# Patient Record
Sex: FEMALE | Race: WHITE | NOT HISPANIC OR LATINO | Employment: OTHER | URBAN - METROPOLITAN AREA
[De-identification: names, ages, dates, MRNs, and addresses within clinical notes are randomized per-mention and may not be internally consistent; named-entity substitution may affect disease eponyms.]

---

## 2017-06-22 ENCOUNTER — APPOINTMENT (EMERGENCY)
Dept: RADIOLOGY | Facility: HOSPITAL | Age: 67
End: 2017-06-22
Payer: COMMERCIAL

## 2017-06-22 ENCOUNTER — HOSPITAL ENCOUNTER (EMERGENCY)
Facility: HOSPITAL | Age: 67
Discharge: HOME | End: 2017-06-22
Attending: EMERGENCY MEDICINE | Admitting: EMERGENCY MEDICINE
Payer: COMMERCIAL

## 2017-06-22 VITALS
TEMPERATURE: 97.4 F | HEART RATE: 73 BPM | DIASTOLIC BLOOD PRESSURE: 74 MMHG | BODY MASS INDEX: 24.75 KG/M2 | HEIGHT: 64 IN | RESPIRATION RATE: 20 BRPM | OXYGEN SATURATION: 97 % | SYSTOLIC BLOOD PRESSURE: 149 MMHG | WEIGHT: 145 LBS

## 2017-06-22 DIAGNOSIS — K21.9 GASTROESOPHAGEAL REFLUX DISEASE WITHOUT ESOPHAGITIS: Primary | ICD-10-CM

## 2017-06-22 LAB
ALBUMIN SERPL BCP-MCNC: 4 G/DL (ref 3.5–5)
ALP SERPL-CCNC: 69 U/L (ref 46–116)
ALT SERPL W P-5'-P-CCNC: 24 U/L (ref 12–78)
ANION GAP SERPL CALCULATED.3IONS-SCNC: 9 MMOL/L (ref 4–13)
APTT PPP: 30 SECONDS (ref 23–35)
AST SERPL W P-5'-P-CCNC: 14 U/L (ref 5–45)
BASOPHILS # BLD AUTO: 0 THOUSANDS/ΜL (ref 0–0.1)
BASOPHILS NFR BLD AUTO: 0 % (ref 0–1)
BILIRUB SERPL-MCNC: 0.2 MG/DL (ref 0.2–1)
BUN SERPL-MCNC: 20 MG/DL (ref 5–25)
CALCIUM SERPL-MCNC: 9.1 MG/DL (ref 8.3–10.1)
CHLORIDE SERPL-SCNC: 103 MMOL/L (ref 100–108)
CO2 SERPL-SCNC: 24 MMOL/L (ref 21–32)
CREAT SERPL-MCNC: 0.78 MG/DL (ref 0.6–1.3)
EOSINOPHIL # BLD AUTO: 0.2 THOUSAND/ΜL (ref 0–0.61)
EOSINOPHIL NFR BLD AUTO: 2 % (ref 0–6)
ERYTHROCYTE [DISTWIDTH] IN BLOOD BY AUTOMATED COUNT: 13.9 % (ref 11.6–15.1)
GFR SERPL CREATININE-BSD FRML MDRD: >60 ML/MIN/1.73SQ M
GLUCOSE SERPL-MCNC: 107 MG/DL (ref 65–140)
HCT VFR BLD AUTO: 39.1 % (ref 37–47)
HGB BLD-MCNC: 13.4 G/DL (ref 12–16)
INR PPP: 0.97 (ref 0.86–1.16)
LYMPHOCYTES # BLD AUTO: 3.3 THOUSANDS/ΜL (ref 0.6–4.47)
LYMPHOCYTES NFR BLD AUTO: 41 % (ref 14–44)
MCH RBC QN AUTO: 33.3 PG (ref 27–31)
MCHC RBC AUTO-ENTMCNC: 34.2 G/DL (ref 31.4–37.4)
MCV RBC AUTO: 97 FL (ref 82–98)
MONOCYTES # BLD AUTO: 0.5 THOUSAND/ΜL (ref 0.17–1.22)
MONOCYTES NFR BLD AUTO: 7 % (ref 4–12)
NEUTROPHILS # BLD AUTO: 4.1 THOUSANDS/ΜL (ref 1.85–7.62)
NEUTS SEG NFR BLD AUTO: 50 % (ref 43–75)
NRBC BLD AUTO-RTO: 0 /100 WBCS
PLATELET # BLD AUTO: 317 THOUSANDS/UL (ref 130–400)
PMV BLD AUTO: 6.8 FL (ref 8.9–12.7)
POTASSIUM SERPL-SCNC: 3.6 MMOL/L (ref 3.5–5.3)
PROT SERPL-MCNC: 7.1 G/DL (ref 6.4–8.2)
PROTHROMBIN TIME: 10.2 SECONDS (ref 9.4–11.7)
RBC # BLD AUTO: 4.02 MILLION/UL (ref 4.2–5.4)
SODIUM SERPL-SCNC: 136 MMOL/L (ref 136–145)
TROPONIN I SERPL-MCNC: <0.02 NG/ML
WBC # BLD AUTO: 8.2 THOUSAND/UL (ref 4.8–10.8)

## 2017-06-22 PROCEDURE — 93005 ELECTROCARDIOGRAM TRACING: CPT

## 2017-06-22 PROCEDURE — 80053 COMPREHEN METABOLIC PANEL: CPT

## 2017-06-22 PROCEDURE — 85025 COMPLETE CBC W/AUTO DIFF WBC: CPT

## 2017-06-22 PROCEDURE — 71010 HB CHEST X-RAY 1 VIEW FRONTAL (PORTABLE): CPT

## 2017-06-22 PROCEDURE — 84484 ASSAY OF TROPONIN QUANT: CPT

## 2017-06-22 PROCEDURE — 85730 THROMBOPLASTIN TIME PARTIAL: CPT

## 2017-06-22 PROCEDURE — 99285 EMERGENCY DEPT VISIT HI MDM: CPT

## 2017-06-22 PROCEDURE — 85610 PROTHROMBIN TIME: CPT

## 2017-06-22 PROCEDURE — 36415 COLL VENOUS BLD VENIPUNCTURE: CPT

## 2017-06-22 RX ORDER — SERTRALINE HYDROCHLORIDE 100 MG/1
TABLET, FILM COATED ORAL
COMMUNITY

## 2017-06-22 RX ORDER — OMEPRAZOLE 20 MG/1
10 CAPSULE, DELAYED RELEASE ORAL 2 TIMES DAILY WITH MEALS
COMMUNITY

## 2017-06-22 RX ORDER — ALPRAZOLAM 0.25 MG/1
0.5 TABLET ORAL 4 TIMES DAILY PRN
COMMUNITY

## 2017-06-22 RX ORDER — FAMOTIDINE 20 MG/1
20 TABLET, FILM COATED ORAL DAILY
COMMUNITY

## 2017-06-22 RX ORDER — LISINOPRIL 10 MG/1
10 TABLET ORAL DAILY
COMMUNITY

## 2017-06-30 LAB
ATRIAL RATE: 78 BPM
P AXIS: 78 DEGREES
PR INTERVAL: 140 MS
QRS AXIS: 36 DEGREES
QRSD INTERVAL: 82 MS
QT INTERVAL: 360 MS
QTC INTERVAL: 410 MS
T WAVE AXIS: 59 DEGREES
VENTRICULAR RATE: 78 BPM

## 2017-08-09 ENCOUNTER — HOSPITAL ENCOUNTER (EMERGENCY)
Facility: HOSPITAL | Age: 67
Discharge: HOME | End: 2017-08-09
Attending: EMERGENCY MEDICINE | Admitting: EMERGENCY MEDICINE
Payer: COMMERCIAL

## 2017-08-09 ENCOUNTER — APPOINTMENT (EMERGENCY)
Dept: RADIOLOGY | Facility: HOSPITAL | Age: 67
End: 2017-08-09
Payer: COMMERCIAL

## 2017-08-09 VITALS
WEIGHT: 145 LBS | BODY MASS INDEX: 24.89 KG/M2 | DIASTOLIC BLOOD PRESSURE: 101 MMHG | OXYGEN SATURATION: 95 % | HEART RATE: 79 BPM | SYSTOLIC BLOOD PRESSURE: 143 MMHG | TEMPERATURE: 96.5 F | RESPIRATION RATE: 24 BRPM

## 2017-08-09 DIAGNOSIS — K21.9 GERD (GASTROESOPHAGEAL REFLUX DISEASE): ICD-10-CM

## 2017-08-09 DIAGNOSIS — F41.9 ANXIETY: ICD-10-CM

## 2017-08-09 DIAGNOSIS — R07.9 CHEST PAIN: Primary | ICD-10-CM

## 2017-08-09 PROCEDURE — 99283 EMERGENCY DEPT VISIT LOW MDM: CPT

## 2017-08-09 PROCEDURE — 93005 ELECTROCARDIOGRAM TRACING: CPT | Performed by: EMERGENCY MEDICINE

## 2017-08-09 PROCEDURE — 71010 HB CHEST X-RAY 1 VIEW FRONTAL (PORTABLE): CPT

## 2017-08-10 LAB
ATRIAL RATE: 72 BPM
P AXIS: 75 DEGREES
PR INTERVAL: 130 MS
QRS AXIS: 28 DEGREES
QRSD INTERVAL: 84 MS
QT INTERVAL: 366 MS
QTC INTERVAL: 400 MS
T WAVE AXIS: 60 DEGREES
VENTRICULAR RATE: 72 BPM

## 2017-10-30 ENCOUNTER — TRANSCRIBE ORDERS (OUTPATIENT)
Dept: ADMINISTRATIVE | Facility: HOSPITAL | Age: 67
End: 2017-10-30

## 2017-10-30 DIAGNOSIS — E04.1 NONTOXIC UNINODULAR GOITER: Primary | ICD-10-CM

## 2017-10-30 DIAGNOSIS — R14.0 ABDOMINAL DISTENTION: ICD-10-CM

## 2017-11-01 ENCOUNTER — HOSPITAL ENCOUNTER (OUTPATIENT)
Dept: RADIOLOGY | Facility: HOSPITAL | Age: 67
Discharge: HOME | End: 2017-11-01
Payer: COMMERCIAL

## 2017-11-01 DIAGNOSIS — E04.1 NONTOXIC UNINODULAR GOITER: ICD-10-CM

## 2017-11-01 PROCEDURE — 76536 US EXAM OF HEAD AND NECK: CPT

## 2017-11-10 ENCOUNTER — TRANSCRIBE ORDERS (OUTPATIENT)
Dept: ADMINISTRATIVE | Facility: HOSPITAL | Age: 67
End: 2017-11-10

## 2017-11-10 DIAGNOSIS — R11.0 NAUSEA: ICD-10-CM

## 2017-11-10 DIAGNOSIS — R14.0 ABDOMINAL DISTENTION: Primary | ICD-10-CM

## 2018-08-19 ENCOUNTER — HOSPITAL ENCOUNTER (EMERGENCY)
Facility: HOSPITAL | Age: 68
Discharge: HOME | End: 2018-08-19
Attending: EMERGENCY MEDICINE
Payer: COMMERCIAL

## 2018-08-19 VITALS
DIASTOLIC BLOOD PRESSURE: 76 MMHG | WEIGHT: 143 LBS | HEIGHT: 64 IN | SYSTOLIC BLOOD PRESSURE: 160 MMHG | HEART RATE: 76 BPM | OXYGEN SATURATION: 96 % | BODY MASS INDEX: 24.41 KG/M2 | TEMPERATURE: 97.5 F | RESPIRATION RATE: 18 BRPM

## 2018-08-19 DIAGNOSIS — K21.9 ACID REFLUX: Primary | ICD-10-CM

## 2018-08-19 PROCEDURE — 99283 EMERGENCY DEPT VISIT LOW MDM: CPT

## 2018-08-19 RX ORDER — FAMOTIDINE 10 MG
10 TABLET ORAL
COMMUNITY
End: 2021-10-08

## 2018-08-19 RX ORDER — FAMOTIDINE 10 MG
10 TABLET ORAL
COMMUNITY

## 2018-08-19 NOTE — DISCHARGE INSTRUCTIONS
Gastroesophageal Reflux Disease   WHAT YOU NEED TO KNOW:   Gastroesophageal reflux occurs when acid and food in the stomach back up into the esophagus  Gastroesophageal reflux disease (GERD) is reflux that occurs more than twice a week for a few weeks  It usually causes heartburn and other symptoms  GERD can cause other health problems over time if it is not treated  DISCHARGE INSTRUCTIONS:   Seek care immediately if:   · You feel full and cannot burp or vomit  · You have severe chest pain and sudden trouble breathing  · Your bowel movements are black, bloody, or tarry-looking  · Your vomit looks like coffee grounds or has blood in it  Contact your healthcare provider if:   · You vomit large amounts, or you vomit often  · You have trouble breathing after you vomit  · You have trouble swallowing, or pain with swallowing  · You are losing weight without trying  · Your symptoms get worse or do not improve with treatment  · You have questions or concerns about your condition or care  Medicines:   · Medicines  are used to decrease stomach acid  Medicine may also be used to help your lower esophageal sphincter and stomach contract (tighten) more  · Take your medicine as directed  Contact your healthcare provider if you think your medicine is not helping or if you have side effects  Tell him or her if you are allergic to any medicine  Keep a list of the medicines, vitamins, and herbs you take  Include the amounts, and when and why you take them  Bring the list or the pill bottles to follow-up visits  Carry your medicine list with you in case of an emergency  Manage GERD:   · Do not have foods or drinks that may increase heartburn  These include chocolate, peppermint, fried or fatty foods, drinks that contain caffeine, or carbonated drinks (soda)  Other foods include spicy foods, onions, tomatoes, and tomato-based foods   Do not have foods or drinks that can irritate your esophagus, such as citrus fruits, juices, and alcohol  · Do not eat large meals  When you eat a lot of food at one time, your stomach needs more acid to digest it  Eat 6 small meals each day instead of 3 large ones, and eat slowly  Do not eat meals 2 to 3 hours before bedtime  · Elevate the head of your bed  Place 6-inch blocks under the head of your bed frame  You may also use more than one pillow under your head and shoulders while you sleep  · Maintain a healthy weight  If you are overweight, weight loss may help relieve symptoms of GERD  · Do not smoke  Smoking weakens the lower esophageal sphincter and increases the risk of GERD  Ask your healthcare provider for information if you currently smoke and need help to quit  E-cigarettes or smokeless tobacco still contain nicotine  Talk to your healthcare provider before you use these products  · Do not wear clothing that is tight around your waist   Tight clothing can put pressure on your stomach and cause or worsen GERD symptoms  Follow up with your healthcare provider as directed:  Write down your questions so you remember to ask them during your visits  © 2017 [facility] Information is for End User's use only and may not be sold, redistributed or otherwise used for commercial purposes  All illustrations and images included in CareNotes® are the copyrighted property of A D A M [employer]  or [employer]  The above information is an  only  It is not intended as medical advice for individual conditions or treatments  Talk to your doctor, nurse or pharmacist before following any medical regimen to see if it is safe and effective for you

## 2018-08-19 NOTE — ED PROVIDER NOTES
History  Chief Complaint   Patient presents with    Sore Throat - Complicated     patient states her throat started burning about 90 minutes before presentation  States when she gets this symptom is usually last 2 5-3 5 hours  States this happened a few years ago when she took amoxil and also this past Thursday  Denies SOB or chest pain  Denies problems breathing  States she is having more secretions than usual      HPI    This is a 60-year-old female with history of acid reflux, anxiety  Patient states she has history of acid reflux she has been advised not to drink excessive amounts of caffeine, drinking or smoking but she continues to  States she states she had an extra strong coffee this morning and started having this burning sensation in her epigastric area that is going up to her throat  She states her throat was burning  She states this happened last Thursday along with a year ago  She went to see her PCP last Thursday who stated acid reflex  Currently she takes Pepcid, omeprazole, Maalox  States because of that she got very anxious and came to the ED  Currently her symptoms are getting better  No trouble swallowing  No swelling  Denies sensation of throat closing  No history of anaphylaxis  States she just wanted somebody to talk to and now she feels better  78 year female presenting had acid reflux I discussed with patient this is most likely acid reflux and anxiety acting  I discussed about discontinuing alcohol along with smoking  Patient understands and states she will try  Patient will follow up with her gastroenterologist as needed  Prior to Admission Medications   Prescriptions Last Dose Informant Patient Reported? Taking?    ALPRAZolam (XANAX) 0 25 mg tablet 8/19/2018 at 1400 Self Yes Yes   Sig: Take 0 5 mg by mouth 4 (four) times a day as needed for anxiety     aluminum-magnesium hydroxide 200-200 MG/5ML suspension 8/19/2018 at Unknown time  Yes Yes   Sig: Take 15 mL by mouth every 12 (twelve) hours     famotidine (PEPCID) 10 mg tablet 8/19/2018 at Unknown time  Yes Yes   Sig: Take 10 mg by mouth daily with lunch   famotidine (PEPCID) 10 mg tablet 8/18/2018 at Unknown time  Yes Yes   Sig: Take 10 mg by mouth daily with dinner   famotidine (PEPCID) 20 mg tablet 8/19/2018 at Unknown time  Yes Yes   Sig: Take 20 mg by mouth daily     lisinopril (ZESTRIL) 10 mg tablet 8/19/2018 at Unknown time Self Yes Yes   Sig: Take 10 mg by mouth daily   omeprazole (PriLOSEC) 20 mg delayed release capsule 8/19/2018 at Unknown time Self Yes Yes   Sig: Take 10 mg by mouth 2 (two) times a day with meals     sertraline (ZOLOFT) 100 mg tablet 8/19/2018 at Unknown time Self Yes Yes   Sig: Take by mouth daily        Facility-Administered Medications: None       Past Medical History:   Diagnosis Date    Anxiety     Depression     GERD (gastroesophageal reflux disease)     Hypertension        Past Surgical History:   Procedure Laterality Date    APPENDECTOMY         History reviewed  No pertinent family history  I have reviewed and agree with the history as documented  Social History   Substance Use Topics    Smoking status: Heavy Tobacco Smoker     Packs/day: 2 00    Smokeless tobacco: Never Used    Alcohol use Yes      Comment: every night has "a few" beers        Review of Systems   Constitutional: Negative  Negative for diaphoresis and fever  HENT: Negative for sore throat, trouble swallowing and voice change  Burning back of her throat    Respiratory: Negative  Negative for cough, shortness of breath and wheezing  Cardiovascular: Negative  Negative for chest pain, palpitations and leg swelling  Gastrointestinal: Negative for abdominal distention, abdominal pain, nausea and vomiting  Genitourinary: Negative  Musculoskeletal: Negative  Skin: Negative  Neurological: Negative  Psychiatric/Behavioral: Negative      All other systems reviewed and are negative  Physical Exam  Physical Exam   Constitutional: She is oriented to person, place, and time  She appears well-developed and well-nourished  No distress  HENT:   Head: Normocephalic and atraumatic  Eyes: EOM are normal  Pupils are equal, round, and reactive to light  Neck: Normal range of motion  Neck supple  Cardiovascular: Normal rate, regular rhythm and normal heart sounds  No murmur heard  Pulmonary/Chest: Effort normal and breath sounds normal  No respiratory distress  Abdominal: Soft  Bowel sounds are normal  She exhibits no distension  There is no tenderness  There is no rebound and no guarding  Musculoskeletal: Normal range of motion  She exhibits no edema or deformity  Neurological: She is alert and oriented to person, place, and time  Skin: Skin is warm and dry  She is not diaphoretic  Psychiatric: She has a normal mood and affect  Vitals reviewed        Vital Signs  ED Triage Vitals [08/19/18 1439]   Temperature Pulse Respirations Blood Pressure SpO2   97 5 °F (36 4 °C) 78 18 160/76 98 %      Temp Source Heart Rate Source Patient Position - Orthostatic VS BP Location FiO2 (%)   Oral Monitor Sitting Left arm --      Pain Score       No Pain           Vitals:    08/19/18 1439 08/19/18 1445   BP: 160/76 160/76   Pulse: 78 76   Patient Position - Orthostatic VS: Sitting        Visual Acuity      ED Medications  Medications - No data to display    Diagnostic Studies  Results Reviewed     None                 No orders to display              Procedures  Procedures       Phone Contacts  ED Phone Contact    ED Course                               MDM  CritCare Time    Disposition  Final diagnoses:   Acid reflux     Time reflects when diagnosis was documented in both MDM as applicable and the Disposition within this note     Time User Action Codes Description Comment    8/19/2018  3:09 PM Gerber Hall  8  [K21 9] Acid reflux       ED Disposition     ED Disposition Condition Comment    Discharge  Millicent Ayers discharge to home/self care  Condition at discharge: Good        Follow-up Information     Follow up With Specialties Details Why [address] [facility] [address] [facility] Schedule an appointment as soon as possible for a visit  [address] Juliann Valadez [facility] [address] [place] [address] Nakul Santos - [facility]  [phone]          Patient's Medications   Discharge Prescriptions    No medications on file     No discharge procedures on file      ED Provider  Electronically Signed by           Genoveva Bello MD  08/19/18 7417

## 2019-01-15 ENCOUNTER — HOSPITAL ENCOUNTER (EMERGENCY)
Facility: HOSPITAL | Age: 69
Discharge: HOME | End: 2019-01-15
Attending: EMERGENCY MEDICINE | Admitting: EMERGENCY MEDICINE
Payer: COMMERCIAL

## 2019-01-15 ENCOUNTER — APPOINTMENT (EMERGENCY)
Dept: RADIOLOGY | Facility: HOSPITAL | Age: 69
End: 2019-01-15
Payer: COMMERCIAL

## 2019-01-15 VITALS
BODY MASS INDEX: 24.43 KG/M2 | HEART RATE: 78 BPM | HEIGHT: 64 IN | RESPIRATION RATE: 18 BRPM | SYSTOLIC BLOOD PRESSURE: 155 MMHG | OXYGEN SATURATION: 98 % | TEMPERATURE: 97.1 F | DIASTOLIC BLOOD PRESSURE: 73 MMHG | WEIGHT: 143.08 LBS

## 2019-01-15 DIAGNOSIS — R10.13 EPIGASTRIC PAIN: Primary | ICD-10-CM

## 2019-01-15 DIAGNOSIS — K21.9 GERD (GASTROESOPHAGEAL REFLUX DISEASE): ICD-10-CM

## 2019-01-15 LAB
ALBUMIN SERPL BCP-MCNC: 3.9 G/DL (ref 3.5–5)
ALP SERPL-CCNC: 71 U/L (ref 46–116)
ALT SERPL W P-5'-P-CCNC: 21 U/L (ref 12–78)
ANION GAP SERPL CALCULATED.3IONS-SCNC: 7 MMOL/L (ref 4–13)
APTT PPP: 29 SECONDS (ref 24–33)
AST SERPL W P-5'-P-CCNC: 12 U/L (ref 5–45)
BASOPHILS # BLD AUTO: 0.04 THOUSANDS/ΜL (ref 0–0.1)
BASOPHILS NFR BLD AUTO: 0 % (ref 0–1)
BILIRUB SERPL-MCNC: 0.3 MG/DL (ref 0.2–1)
BILIRUB UR QL STRIP: NEGATIVE
BUN SERPL-MCNC: 19 MG/DL (ref 5–25)
CALCIUM SERPL-MCNC: 9.1 MG/DL (ref 8.3–10.1)
CHLORIDE SERPL-SCNC: 100 MMOL/L (ref 100–108)
CLARITY UR: CLEAR
CO2 SERPL-SCNC: 29 MMOL/L (ref 21–32)
COLOR UR: NORMAL
CREAT SERPL-MCNC: 0.8 MG/DL (ref 0.6–1.3)
EOSINOPHIL # BLD AUTO: 0.13 THOUSAND/ΜL (ref 0–0.61)
EOSINOPHIL NFR BLD AUTO: 2 % (ref 0–6)
ERYTHROCYTE [DISTWIDTH] IN BLOOD BY AUTOMATED COUNT: 13.7 % (ref 11.6–15.1)
GFR SERPL CREATININE-BSD FRML MDRD: 77 ML/MIN/1.73SQ M
GLUCOSE SERPL-MCNC: 129 MG/DL (ref 65–140)
GLUCOSE UR STRIP-MCNC: NEGATIVE MG/DL
HCT VFR BLD AUTO: 41.7 % (ref 34.8–46.1)
HGB BLD-MCNC: 13.4 G/DL (ref 11.5–15.4)
HGB UR QL STRIP.AUTO: NEGATIVE
IMM GRANULOCYTES # BLD AUTO: 0.02 THOUSAND/UL (ref 0–0.2)
IMM GRANULOCYTES NFR BLD AUTO: 0 % (ref 0–2)
INR PPP: 0.93 (ref 0.86–1.16)
KETONES UR STRIP-MCNC: NEGATIVE MG/DL
LEUKOCYTE ESTERASE UR QL STRIP: NEGATIVE
LIPASE SERPL-CCNC: 120 U/L (ref 73–393)
LYMPHOCYTES # BLD AUTO: 2.86 THOUSANDS/ΜL (ref 0.6–4.47)
LYMPHOCYTES NFR BLD AUTO: 32 % (ref 14–44)
MAGNESIUM SERPL-MCNC: 2.1 MG/DL (ref 1.6–2.6)
MCH RBC QN AUTO: 32 PG (ref 26.8–34.3)
MCHC RBC AUTO-ENTMCNC: 32.1 G/DL (ref 31.4–37.4)
MCV RBC AUTO: 100 FL (ref 82–98)
MONOCYTES # BLD AUTO: 0.64 THOUSAND/ΜL (ref 0.17–1.22)
MONOCYTES NFR BLD AUTO: 7 % (ref 4–12)
NEUTROPHILS # BLD AUTO: 5.21 THOUSANDS/ΜL (ref 1.85–7.62)
NEUTS SEG NFR BLD AUTO: 59 % (ref 43–75)
NITRITE UR QL STRIP: NEGATIVE
NRBC BLD AUTO-RTO: 0 /100 WBCS
PH UR STRIP.AUTO: 6.5 [PH] (ref 5–9)
PHOSPHATE SERPL-MCNC: 2.7 MG/DL (ref 2.3–4.1)
PLATELET # BLD AUTO: 321 THOUSANDS/UL (ref 149–390)
PMV BLD AUTO: 9.2 FL (ref 8.9–12.7)
POTASSIUM SERPL-SCNC: 3.9 MMOL/L (ref 3.5–5.3)
PROT SERPL-MCNC: 7.1 G/DL (ref 6.4–8.2)
PROT UR STRIP-MCNC: NEGATIVE MG/DL
PROTHROMBIN TIME: 9.8 SECONDS (ref 9.4–11.7)
RBC # BLD AUTO: 4.19 MILLION/UL (ref 3.81–5.12)
SODIUM SERPL-SCNC: 136 MMOL/L (ref 136–145)
SP GR UR STRIP.AUTO: 1.01 (ref 1–1.03)
TROPONIN I SERPL-MCNC: <0.02 NG/ML
TROPONIN I SERPL-MCNC: <0.02 NG/ML
UROBILINOGEN UR QL STRIP.AUTO: 0.2 E.U./DL
WBC # BLD AUTO: 8.9 THOUSAND/UL (ref 4.31–10.16)

## 2019-01-15 PROCEDURE — 85610 PROTHROMBIN TIME: CPT | Performed by: EMERGENCY MEDICINE

## 2019-01-15 PROCEDURE — 84484 ASSAY OF TROPONIN QUANT: CPT | Performed by: EMERGENCY MEDICINE

## 2019-01-15 PROCEDURE — 83690 ASSAY OF LIPASE: CPT | Performed by: EMERGENCY MEDICINE

## 2019-01-15 PROCEDURE — 99284 EMERGENCY DEPT VISIT MOD MDM: CPT

## 2019-01-15 PROCEDURE — 93005 ELECTROCARDIOGRAM TRACING: CPT

## 2019-01-15 PROCEDURE — 85730 THROMBOPLASTIN TIME PARTIAL: CPT | Performed by: EMERGENCY MEDICINE

## 2019-01-15 PROCEDURE — 83735 ASSAY OF MAGNESIUM: CPT | Performed by: EMERGENCY MEDICINE

## 2019-01-15 PROCEDURE — 96360 HYDRATION IV INFUSION INIT: CPT

## 2019-01-15 PROCEDURE — 81003 URINALYSIS AUTO W/O SCOPE: CPT | Performed by: EMERGENCY MEDICINE

## 2019-01-15 PROCEDURE — 85025 COMPLETE CBC W/AUTO DIFF WBC: CPT | Performed by: EMERGENCY MEDICINE

## 2019-01-15 PROCEDURE — 74022 RADEX COMPL AQT ABD SERIES: CPT

## 2019-01-15 PROCEDURE — 96361 HYDRATE IV INFUSION ADD-ON: CPT

## 2019-01-15 PROCEDURE — 36415 COLL VENOUS BLD VENIPUNCTURE: CPT | Performed by: EMERGENCY MEDICINE

## 2019-01-15 PROCEDURE — 84100 ASSAY OF PHOSPHORUS: CPT | Performed by: EMERGENCY MEDICINE

## 2019-01-15 PROCEDURE — 80053 COMPREHEN METABOLIC PANEL: CPT | Performed by: EMERGENCY MEDICINE

## 2019-01-15 RX ORDER — SUCRALFATE ORAL 1 G/10ML
1000 SUSPENSION ORAL ONCE
Status: DISCONTINUED | OUTPATIENT
Start: 2019-01-15 | End: 2019-01-15 | Stop reason: HOSPADM

## 2019-01-15 RX ORDER — PANTOPRAZOLE SODIUM 40 MG/1
40 INJECTION, POWDER, FOR SOLUTION INTRAVENOUS ONCE
Status: DISCONTINUED | OUTPATIENT
Start: 2019-01-15 | End: 2019-01-15 | Stop reason: HOSPADM

## 2019-01-15 RX ADMIN — SODIUM CHLORIDE 500 ML: 0.9 INJECTION, SOLUTION INTRAVENOUS at 15:20

## 2019-01-15 NOTE — ED NOTES
Pt reportedly left building in street clothes with NSL in place  Security informed, then patient returned ambulatory back to ER  Went to car to smoke a cigarette ,  Advised patient facility is non smoking & leaving building with NSL in place is not acceptable  Pt aware & requested patient  to change into hospital gown, pt refused  Suggested patient stay in room & not in lagunas/roaming department or leaving building & pulled long sleeved shirt up her arm to make NSL visible to hospital staff        Mila Marks, RN  01/15/19 9620

## 2019-01-15 NOTE — Clinical Note
Date: 1/15/2019  Patient: Rocio Reed  Admitted: 1/15/2019  3:01 PM  Attending Provider: Rosina Lee DO    Rocio Reed or her authorized caregiver has made the decision for the patient to leave the emergency department against the advice  of the emergency department staff  She or her authorized caregiver has been informed and understands the inherent risks, including death, worsening pain  She or her authorized caregiver has decided to accept the responsibility for this decision  Dillon Raheem dirk Gutierrezjennifer and all necessary parties have been advised that she may return for further evaluation or treatment  Her condition at time of discharge was stable    Rocio Reed had current vital signs as follows:  /73 (BP Location: Right arm)    Pulse 78   Temp (!) 97 1 °F (36 2 °C) (Oral)   Resp 18   Ht 5' 4" (1 626 m)   Wt 64 9 kg (143 lb 1 3 oz)

## 2019-01-15 NOTE — ED PROVIDER NOTES
History  Chief Complaint   Patient presents with    Heartburn     pt presents to ED with hx of acid reflux  pt states "its way worse and much sharper today" pt states "i want to just get checked out because I'm nervous that the pain is in my chest" pt states she has a hx of anxiety and it flares up with her acid reflux     72-year-old female with past medical history of GERD, hypertension, depression, presents to the ER with complaint of reflux and epigastric pain since this morning  Patient states that she has been getting intubated soft for dinner last night  Patient woke up and had some strong coffee without eating breakfast   Patient then started to feel burning sensation epigastric pain radiating to her chest   Patient became worried once the pain started to radiate to her chest as this does not usually happen with her reflux  Patient is compliant with her Maalox, Pepcid, omeprazole at home for her GERD  Patient denies any previous history of cardiovascular disease  Patient denies any family history of cardiovascular disease  Patient appears to be extremely anxious during interview  Patient states she did take her Xanax earlier today for her anxiety  Patient came to the ER to make sure that her symptoms are not related to any cardiac origin  Patient is followed closely by her PCP  Patient states that she had a stress test about 2 years ago that was unremarkable  Patient is not seen by any cardiologist         History provided by:  Patient  Heartburn   Associated symptoms: chest pain    Associated symptoms: no abdominal pain, no congestion, no cough, no diarrhea, no ear pain, no fever, no headaches, no nausea, no rash, no shortness of breath and no wheezing        Prior to Admission Medications   Prescriptions Last Dose Informant Patient Reported? Taking?    ALPRAZolam (XANAX) 0 25 mg tablet  Self Yes No   Sig: Take 0 5 mg by mouth 4 (four) times a day as needed for anxiety     aluminum-magnesium hydroxide 200-200 MG/5ML suspension   Yes No   Sig: Take 15 mL by mouth every 12 (twelve) hours     famotidine (PEPCID) 10 mg tablet   Yes No   Sig: Take 10 mg by mouth daily with lunch   famotidine (PEPCID) 10 mg tablet   Yes No   Sig: Take 10 mg by mouth daily with dinner   famotidine (PEPCID) 20 mg tablet   Yes No   Sig: Take 20 mg by mouth daily     lisinopril (ZESTRIL) 10 mg tablet  Self Yes No   Sig: Take 10 mg by mouth daily   omeprazole (PriLOSEC) 20 mg delayed release capsule  Self Yes No   Sig: Take 10 mg by mouth 2 (two) times a day with meals     sertraline (ZOLOFT) 100 mg tablet  Self Yes No   Sig: Take by mouth daily        Facility-Administered Medications: None       Past Medical History:   Diagnosis Date    Anxiety     Depression     GERD (gastroesophageal reflux disease)     Hypertension        Past Surgical History:   Procedure Laterality Date    APPENDECTOMY         History reviewed  No pertinent family history  I have reviewed and agree with the history as documented  Social History   Substance Use Topics    Smoking status: Heavy Tobacco Smoker     Packs/day: 2 00    Smokeless tobacco: Never Used    Alcohol use Yes      Comment: every night has "a few" beers        Review of Systems   Constitutional: Negative for activity change, appetite change, chills and fever  HENT: Negative for congestion and ear pain  Eyes: Negative for pain and discharge  Respiratory: Negative for cough, chest tightness, shortness of breath, wheezing and stridor  Cardiovascular: Positive for chest pain  Negative for palpitations  Gastrointestinal: Negative for abdominal distention, abdominal pain, constipation, diarrhea and nausea  Epigastric pain   Endocrine: Negative for cold intolerance  Genitourinary: Negative for dysuria, frequency and urgency  Musculoskeletal: Negative for arthralgias and back pain  Skin: Negative for color change and rash     Allergic/Immunologic: Negative for environmental allergies and food allergies  Neurological: Negative for dizziness, weakness, numbness and headaches  Hematological: Negative for adenopathy  Psychiatric/Behavioral: Negative for agitation, behavioral problems and confusion  The patient is not nervous/anxious  All other systems reviewed and are negative  Physical Exam  Physical Exam   Constitutional: She is oriented to person, place, and time  She appears well-developed and well-nourished  HENT:   Head: Normocephalic and atraumatic  Mouth/Throat: Oropharynx is clear and moist    Eyes: Conjunctivae and EOM are normal    Neck: Normal range of motion  Neck supple  Cardiovascular: Normal rate, regular rhythm, normal heart sounds and intact distal pulses  Pulmonary/Chest: Effort normal and breath sounds normal    Lungs are clear to auscultation  No chest wall tenderness noted to palpation  Abdominal: Soft  Bowel sounds are normal  She exhibits no distension  There is no tenderness  Abdomen is soft  Bowel sounds present all 4 quadrant  Mild tenderness noted to palpation along the epigastric region  Musculoskeletal: Normal range of motion  Neurological: She is alert and oriented to person, place, and time  Skin: Skin is warm and dry  Psychiatric: She has a normal mood and affect  Her behavior is normal  Judgment and thought content normal    Nursing note and vitals reviewed        Vital Signs  ED Triage Vitals [01/15/19 1508]   Temperature Pulse Respirations Blood Pressure SpO2   (!) 97 1 °F (36 2 °C) 78 18 155/73 98 %      Temp Source Heart Rate Source Patient Position - Orthostatic VS BP Location FiO2 (%)   Oral Monitor Sitting Right arm --      Pain Score       --           Vitals:    01/15/19 1508   BP: 155/73   Pulse: 78   Patient Position - Orthostatic VS: Sitting       Visual Acuity      ED Medications  Medications   sodium chloride 0 9 % bolus 500 mL (0 mL Intravenous Stopped 1/15/19 1348)       Diagnostic Studies  Results Reviewed     Procedure Component Value Units Date/Time    Troponin I [[id number]]  (Normal) Collected:  01/15/19 1808    Lab Status:  Final result Specimen:  Blood from Arm, Left Updated:  01/15/19 1833     Troponin I <0 02 ng/mL     UA w Reflex to Microscopic w Reflex to Culture [[id number]] Collected:  01/15/19 1710    Lab Status:  Final result Specimen:  Urine from Urine, Clean Catch Updated:  01/15/19 1719     Color, UA Light Yellow     Clarity, UA Clear     Specific Gravity, UA 1 010     pH, UA 6 5     Leukocytes, UA Negative     Nitrite, UA Negative     Protein, UA Negative mg/dl      Glucose, UA Negative mg/dl      Ketones, UA Negative mg/dl      Urobilinogen, UA 0 2 E U /dl      Bilirubin, UA Negative     Blood, UA Negative    Protime-INR [[id number]]  (Normal) Collected:  01/15/19 1520    Lab Status:  Final result Specimen:  Blood from Arm, Left Updated:  01/15/19 1552     Protime 9 8 seconds      INR 0 93    APTT [[id number]]  (Normal) Collected:  01/15/19 1520    Lab Status:  Final result Specimen:  Blood from Arm, Left Updated:  01/15/19 1552     PTT 29 seconds     Troponin I [[id number]]  (Normal) Collected:  01/15/19 1520    Lab Status:  Final result Specimen:  Blood from Arm, Left Updated:  01/15/19 1547     Troponin I <0 02 ng/mL     Comprehensive metabolic panel [[id number]] Collected:  01/15/19 1520    Lab Status:  Final result Specimen:  Blood from Arm, Left Updated:  01/15/19 1546     Sodium 136 mmol/L      Potassium 3 9 mmol/L      Chloride 100 mmol/L      CO2 29 mmol/L      ANION GAP 7 mmol/L      BUN 19 mg/dL      Creatinine 0 80 mg/dL      Glucose 129 mg/dL      Calcium 9 1 mg/dL      AST 12 U/L      ALT 21 U/L      Alkaline Phosphatase 71 U/L      Total Protein 7 1 g/dL      Albumin 3 9 g/dL      Total Bilirubin 0 30 mg/dL      eGFR 77 ml/min/1 73sq m     Narrative:         National Kidney Disease Education Program recommendations are as follows:  GFR calculation is accurate only with a steady state creatinine  Chronic Kidney disease less than 60 ml/min/1 73 sq  meters  Kidney failure less than 15 ml/min/1 73 sq  meters  Phosphorus [[id number]]  (Normal) Collected:  01/15/19 1520    Lab Status:  Final result Specimen:  Blood from Arm, Left Updated:  01/15/19 1546     Phosphorus 2 7 mg/dL     Magnesium [[id number]]  (Normal) Collected:  01/15/19 1520    Lab Status:  Final result Specimen:  Blood from Arm, Left Updated:  01/15/19 1546     Magnesium 2 1 mg/dL     Lipase [[id number]]  (Normal) Collected:  01/15/19 1520    Lab Status:  Final result Specimen:  Blood from Arm, Left Updated:  01/15/19 1546     Lipase 120 u/L     CBC and differential [[id number]]  (Abnormal) Collected:  01/15/19 1520    Lab Status:  Final result Specimen:  Blood from Arm, Left Updated:  01/15/19 1528     WBC 8 90 Thousand/uL      RBC 4 19 Million/uL      Hemoglobin 13 4 g/dL      Hematocrit 41 7 %       (H) fL      MCH 32 0 pg      MCHC 32 1 g/dL      RDW 13 7 %      MPV 9 2 fL      Platelets 331 Thousands/uL      nRBC 0 /100 WBCs      Neutrophils Relative 59 %      Immat GRANS % 0 %      Lymphocytes Relative 32 %      Monocytes Relative 7 %      Eosinophils Relative 2 %      Basophils Relative 0 %      Neutrophils Absolute 5 21 Thousands/µL      Immature Grans Absolute 0 02 Thousand/uL      Lymphocytes Absolute 2 86 Thousands/µL      Monocytes Absolute 0 64 Thousand/µL      Eosinophils Absolute 0 13 Thousand/µL      Basophils Absolute 0 04 Thousands/µL                  XR abdomen obstruction series   Final Result by Edmond Cassidy MD (01/15 1700)      Nonobstructive bowel gas pattern           Workstation performed: [id number]                    Procedures  ECG 12 Lead Documentation  Date/Time: 1/15/2019 3:19 PM  Performed by: Toña Marie  Authorized by: Toña Marie     Indications / Diagnosis:  Epigastric pain  ECG reviewed by me, the ED Provider: yes    Patient location:  ED  Previous ECG: Previous ECG:  Compared to current    Similarity:  No change    Comparison to cardiac monitor: Yes    Interpretation:     Interpretation: normal    Comments:      Sinus rhythm, rate 73, normal axis, normal intervals, no acute ST/T-wave abnormalities noted, otherwise unremarkable EKG, unchanged from previous study  Phone Contacts  ED Phone Contact    ED Course  ED Course as of Andrea 15 2225   Tue Andrea 15, 2019   1618 Patient very anxious and has refused the Carafate and Protonix saying that she is feeling better  1642 Patient requesting to sign out against medical advice  She is extremely and chest and refusing anything for anxiety  Patient does not want to wait for her 2nd troponin    1700 When nurse went to give patient AMA form, pt changed her mind and decided to stay for second troponin  Pt will be discharged after second troponin is resulted  1845 Second troponin is negative  At this point pt is discharged home with follow up to PCP, GI and cardiology  HEART Risk Score      Most Recent Value   History  0 Filed at: 01/15/2019 1648   ECG  0 Filed at: 01/15/2019 1648   Age  2 Filed at: 01/15/2019 1648   Risk Factors  1 Filed at: 01/15/2019 1648   Troponin  0 Filed at: 01/15/2019 1648   Heart Score Risk Calculator   History  0 Filed at: 01/15/2019 1648   ECG  0 Filed at: 01/15/2019 1648   Age  2 Filed at: 01/15/2019 1648   Risk Factors  1 Filed at: 01/15/2019 1648   Troponin  0 Filed at: 01/15/2019 1648   HEART Score  3 Filed at: 01/15/2019 1648   HEART Score  3 Filed at: 01/15/2019 1648                            MDM  Number of Diagnoses or Management Options  Epigastric pain: new and requires workup  GERD (gastroesophageal reflux disease): new and requires workup  Diagnosis management comments: Obtain blood work, troponin, EKG, obstruction series,   Give IV fluids, Protonix, Carafate and re-evaluate symptoms         Amount and/or Complexity of Data Reviewed  Clinical lab tests: ordered and reviewed  Tests in the radiology section of CPT®: ordered and reviewed  Tests in the medicine section of CPT®: ordered and reviewed  Review and summarize past medical records: yes  Independent visualization of images, tracings, or specimens: yes    Risk of Complications, Morbidity, and/or Mortality  General comments: Patient refused any medications in the ER as she is very anxious was taking new medications  Patient states that her symptoms started to improve in the ER  Patient's EKG and 1st troponin was negative  Patient's heart score was low in the ER  I offered to do a 2nd troponin however patient initially was not agreeable and wanted to sign out AMA  When nurse went to give the [place] form, patient changed her mind  Patient's 2nd troponin was negative as well  At this point patient's symptoms are most likely secondary to GERD and unlikely due to any cardiac origin as her heart score is also low  At this point patient is discharged home with follow-up to PCP, GI, and Cardiology for a repeat stress test  Close return instructions given to return to the ER for any worsening symptoms  Patient agrees with discharge plan  Patient well appearing at time of discharge        Patient Progress  Patient progress: improved    CritCare Time    Disposition  Final diagnoses:   Epigastric pain   GERD (gastroesophageal reflux disease)     Time reflects when diagnosis was documented in both MDM as applicable and the Disposition within this note     Time User Action Codes Description Comment    1/15/2019  4:43 PM Negrito Sanchez Add [R10 13] Epigastric pain     1/15/2019  4:43 PM Vinnie Alonso Add [K21 9] GERD (gastroesophageal reflux disease)       ED Disposition     ED Disposition Condition Comment    Discharge          Follow-up Information     Follow up With Specialties Details Why Codi Gillespie MD Cardiology  To further discuss your symptoms of epigastric pain and possibly obtain an outpatient stress test  Qi U  76       Brianna Madison MD Family Medicine In 3 days  [address] MD Gastroenterology  Please follow up with her own GI doctor or call the number below to further discuss your symptoms of GERD and possibly obtain a follow-up EGD  Shonda Vargas  [phone]            Discharge Medication List as of 1/15/2019  6:44 PM      CONTINUE these medications which have NOT CHANGED    Details   ALPRAZolam (XANAX) 0 25 mg tablet Take 0 5 mg by mouth 4 (four) times a day as needed for anxiety  , Historical Med      aluminum-magnesium hydroxide 200-200 MG/5ML suspension Take 15 mL by mouth every 12 (twelve) hours  , Historical Med      !! famotidine (PEPCID) 10 mg tablet Take 10 mg by mouth daily with lunch, Historical Med      !! famotidine (PEPCID) 10 mg tablet Take 10 mg by mouth daily with dinner, Historical Med      !! famotidine (PEPCID) 20 mg tablet Take 20 mg by mouth daily  , Historical Med      lisinopril (ZESTRIL) 10 mg tablet Take 10 mg by mouth daily, Historical Med      omeprazole (PriLOSEC) 20 mg delayed release capsule Take 10 mg by mouth 2 (two) times a day with meals  , Historical Med      sertraline (ZOLOFT) 100 mg tablet Take by mouth daily  , Historical Med       !! - Potential duplicate medications found  Please discuss with provider  No discharge procedures on file      ED Provider  Electronically Signed by           Stan Hearn DO  01/15/19 9148

## 2019-01-15 NOTE — DISCHARGE INSTRUCTIONS
Please take a list of all of your medications and discharge paperwork with you to all of your follow-up medical visits  Please take all of your medications as directed  Please call your family doctor or return to the ER if you have increased shortness of breath, chest pain, fevers, chills, nausea, vomiting, diarrhea, or any other worsening symptoms  Epigastric Pain, Ambulatory Care   GENERAL INFORMATION:   Epigastric pain  is felt in the middle of the upper abdomen, between the ribs and the bellybutton  The pain may be mild or severe  Pain may spread from or to another part of your body  Epigastric pain may be a sign of a serious health problem that needs to be treated  Common symptoms include the following:   · Inflammation of your stomach, liver, pancreas, or intestines    · Heart problems, such as a heart attack    · Digestion problems, such as indigestion, GERD, or lactose intolerance    · Medical conditions, such as an ulcer, a hernia, irritable bowel syndrome (IBS), or cancer    · A blockage in your bowels or gallbladder    · A bladder infection    · An injury or previous surgery in your abdomen  Seek immediate care for the following symptoms:   · Heart attack symptoms:      ¨ Squeezing, pressure, or pain in your chest that lasts longer than 5 minutes or returns    ¨ Discomfort or pain in your back, neck, jaw, stomach, or arm     ¨ Trouble breathing    ¨ Nausea or vomiting    ¨ Lightheadedness or a sudden cold sweat, especially with chest pain or trouble breathing    · Severe pain that radiates to your jaw or back    · Severe pain that starts suddenly and quickly gets worse    · No ability to have a bowel movement, with vomiting    · Vomiting or coughing up blood    · Blood in your urine or bowel movement    · Feeling drowsy and slower than usual breathing  Treatment for epigastric pain  will depend on what is causing your pain  You may be given medicine to treat pain or to stop vomiting   You may also need medicines to reduce or control stomach acid, or to treat an infection  Manage your symptoms:   · Keep a record of your symptoms  Include when the pain starts, how long it lasts, and if it is sharp or dull  Also include any foods you ate or activities you did before the pain started  Keep track of anything that helped the pain  · Eat a variety of healthy foods  Healthy foods include fruits, vegetables, whole-grain breads, low-fat dairy products, beans, lean meats, and fish  Ask if you need to be on a special diet  Certain foods may cause your pain, such as alcohol or foods that are high in fat  You may need to eat smaller meals and to eat more often than usual     · Drink liquids as directed  Ask how much liquid to drink each day and which liquids are best for you  Do not have drinks that contain alcohol or caffeine  Follow up with your healthcare provider as directed:  Write down your questions so you remember to ask them during your visits  CARE AGREEMENT:   You have the right to help plan your care  Learn about your health condition and how it may be treated  Discuss treatment options with your caregivers to decide what care you want to receive  You always have the right to refuse treatment  The above information is an  only  It is not intended as medical advice for individual conditions or treatments  Talk to your doctor, nurse or pharmacist before following any medical regimen to see if it is safe and effective for you  © 2014 [address] is for End User's use only and may not be sold, redistributed or otherwise used for commercial purposes  All illustrations and images included in CareNotes® are the copyrighted property of [employer]  or [employer]  Gastroesophageal Reflux Disease   WHAT YOU NEED TO KNOW:   What is gastroesophageal reflux disease?   Gastroesophageal reflux occurs when acid and food in the stomach back up into the esophagus  Gastroesophageal reflux disease (GERD) is reflux that occurs more than twice a week for a few weeks  It usually causes heartburn and other symptoms  GERD can cause other health problems over time if it is not treated  What causes GERD? GERD often occurs when the lower muscle (sphincter) of the esophagus does not close properly  The sphincter normally opens to let food into the stomach  It then closes to keep food and stomach acid in the stomach  If the sphincter does not close properly, stomach acid and food back up (reflux) into the esophagus  The following may increase your risk for GERD:  · Certain foods such as spicy foods, chocolate, foods that contain caffeine, peppermint, and fried foods    · Hiatal hernia    · Certain medicines such as calcium channel blockers (used to treat high blood pressure), allergy medicines, sedatives, or antidepressants    · Pregnancy or obesity    · Lying down after a meal    · Drinking alcohol or smoking  What are the signs and symptoms of GERD? Heartburn is the most common symptom of GERD  You may feel burning pain in your chest or below the breast bone  This usually occurs after meals and spreads to your neck, jaw, or shoulder  The pain gets better when you change positions  You may also have any of the following:  · Bitter or acid taste in your mouth    · Dry cough    · Trouble swallowing or pain with swallowing    · Hoarseness or sore throat    · Frequent burping or hiccups    · Feeling of fullness soon after you start eating  How is GERD diagnosed? Your healthcare provider will ask about your symptoms and when they started  Tell him or her about other medical conditions you have, your eating habits, and your activities  You may also need any of the following:  · Esophageal pH monitoring  is used to place a small probe inside your esophagus and stomach to check the amount of acid       · An endoscopy  is a procedure used to look at the inside of your esophagus and stomach  An endoscope is a bendable tube with a light and camera on the end  Your healthcare provider may remove a small sample of tissue and send it to a lab for tests  · Upper GI x-rays  are done to take pictures of your stomach and intestines (bowel)  You may be given a chalky liquid to drink before the pictures are taken  This liquid helps your stomach and intestines show up better on the x-rays  · Esophageal manometry  is a test that shows how your esophagus pushes food and fluid to your stomach  It also shows the pressures in your esophagus and stomach  It may show a hiatal hernia  How is GERD treated? · Medicines  are used to decrease stomach acid  Medicine may also be used to help your lower esophageal sphincter and stomach contract (tighten) more  · Surgery  is done to wrap the upper part of the stomach around the esophageal sphincter  This will strengthen the sphincter and prevent reflux  How can I help manage GERD? · Do not have foods or drinks that may increase heartburn  These include chocolate, peppermint, fried or fatty foods, drinks that contain caffeine, or carbonated drinks (soda)  Other foods include spicy foods, onions, tomatoes, and tomato-based foods  Do not have foods or drinks that can irritate your esophagus, such as citrus fruits, juices, and alcohol  · Do not eat large meals  When you eat a lot of food at one time, your stomach needs more acid to digest it  Eat 6 small meals each day instead of 3 large ones, and eat slowly  Do not eat meals 2 to 3 hours before bedtime  · Elevate the head of your bed  Place 6-inch blocks under the head of your bed frame  You may also use more than one pillow under your head and shoulders while you sleep  · Maintain a healthy weight  If you are overweight, weight loss may help relieve symptoms of GERD  · Do not smoke  Smoking weakens the lower esophageal sphincter and increases the risk of GERD   Ask your healthcare provider for information if you currently smoke and need help to quit  E-cigarettes or smokeless tobacco still contain nicotine  Talk to your healthcare provider before you use these products  · Do not wear clothing that is tight around your waist   Tight clothing can put pressure on your stomach and cause or worsen GERD symptoms  When should I seek immediate care? · You feel full and cannot burp or vomit  · You have severe chest pain and sudden trouble breathing  · Your bowel movements are black, bloody, or tarry-looking  · Your vomit looks like coffee grounds or has blood in it  When should I contact my healthcare provider? · You vomit large amounts, or you vomit often  · You have trouble breathing after you vomit  · You have trouble swallowing, or pain with swallowing  · You are losing weight without trying  · Your symptoms get worse or do not improve with treatment  · You have questions or concerns about your condition or care  CARE AGREEMENT:   You have the right to help plan your care  Learn about your health condition and how it may be treated  Discuss treatment options with your caregivers to decide what care you want to receive  You always have the right to refuse treatment  The above information is an  only  It is not intended as medical advice for individual conditions or treatments  Talk to your doctor, nurse or pharmacist before following any medical regimen to see if it is safe and effective for you  © 2017 [address] is for End User's use only and may not be sold, redistributed or otherwise used for commercial purposes  All illustrations and images included in CareNotes® are the copyrighted property of [employer]  or [employer]

## 2019-01-15 NOTE — ED NOTES
Patient is panicking states "Haim why the dr wants to wait three hours for my next enzyme level  No other Dr  Has done this  I want to leave  Get me paperwork I am going to sign out " Dr Aragon Sayres notified and in room with patient        Brigido Tidwell RN  01/15/19 3236

## 2019-01-15 NOTE — ED NOTES
Risks of signing out AMA discussed with patient  Patient screamed "DEATH! MI! WHAT?!" Patient reassured that these are Possible risks of signing out  Patient screamed "I'm not signing out now! Ill stay but You aren't hooking me up to that monitor and I'm wearing my own clothes though " Dr Alexandre Camacho notified       Frank Cox, PASCUAL  01/15/19 1381

## 2019-01-17 LAB
ATRIAL RATE: 73 BPM
P AXIS: 58 DEGREES
PR INTERVAL: 128 MS
QRS AXIS: 20 DEGREES
QRSD INTERVAL: 82 MS
QT INTERVAL: 364 MS
QTC INTERVAL: 401 MS
T WAVE AXIS: 55 DEGREES
VENTRICULAR RATE: 73 BPM

## 2019-01-17 PROCEDURE — 93010 ELECTROCARDIOGRAM REPORT: CPT | Performed by: INTERNAL MEDICINE

## 2019-05-04 ENCOUNTER — HOSPITAL ENCOUNTER (EMERGENCY)
Facility: HOSPITAL | Age: 69
Discharge: HOME | End: 2019-05-04
Attending: EMERGENCY MEDICINE | Admitting: EMERGENCY MEDICINE
Payer: COMMERCIAL

## 2019-05-04 ENCOUNTER — APPOINTMENT (EMERGENCY)
Dept: RADIOLOGY | Facility: HOSPITAL | Age: 69
End: 2019-05-04
Payer: COMMERCIAL

## 2019-05-04 VITALS
SYSTOLIC BLOOD PRESSURE: 135 MMHG | BODY MASS INDEX: 25.23 KG/M2 | RESPIRATION RATE: 18 BRPM | TEMPERATURE: 99 F | OXYGEN SATURATION: 98 % | HEART RATE: 92 BPM | DIASTOLIC BLOOD PRESSURE: 69 MMHG | WEIGHT: 147 LBS

## 2019-05-04 DIAGNOSIS — F41.9 ANXIETY: Primary | ICD-10-CM

## 2019-05-04 LAB
ALBUMIN SERPL BCP-MCNC: 3.7 G/DL (ref 3.5–5)
ALP SERPL-CCNC: 72 U/L (ref 46–116)
ALT SERPL W P-5'-P-CCNC: 21 U/L (ref 12–78)
ANION GAP SERPL CALCULATED.3IONS-SCNC: 7 MMOL/L (ref 4–13)
AST SERPL W P-5'-P-CCNC: 13 U/L (ref 5–45)
BASOPHILS # BLD AUTO: 0.04 THOUSANDS/ΜL (ref 0–0.1)
BASOPHILS NFR BLD AUTO: 1 % (ref 0–1)
BILIRUB SERPL-MCNC: 0.3 MG/DL (ref 0.2–1)
BILIRUB UR QL STRIP: NEGATIVE
BUN SERPL-MCNC: 20 MG/DL (ref 5–25)
CALCIUM SERPL-MCNC: 9.1 MG/DL (ref 8.3–10.1)
CHLORIDE SERPL-SCNC: 103 MMOL/L (ref 100–108)
CLARITY UR: CLEAR
CO2 SERPL-SCNC: 27 MMOL/L (ref 21–32)
COLOR UR: YELLOW
CREAT SERPL-MCNC: 1.09 MG/DL (ref 0.6–1.3)
DEPRECATED D DIMER PPP: 300 NG/ML (FEU) (ref 190–520)
EOSINOPHIL # BLD AUTO: 0.14 THOUSAND/ΜL (ref 0–0.61)
EOSINOPHIL NFR BLD AUTO: 2 % (ref 0–6)
ERYTHROCYTE [DISTWIDTH] IN BLOOD BY AUTOMATED COUNT: 13.8 % (ref 11.6–15.1)
GFR SERPL CREATININE-BSD FRML MDRD: 53 ML/MIN/1.73SQ M
GLUCOSE SERPL-MCNC: 112 MG/DL (ref 65–140)
GLUCOSE UR STRIP-MCNC: NEGATIVE MG/DL
HCT VFR BLD AUTO: 38.5 % (ref 34.8–46.1)
HGB BLD-MCNC: 12.5 G/DL (ref 11.5–15.4)
HGB UR QL STRIP.AUTO: NEGATIVE
IMM GRANULOCYTES # BLD AUTO: 0.02 THOUSAND/UL (ref 0–0.2)
IMM GRANULOCYTES NFR BLD AUTO: 0 % (ref 0–2)
KETONES UR STRIP-MCNC: ABNORMAL MG/DL
LEUKOCYTE ESTERASE UR QL STRIP: NEGATIVE
LYMPHOCYTES # BLD AUTO: 2.93 THOUSANDS/ΜL (ref 0.6–4.47)
LYMPHOCYTES NFR BLD AUTO: 35 % (ref 14–44)
MAGNESIUM SERPL-MCNC: 2.2 MG/DL (ref 1.6–2.6)
MCH RBC QN AUTO: 32.7 PG (ref 26.8–34.3)
MCHC RBC AUTO-ENTMCNC: 32.5 G/DL (ref 31.4–37.4)
MCV RBC AUTO: 101 FL (ref 82–98)
MONOCYTES # BLD AUTO: 0.63 THOUSAND/ΜL (ref 0.17–1.22)
MONOCYTES NFR BLD AUTO: 7 % (ref 4–12)
NEUTROPHILS # BLD AUTO: 4.71 THOUSANDS/ΜL (ref 1.85–7.62)
NEUTS SEG NFR BLD AUTO: 55 % (ref 43–75)
NITRITE UR QL STRIP: NEGATIVE
NRBC BLD AUTO-RTO: 0 /100 WBCS
PH UR STRIP.AUTO: 6.5 [PH]
PLATELET # BLD AUTO: 281 THOUSANDS/UL (ref 149–390)
PMV BLD AUTO: 9 FL (ref 8.9–12.7)
POTASSIUM SERPL-SCNC: 3.9 MMOL/L (ref 3.5–5.3)
PROT SERPL-MCNC: 6.7 G/DL (ref 6.4–8.2)
PROT UR STRIP-MCNC: NEGATIVE MG/DL
RBC # BLD AUTO: 3.82 MILLION/UL (ref 3.81–5.12)
SODIUM SERPL-SCNC: 137 MMOL/L (ref 136–145)
SP GR UR STRIP.AUTO: 1.02 (ref 1–1.03)
TROPONIN I SERPL-MCNC: <0.02 NG/ML
TSH SERPL DL<=0.05 MIU/L-ACNC: 4.93 UIU/ML (ref 0.36–3.74)
UROBILINOGEN UR QL STRIP.AUTO: 0.2 E.U./DL
WBC # BLD AUTO: 8.47 THOUSAND/UL (ref 4.31–10.16)

## 2019-05-04 PROCEDURE — 71045 X-RAY EXAM CHEST 1 VIEW: CPT

## 2019-05-04 PROCEDURE — 80053 COMPREHEN METABOLIC PANEL: CPT | Performed by: EMERGENCY MEDICINE

## 2019-05-04 PROCEDURE — 85379 FIBRIN DEGRADATION QUANT: CPT | Performed by: EMERGENCY MEDICINE

## 2019-05-04 PROCEDURE — 99284 EMERGENCY DEPT VISIT MOD MDM: CPT

## 2019-05-04 PROCEDURE — 36415 COLL VENOUS BLD VENIPUNCTURE: CPT | Performed by: EMERGENCY MEDICINE

## 2019-05-04 PROCEDURE — 93005 ELECTROCARDIOGRAM TRACING: CPT

## 2019-05-04 PROCEDURE — 84443 ASSAY THYROID STIM HORMONE: CPT | Performed by: EMERGENCY MEDICINE

## 2019-05-04 PROCEDURE — 81003 URINALYSIS AUTO W/O SCOPE: CPT | Performed by: EMERGENCY MEDICINE

## 2019-05-04 PROCEDURE — 85025 COMPLETE CBC W/AUTO DIFF WBC: CPT | Performed by: EMERGENCY MEDICINE

## 2019-05-04 PROCEDURE — 83735 ASSAY OF MAGNESIUM: CPT | Performed by: EMERGENCY MEDICINE

## 2019-05-04 PROCEDURE — 84484 ASSAY OF TROPONIN QUANT: CPT | Performed by: EMERGENCY MEDICINE

## 2019-05-06 LAB
ATRIAL RATE: 72 BPM
P AXIS: 76 DEGREES
PR INTERVAL: 132 MS
QRS AXIS: 37 DEGREES
QRSD INTERVAL: 82 MS
QT INTERVAL: 354 MS
QTC INTERVAL: 387 MS
T WAVE AXIS: 59 DEGREES
VENTRICULAR RATE: 72 BPM

## 2019-05-06 PROCEDURE — 93010 ELECTROCARDIOGRAM REPORT: CPT | Performed by: INTERNAL MEDICINE

## 2019-07-04 ENCOUNTER — OFFICE VISIT (OUTPATIENT)
Dept: URGENT CARE | Facility: CLINIC | Age: 69
End: 2019-07-04
Payer: COMMERCIAL

## 2019-07-04 ENCOUNTER — HOSPITAL ENCOUNTER (EMERGENCY)
Facility: HOSPITAL | Age: 69
Discharge: HOME | End: 2019-07-04
Attending: EMERGENCY MEDICINE | Admitting: EMERGENCY MEDICINE
Payer: COMMERCIAL

## 2019-07-04 ENCOUNTER — APPOINTMENT (EMERGENCY)
Dept: RADIOLOGY | Facility: HOSPITAL | Age: 69
End: 2019-07-04
Payer: COMMERCIAL

## 2019-07-04 VITALS
WEIGHT: 149.5 LBS | BODY MASS INDEX: 25.52 KG/M2 | DIASTOLIC BLOOD PRESSURE: 96 MMHG | HEIGHT: 64 IN | HEART RATE: 86 BPM | OXYGEN SATURATION: 98 % | SYSTOLIC BLOOD PRESSURE: 162 MMHG | TEMPERATURE: 97.8 F | RESPIRATION RATE: 20 BRPM

## 2019-07-04 VITALS
OXYGEN SATURATION: 99 % | SYSTOLIC BLOOD PRESSURE: 146 MMHG | HEART RATE: 88 BPM | WEIGHT: 149.8 LBS | DIASTOLIC BLOOD PRESSURE: 70 MMHG | RESPIRATION RATE: 18 BRPM | BODY MASS INDEX: 25.57 KG/M2 | TEMPERATURE: 97.6 F | HEIGHT: 64 IN

## 2019-07-04 DIAGNOSIS — J40 BRONCHITIS: Primary | ICD-10-CM

## 2019-07-04 DIAGNOSIS — J20.9 ACUTE BRONCHITIS, UNSPECIFIED ORGANISM: Primary | ICD-10-CM

## 2019-07-04 PROCEDURE — 99283 EMERGENCY DEPT VISIT LOW MDM: CPT

## 2019-07-04 PROCEDURE — 71046 X-RAY EXAM CHEST 2 VIEWS: CPT

## 2019-07-04 PROCEDURE — 99213 OFFICE O/P EST LOW 20 MIN: CPT | Performed by: PHYSICIAN ASSISTANT

## 2019-07-04 NOTE — PROGRESS NOTES
[facility]        NAME: Gilbert Mitchell is a 80 y o  female  : 1950    MRN: [id number]  DATE: 2019  TIME: 9:41 AM    Assessment and Plan   Acute bronchitis, unspecified organism [J20 [id number]]  1  Acute bronchitis, unspecified organism           Patient Instructions   Patient is an every day two pack per day smoker-symptoms consistent with bronchitis/COPD exacerbation and not an infectious source  I recommended prednisone and albuterol inhaler for a diagnosis of bronchitis and the patient refuses treatment  The patient has requested antibiotics several times, however, antibiotics are not appropriate in this situation  She states that she does not want to take the prescribed medication because she "does not like taking medicine"  The patient elicits no signs or symptoms of pneumonia or other bacterial infection  Saline nasal spray several times daily, Flonase in a m , cool mist humidifier, may take over-the-counter cough medication for symptoms  Follow up with PCP in 3-5 days  Proceed to  ER if symptoms worsen  Chief Complaint     Chief Complaint   Patient presents with    Cough     Had episode of GERD today - resolved after taking medicine  Then started with tight, congested cough with some chestt ightness  No ear pain or sore throat  No fever  Took Tylenol at 2 pm          History of Present Illness   The patient is a 72-year-old female who presents with a 1 day history of a cough  She is a 2 pack per day smoker but denies a history of COPD  She states that this morning she had an episode of what felt like GERD  She described a burning sensation in her throat that made her cough  Her symptoms quickly resolved after taking her previously prescribed occurred medication  She does admit to a history of GERD in the symptoms felt similar  She denied chest pain, shortness of breath, radiating arm pain  Negative focal deficits  Negative weakness    She has a 1 day history of a cough without shortness of breath or chest tightness  She admits to wheezing but states that she always wheezes  Negative hemoptysis  Negative fever or shaking chills  Positive nasal congestion without sinus point tenderness  Negative headache  Negative dizziness or lightheadedness  Negative abdominal pain, nausea, vomiting or diarrhea  Negative lower extremity edema  HPI    Review of Systems   Review of Systems   Constitutional: Negative for activity change, chills, fatigue and fever  HENT: Positive for congestion and rhinorrhea  Negative for ear discharge, ear pain, facial swelling, mouth sores, postnasal drip, sinus pressure, sinus pain, sneezing, sore throat and trouble swallowing  Eyes: Negative for pain, discharge, redness and itching  Respiratory: Positive for cough and wheezing  Negative for apnea, chest tightness, shortness of breath and stridor  Cardiovascular: Negative for chest pain  Gastrointestinal: Negative for abdominal distention, abdominal pain, diarrhea, nausea and vomiting  Genitourinary: Negative for difficulty urinating  Musculoskeletal: Negative for arthralgias and myalgias  Skin: Negative for pallor and rash  Allergic/Immunologic: Negative  Neurological: Negative for dizziness, light-headedness and headaches  Hematological: Negative  Psychiatric/Behavioral: Negative  All other systems reviewed and are negative          Current Medications       Current Outpatient Medications:     ALPRAZolam (XANAX) 0 25 mg tablet, Take 0 5 mg by mouth 4 (four) times a day as needed for anxiety  , Disp: , Rfl:     aluminum-magnesium hydroxide 200-200 MG/5ML suspension, Take 15 mL by mouth every 12 (twelve) hours  , Disp: , Rfl:     famotidine (PEPCID) 10 mg tablet, Take 10 mg by mouth daily with lunch, Disp: , Rfl:     famotidine (PEPCID) 10 mg tablet, Take 10 mg by mouth daily with dinner, Disp: , Rfl:     famotidine (PEPCID) 20 mg tablet, Take 20 mg by mouth daily  , Disp: , Rfl:     lisinopril (ZESTRIL) 10 mg tablet, Take 10 mg by mouth daily, Disp: , Rfl:     omeprazole (PriLOSEC) 20 mg delayed release capsule, Take 10 mg by mouth 2 (two) times a day with meals  , Disp: , Rfl:     sertraline (ZOLOFT) 100 mg tablet, Take by mouth daily  , Disp: , Rfl:     Current Allergies     Allergies as of 07/04/2019 - Reviewed 07/04/2019   Allergen Reaction Noted    Amoxil [amoxicillin] Other (See Comments) 08/19/2018            The following portions of the patient's history were reviewed and updated as appropriate: allergies, current medications, past family history, past medical history, past social history, past surgical history and problem list      Past Medical History:   Diagnosis Date    Anxiety     Depression     GERD (gastroesophageal reflux disease)     Hypertension        Past Surgical History:   Procedure Laterality Date    APPENDECTOMY      TONSILLECTOMY         No family history on file  Medications have been verified  Objective   /70 (BP Location: Left arm, Patient Position: Sitting, Cuff Size: Standard)   Pulse 88   Temp 97 6 °F (36 4 °C) (Tympanic)   Resp 18   Ht 5' 4" (1 626 m)   Wt 67 9 kg (149 lb 12 8 oz)   SpO2 99%   BMI 25 71 kg/m²        Physical Exam     Physical Exam   Constitutional: She is oriented to person, place, and time  Vital signs are normal  She appears well-developed and well-nourished  Non-toxic appearance  She does not have a sickly appearance  She does not appear ill  No distress  HENT:   Head: Normocephalic and atraumatic  Right Ear: Hearing, tympanic membrane, external ear and ear canal normal  No drainage or tenderness  Tympanic membrane is not perforated, not erythematous, not retracted and not bulging  No middle ear effusion  No decreased hearing is noted  Left Ear: Hearing, tympanic membrane, external ear and ear canal normal  No drainage or tenderness   Tympanic membrane is not perforated, not erythematous, not retracted and not bulging  No middle ear effusion  No decreased hearing is noted  Nose: Nose normal  No mucosal edema, rhinorrhea or septal deviation  Right sinus exhibits no maxillary sinus tenderness and no frontal sinus tenderness  Left sinus exhibits no maxillary sinus tenderness and no frontal sinus tenderness  Mouth/Throat: Uvula is midline, oropharynx is clear and moist and mucous membranes are normal  No oral lesions  No dental abscesses or uvula swelling  No oropharyngeal exudate, posterior oropharyngeal edema, posterior oropharyngeal erythema or tonsillar abscesses  Eyes: Pupils are equal, round, and reactive to light  Conjunctivae and EOM are normal    Neck: Trachea normal, normal range of motion and full passive range of motion without pain  Neck supple  No JVD present  No edema and no erythema present  No thyromegaly present  Cardiovascular: Normal rate, regular rhythm, S1 normal, S2 normal, normal heart sounds, intact distal pulses and normal pulses  No murmur heard  Pulmonary/Chest: Effort normal and breath sounds normal  No accessory muscle usage or stridor  No tachypnea  No respiratory distress  She has no decreased breath sounds  She has no wheezes  She has no rhonchi  She has no rales  Abdominal: Soft  Normal appearance and bowel sounds are normal  She exhibits no distension  There is no hepatosplenomegaly  There is no tenderness  Musculoskeletal: Normal range of motion  She exhibits no edema  Neurological: She is alert and oriented to person, place, and time  Skin: Skin is warm, dry and intact  No abrasion, no lesion and no rash noted  She is not diaphoretic  No cyanosis or erythema  Nails show no clubbing  Psychiatric: Her speech is normal  Her mood appears anxious  Her affect is angry  She is aggressive  Nursing note and vitals reviewed

## 2019-07-04 NOTE — PATIENT INSTRUCTIONS
Saline nasal spray several times daily, Flonase in a m , cool mist humidifier, may take over-the-counter cough medication for symptoms  Follow up with PCP in 3-5 days  Proceed to  ER if symptoms worsen  COPD Exacerbation, Ambulatory Care   GENERAL INFORMATION:   A COPD (chronic obstructive pulmonary disease ) exacerbation  is a flare up or worsening of COPD  Common symptoms include the following:   · Shortness of breath     · A dry cough     · Coughing fits that bring up mucus from your lungs     · Wheezing and chest tightness  Seek immediate care for the following symptoms:   · Confusion, dizziness, or lightheadedness    · Red, swollen, warm arm or leg    · Shortness of breath or chest pain    · Coughing up blood  Treatment for a COPD exacerbation  may include medicines to help decrease swelling and inflammation in your lungs  Medicines may also help open your airways or treat and infection  You may need pulmonary rehabilitation to help you manage your symptoms and improve your quality of life  You may need extra oxygen to help you breathe easier  Prevent another exacerbation:   · Do not smoke, and avoid others who smoke  If you smoke, it is never too late to quit  You may have fewer exacerbations  Ask for information about medicines and support programs that can help you quit  · Avoid triggers that make your symptoms worse  Cold weather and sudden temperature changes can trigger an exacerbation  Fumes from cars and chemicals, air pollution, and perfume can also increase your symptoms  · Use pursed-lip breathing when you feel short of breath  Take a deep breath in through your nose  Slowly breathe out through your mouth with your lips pursed for twice as long as you inhaled  You can also practice this breathing pattern while you bend, lift, climb stairs, or exercise  Pursed-lip breathing slows down your breathing and helps move more air in and out of your lungs             · Exercise for at least 20 minutes each day  Exercise can help increase your energy and decrease shortness of breath  Ask about the best exercise plan for you  · Prevent infections that can be dangerous when you have COPD  Get a flu vaccine every year as soon as it becomes available  Ask if you should also get other vaccines, such as those given to prevent pneumonia and tetanus  Avoid people who are sick, and wash your hands often  Follow up with your healthcare provider as directed:  Write down your questions so you remember to ask them during your visits  CARE AGREEMENT:   You have the right to help plan your care  Learn about your health condition and how it may be treated  Discuss treatment options with your caregivers to decide what care you want to receive  You always have the right to refuse treatment  The above information is an  only  It is not intended as medical advice for individual conditions or treatments  Talk to your doctor, nurse or pharmacist before following any medical regimen to see if it is safe and effective for you  © 2014 [address] is for End User's use only and may not be sold, redistributed or otherwise used for commercial purposes  All illustrations and images included in CareNotes® are the copyrighted property of [employer]  or [employer]rell

## 2020-01-12 ENCOUNTER — APPOINTMENT (EMERGENCY)
Dept: RADIOLOGY | Facility: HOSPITAL | Age: 70
End: 2020-01-12
Payer: COMMERCIAL

## 2020-01-12 ENCOUNTER — HOSPITAL ENCOUNTER (EMERGENCY)
Facility: HOSPITAL | Age: 70
Discharge: HOME | End: 2020-01-12
Attending: EMERGENCY MEDICINE | Admitting: EMERGENCY MEDICINE
Payer: COMMERCIAL

## 2020-01-12 VITALS
OXYGEN SATURATION: 99 % | TEMPERATURE: 98.3 F | RESPIRATION RATE: 17 BRPM | HEIGHT: 64 IN | WEIGHT: 150 LBS | HEART RATE: 86 BPM | BODY MASS INDEX: 25.61 KG/M2 | SYSTOLIC BLOOD PRESSURE: 148 MMHG | DIASTOLIC BLOOD PRESSURE: 70 MMHG

## 2020-01-12 DIAGNOSIS — F41.9 ANXIETY: Primary | ICD-10-CM

## 2020-01-12 PROCEDURE — 71046 X-RAY EXAM CHEST 2 VIEWS: CPT

## 2020-01-12 PROCEDURE — 99283 EMERGENCY DEPT VISIT LOW MDM: CPT

## 2020-01-12 PROCEDURE — 99284 EMERGENCY DEPT VISIT MOD MDM: CPT | Performed by: EMERGENCY MEDICINE

## 2020-01-12 NOTE — ED PROVIDER NOTES
History  Chief Complaint   Patient presents with    Anxiety     patient was diagnosed with anxiety it 1988  Has had "flare ups" for weeks on end periodically throughout the year  This epsiode started around Julius, patient has been taking more xanax than usual  Patient increased her zoloft dosage on her own  Saw PCP and made him aware  No additional change to medications  Patient had an episode yesterday and today of not feeling able to catch her breath "that just won't quit"  77 female with c/o being anxious and having constant thoughts that make her anxious  States she gets sob with that  Pt states that she increased her zoloft dose at home but it hasn't helped  She states that this just won't quit  Does take xanax routinely  No alleviating factors  No other assoc symptoms      History provided by:  Patient   used: No        Prior to Admission Medications   Prescriptions Last Dose Informant Patient Reported? Taking?    ALPRAZolam (XANAX) 0 25 mg tablet 1/12/2020 at 1300 Self Yes Yes   Sig: Take 0 5 mg by mouth 4 (four) times a day as needed for anxiety     aluminum-magnesium hydroxide 200-200 MG/5ML suspension   Yes No   Sig: Take 15 mL by mouth every 12 (twelve) hours     famotidine (PEPCID) 10 mg tablet   Yes Yes   Sig: Take 10 mg by mouth daily with lunch   famotidine (PEPCID) 10 mg tablet   Yes Yes   Sig: Take 10 mg by mouth daily with dinner   famotidine (PEPCID) 20 mg tablet   Yes Yes   Sig: Take 20 mg by mouth daily     lisinopril (ZESTRIL) 10 mg tablet  Self Yes Yes   Sig: Take 10 mg by mouth daily   omeprazole (PriLOSEC) 20 mg delayed release capsule  Self Yes Yes   Sig: Take 10 mg by mouth 2 (two) times a day with meals     sertraline (ZOLOFT) 100 mg tablet  Self Yes No   Sig: Take by mouth daily        Facility-Administered Medications: None       Past Medical History:   Diagnosis Date    Anxiety     Depression     GERD (gastroesophageal reflux disease)     Hypertension        Past Surgical History:   Procedure Laterality Date    APPENDECTOMY      TONSILLECTOMY         History reviewed  No pertinent family history  I have reviewed and agree with the history as documented  Social History     Tobacco Use    Smoking status: Heavy Tobacco Smoker     Packs/day: 1 50    Smokeless tobacco: Never Used   Substance Use Topics    Alcohol use: Yes     Alcohol/week: 28 0 standard drinks     Types: 28 Cans of beer per week     Comment: every night has "a few" beers    Drug use: No        Review of Systems   Constitutional: Negative for activity change, chills, diaphoresis and fever  HENT: Negative for congestion, ear pain, nosebleeds, sore throat, trouble swallowing and voice change  Eyes: Negative for pain, discharge and redness  Respiratory: Positive for shortness of breath  Negative for apnea, cough, choking, wheezing and stridor  Cardiovascular: Negative for chest pain and palpitations  Gastrointestinal: Negative for abdominal distention, abdominal pain, constipation, diarrhea, nausea and vomiting  Endocrine: Negative for polydipsia  Genitourinary: Negative for difficulty urinating, dysuria, flank pain, frequency, hematuria and urgency  Musculoskeletal: Negative for back pain, gait problem, joint swelling, myalgias, neck pain and neck stiffness  Skin: Negative for pallor and rash  Neurological: Negative for dizziness, tremors, syncope, speech difficulty, weakness, numbness and headaches  Hematological: Negative for adenopathy  Psychiatric/Behavioral: Positive for sleep disturbance  Negative for confusion, hallucinations, self-injury and suicidal ideas  The patient is nervous/anxious  Physical Exam  Physical Exam   Constitutional: She is oriented to person, place, and time  Vital signs are normal  She appears well-developed and well-nourished  No distress  HENT:   Head: Normocephalic and atraumatic     Right Ear: External ear normal  Left Ear: External ear normal    Nose: Nose normal    Mouth/Throat: Oropharynx is clear and moist    Eyes: Pupils are equal, round, and reactive to light  Conjunctivae are normal    Neck: Normal range of motion  Neck supple  Cardiovascular: Normal rate, regular rhythm, normal heart sounds and intact distal pulses  Pulmonary/Chest: Effort normal and breath sounds normal    Abdominal: Soft  Bowel sounds are normal    Musculoskeletal: Normal range of motion  Neurological: She is alert and oriented to person, place, and time  She has normal reflexes  Skin: Skin is warm and dry  She is not diaphoretic  Psychiatric: She has a normal mood and affect  Nursing note and vitals reviewed  Vital Signs  ED Triage Vitals [01/12/20 1356]   Temperature Pulse Respirations Blood Pressure SpO2   98 3 °F (36 8 °C) 86 17 148/70 99 %      Temp Source Heart Rate Source Patient Position - Orthostatic VS BP Location FiO2 (%)   Tympanic Monitor Sitting Right arm --      Pain Score       --           Vitals:    01/12/20 1356   BP: 148/70   Pulse: 86   Patient Position - Orthostatic VS: Sitting         Visual Acuity      ED Medications  Medications - No data to display    Diagnostic Studies  Results Reviewed     None                 XR chest 2 views    (Results Pending)              Procedures  Procedures         ED Course                               MDM      Disposition  Final diagnoses:   Anxiety     Time reflects when diagnosis was documented in both MDM as applicable and the Disposition within this note     Time User Action Codes Description Comment    1/12/2020  3:45 PM Priya Mancia Add [F41 9] Anxiety       ED Disposition     ED Disposition Condition Date/Time Comment    Discharge Stable Sun Jan 12, 2020  3:45 PM Promise Mujica discharge to home/self care              Follow-up Information     Follow up With Specialties Details Why [address],  Family Medicine Schedule an appointment as soon as possible for a visit  As needed [address] [facility]  [phone]            Patient's Medications   Discharge Prescriptions    No medications on file     No discharge procedures on file      ED Provider  Electronically Signed by           Katty Pedraza DO  01/12/20 8624

## 2020-03-04 NOTE — ED PROVIDER NOTES
History  Chief Complaint   Patient presents with    Cough     Pt sts she had acid reflux this am   Took meds but now has continuous cough  Went to care now and they wanted to put her on steroids but she refused  Thinks she has bronchitis  80year old female hx anxiety presents with cough x1 day  It started today after an episode of acid reflux  She was evaluated at urgent care earlier today, was diagnosed with bronchitis however patient refused treatment  Patient states she is in a rush and just wants a chest xray done  She is asymptomatic at this time  No shortness of breath, difficulty breathing, chest tightness, fever, chills, abdominal pain, nausea, vomiting  No recent travel  She is a current smoker  No calf pain or leg swelling  Prior to Admission Medications   Prescriptions Last Dose Informant Patient Reported? Taking? ALPRAZolam (XANAX) 0 25 mg tablet  Self Yes No   Sig: Take 0 5 mg by mouth 4 (four) times a day as needed for anxiety     aluminum-magnesium hydroxide 200-200 MG/5ML suspension   Yes No   Sig: Take 15 mL by mouth every 12 (twelve) hours     famotidine (PEPCID) 10 mg tablet   Yes No   Sig: Take 10 mg by mouth daily with lunch   famotidine (PEPCID) 10 mg tablet   Yes No   Sig: Take 10 mg by mouth daily with dinner   famotidine (PEPCID) 20 mg tablet   Yes No   Sig: Take 20 mg by mouth daily     lisinopril (ZESTRIL) 10 mg tablet  Self Yes No   Sig: Take 10 mg by mouth daily   omeprazole (PriLOSEC) 20 mg delayed release capsule  Self Yes No   Sig: Take 10 mg by mouth 2 (two) times a day with meals     sertraline (ZOLOFT) 100 mg tablet  Self Yes No   Sig: Take by mouth daily        Facility-Administered Medications: None       Past Medical History:   Diagnosis Date    Anxiety     Depression     GERD (gastroesophageal reflux disease)     Hypertension        Past Surgical History:   Procedure Laterality Date    APPENDECTOMY      TONSILLECTOMY         History reviewed   No pertinent family history  I have reviewed and agree with the history as documented  Social History     Tobacco Use    Smoking status: Heavy Tobacco Smoker     Packs/day: 2 00    Smokeless tobacco: Never Used   Substance Use Topics    Alcohol use: Yes     Alcohol/week: 28 0 standard drinks     Types: 28 Cans of beer per week     Comment: every night has "a few" beers    Drug use: No        Review of Systems   Constitutional: Negative for chills and fever  HENT: Negative for sneezing and sore throat  Respiratory: Positive for cough  Negative for shortness of breath  Cardiovascular: Negative for chest pain, palpitations and leg swelling  Gastrointestinal: Negative for abdominal pain, constipation, diarrhea, nausea and vomiting  Musculoskeletal: Negative for back pain, gait problem and joint swelling  Skin: Negative for color change, pallor, rash and wound  Neurological: Negative for dizziness, syncope, weakness, light-headedness, numbness and headaches  All other systems reviewed and are negative  Physical Exam  Physical Exam   Constitutional: She appears well-developed and well-nourished  No distress  Patient anxious   HENT:   Head: Normocephalic and atraumatic  Nose: Nose normal    Eyes: EOM are normal    Neck: Normal range of motion  Cardiovascular: Normal rate, regular rhythm, normal heart sounds and intact distal pulses  Exam reveals no gallop and no friction rub  No murmur heard  Pulmonary/Chest: Effort normal and breath sounds normal  No stridor  No respiratory distress  She has no wheezes  She has no rales  Sp02 is 98% indicating adequate oxygenation on room air   Skin: Skin is warm and dry  Capillary refill takes less than 2 seconds  No rash noted  She is not diaphoretic  No erythema  No pallor  Nursing note and vitals reviewed        Vital Signs  ED Triage Vitals [07/04/19 1630]   Temperature Pulse Respirations Blood Pressure SpO2   97 8 °F (36 6 °C) 86 20 162/96 98 %      Temp src Heart Rate Source Patient Position - Orthostatic VS BP Location FiO2 (%)   -- -- -- -- --      Pain Score       No Pain           Vitals:    07/04/19 1630   BP: 162/96   Pulse: 86         Visual Acuity      ED Medications  Medications - No data to display    Diagnostic Studies  Results Reviewed     None                 XR chest 2 views    (Results Pending)              Procedures  Procedures       ED Course                               MDM  Number of Diagnoses or Management Options  Bronchitis:   Diagnosis management comments: Cough likely secondary to developing bronchitis vs GERD episode  CXR NAD  Encouraged supportive treatment at this time, can swallow honey, use humidifier, increase hydration, etc  Patient was offered steroids and rescue inhaler at urgent care which she refused, as she also refused any other medications/interventions at this visit  Gave patient proper education regarding diagnosis  Answered all questions  Return to ED for any worsening of symptoms otherwise follow up with primary care physician for re-evaluation  Discussed plan with patient who verbalized understanding and agreed to plan  Amount and/or Complexity of Data Reviewed  Tests in the radiology section of CPT®: ordered and reviewed  Discussion of test results with the performing providers: yes  Review and summarize past medical records: yes  Discuss the patient with other providers: yes  Independent visualization of images, tracings, or specimens: yes        Disposition  Final diagnoses:   Bronchitis     Time reflects when diagnosis was documented in both MDM as applicable and the Disposition within this note     Time User Action Codes Description Comment    7/4/2019  4:56 PM Nakul, [facility] Bronchitis       ED Disposition     ED Disposition Condition Date/Time Comment    Discharge Good Thu Jul 4, 2019  4:56 PM Bran Cedillo discharge to home/self care              Follow-up Information     Follow up With Specialties Details Why Contact Info Additional [address], DO Family Medicine Schedule an appointment as soon as possible for a visit in 1 week If symptoms worsen [address] [facility] Emergency Department Emergency Medicine Go to  As needed [address] [facility], [place], [place], [address]          Discharge Medication List as of 7/4/2019  4:56 PM      CONTINUE these medications which have NOT CHANGED    Details   ALPRAZolam (XANAX) 0 25 mg tablet Take 0 5 mg by mouth 4 (four) times a day as needed for anxiety  , Historical Med      aluminum-magnesium hydroxide 200-200 MG/5ML suspension Take 15 mL by mouth every 12 (twelve) hours  , Historical Med      !! famotidine (PEPCID) 10 mg tablet Take 10 mg by mouth daily with lunch, Historical Med      !! famotidine (PEPCID) 10 mg tablet Take 10 mg by mouth daily with dinner, Historical Med      !! famotidine (PEPCID) 20 mg tablet Take 20 mg by mouth daily  , Historical Med      lisinopril (ZESTRIL) 10 mg tablet Take 10 mg by mouth daily, Historical Med      omeprazole (PriLOSEC) 20 mg delayed release capsule Take 10 mg by mouth 2 (two) times a day with meals  , Historical Med      sertraline (ZOLOFT) 100 mg tablet Take by mouth daily  , Historical Med       !! - Potential duplicate medications found  Please discuss with provider  No discharge procedures on file      ED Provider  Electronically Signed by           Armond Burks PA-C  07/04/19 4616 Home

## 2021-01-04 ENCOUNTER — OFFICE VISIT (OUTPATIENT)
Dept: URGENT CARE | Facility: CLINIC | Age: 71
End: 2021-01-04
Payer: COMMERCIAL

## 2021-01-04 VITALS
RESPIRATION RATE: 20 BRPM | SYSTOLIC BLOOD PRESSURE: 142 MMHG | HEART RATE: 94 BPM | HEIGHT: 63 IN | WEIGHT: 152 LBS | DIASTOLIC BLOOD PRESSURE: 73 MMHG | BODY MASS INDEX: 26.93 KG/M2 | OXYGEN SATURATION: 97 %

## 2021-01-04 DIAGNOSIS — M54.2 NECK PAIN: Primary | ICD-10-CM

## 2021-01-04 PROCEDURE — 99213 OFFICE O/P EST LOW 20 MIN: CPT | Performed by: PHYSICIAN ASSISTANT

## 2021-01-04 NOTE — PATIENT INSTRUCTIONS
Exam shows no signs of neurological dysfunction  No shortness or breath, chest pain or headache  No ECG needed at this time  Continue to take tylenol every 6 hours up to 650mg each time  Can use icy/hot or heating pads  Patient declined muscle relaxer and prednisone  Stretch your neck with range of motion exercises once or twice an hour when you can  If pain continues follow up with primary care doctor for physical therapy referral  Reassured patient strongly believed this was a muscular issue and with continued symptomatic treatment will resolve

## 2021-01-04 NOTE — PROGRESS NOTES
[facility]        NAME: Pascual Miranda is a 72 y o  female  : 1950    MRN: [id number]  DATE: 2021  TIME: 5:25 PM    Assessment and Plan   Neck pain [M54 2]  1  Neck pain           Patient Instructions     Patient Instructions   Exam shows no signs of neurological dysfunction  No shortness or breath, chest pain or headache  No ECG needed at this time  Continue to take tylenol every 6 hours up to 650mg each time  Can use icy/hot or heating pads  Patient declined muscle relaxer and prednisone  Stretch your neck with range of motion exercises once or twice an hour when you can  If pain continues follow up with primary care doctor for physical therapy referral  Reassured patient strongly believed this was a muscular issue and with continued symptomatic treatment will resolve  Follow up with PCP in 3-5 days  Proceed to  ER if symptoms worsen  Chief Complaint   No chief complaint on file  History of Present Illness       45-year-old female with severe anxiety presents today with stiff neck and pain x4 days  She notes the pain started as stiffness and was mild but 3 days ago she carried in heavy groceries and made the pain worse  She feels like she cannot move her head in all directions fully  Patient has been taking Tylenol twice a day and using icy Hot x1 day with mild symptomatic relief  She notes she has a history of back problems and sees a chiropractor  She called the chiropractor but he retired because of [place]  She has never taken any medication other than over-the-counter because of her psychiatric meds  She feels as though her pain is getting better with time however her anxiety was high today she wanted to be examined  The patient denies chest pain shortness of breath fever headache dizziness nausea vomiting diarrhea  Review of Systems   Review of Systems   Constitutional: Negative for chills, diaphoresis and fever     Respiratory: Negative for cough, chest tightness and shortness of breath  Cardiovascular: Negative for chest pain and palpitations  Gastrointestinal: Negative for diarrhea, nausea and vomiting  Musculoskeletal: Positive for back pain, neck pain and neck stiffness  Neurological: Negative for light-headedness and headaches  Psychiatric/Behavioral: The patient is nervous/anxious (High anxiety)  Current Medications       Current Outpatient Medications:     ALPRAZolam (XANAX) 0 25 mg tablet, Take 0 5 mg by mouth 4 (four) times a day as needed for anxiety  , Disp: , Rfl:     aluminum-magnesium hydroxide 200-200 MG/5ML suspension, Take 15 mL by mouth every 12 (twelve) hours  , Disp: , Rfl:     famotidine (PEPCID) 10 mg tablet, Take 10 mg by mouth daily with lunch, Disp: , Rfl:     famotidine (PEPCID) 10 mg tablet, Take 10 mg by mouth daily with dinner, Disp: , Rfl:     famotidine (PEPCID) 20 mg tablet, Take 20 mg by mouth daily  , Disp: , Rfl:     lisinopril (ZESTRIL) 10 mg tablet, Take 10 mg by mouth daily, Disp: , Rfl:     omeprazole (PriLOSEC) 20 mg delayed release capsule, Take 10 mg by mouth 2 (two) times a day with meals  , Disp: , Rfl:     sertraline (ZOLOFT) 100 mg tablet, Take by mouth daily  , Disp: , Rfl:     Current Allergies     Allergies as of 01/04/2021 - Reviewed 01/12/2020   Allergen Reaction Noted    Amoxil [amoxicillin] Other (See Comments) 08/19/2018            The following portions of the patient's history were reviewed and updated as appropriate: allergies, current medications, past family history, past medical history, past social history, past surgical history and problem list      Past Medical History:   Diagnosis Date    Anxiety     Depression     GERD (gastroesophageal reflux disease)     Hypertension        Past Surgical History:   Procedure Laterality Date    APPENDECTOMY      TONSILLECTOMY         No family history on file        Medications have been verified  Objective   /73   Pulse 94   Resp 20   Ht 5' 3" (1 6 m)   Wt 68 9 kg (152 lb)   SpO2 97%   BMI 26 93 kg/m²        Physical Exam     Physical Exam  Vitals signs reviewed  Constitutional:       Appearance: Normal appearance  Neck:      Musculoskeletal: Normal range of motion  Muscular tenderness present  No neck rigidity  Vascular: Carotid bruit: left sided scalene TTP  Comments: No cervical bony tenderness  Full ROM with some pain  N/V intact, no numbness/tingling  Cardiovascular:      Rate and Rhythm: Normal rate and regular rhythm  Pulses: Normal pulses  Heart sounds: Normal heart sounds  Pulmonary:      Effort: Pulmonary effort is normal  No respiratory distress  Breath sounds: Normal breath sounds  Musculoskeletal: Normal range of motion  General: No tenderness  Neurological:      Mental Status: She is alert and oriented to person, place, and time  Mental status is at baseline     Psychiatric:      Comments: Visually anxious with rapid speech and knee shaking

## 2021-10-08 ENCOUNTER — HOSPITAL ENCOUNTER (EMERGENCY)
Facility: HOSPITAL | Age: 71
Discharge: HOME | End: 2021-10-08
Attending: EMERGENCY MEDICINE | Admitting: EMERGENCY MEDICINE
Payer: COMMERCIAL

## 2021-10-08 ENCOUNTER — APPOINTMENT (EMERGENCY)
Dept: RADIOLOGY | Facility: HOSPITAL | Age: 71
End: 2021-10-08
Payer: COMMERCIAL

## 2021-10-08 VITALS
RESPIRATION RATE: 22 BRPM | SYSTOLIC BLOOD PRESSURE: 153 MMHG | HEART RATE: 80 BPM | BODY MASS INDEX: 27.63 KG/M2 | DIASTOLIC BLOOD PRESSURE: 70 MMHG | OXYGEN SATURATION: 95 % | WEIGHT: 156 LBS | TEMPERATURE: 97.8 F

## 2021-10-08 DIAGNOSIS — R07.9 CHEST PAIN, UNSPECIFIED: Primary | ICD-10-CM

## 2021-10-08 LAB
ANION GAP SERPL CALCULATED.3IONS-SCNC: 8 MMOL/L (ref 4–13)
BASOPHILS # BLD AUTO: 0.04 THOUSANDS/ΜL (ref 0–0.1)
BASOPHILS NFR BLD AUTO: 1 % (ref 0–1)
BUN SERPL-MCNC: 22 MG/DL (ref 5–25)
CALCIUM SERPL-MCNC: 9.3 MG/DL (ref 8.3–10.1)
CHLORIDE SERPL-SCNC: 103 MMOL/L (ref 100–108)
CO2 SERPL-SCNC: 29 MMOL/L (ref 21–32)
CREAT SERPL-MCNC: 0.73 MG/DL (ref 0.6–1.3)
EOSINOPHIL # BLD AUTO: 0.12 THOUSAND/ΜL (ref 0–0.61)
EOSINOPHIL NFR BLD AUTO: 1 % (ref 0–6)
ERYTHROCYTE [DISTWIDTH] IN BLOOD BY AUTOMATED COUNT: 13.7 % (ref 11.6–15.1)
GFR SERPL CREATININE-BSD FRML MDRD: 84 ML/MIN/1.73SQ M
GLUCOSE SERPL-MCNC: 107 MG/DL (ref 65–140)
HCT VFR BLD AUTO: 40.8 % (ref 34.8–46.1)
HGB BLD-MCNC: 13.1 G/DL (ref 11.5–15.4)
IMM GRANULOCYTES # BLD AUTO: 0.02 THOUSAND/UL (ref 0–0.2)
IMM GRANULOCYTES NFR BLD AUTO: 0 % (ref 0–2)
LYMPHOCYTES # BLD AUTO: 2.48 THOUSANDS/ΜL (ref 0.6–4.47)
LYMPHOCYTES NFR BLD AUTO: 29 % (ref 14–44)
MCH RBC QN AUTO: 32.3 PG (ref 26.8–34.3)
MCHC RBC AUTO-ENTMCNC: 32.1 G/DL (ref 31.4–37.4)
MCV RBC AUTO: 101 FL (ref 82–98)
MONOCYTES # BLD AUTO: 0.65 THOUSAND/ΜL (ref 0.17–1.22)
MONOCYTES NFR BLD AUTO: 8 % (ref 4–12)
NEUTROPHILS # BLD AUTO: 5.37 THOUSANDS/ΜL (ref 1.85–7.62)
NEUTS SEG NFR BLD AUTO: 61 % (ref 43–75)
NRBC BLD AUTO-RTO: 0 /100 WBCS
PLATELET # BLD AUTO: 315 THOUSANDS/UL (ref 149–390)
PMV BLD AUTO: 9.4 FL (ref 8.9–12.7)
POTASSIUM SERPL-SCNC: 4.2 MMOL/L (ref 3.5–5.3)
RBC # BLD AUTO: 4.05 MILLION/UL (ref 3.81–5.12)
SODIUM SERPL-SCNC: 140 MMOL/L (ref 136–145)
TROPONIN I SERPL-MCNC: <0.02 NG/ML
WBC # BLD AUTO: 8.68 THOUSAND/UL (ref 4.31–10.16)

## 2021-10-08 PROCEDURE — 80048 BASIC METABOLIC PNL TOTAL CA: CPT | Performed by: PHYSICIAN ASSISTANT

## 2021-10-08 PROCEDURE — 99285 EMERGENCY DEPT VISIT HI MDM: CPT

## 2021-10-08 PROCEDURE — 84484 ASSAY OF TROPONIN QUANT: CPT | Performed by: PHYSICIAN ASSISTANT

## 2021-10-08 PROCEDURE — 36415 COLL VENOUS BLD VENIPUNCTURE: CPT | Performed by: PHYSICIAN ASSISTANT

## 2021-10-08 PROCEDURE — NC001 PR NO CHARGE: Performed by: EMERGENCY MEDICINE

## 2021-10-08 PROCEDURE — 93005 ELECTROCARDIOGRAM TRACING: CPT

## 2021-10-08 PROCEDURE — 85025 COMPLETE CBC W/AUTO DIFF WBC: CPT | Performed by: PHYSICIAN ASSISTANT

## 2021-10-08 PROCEDURE — 71045 X-RAY EXAM CHEST 1 VIEW: CPT

## 2021-10-08 PROCEDURE — 99284 EMERGENCY DEPT VISIT MOD MDM: CPT | Performed by: PHYSICIAN ASSISTANT

## 2021-10-08 RX ORDER — SODIUM CHLORIDE 9 MG/ML
3 INJECTION INTRAVENOUS
Status: DISCONTINUED | OUTPATIENT
Start: 2021-10-08 | End: 2021-10-08

## 2021-10-09 LAB
ATRIAL RATE: 82 BPM
P AXIS: 67 DEGREES
PR INTERVAL: 138 MS
QRS AXIS: 16 DEGREES
QRSD INTERVAL: 72 MS
QT INTERVAL: 350 MS
QTC INTERVAL: 408 MS
T WAVE AXIS: 52 DEGREES
VENTRICULAR RATE: 82 BPM

## 2021-10-09 PROCEDURE — 93010 ELECTROCARDIOGRAM REPORT: CPT | Performed by: INTERNAL MEDICINE

## 2022-12-28 ENCOUNTER — OFFICE VISIT (OUTPATIENT)
Dept: URGENT CARE | Facility: CLINIC | Age: 72
End: 2022-12-28

## 2022-12-28 VITALS
DIASTOLIC BLOOD PRESSURE: 72 MMHG | RESPIRATION RATE: 20 BRPM | BODY MASS INDEX: 28.52 KG/M2 | SYSTOLIC BLOOD PRESSURE: 152 MMHG | TEMPERATURE: 97.7 F | OXYGEN SATURATION: 96 % | HEART RATE: 87 BPM | WEIGHT: 161 LBS

## 2022-12-28 DIAGNOSIS — R10.9 ABDOMINAL DISCOMFORT: Primary | ICD-10-CM

## 2022-12-30 NOTE — PROGRESS NOTES
[facility]        NAME: Anthony Blake is a 71 y o  female  : 1950    MRN: [id number]  DATE: 2022  TIME: 2:24 PM    Assessment and Plan   Abdominal discomfort [R10 9]  1  Abdominal discomfort          Left without being evaluated  Patient Instructions     Follow up with PCP in 3-5 days  Proceed to  ER if symptoms worsen  Chief Complaint     Chief Complaint   Patient presents with   • Abdominal Pain     EPIGASTRIC FULLNESS, "BLAH " FEELING  THIS AM, AFTER DRINKING COFFEE THIS AM   DENIES N/V/D  DENIES NON RADIATING PAIN   WITH USE OF XANAX , AND PEPCID WITH SOME RELIEF  @ 1615  DENIES SOB   @1757 pt LWBS   • Left Without Being Seen     Pt walked out         History of Present Illness       52-year-old female presenting today requesting an ECG  Denies chest pain but was feeling somewhat fatigued  Review of Systems   Review of Systems   Constitutional: Positive for fatigue  Cardiovascular: Negative for chest pain           Current Medications       Current Outpatient Medications:   •  ALPRAZolam (XANAX) 0 25 mg tablet, Take 0 5 mg by mouth 4 (four) times a day as needed for anxiety  , Disp: , Rfl:   •  aluminum-magnesium hydroxide 200-200 MG/5ML suspension, Take 15 mL by mouth every 12 (twelve) hours, Disp: , Rfl:   •  famotidine (PEPCID) 10 mg tablet, Take 10 mg by mouth daily with lunch, Disp: , Rfl:   •  famotidine (PEPCID) 20 mg tablet, Take 20 mg by mouth daily  , Disp: , Rfl:   •  lisinopril (ZESTRIL) 10 mg tablet, Take 10 mg by mouth daily, Disp: , Rfl:   •  omeprazole (PriLOSEC) 20 mg delayed release capsule, Take 10 mg by mouth 2 (two) times a day with meals  , Disp: , Rfl:   •  sertraline (ZOLOFT) 100 mg tablet, Take by mouth , Disp: , Rfl:     Current Allergies     Allergies as of 2022 - Reviewed 2022   Allergen Reaction Noted   • Amoxil [amoxicillin] Other (See Comments) 2018            The following portions of the patient's history were reviewed and updated as appropriate: allergies, current medications, past family history, past medical history, past social history, past surgical history and problem list      Past Medical History:   Diagnosis Date   • Anxiety    • Depression    • GERD (gastroesophageal reflux disease)    • Hypertension        Past Surgical History:   Procedure Laterality Date   • APPENDECTOMY     • TONSILLECTOMY         No family history on file  Medications have been verified  Objective   /72   Pulse 87   Temp 97 7 °F (36 5 °C)   Resp 20   Wt 73 kg (161 lb)   SpO2 96%   BMI 28 52 kg/m²   No LMP recorded  Patient is postmenopausal        Physical Exam     Physical Exam  Vitals and nursing note reviewed  Neurological:      Mental Status: She is alert

## 2023-04-29 ENCOUNTER — HOSPITAL ENCOUNTER (EMERGENCY)
Facility: HOSPITAL | Age: 73
Discharge: HOME | End: 2023-04-30
Attending: EMERGENCY MEDICINE

## 2023-04-29 ENCOUNTER — APPOINTMENT (EMERGENCY)
Dept: RADIOLOGY | Facility: HOSPITAL | Age: 73
End: 2023-04-29

## 2023-04-29 VITALS
DIASTOLIC BLOOD PRESSURE: 78 MMHG | HEART RATE: 78 BPM | OXYGEN SATURATION: 97 % | HEIGHT: 64 IN | RESPIRATION RATE: 18 BRPM | SYSTOLIC BLOOD PRESSURE: 171 MMHG | BODY MASS INDEX: 27.64 KG/M2

## 2023-04-29 DIAGNOSIS — N20.0 KIDNEY STONES: Primary | ICD-10-CM

## 2023-04-29 DIAGNOSIS — R31.9 HEMATURIA, UNSPECIFIED TYPE: ICD-10-CM

## 2023-04-29 LAB
BASOPHILS # BLD AUTO: 0.07 THOUSANDS/ΜL (ref 0–0.1)
BASOPHILS NFR BLD AUTO: 1 % (ref 0–1)
BILIRUB UR QL STRIP: NEGATIVE
CLARITY UR: CLEAR
COLOR UR: NORMAL
EOSINOPHIL # BLD AUTO: 0.2 THOUSAND/ΜL (ref 0–0.61)
EOSINOPHIL NFR BLD AUTO: 2 % (ref 0–6)
ERYTHROCYTE [DISTWIDTH] IN BLOOD BY AUTOMATED COUNT: 14.2 % (ref 11.6–15.1)
GLUCOSE UR STRIP-MCNC: NEGATIVE MG/DL
HCT VFR BLD AUTO: 37.4 % (ref 34.8–46.1)
HGB BLD-MCNC: 12.3 G/DL (ref 11.5–15.4)
HGB UR QL STRIP.AUTO: NEGATIVE
IMM GRANULOCYTES # BLD AUTO: 0.02 THOUSAND/UL (ref 0–0.2)
IMM GRANULOCYTES NFR BLD AUTO: 0 % (ref 0–2)
KETONES UR STRIP-MCNC: NEGATIVE MG/DL
LEUKOCYTE ESTERASE UR QL STRIP: NEGATIVE
LYMPHOCYTES # BLD AUTO: 2.88 THOUSANDS/ΜL (ref 0.6–4.47)
LYMPHOCYTES NFR BLD AUTO: 32 % (ref 14–44)
MCH RBC QN AUTO: 32.1 PG (ref 26.8–34.3)
MCHC RBC AUTO-ENTMCNC: 32.9 G/DL (ref 31.4–37.4)
MCV RBC AUTO: 98 FL (ref 82–98)
MONOCYTES # BLD AUTO: 0.66 THOUSAND/ΜL (ref 0.17–1.22)
MONOCYTES NFR BLD AUTO: 7 % (ref 4–12)
NEUTROPHILS # BLD AUTO: 5.24 THOUSANDS/ΜL (ref 1.85–7.62)
NEUTS SEG NFR BLD AUTO: 58 % (ref 43–75)
NITRITE UR QL STRIP: NEGATIVE
NRBC BLD AUTO-RTO: 0 /100 WBCS
PH UR STRIP.AUTO: 6 [PH]
PLATELET # BLD AUTO: 299 THOUSANDS/UL (ref 149–390)
PMV BLD AUTO: 8.7 FL (ref 8.9–12.7)
PROT UR STRIP-MCNC: NEGATIVE MG/DL
RBC # BLD AUTO: 3.83 MILLION/UL (ref 3.81–5.12)
SP GR UR STRIP.AUTO: <=1.005 (ref 1–1.03)
UROBILINOGEN UR QL STRIP.AUTO: 0.2 E.U./DL
WBC # BLD AUTO: 9.07 THOUSAND/UL (ref 4.31–10.16)

## 2023-04-30 LAB
ALBUMIN SERPL BCP-MCNC: 4.1 G/DL (ref 3.5–5)
ALP SERPL-CCNC: 67 U/L (ref 34–104)
ALT SERPL W P-5'-P-CCNC: 16 U/L (ref 7–52)
ANION GAP SERPL CALCULATED.3IONS-SCNC: 8 MMOL/L (ref 4–13)
AST SERPL W P-5'-P-CCNC: 17 U/L (ref 13–39)
BILIRUB SERPL-MCNC: 0.21 MG/DL (ref 0.2–1)
BUN SERPL-MCNC: 14 MG/DL (ref 5–25)
CALCIUM SERPL-MCNC: 8.9 MG/DL (ref 8.4–10.2)
CHLORIDE SERPL-SCNC: 101 MMOL/L (ref 96–108)
CO2 SERPL-SCNC: 25 MMOL/L (ref 21–32)
CREAT SERPL-MCNC: 0.79 MG/DL (ref 0.6–1.3)
GFR SERPL CREATININE-BSD FRML MDRD: 75 ML/MIN/1.73SQ M
GLUCOSE SERPL-MCNC: 110 MG/DL (ref 65–140)
POTASSIUM SERPL-SCNC: 3.7 MMOL/L (ref 3.5–5.3)
PROT SERPL-MCNC: 6.7 G/DL (ref 6.4–8.4)
SODIUM SERPL-SCNC: 134 MMOL/L (ref 135–147)

## 2023-04-30 NOTE — ED PROVIDER NOTES
History  Chief Complaint   Patient presents with    Blood in Urine     Pt  c/o blood in urine  Pt  States she was scheduled for pelvic ultrasound on weds  recently had urine culture and pap smear this week and said results were normal      Patient presents for evaluation of hematuria  Patient states this has been happening intermittently since about November or December 2022  She denies any pain or discomfort  States it will happen once and then resolved and this was the fourth time  Denies any pain or discomfort at this time  She saw her primary care physician who did a urine culture was negative also did a pelvic exam with a Pap smear and is sent for a pelvic ultrasound  Patient is very anxious over the hematuria  History provided by:  Patient   used: No        Prior to Admission Medications   Prescriptions Last Dose Informant Patient Reported? Taking? ALPRAZolam (XANAX) 0 25 mg tablet   Yes No   Sig: Take 0 5 mg by mouth 4 (four) times a day as needed for anxiety     aluminum-magnesium hydroxide 200-200 MG/5ML suspension   Yes No   Sig: Take 15 mL by mouth every 12 (twelve) hours   famotidine (PEPCID) 10 mg tablet   Yes No   Sig: Take 10 mg by mouth daily with lunch   famotidine (PEPCID) 20 mg tablet   Yes No   Sig: Take 20 mg by mouth daily     lisinopril (ZESTRIL) 10 mg tablet   Yes No   Sig: Take 10 mg by mouth daily   omeprazole (PriLOSEC) 20 mg delayed release capsule   Yes No   Sig: Take 10 mg by mouth 2 (two) times a day with meals     sertraline (ZOLOFT) 100 mg tablet   Yes No   Sig: Take by mouth       Facility-Administered Medications: None       Past Medical History:   Diagnosis Date    Anxiety     Depression     GERD (gastroesophageal reflux disease)     Hypertension        Past Surgical History:   Procedure Laterality Date    APPENDECTOMY      TONSILLECTOMY         History reviewed  No pertinent family history    I have reviewed and agree with the history as "documented  E-Cigarette/Vaping    E-Cigarette Use Never User      E-Cigarette/Vaping Substances     Social History     Tobacco Use    Smoking status: Heavy Smoker     Packs/day: 1 50     Types: Cigarettes    Smokeless tobacco: Never   Vaping Use    Vaping Use: Never used   Substance Use Topics    Alcohol use: Yes     Alcohol/week: 28 0 standard drinks     Types: 28 Cans of beer per week     Comment: every night has \"a few\" beers    Drug use: No       Review of Systems   Constitutional: Negative for chills and fever  HENT: Negative for ear pain and sore throat  Eyes: Negative for pain and visual disturbance  Respiratory: Negative for cough and shortness of breath  Cardiovascular: Negative for chest pain and palpitations  Gastrointestinal: Negative for abdominal pain and vomiting  Genitourinary: Positive for hematuria  Negative for dysuria  Musculoskeletal: Negative for arthralgias and back pain  Skin: Negative for color change and rash  Neurological: Negative for seizures and syncope  Psychiatric/Behavioral: The patient is nervous/anxious  All other systems reviewed and are negative  Physical Exam  Physical Exam  Vitals and nursing note reviewed  Constitutional:       General: She is not in acute distress  Cardiovascular:      Rate and Rhythm: Normal rate and regular rhythm  Pulmonary:      Effort: Pulmonary effort is normal  No respiratory distress  Breath sounds: Normal breath sounds  Abdominal:      General: Abdomen is flat  Bowel sounds are normal  There is no distension  Palpations: Abdomen is soft  Tenderness: There is no abdominal tenderness  There is no right CVA tenderness, left CVA tenderness, guarding or rebound  Neurological:      General: No focal deficit present  Mental Status: She is alert and oriented to person, place, and time           Vital Signs  ED Triage Vitals [04/29/23 2315]   Temp Pulse Respirations Blood Pressure SpO2   -- 78 " 18 (!) 171/78 97 %      Temp src Heart Rate Source Patient Position - Orthostatic VS BP Location FiO2 (%)   -- Monitor Sitting Left arm --      Pain Score       --           Vitals:    04/29/23 2315   BP: (!) 171/78   Pulse: 78   Patient Position - Orthostatic VS: Sitting         Visual Acuity      ED Medications  Medications - No data to display    Diagnostic Studies  Results Reviewed     Procedure Component Value Units Date/Time    Comprehensive metabolic panel [[id number]]  (Abnormal) Collected: 04/29/23 2334    Lab Status: Final result Specimen: Blood from Arm, Left Updated: 04/30/23 0009     Sodium 134 mmol/L      Potassium 3 7 mmol/L      Chloride 101 mmol/L      CO2 25 mmol/L      ANION GAP 8 mmol/L      BUN 14 mg/dL      Creatinine 0 79 mg/dL      Glucose 110 mg/dL      Calcium 8 9 mg/dL      AST 17 U/L      ALT 16 U/L      Alkaline Phosphatase 67 U/L      Total Protein 6 7 g/dL      Albumin 4 1 g/dL      Total Bilirubin 0 21 mg/dL      eGFR 75 ml/min/1 73sq m     Narrative:      [place] guidelines for Chronic Kidney Disease (CKD):     Stage 1 with normal or high GFR (GFR > 90 mL/min/1 73 square meters)    Stage 2 Mild CKD (GFR = 60-89 mL/min/1 73 square meters)    Stage 3A Moderate CKD (GFR = 45-59 mL/min/1 73 square meters)    Stage 3B Moderate CKD (GFR = 30-44 mL/min/1 73 square meters)    Stage 4 Severe CKD (GFR = 15-29 mL/min/1 73 square meters)    Stage 5 End Stage CKD (GFR <15 mL/min/1 73 square meters)  Note: GFR calculation is accurate only with a steady state creatinine    CBC and differential [[id number]]  (Abnormal) Collected: 04/29/23 2334    Lab Status: Final result Specimen: Blood from Arm, Left Updated: 04/29/23 2340     WBC 9 07 Thousand/uL      RBC 3 83 Million/uL      Hemoglobin 12 3 g/dL      Hematocrit 37 4 %      MCV 98 fL      MCH 32 1 pg      MCHC 32 9 g/dL      RDW 14 2 %      MPV 8 7 fL      Platelets 216 Thousands/uL      nRBC 0 /100 WBCs Neutrophils Relative 58 %      Immat GRANS % 0 %      Lymphocytes Relative 32 %      Monocytes Relative 7 %      Eosinophils Relative 2 %      Basophils Relative 1 %      Neutrophils Absolute 5 24 Thousands/µL      Immature Grans Absolute 0 02 Thousand/uL      Lymphocytes Absolute 2 88 Thousands/µL      Monocytes Absolute 0 66 Thousand/µL      Eosinophils Absolute 0 20 Thousand/µL      Basophils Absolute 0 07 Thousands/µL     UA w Reflex to Microscopic w Reflex to Culture [[id number]] Collected: 04/29/23 2317    Lab Status: Final result Specimen: Urine, Clean Catch Updated: 04/29/23 2321     Color, UA Light Yellow     Clarity, UA Clear     Specific Gravity, UA <=1 005     pH, UA 6 0     Leukocytes, UA Negative     Nitrite, UA Negative     Protein, UA Negative mg/dl      Glucose, UA Negative mg/dl      Ketones, UA Negative mg/dl      Urobilinogen, UA 0 2 E U /dl      Bilirubin, UA Negative     Occult Blood, UA Negative                 CT renal stone study abdomen pelvis without contrast    (Results Pending)              Procedures  Procedures         ED Course                               SBIRT 20yo+    Flowsheet Row Most Recent Value   Initial Alcohol Screen: US AUDIT-C     1  How often do you have a drink containing alcohol? 6 Filed at: 04/29/2023 2318   2  How many drinks containing alcohol do you have on a typical day you are drinking? 3 Filed at: 04/29/2023 2318   3b  FEMALE Any Age, or MALE 65+: How often do you have 4 or more drinks on one occassion? 6 Filed at: 04/29/2023 2318   Audit-C Score 15 Filed at: 04/29/2023 2318   TERRANCE: How many times in the past year have you    Used an illegal drug or used a prescription medication for non-medical reasons? Never Filed at: 04/29/2023 2318                    Medical Decision Making  Pulse ox 97% on room air indicating adequate oxygenation          Patient signed out to next provider Dr Reyna Flor    Amount and/or Complexity of Data Reviewed  Labs: ordered  Radiology: ordered  Disposition  Final diagnoses:   None     ED Disposition     None      Follow-up Information    None         Patient's Medications   Discharge Prescriptions    No medications on file       No discharge procedures on file      PDMP Review     None          ED Provider  Electronically Signed by           Estrellita Bland DO  04/30/23 4184

## 2023-04-30 NOTE — ED CARE HANDOFF
Emergency Department Sign Out Note        Sign out and transfer of care from Dr Norwood Distance  See Separate Emergency Department note  The patient, Sahil Mccrary, was evaluated by the previous provider for hematuria  Workup Completed:    Labs and imaging    ED Course / Workup Pending (followup): Case endorsed to me pending CT scan results  2:46 AM    CT scan reveals some kidney stones that are uncomplicated  She also has some findings in the GI tract that are relayed to the patient  I have given her outpatient follow-up with urology as well as GI  Patient hemodynamically stable with no new concerns at this time  She is provided with strict return precautions as well  Procedures  MDM        Disposition  Final diagnoses:   Kidney stones   Hematuria, unspecified type     Time reflects when diagnosis was documented in both MDM as applicable and the Disposition within this note     Time User Action Codes Description Comment    4/30/2023  2:42 AM Janette Balder Add [N20 0] Kidney stones     4/30/2023  2:43 AM Janette Balder Add [R31 9] Hematuria, unspecified type       ED Disposition     ED Disposition   Discharge    Condition   Stable    Date/Time   Sun Apr 30, 2023  2:42 AM    Comment   Sahil Mccrary discharge to home/self care  Follow-up Information     Follow up With Specialties Details Why Contact Info Additional [address] Urology Call in 1 day  [address] [facility] [address]  [address], [address], [place], [address], [phone]        Patient's Medications   Discharge Prescriptions    No medications on file     No discharge procedures on file         ED Provider  Electronically Signed by     Marisel Gupta MD  04/30/23 5756

## 2023-07-28 ENCOUNTER — APPOINTMENT (EMERGENCY)
Dept: RADIOLOGY | Facility: HOSPITAL | Age: 73
DRG: 066 | End: 2023-07-28
Payer: COMMERCIAL

## 2023-07-28 ENCOUNTER — HOSPITAL ENCOUNTER (INPATIENT)
Facility: HOSPITAL | Age: 73
LOS: 1 days | Discharge: HOME | DRG: 066 | End: 2023-07-29
Attending: STUDENT IN AN ORGANIZED HEALTH CARE EDUCATION/TRAINING PROGRAM | Admitting: FAMILY MEDICINE
Payer: COMMERCIAL

## 2023-07-28 DIAGNOSIS — I63.9 ACUTE CVA (CEREBROVASCULAR ACCIDENT) (HCC): Primary | ICD-10-CM

## 2023-07-28 DIAGNOSIS — I63.9 CVA (CEREBRAL VASCULAR ACCIDENT) (HCC): ICD-10-CM

## 2023-07-28 DIAGNOSIS — E11.9 TYPE 2 DIABETES MELLITUS (HCC): ICD-10-CM

## 2023-07-28 DIAGNOSIS — I66.02 OCCLUSION AND STENOSIS OF LEFT MIDDLE CEREBRAL ARTERY: ICD-10-CM

## 2023-07-28 DIAGNOSIS — E78.5 DYSLIPIDEMIA: ICD-10-CM

## 2023-07-28 DIAGNOSIS — F17.200 SMOKER: ICD-10-CM

## 2023-07-28 DIAGNOSIS — K21.9 GERD (GASTROESOPHAGEAL REFLUX DISEASE): ICD-10-CM

## 2023-07-28 DIAGNOSIS — I10 ESSENTIAL HYPERTENSION: ICD-10-CM

## 2023-07-28 LAB
2HR DELTA HS TROPONIN: 0 NG/L
ANION GAP SERPL CALCULATED.3IONS-SCNC: 5 MMOL/L
APTT PPP: 31 SECONDS (ref 23–37)
BUN SERPL-MCNC: 16 MG/DL (ref 5–25)
CALCIUM SERPL-MCNC: 9.3 MG/DL (ref 8.4–10.2)
CARDIAC TROPONIN I PNL SERPL HS: 4 NG/L
CARDIAC TROPONIN I PNL SERPL HS: 4 NG/L
CHLORIDE SERPL-SCNC: 105 MMOL/L (ref 96–108)
CHOLEST SERPL-MCNC: 225 MG/DL
CO2 SERPL-SCNC: 28 MMOL/L (ref 21–32)
CREAT SERPL-MCNC: 0.67 MG/DL (ref 0.6–1.3)
ERYTHROCYTE [DISTWIDTH] IN BLOOD BY AUTOMATED COUNT: 14.7 % (ref 11.6–15.1)
EST. AVERAGE GLUCOSE BLD GHB EST-MCNC: 140 MG/DL
FLUAV RNA RESP QL NAA+PROBE: NEGATIVE
FLUBV RNA RESP QL NAA+PROBE: NEGATIVE
GFR SERPL CREATININE-BSD FRML MDRD: 88 ML/MIN/1.73SQ M
GLUCOSE SERPL-MCNC: 122 MG/DL (ref 65–140)
GLUCOSE SERPL-MCNC: 134 MG/DL (ref 65–140)
HBA1C MFR BLD: 6.5 %
HCT VFR BLD AUTO: 39.6 % (ref 34.8–46.1)
HDLC SERPL-MCNC: 81 MG/DL
HGB BLD-MCNC: 12.7 G/DL (ref 11.5–15.4)
INR PPP: 0.86 (ref 0.84–1.19)
LDLC SERPL CALC-MCNC: 130 MG/DL (ref 0–100)
MCH RBC QN AUTO: 31.2 PG (ref 26.8–34.3)
MCHC RBC AUTO-ENTMCNC: 32.1 G/DL (ref 31.4–37.4)
MCV RBC AUTO: 97 FL (ref 82–98)
PLATELET # BLD AUTO: 306 THOUSANDS/UL (ref 149–390)
PMV BLD AUTO: 9.3 FL (ref 8.9–12.7)
POTASSIUM SERPL-SCNC: 4.4 MMOL/L (ref 3.5–5.3)
PROTHROMBIN TIME: 11.8 SECONDS (ref 11.6–14.5)
RBC # BLD AUTO: 4.07 MILLION/UL (ref 3.81–5.12)
RSV RNA RESP QL NAA+PROBE: NEGATIVE
SARS-COV-2 RNA RESP QL NAA+PROBE: NEGATIVE
SODIUM SERPL-SCNC: 138 MMOL/L (ref 135–147)
TRIGL SERPL-MCNC: 72 MG/DL
WBC # BLD AUTO: 7.81 THOUSAND/UL (ref 4.31–10.16)

## 2023-07-28 PROCEDURE — 84484 ASSAY OF TROPONIN QUANT: CPT | Performed by: STUDENT IN AN ORGANIZED HEALTH CARE EDUCATION/TRAINING PROGRAM

## 2023-07-28 PROCEDURE — 83036 HEMOGLOBIN GLYCOSYLATED A1C: CPT | Performed by: STUDENT IN AN ORGANIZED HEALTH CARE EDUCATION/TRAINING PROGRAM

## 2023-07-28 PROCEDURE — 0241U HB NFCT DS VIR RESP RNA 4 TRGT: CPT | Performed by: STUDENT IN AN ORGANIZED HEALTH CARE EDUCATION/TRAINING PROGRAM

## 2023-07-28 PROCEDURE — 85027 COMPLETE CBC AUTOMATED: CPT | Performed by: STUDENT IN AN ORGANIZED HEALTH CARE EDUCATION/TRAINING PROGRAM

## 2023-07-28 PROCEDURE — 99291 CRITICAL CARE FIRST HOUR: CPT | Performed by: STUDENT IN AN ORGANIZED HEALTH CARE EDUCATION/TRAINING PROGRAM

## 2023-07-28 PROCEDURE — G1004 CDSM NDSC: HCPCS

## 2023-07-28 PROCEDURE — 80061 LIPID PANEL: CPT | Performed by: STUDENT IN AN ORGANIZED HEALTH CARE EDUCATION/TRAINING PROGRAM

## 2023-07-28 PROCEDURE — 93005 ELECTROCARDIOGRAM TRACING: CPT

## 2023-07-28 PROCEDURE — 1124F ACP DISCUSS-NO DSCNMKR DOCD: CPT | Performed by: PSYCHIATRY & NEUROLOGY

## 2023-07-28 PROCEDURE — 85610 PROTHROMBIN TIME: CPT | Performed by: STUDENT IN AN ORGANIZED HEALTH CARE EDUCATION/TRAINING PROGRAM

## 2023-07-28 PROCEDURE — 70496 CT ANGIOGRAPHY HEAD: CPT

## 2023-07-28 PROCEDURE — 80048 BASIC METABOLIC PNL TOTAL CA: CPT | Performed by: STUDENT IN AN ORGANIZED HEALTH CARE EDUCATION/TRAINING PROGRAM

## 2023-07-28 PROCEDURE — 70498 CT ANGIOGRAPHY NECK: CPT

## 2023-07-28 PROCEDURE — 99291 CRITICAL CARE FIRST HOUR: CPT

## 2023-07-28 PROCEDURE — 85730 THROMBOPLASTIN TIME PARTIAL: CPT | Performed by: STUDENT IN AN ORGANIZED HEALTH CARE EDUCATION/TRAINING PROGRAM

## 2023-07-28 PROCEDURE — 99223 1ST HOSP IP/OBS HIGH 75: CPT | Performed by: PSYCHIATRY & NEUROLOGY

## 2023-07-28 PROCEDURE — 36415 COLL VENOUS BLD VENIPUNCTURE: CPT | Performed by: STUDENT IN AN ORGANIZED HEALTH CARE EDUCATION/TRAINING PROGRAM

## 2023-07-28 PROCEDURE — 82948 REAGENT STRIP/BLOOD GLUCOSE: CPT

## 2023-07-28 PROCEDURE — 99223 1ST HOSP IP/OBS HIGH 75: CPT | Performed by: FAMILY MEDICINE

## 2023-07-28 RX ORDER — ATORVASTATIN CALCIUM 80 MG/1
80 TABLET, FILM COATED ORAL
Status: DISCONTINUED | OUTPATIENT
Start: 2023-07-28 | End: 2023-07-29 | Stop reason: HOSPADM

## 2023-07-28 RX ORDER — FAMOTIDINE 20 MG/1
10 TABLET, FILM COATED ORAL 2 TIMES DAILY
Status: DISCONTINUED | OUTPATIENT
Start: 2023-07-28 | End: 2023-07-29 | Stop reason: HOSPADM

## 2023-07-28 RX ORDER — ASPIRIN 81 MG/1
81 TABLET, CHEWABLE ORAL DAILY
Status: DISCONTINUED | OUTPATIENT
Start: 2023-07-29 | End: 2023-07-29 | Stop reason: HOSPADM

## 2023-07-28 RX ORDER — NICOTINE 21 MG/24HR
1 PATCH, TRANSDERMAL 24 HOURS TRANSDERMAL DAILY
Status: DISCONTINUED | OUTPATIENT
Start: 2023-07-29 | End: 2023-07-29 | Stop reason: HOSPADM

## 2023-07-28 RX ORDER — ALPRAZOLAM 0.5 MG/1
0.5 TABLET ORAL 4 TIMES DAILY PRN
Status: DISCONTINUED | OUTPATIENT
Start: 2023-07-28 | End: 2023-07-29 | Stop reason: HOSPADM

## 2023-07-28 RX ORDER — CLOPIDOGREL BISULFATE 75 MG/1
75 TABLET ORAL DAILY
Status: DISCONTINUED | OUTPATIENT
Start: 2023-07-29 | End: 2023-07-29 | Stop reason: HOSPADM

## 2023-07-28 RX ORDER — ASPIRIN 325 MG
325 TABLET ORAL ONCE
Status: COMPLETED | OUTPATIENT
Start: 2023-07-28 | End: 2023-07-28

## 2023-07-28 RX ORDER — SERTRALINE HYDROCHLORIDE 100 MG/1
100 TABLET, FILM COATED ORAL DAILY
Status: DISCONTINUED | OUTPATIENT
Start: 2023-07-29 | End: 2023-07-29 | Stop reason: HOSPADM

## 2023-07-28 RX ORDER — HYDRALAZINE HYDROCHLORIDE 20 MG/ML
5 INJECTION INTRAMUSCULAR; INTRAVENOUS EVERY 6 HOURS PRN
Status: DISCONTINUED | OUTPATIENT
Start: 2023-07-28 | End: 2023-07-29 | Stop reason: HOSPADM

## 2023-07-28 RX ORDER — ENOXAPARIN SODIUM 100 MG/ML
40 INJECTION SUBCUTANEOUS DAILY
Status: DISCONTINUED | OUTPATIENT
Start: 2023-07-29 | End: 2023-07-29 | Stop reason: HOSPADM

## 2023-07-28 RX ORDER — CLOPIDOGREL BISULFATE 75 MG/1
600 TABLET ORAL ONCE
Status: COMPLETED | OUTPATIENT
Start: 2023-07-28 | End: 2023-07-28

## 2023-07-28 RX ADMIN — IOHEXOL 85 ML: 350 INJECTION, SOLUTION INTRAVENOUS at 13:40

## 2023-07-28 RX ADMIN — ATORVASTATIN CALCIUM 80 MG: 40 TABLET, FILM COATED ORAL at 16:08

## 2023-07-28 RX ADMIN — CLOPIDOGREL BISULFATE 600 MG: 75 TABLET ORAL at 15:00

## 2023-07-28 RX ADMIN — ASPIRIN 325 MG: 325 TABLET ORAL at 15:00

## 2023-07-28 RX ADMIN — FAMOTIDINE 10 MG: 20 TABLET, FILM COATED ORAL at 18:22

## 2023-07-28 NOTE — ASSESSMENT & PLAN NOTE
Patient presented to the ER with right hand weakness where she was dropping things. Her  noticed her to have slurred speech. Symptoms started at around 10:30 AM and lasted about 15 minutes.     Admit to telemetry  CTA head/neck revealed acute ischemia of left lentiform nucleus, left insular ribbon/subinsular region  Patient loaded with 600 mg of Plavix and full dose ASA  Placed patient on 75 mg of Plavix and 81 mg of aspirin starting tomorrow   high-dose statin  Lipid panel, HgA1C in AM  MRI in AM  Echo with bubble study  Plan of care coordinated with neurology

## 2023-07-28 NOTE — H&P
[address] [facility]  H&P  Name: Jannette Nguyen 71 y.o. female I MRN: [id number]  Unit/Bed#: ED CT2 I Date of Admission: 7/28/2023   Date of Service: 7/28/2023 I Hospital Day: 0      Assessment/Plan   * CVA (cerebral vascular accident) [facility])  Assessment & Plan  Patient presented to the ER with right hand weakness where she was dropping things. Her  noticed her to have slurred speech. Symptoms started at around 10:30 AM and lasted about 15 minutes. Admit to telemetry  CTA head/neck revealed acute ischemia of left lentiform nucleus, left insular ribbon/subinsular region  Patient loaded with 600 mg of Plavix and full dose ASA  Placed patient on 75 mg of Plavix and 81 mg of aspirin starting tomorrow   high-dose statin  Lipid panel, HgA1C in AM  MRI in AM  Echo with bubble study  Plan of care coordinated with neurology        Occlusion and stenosis of left middle cerebral artery  Assessment & Plan  Patient noted to have short segment critical high-grade stenosis of left middle cerebral artery M1 segment    Smoker  Assessment & Plan  Smoking cessation. Nicotine patch    GERD (gastroesophageal reflux disease)  Assessment & Plan  Continue Pepcid    Essential hypertension  Assessment & Plan  Hold antihypertensives to allow for permissive hypertension  Hydralazine as needed    Anxiety and depression  Assessment & Plan  Continue Zoloft, Xanax as needed        VTE Pharmacologic Prophylaxis:   lovenox  Code Status: Level 1 - Full Code     Anticipated Length of Stay: Patient will be admitted on an inpatient basis with an anticipated length of stay of greater than 2 midnights secondary to Stroke.     Total Time Spent on Date of Encounter in care of patient: 55 minutes This time was spent on one or more of the following: performing physical exam; counseling and coordination of care; obtaining or reviewing history; documenting in the medical record; reviewing/ordering tests, medications or procedures; communicating with other healthcare professionals and discussing with patient's family/caregivers. Chief Complaint: Right hand weakness    History of Present Illness:  Armond Iqbal is a 71 y.o. female who presents with Right hand weakness where she was dropping things that started at around 10:30 AM.  Per patient her  also noted her to have slurring of speech. Symptoms lasted about 15 minutes and then fully resolved. Patient denies any numbness or recurrence of symptoms since. Patient does have low back pain with sciatica. Denies any difficulty ambulating weakness in her legs    Review of Systems:  Review of Systems   Constitutional: Negative for appetite change, chills and fever. HENT: Negative for congestion. Eyes: Negative for photophobia and visual disturbance. Respiratory: Negative for chest tightness and shortness of breath. Cardiovascular: Negative for chest pain and leg swelling. Gastrointestinal: Negative for abdominal pain, blood in stool, diarrhea, nausea and vomiting. Genitourinary: Negative for dysuria, frequency and hematuria. Musculoskeletal: Positive for back pain. Skin: Negative for wound. Neurological: Positive for speech difficulty and weakness. Negative for dizziness, syncope and light-headedness. Hematological: Does not bruise/bleed easily. Psychiatric/Behavioral: Negative for agitation. Past Medical and Surgical History:   Past Medical History:   Diagnosis Date   • Anxiety    • Depression    • GERD (gastroesophageal reflux disease)    • Hypertension        Past Surgical History:   Procedure Laterality Date   • APPENDECTOMY     • TONSILLECTOMY         Meds/Allergies:  Prior to Admission medications    Medication Sig Start Date End Date Taking?  Authorizing Provider   ALPRAZolam Mary Elizondo 0.5 mg tablet Take 0.5 mg by mouth 4 (four) times a day as needed for anxiety Pt takes one 0.5 mg tablet as needed for anxiety up to four times daily   Yes Historical Provider, MD   aluminum-magnesium hydroxide 200-200 MG/5ML suspension Take 15 mL by mouth every 12 (twelve) hours   Yes Historical Provider, MD   famotidine (PEPCID) 20 mg tablet Take 20 mg by mouth daily Pt is taking half a tablet in the morning and half a tablet at night. Yes Historical Provider, MD   lisinopril (ZESTRIL) 10 mg tablet Take 10 mg by mouth daily   Yes Historical Provider, MD   omeprazole (PriLOSEC) 20 mg delayed release capsule Take 10 mg by mouth 2 (two) times a day with meals     Yes Historical Provider, MD   sertraline (ZOLOFT) 100 mg tablet Take by mouth    Yes Historical Provider, MD   famotidine (PEPCID) 10 mg tablet Take 10 mg by mouth daily with lunch  Patient not taking: Reported on 7/28/2023    Historical Provider, MD     I have reviewed home medications using recent Epic encounter. Allergies: Allergies   Allergen Reactions   • Amoxil [Amoxicillin] Other (See Comments)     "mouth felt on fire"       Social History:  Marital Status: /Civil Union   Occupation: Works at [employer]  Patient Pre-hospital Living Situation: With spouse  Patient Pre-hospital Level of Mobility: walks  Patient Pre-hospital Diet Restrictions: none  Substance Use History:   Social History     Substance and Sexual Activity   Alcohol Use Yes   • Alcohol/week: 28.0 standard drinks of alcohol   • Types: 28 Cans of beer per week    Comment: every night has "a few" beers     Social History     Tobacco Use   Smoking Status Heavy Smoker   • Packs/day: 1.50   • Types: Cigarettes   Smokeless Tobacco Never     Social History     Substance and Sexual Activity   Drug Use No       Family History:  History reviewed. No pertinent family history.     Physical Exam:     Vitals:   Blood Pressure: (!) 191/91 (07/28/23 1543)  Pulse: 78 (07/28/23 1543)  Temperature: 98.5 °F (36.9 °C) (07/28/23 1221)  Temp Source: Oral (07/28/23 1221)  Respirations: 18 (07/28/23 1543)  Height: 5' 4" (162.6 cm) (07/28/23 1217)  Weight - Scale: 79.2 kg (174 lb 9.7 oz) (07/28/23 1217)  SpO2: 98 % (07/28/23 1543)    Physical Exam  Constitutional:       General: She is not in acute distress. Appearance: She is well-developed. HENT:      Head: Normocephalic and atraumatic. Eyes:      General: No scleral icterus. Cardiovascular:      Rate and Rhythm: Normal rate and regular rhythm. Pulmonary:      Effort: Pulmonary effort is normal. No respiratory distress. Breath sounds: Normal breath sounds. No wheezing or rales. Abdominal:      General: Bowel sounds are normal. There is no distension. Palpations: Abdomen is soft. Tenderness: There is no abdominal tenderness. Musculoskeletal:      Right lower leg: No edema. Left lower leg: No edema. Neurological:      Mental Status: She is alert and oriented to person, place, and time. Sensory: No sensory deficit. Motor: No weakness. Comments: Speech is clear and fluent.   Mild right-sided facial droop noted but per patient this is at her baseline          Additional Data:     Lab Results:  Results from last 7 days   Lab Units 07/28/23  1228   WBC Thousand/uL 7.81   HEMOGLOBIN g/dL 12.7   HEMATOCRIT % 39.6   PLATELETS Thousands/uL 306     Results from last 7 days   Lab Units 07/28/23  1228   SODIUM mmol/L 138   POTASSIUM mmol/L 4.4   CHLORIDE mmol/L 105   CO2 mmol/L 28   BUN mg/dL 16   CREATININE mg/dL 0.67   ANION GAP mmol/L 5   CALCIUM mg/dL 9.3   GLUCOSE RANDOM mg/dL 122     Results from last 7 days   Lab Units 07/28/23  1228   INR  0.86     Results from last 7 days   Lab Units 07/28/23  1212   POC GLUCOSE mg/dl 134               Lines/Drains:  Invasive Devices     Peripheral Intravenous Line  Duration           Peripheral IV 07/28/23 Left Antecubital <1 day                    Imaging: Reviewed radiology reports from this admission including: cTA head/neck  CTA head and neck with and without contrast   Final Result by Kasandra Patterson MD (07/28 9227) Short segment critical high-grade stenosis/near occlusion of the M1 segment of the left middle cerebral artery. Low-attenuation of the left lentiform nucleus as well as left insular ribbon/subinsular white matter consistent with acute ischemia and should be correlated with MRI. No associated hemorrhage. I personally discussed this study with Koko Ramirez on 7/28/2023 2:11 PM.                        Workstation performed: [id number]             EKG and Other Studies Reviewed on Admission:   · EKG: Sinus rhythm. ** Please Note: This note has been constructed using a voice recognition system.  **

## 2023-07-28 NOTE — ASSESSMENT & PLAN NOTE
Patient noted to have short segment critical high-grade stenosis of left middle cerebral artery M1 segment

## 2023-07-28 NOTE — CONSULTS
[address] [facility]   Neurology Initial Consult    Cristy Cisneros is a 71 y.o. female  [address] [facility]          Information obtained from:   Chief Complaint   Patient presents with   • CVA/TIA-like Symptoms     Pt's spouse states pt couldn't hold anything in her right hand and pt had slurred speech and droopy lower lip. Began about two hours ago and has since resolved. Pt is A&O x4 upon arrival. No blood thinners. Pt was outside at the time and symptoms developed after walking inside. Assessment/Plan:    1. Suspected acute left BG/insular ischemic stroke  2. Left M1 stenosis/occlusion of unclear chronicity  3. HLD  4. HTN  5. Smoking addiction       Patient doesn't have any active symptoms from suspected stroke in left hemisphere. Her right facial asymmetry she says is old. She wasn't TNK candidate due to low NIH scale. She was given aspirin 325mg, plavix of 600mg and lipitor 80mg. Cont aspirin 81mg, plavix 75mg and lipitor 80mg tomm  MRI brain   TTE w/ shunt  Allow permissive HTN. Systolic atleast >585 for 24 hrs. Hold all antihypertensives, may use midodrine if needed. Neuro checks  Speech evaluation   PT/OT  Advised smoking cessation      Total time of encounter: 70 min   More than 50% of time was spent in counseling and coordination of care of patient. HPI:    Cristy Cisneros is a 70 yo F with PMH of HTN presents with transient right sided symptoms. She states that around 10:30am, she had developed slurred speech and dropped things out of her right hand. Denied any difficulty with gait or vision. Patient states that her symptoms lasted 15 min only. She didn't have any sxs upon arrival. LSN was 10:30am. Stroke alert wasn't called due to lack of symptoms. Patient's CTA head and neck reveals short segment critical high grade stenosis /occlusion of M1 of left MCA and likely changes related to potential left BG stroke. Patient smokes 1.5 ppd.  She's not on any AP at home.  Patient was asked to be given aspirin 325mg, plavix 600mg and lipitor 80mg after discussion with ED. Past Medical History:   Diagnosis Date   • Anxiety    • Depression    • GERD (gastroesophageal reflux disease)    • Hypertension        Past Surgical History:   Procedure Laterality Date   • APPENDECTOMY     • TONSILLECTOMY         Allergies   Allergen Reactions   • Amoxil [Amoxicillin] Other (See Comments)     "mouth felt on fire"         Current Facility-Administered Medications:   •  ALPRAZolam (XANAX) tablet 0.5 mg, 0.5 mg, Oral, 4x Daily PRN, Kyara Olivera DO  •  [START ON 7/29/2023] aspirin chewable tablet 81 mg, 81 mg, Oral, Daily, Kyara Olivera DO  •  atorvastatin (LIPITOR) tablet 80 mg, 80 mg, Oral, Daily With Dinner, Kyara Olivera DO, 80 mg at 07/28/23 1608  •  [START ON 7/29/2023] clopidogrel (PLAVIX) tablet 75 mg, 75 mg, Oral, Daily, Kyara Olivera DO  •  [START ON 7/29/2023] enoxaparin (LOVENOX) subcutaneous injection 40 mg, 40 mg, Subcutaneous, Daily, Kyara Olivera DO  •  famotidine (PEPCID) tablet 10 mg, 10 mg, Oral, BID, Kyara Olivera, DO  •  [START ON 7/29/2023] nicotine (NICODERM CQ) 21 mg/24 hr TD 24 hr patch 1 patch, 1 patch, Transdermal, Daily, Kyara Olivera, DO  •  [START ON 7/29/2023] sertraline (ZOLOFT) tablet 100 mg, 100 mg, Oral, Daily, Kyara Olivera,     Social History     Socioeconomic History   • Marital status: /Civil Union     Spouse name: Not on file   • Number of children: Not on file   • Years of education: Not on file   • Highest education level: Not on file   Occupational History   • Not on file   Tobacco Use   • Smoking status: Heavy Smoker     Packs/day: 1.50     Types: Cigarettes   • Smokeless tobacco: Never   Vaping Use   • Vaping Use: Never used   Substance and Sexual Activity   • Alcohol use:  Yes     Alcohol/week: 28.0 standard drinks of alcohol     Types: 28 Cans of beer per week     Comment: every night has "a few" beers   • Drug use: No   • Sexual activity: Not on file   Other Topics Concern   • Not on file   Social History Narrative   • Not on file     Social Determinants of Health     Financial Resource Strain: Not on file   Food Insecurity: Not on file   Transportation Needs: Not on file   Physical Activity: Not on file   Stress: Not on file   Social Connections: Not on file   Intimate Partner Violence: Not on file   Housing Stability: Not on file       History reviewed. No pertinent family history. Review of systems:  Please see HPI for positive symptoms. No fever, no chills, no weight change. Ocular: No drainage, no blurred vision. HEENT:  No sore throat, earache, or congestion. No neck pain. COR:  No chest pain. No palpitations. Lungs:  no sob, wheezing,  GI:  no  nausea, no vomiting, no diarrhea, no constipation, no anorexia. :  No dysuria, frequency, or urgency. No hematuria. Musculoskeletal:  No joint pain or swelling or edema. Skin:  No rash or itching. Psychiatric:  no anxiety, no depression. Endocrine:  No polyuria or polydipsia. Physical examination:  Vitals:    07/28/23 1748   BP: (!) 172/92   Pulse: 79   Resp: 18   Temp: 98 °F (36.7 °C)   SpO2: 95%       GENERAL APPEARANCE:  The patient is alert, oriented. He is in no acute distress. HEENT:  Head is normocephalic. The sinuses are otherwise nontender. Pupils are equal and reactive. NECK:  Supple without lymphadenopathy. HEART:  Regular rate and rhythm. LUNGS:  clear to auscultation. No crackles or wheezes are heard. ABDOMEN:  Soft, nontender, nondistended with good bowel sounds heard. EXTREMITIES:  Without cyanosis, clubbing or edema. Mental status: The patient is alert, attentive, and oriented. Speech is clear and fluent, good repetition, comprehension, and naming. he recalls 3/3 objects at 5 minutes. Cranial nerves:  CN II: Visual fields are full to confrontation. Fundoscopic exam is normal with sharp discs and no vascular changes. . Pupils are 3 mm and briskly reactive to light. CN III, IV, VI: At primary gaze, there is no eye deviation. CN V: Facial sensation is intact to pinprick in all 3 divisions bilaterally. Corneal responses are intact. CN VII: right facial asymmetry however patient states that she's had that for a long time. CN VIII: Hearing is normal to rubbing fingers  CN IX, X: Palate elevates symmetrically. Phonation is normal.  CN XI: Head turning and shoulder shrug are intact  CN XII: Tongue is midline with normal movements and no atrophy. Motor: There is no pronator drift of out-stretched arms. Muscle bulk and tone are normal.     Muscle exam  Arm Right Left Leg Right Left   Deltoid 5/5 5/5 Iliopsoas 5/5 5/5   Biceps 5/5 5/5 Quads 5/5 5/5   Triceps 5/5 5/5 Hamstrings 5/5 5/5   Wrist Extension 5/5 5/5 Ankle Dorsi Flexion 5/5 5/5   Wrist Flexion 5/5 5/5 Ankle Plantar Flexion 5/5 5/5   Interossei 4++/5 5/5 Ankle Eversion 5/5 5/5   APB 5/5 5/5 Ankle Inversion 5/5 5/5       Reflexes   RJ BJ TJ KJ AJ Plantars Trujillo's   Right 2+ 2+ 2+ 2+  Downgoing Not present   Left 2+ 2+ 2+ 2+  Downgoing Not present     Sensory:  Light touch, pinprick, position sense, and vibration sense are intact in fingers and toes. Coordination:  Rapid alternating movements and fine finger movements are intact. There is no dysmetria on finger-to-nose and heel-knee-shin. There are no abnormal or extraneous movements. Romberg negative. Gait/Stance:  Normal heel/toe walking and tandem gait.     Lab Results   Component Value Date    WBC 7.81 07/28/2023    HGB 12.7 07/28/2023    HCT 39.6 07/28/2023    MCV 97 07/28/2023     07/28/2023     No results found for: "HGBA1C"  Lab Results   Component Value Date    ALT 16 04/29/2023    AST 17 04/29/2023    ALKPHOS 67 04/29/2023     Lab Results   Component Value Date    CALCIUM 9.3 07/28/2023    K 4.4 07/28/2023    CO2 28 07/28/2023     07/28/2023    BUN 16 07/28/2023    CREATININE 0.67 07/28/2023         Radiology Review of reports and notes reveal:    Independent Interpretation of images or specimens:  CTA head and neck with and without contrast    Result Date: 7/28/2023  Short segment critical high-grade stenosis/near occlusion of the M1 segment of the left middle cerebral artery. Low-attenuation of the left lentiform nucleus as well as left insular ribbon/subinsular white matter consistent with acute ischemia and should be correlated with MRI. No associated hemorrhage. Thank you for this consult. Cortland Bence, M.D.   [facility]  [address]

## 2023-07-28 NOTE — ED PROVIDER NOTES
History  Chief Complaint   Patient presents with   • CVA/TIA-like Symptoms     Pt's spouse states pt couldn't hold anything in her right hand and pt had slurred speech and droopy lower lip. Began about two hours ago and has since resolved. Pt is A&O x4 upon arrival. No blood thinners. Pt was outside at the time and symptoms developed after walking inside. Patient is a 69-year-old female, past medical history of anxiety, depression, GERD, and hypertension, who presents to the emergency department for transient right facial droop and right hand weakness. Symptoms started at approximately 10 AM this morning. She noticed that she kept dropping things with her right hand. Her  noted the facial droop (right/mid lower lip). This lasted for around 2 hours. Eventually she was convinced by her  to seek care and she now presents for further evaluation. On arrival to the emergency department symptoms have resolved. Currently she states she feels fine. Denies any numbness, tingling, or weakness. Denies any chest pain, shortness of breath, nausea, vomiting. No prior history of symptoms like this in the past.  No other complaints or concerns. NIH on arrival: 0          Prior to Admission Medications   Prescriptions Last Dose Informant Patient Reported? Taking?    ALPRAZolam (XANAX) 0.5 mg tablet 7/28/2023 at 1130 Self Yes Yes   Sig: Take 0.5 mg by mouth 4 (four) times a day as needed for anxiety Pt takes one 0.5 mg tablet as needed for anxiety up to four times daily   aluminum-magnesium hydroxide 200-200 MG/5ML suspension 7/28/2023 at 1000  Yes Yes   Sig: Take 15 mL by mouth every 12 (twelve) hours   famotidine (PEPCID) 10 mg tablet Not Taking  Yes No   Sig: Take 10 mg by mouth daily with lunch   Patient not taking: Reported on 7/28/2023   famotidine (PEPCID) 20 mg tablet 7/28/2023 at 1000 Self Yes Yes   Sig: Take 20 mg by mouth daily Pt is taking half a tablet in the morning and half a tablet at night.   lisinopril (ZESTRIL) 10 mg tablet 7/28/2023 at 1000 Self Yes Yes   Sig: Take 10 mg by mouth daily   omeprazole (PriLOSEC) 20 mg delayed release capsule 7/28/2023 at 1000 Self Yes Yes   Sig: Take 10 mg by mouth 2 (two) times a day with meals     sertraline (ZOLOFT) 100 mg tablet 7/27/2023 at 1500 Self Yes Yes   Sig: Take by mouth       Facility-Administered Medications: None       Past Medical History:   Diagnosis Date   • Anxiety    • Depression    • GERD (gastroesophageal reflux disease)    • Hypertension        Past Surgical History:   Procedure Laterality Date   • APPENDECTOMY     • TONSILLECTOMY         History reviewed. No pertinent family history. I have reviewed and agree with the history as documented. E-Cigarette/Vaping   • E-Cigarette Use Never User      E-Cigarette/Vaping Substances     Social History     Tobacco Use   • Smoking status: Heavy Smoker     Packs/day: 1.50     Types: Cigarettes   • Smokeless tobacco: Never   Vaping Use   • Vaping Use: Never used   Substance Use Topics   • Alcohol use: Yes     Alcohol/week: 28.0 standard drinks of alcohol     Types: 28 Cans of beer per week     Comment: every night has "a few" beers   • Drug use: No       Review of Systems   Constitutional: Negative for chills and fever. Respiratory: Negative for shortness of breath. Cardiovascular: Negative for chest pain. Gastrointestinal: Negative for nausea and vomiting. Neurological: Positive for facial asymmetry (Resolved) and weakness (Resolved). Negative for numbness. All other systems reviewed and are negative. Physical Exam  Physical Exam  Vitals and nursing note reviewed. Constitutional:       General: She is not in acute distress. Appearance: She is well-developed. She is not diaphoretic. HENT:      Head: Normocephalic and atraumatic.       Right Ear: External ear normal.      Left Ear: External ear normal.      Nose: Nose normal.   Eyes:      General: Lids are normal. No scleral icterus. Cardiovascular:      Rate and Rhythm: Normal rate and regular rhythm. Heart sounds: Normal heart sounds. No murmur heard. No friction rub. No gallop. Pulmonary:      Effort: Pulmonary effort is normal. No respiratory distress. Breath sounds: Normal breath sounds. No wheezing or rales. Abdominal:      Palpations: Abdomen is soft. Tenderness: There is no abdominal tenderness. There is no guarding or rebound. Musculoskeletal:         General: No deformity. Normal range of motion. Cervical back: Normal range of motion and neck supple. Skin:     General: Skin is warm and dry. Neurological:      General: No focal deficit present. Mental Status: She is alert and oriented to person, place, and time. Mental status is at baseline. GCS: GCS eye subscore is 4. GCS verbal subscore is 5. GCS motor subscore is 6. Cranial Nerves: Cranial nerves 2-12 are intact. No cranial nerve deficit, dysarthria or facial asymmetry. Sensory: Sensation is intact. No sensory deficit. Motor: Motor function is intact. No weakness, tremor, seizure activity or pronator drift. Coordination: Coordination is intact. Finger-Nose-Finger Test and Heel to [place] Test normal. Rapid alternating movements normal.      Gait: Gait is intact.  Gait normal.   Psychiatric:         Mood and Affect: Mood normal.         Behavior: Behavior normal.         Vital Signs  ED Triage Vitals   Temperature Pulse Respirations Blood Pressure SpO2   07/28/23 1221 07/28/23 1217 07/28/23 1217 07/28/23 1217 07/28/23 1217   98.5 °F (36.9 °C) 77 18 (!) 179/82 98 %      Temp Source Heart Rate Source Patient Position - Orthostatic VS BP Location FiO2 (%)   07/28/23 1221 07/28/23 1217 07/28/23 1217 07/28/23 1217 --   Oral Monitor Lying Left arm       Pain Score       07/28/23 1217       No Pain           Vitals:    07/28/23 1315 07/28/23 1400 07/28/23 1445 07/28/23 1543   BP: 162/94 162/79 (!) 195/138 (!) 191/91 Pulse: 74 79 81 78   Patient Position - Orthostatic VS: Lying Lying Lying Lying         Visual Acuity  Visual Acuity    Flowsheet Row Most Recent Value   L Pupil Size (mm) 3   R Pupil Size (mm) 3          ED Medications  Medications   atorvastatin (LIPITOR) tablet 80 mg (has no administration in time range)   iohexol (OMNIPAQUE) 350 MG/ML injection (SINGLE-DOSE) 85 mL (85 mL Intravenous Given 7/28/23 1340)   aspirin tablet 325 mg (325 mg Oral Given 7/28/23 1500)   clopidogrel (PLAVIX) tablet 600 mg (600 mg Oral Given 7/28/23 1500)       Diagnostic Studies  Results Reviewed     Procedure Component Value Units Date/Time    HS Troponin I 2hr [[id number]]  (Normal) Collected: 07/28/23 1434    Lab Status: Final result Specimen: Blood from Arm, Left Updated: 07/28/23 1524     hs TnI 2hr 4 ng/L      Delta 2hr hsTnI 0 ng/L     Lipid Panel with Direct LDL reflex [[id number]]  (Abnormal) Collected: 07/28/23 1503    Lab Status: Final result Specimen: Blood from Arm, Left Updated: 07/28/23 1523     Cholesterol 225 mg/dL      Triglycerides 72 mg/dL      HDL, Direct 81 mg/dL      LDL Calculated 130 mg/dL     Hemoglobin A1C [[id number]] Collected: 07/28/23 1503    Lab Status: In process Specimen: Blood from Arm, Left Updated: 07/28/23 1506    HS Troponin I 4hr [[id number]]     Lab Status: No result Specimen: Blood     FLU/RSV/COVID - if FLU/RSV clinically relevant [[id number]]  (Normal) Collected: 07/28/23 1228    Lab Status: Final result Specimen: Nares from Nose Updated: 07/28/23 1329     SARS-CoV-2 Negative     INFLUENZA A PCR Negative     INFLUENZA B PCR Negative     RSV PCR Negative    Narrative:      FOR PEDIATRIC PATIENTS - copy/paste COVID Guidelines URL to browser: [email or web address]    SARS-CoV-2 assay is a Nucleic Acid Amplification assay intended for the  qualitative detection of nucleic acid from SARS-CoV-2 in nasopharyngeal  swabs.  Results are for the presumptive identification of SARS-CoV-2 RNA. Positive results are indicative of infection with SARS-CoV-2, the virus  causing COVID-19, but do not rule out bacterial infection or co-infection  with other viruses. Laboratories within the [facility] and its  territories are required to report all positive results to the appropriate  public health authorities. Negative results do not preclude SARS-CoV-2  infection and should not be used as the sole basis for treatment or other  patient management decisions. Negative results must be combined with  clinical observations, patient history, and epidemiological information. This test has not been FDA cleared or approved. This test has been authorized by FDA under an Emergency Use Authorization  (EUA). This test is only authorized for the duration of time the  declaration that circumstances exist justifying the authorization of the  emergency use of an in vitro diagnostic tests for detection of SARS-CoV-2  virus and/or diagnosis of COVID-19 infection under section 564(b)(1) of  the Act, 21 U. S.C. 545HOL-4(C)(8), unless the authorization is terminated  or revoked sooner. The test has been validated but independent review by FDA  and CLIA is pending. Test performed using [employer]: This RT-PCR assay targets N2,  a region unique to SARS-CoV-2. A conserved region in the E-gene was chosen  for pan-Sarbecovirus detection which includes SARS-CoV-2. According to CMS-2020-01-R, this platform meets the definition of high-throughput technology.     HS Troponin 0hr (reflex protocol) [[id number]]  (Normal) Collected: 07/28/23 1228    Lab Status: Final result Specimen: Blood from Arm, Left Updated: 07/28/23 1322     hs TnI 0hr 4 ng/L     Basic metabolic panel [[id number]] Collected: 07/28/23 1228    Lab Status: Final result Specimen: Blood from Arm, Left Updated: 07/28/23 1316     Sodium 138 mmol/L      Potassium 4.4 mmol/L      Chloride 105 mmol/L      CO2 28 mmol/L      ANION GAP 5 mmol/L      BUN 16 mg/dL      Creatinine 0.67 mg/dL      Glucose 122 mg/dL      Calcium 9.3 mg/dL      eGFR 88 ml/min/1.73sq m     Narrative:      [place] guidelines for Chronic Kidney Disease (CKD):   •  Stage 1 with normal or high GFR (GFR > 90 mL/min/1.73 square meters)  •  Stage 2 Mild CKD (GFR = 60-89 mL/min/1.73 square meters)  •  Stage 3A Moderate CKD (GFR = 45-59 mL/min/1.73 square meters)  •  Stage 3B Moderate CKD (GFR = 30-44 mL/min/1.73 square meters)  •  Stage 4 Severe CKD (GFR = 15-29 mL/min/1.73 square meters)  •  Stage 5 End Stage CKD (GFR <15 mL/min/1.73 square meters)  Note: GFR calculation is accurate only with a steady state creatinine    Protime-INR [[id number]]  (Normal) Collected: 07/28/23 1228    Lab Status: Final result Specimen: Blood from Arm, Left Updated: 07/28/23 1309     Protime 11.8 seconds      INR 0.86    APTT [[id number]]  (Normal) Collected: 07/28/23 1228    Lab Status: Final result Specimen: Blood from Arm, Left Updated: 07/28/23 1309     PTT 31 seconds     CBC and Platelet [[id number]]  (Normal) Collected: 07/28/23 1228    Lab Status: Final result Specimen: Blood from Arm, Left Updated: 07/28/23 1241     WBC 7.81 Thousand/uL      RBC 4.07 Million/uL      Hemoglobin 12.7 g/dL      Hematocrit 39.6 %      MCV 97 fL      MCH 31.2 pg      MCHC 32.1 g/dL      RDW 14.7 %      Platelets 072 Thousands/uL      MPV 9.3 fL     Fingerstick Glucose (POCT) [[id number]]  (Normal) Collected: 07/28/23 1212    Lab Status: Final result Updated: 07/28/23 1228     POC Glucose 134 mg/dl                  CTA head and neck with and without contrast   Final Result by Bridget Santos MD (07/28 1427)      Short segment critical high-grade stenosis/near occlusion of the M1 segment of the left middle cerebral artery. Low-attenuation of the left lentiform nucleus as well as left insular ribbon/subinsular white matter consistent with acute ischemia and should be correlated with MRI. No associated hemorrhage.       I personally discussed this study with Lambert Carreno on 7/28/2023 2:11 PM.                        Workstation performed: [id number]                    Procedures  CriticalCare Time    Date/Time: 7/28/2023 12:14 PM    Performed by: Lambert Carreno DO  Authorized by: Lambert Carreno DO    Critical care provider statement:     Critical care time (minutes):  39    Critical care start time:  7/28/2023 12:14 PM    Critical care end time:  7/28/2023 3:59 PM    Critical care time was exclusive of:  Separately billable procedures and treating other patients    Critical care was necessary to treat or prevent imminent or life-threatening deterioration of the following conditions:  CNS failure or compromise    Critical care was time spent personally by me on the following activities:  Examination of patient, discussions with consultants, ordering and review of radiographic studies, ordering and review of laboratory studies, blood draw for specimens, ordering and performing treatments and interventions, obtaining history from patient or surrogate and development of treatment plan with patient or surrogate    I assumed direction of critical care for this patient from another provider in my specialty: no      ECG 12 Lead Documentation Only    Date/Time: 7/28/2023 4:02 PM    Performed by: Lambert Carreno DO  Authorized by: Lambert Carreno DO    ECG reviewed by me, the ED Provider: yes    Patient location:  ED  Interpretation:     Interpretation: normal    Rate:     ECG rate:  72    ECG rate assessment: normal    Rhythm:     Rhythm: sinus rhythm    Ectopy:     Ectopy: none    QRS:     QRS axis:  Normal  Conduction:     Conduction: normal    ST segments:     ST segments:  Normal  T waves:     T waves: normal               ED Course  ED Course as of 07/28/23 1603   Fri Jul 28, 2023   1245 CBC and Platelet   6344 Basic metabolic panel   9374 FLU/RSV/COVID - if FLU/RSV clinically relevant 1425 Discussed with radiology. Patient has an acute occlusion of the left MCA. Also has early ischemic changes of the left lentiform nucleus. Will discuss with neurology. 1430 TT sent to neuro   7269 CTA head and neck with and without contrast  Short segment critical high-grade stenosis/near occlusion of the M1 segment of the left middle cerebral artery.     Low-attenuation of the left lentiform nucleus as well as left insular ribbon/subinsular white matter consistent with acute ischemia and should be correlated with MRI. No associated hemorrhage. 1443 Discussed with neuro. No IR intervention indicated given patient is currently asymptomatic. Recommends admission on stroke pathway. Permissive hypertension with goal between 135 and 861 systolic. Recommends giving full dose aspirin, 600 Plavix, and ADA Lipitor. If patient were to develop any new symptoms restart clock and call stroke alert. 1455 Discussed with [facility]. [id number] [place]                  Stroke Assessment     Row Name 07/28/23 1214             NIH Stroke Scale    Interval Baseline      Level of Consciousness (1a.) 0      LOC Questions (1b.) 0      LOC Commands (1c.) 0      Best Gaze (2.) 0      Visual (3.) 0      Facial Palsy (4.) 0      Motor Arm, Left (5a.) 0      Motor Arm, Right (5b.) 0      Motor Leg, Left (6a.) 0      Motor Leg, Right (6b.) 0      Limb Ataxia (7.) 0      Sensory (8.) 0      Best Language (9.) 0      Dysarthria (10.) 0      Extinction and Inattention (11.) (Formerly Neglect) 0      Total 0              Flowsheet Row Most Recent Value   Thrombolytic Decision Options    Thrombolytic Decision Patient not a candidate. Patient is not a candidate options Symptoms resolved/clearly non disabling. Medical Decision Making  Patient is a 71 y.o. female who presents to the ED for transient right hand weakness and right facial droop. Patient is nontoxic, well-appearing.   She is hypertensive but otherwise vitals are stable. Currently physical exam is unremarkable. No residual focal neurologic deficits. Symptoms concerning for acute CVA versus TIA. EKG without evidence of STEMI or ischemia, fingerstick BS not hypoglycemic, and clinical picture does not suggest other stroke mimic. I considered, but think less likely that symptoms are secondary to a dissection, AMI, hypoglycemia, other metabolic derangement including hepatic/uremic encephalopathy, medication side effect, or post-ictal Saurabh's paralysis. Plan: CT CTA Head and Neck w/ and w/out contrast, stroke labs, EKG, Neurology stroke consult, admit                 Portions of the record may have been created with voice recognition software. Occasional wrong word or "sound a like" substitutions may have occurred due to the inherent limitations of voice recognition software. Read the chart carefully and recognize, using context, where substitutions have occurred. Acute CVA (cerebrovascular accident) [facility]): acute illness or injury that poses a threat to life or bodily functions  Occlusion and stenosis of left middle cerebral artery: acute illness or injury that poses a threat to life or bodily functions  Amount and/or Complexity of Data Reviewed  Labs: ordered. Decision-making details documented in ED Course. Radiology: ordered. Decision-making details documented in ED Course. Discussion of management or test interpretation with external provider(s): Discussed with neurology, see ED course    Risk  OTC drugs. Prescription drug management. Decision regarding hospitalization.           Disposition  Final diagnoses:   Acute CVA (cerebrovascular accident) ([address])   Occlusion and stenosis of left middle cerebral artery     Time reflects when diagnosis was documented in both MDM as applicable and the Disposition within this note     Time User Action Codes Description Comment    7/28/2023  2:33 PM Gabo Smith Add [I63.9] Acute CVA (cerebrovascular accident) ([address])     7/28/2023  3:58 PM Luanne Frias Add [I66.02] Occlusion and stenosis of left middle cerebral artery       ED Disposition     ED Disposition   Admit    Condition   Stable    Date/Time   Fri Jul 28, 2023  3:56 PM    Comment   Case was discussed with SUZETTE and the patient's admission status was agreed to be Admission Status: inpatient status to the service of Dr. Yadira Younger . Follow-up Information    None         Patient's Medications   Discharge Prescriptions    No medications on file       No discharge procedures on file.     PDMP Review     None          ED Provider  Electronically Signed by           Eda Kam DO  07/28/23 1464

## 2023-07-28 NOTE — ED NOTES
Pt spouse gave pt home medication Xanax 0.5mg. Per provider Renate GARRISON to take home medications.       Dennie Banister, [address]  07/28/23 0194

## 2023-07-28 NOTE — Clinical Note
Case was discussed with and the patient's admission status was agreed to be Admission Status: inpatient status to the service of Dr. Munson

## 2023-07-29 ENCOUNTER — APPOINTMENT (INPATIENT)
Dept: RADIOLOGY | Facility: HOSPITAL | Age: 73
DRG: 066 | End: 2023-07-29
Payer: COMMERCIAL

## 2023-07-29 ENCOUNTER — APPOINTMENT (INPATIENT)
Dept: NON INVASIVE DIAGNOSTICS | Facility: HOSPITAL | Age: 73
DRG: 066 | End: 2023-07-29
Payer: COMMERCIAL

## 2023-07-29 VITALS
SYSTOLIC BLOOD PRESSURE: 166 MMHG | HEIGHT: 64 IN | HEART RATE: 80 BPM | BODY MASS INDEX: 29.71 KG/M2 | TEMPERATURE: 97.9 F | WEIGHT: 174 LBS | RESPIRATION RATE: 19 BRPM | DIASTOLIC BLOOD PRESSURE: 81 MMHG | OXYGEN SATURATION: 94 %

## 2023-07-29 PROBLEM — E11.9 TYPE 2 DIABETES MELLITUS, WITHOUT LONG-TERM CURRENT USE OF INSULIN (HCC): Status: ACTIVE | Noted: 2023-07-29

## 2023-07-29 LAB
ANION GAP SERPL CALCULATED.3IONS-SCNC: 7 MMOL/L
AORTIC ROOT: 3.4 CM
APICAL FOUR CHAMBER EJECTION FRACTION: 68 %
AV LVOT PEAK GRADIENT: 5 MMHG
AV PEAK GRADIENT: 7 MMHG
BUN SERPL-MCNC: 11 MG/DL (ref 5–25)
CALCIUM SERPL-MCNC: 8.9 MG/DL (ref 8.4–10.2)
CHLORIDE SERPL-SCNC: 106 MMOL/L (ref 96–108)
CHOLEST SERPL-MCNC: 216 MG/DL
CO2 SERPL-SCNC: 28 MMOL/L (ref 21–32)
CREAT SERPL-MCNC: 0.65 MG/DL (ref 0.6–1.3)
DOP CALC LVOT AREA: 3.14 CM2
DOP CALC LVOT DIAMETER: 2 CM
E WAVE DECELERATION TIME: 228 MS
ERYTHROCYTE [DISTWIDTH] IN BLOOD BY AUTOMATED COUNT: 14.6 % (ref 11.6–15.1)
FRACTIONAL SHORTENING: 33 % (ref 28–44)
GFR SERPL CREATININE-BSD FRML MDRD: 89 ML/MIN/1.73SQ M
GLUCOSE SERPL-MCNC: 122 MG/DL (ref 65–140)
HCT VFR BLD AUTO: 38.9 % (ref 34.8–46.1)
HDLC SERPL-MCNC: 71 MG/DL
HGB BLD-MCNC: 12.7 G/DL (ref 11.5–15.4)
INTERVENTRICULAR SEPTUM IN DIASTOLE (PARASTERNAL SHORT AXIS VIEW): 1 CM
INTERVENTRICULAR SEPTUM: 1 CM (ref 0.6–1.1)
LAAS-AP2: 14.7 CM2
LAAS-AP4: 10.5 CM2
LDLC SERPL CALC-MCNC: 126 MG/DL (ref 0–100)
LEFT ATRIUM AREA SYSTOLE SINGLE PLANE A4C: 10.1 CM2
LEFT ATRIUM SIZE: 2.8 CM
LEFT ATRIUM VOLUME (MOD BIPLANE): 29 ML
LEFT INTERNAL DIMENSION IN SYSTOLE: 2.6 CM (ref 2.1–4)
LEFT VENTRICULAR INTERNAL DIMENSION IN DIASTOLE: 3.9 CM (ref 3.5–6)
LEFT VENTRICULAR POSTERIOR WALL IN END DIASTOLE: 0.9 CM
LEFT VENTRICULAR STROKE VOLUME: 41 ML
LVSV (TEICH): 41 ML
MAGNESIUM SERPL-MCNC: 2 MG/DL (ref 1.9–2.7)
MCH RBC QN AUTO: 31.4 PG (ref 26.8–34.3)
MCHC RBC AUTO-ENTMCNC: 32.6 G/DL (ref 31.4–37.4)
MCV RBC AUTO: 96 FL (ref 82–98)
MV E'TISSUE VEL-LAT: 6 CM/S
MV E'TISSUE VEL-SEP: 6 CM/S
MV PEAK A VEL: 0.89 M/S
MV PEAK E VEL: 68 CM/S
MV STENOSIS PRESSURE HALF TIME: 66 MS
MV VALVE AREA P 1/2 METHOD: 3.33 CM2
PLATELET # BLD AUTO: 278 THOUSANDS/UL (ref 149–390)
PMV BLD AUTO: 9.5 FL (ref 8.9–12.7)
POTASSIUM SERPL-SCNC: 4 MMOL/L (ref 3.5–5.3)
PV PEAK GRADIENT: 4 MMHG
RBC # BLD AUTO: 4.05 MILLION/UL (ref 3.81–5.12)
RIGHT ATRIUM AREA SYSTOLE A4C: 8.9 CM2
RIGHT VENTRICLE ID DIMENSION: 2.7 CM
SL CV LEFT ATRIUM LENGTH A2C: 5.1 CM
SL CV LV EF: 63
SL CV PED ECHO LEFT VENTRICLE DIASTOLIC VOLUME (MOD BIPLANE) 2D: 64 ML
SL CV PED ECHO LEFT VENTRICLE SYSTOLIC VOLUME (MOD BIPLANE) 2D: 24 ML
SODIUM SERPL-SCNC: 141 MMOL/L (ref 135–147)
TR MAX PG: 33 MMHG
TR PEAK VELOCITY: 2.9 M/S
TRICUSPID ANNULAR PLANE SYSTOLIC EXCURSION: 1.9 CM
TRICUSPID VALVE PEAK REGURGITATION VELOCITY: 2.88 M/S
TRIGL SERPL-MCNC: 97 MG/DL
WBC # BLD AUTO: 6.88 THOUSAND/UL (ref 4.31–10.16)

## 2023-07-29 PROCEDURE — 93306 TTE W/DOPPLER COMPLETE: CPT | Performed by: INTERNAL MEDICINE

## 2023-07-29 PROCEDURE — 92610 EVALUATE SWALLOWING FUNCTION: CPT

## 2023-07-29 PROCEDURE — 80061 LIPID PANEL: CPT | Performed by: FAMILY MEDICINE

## 2023-07-29 PROCEDURE — 70551 MRI BRAIN STEM W/O DYE: CPT

## 2023-07-29 PROCEDURE — 97167 OT EVAL HIGH COMPLEX 60 MIN: CPT

## 2023-07-29 PROCEDURE — 93306 TTE W/DOPPLER COMPLETE: CPT

## 2023-07-29 PROCEDURE — 97162 PT EVAL MOD COMPLEX 30 MIN: CPT

## 2023-07-29 PROCEDURE — 80048 BASIC METABOLIC PNL TOTAL CA: CPT | Performed by: FAMILY MEDICINE

## 2023-07-29 PROCEDURE — 97530 THERAPEUTIC ACTIVITIES: CPT

## 2023-07-29 PROCEDURE — 83735 ASSAY OF MAGNESIUM: CPT | Performed by: FAMILY MEDICINE

## 2023-07-29 PROCEDURE — 97535 SELF CARE MNGMENT TRAINING: CPT

## 2023-07-29 PROCEDURE — 99239 HOSP IP/OBS DSCHRG MGMT >30: CPT | Performed by: FAMILY MEDICINE

## 2023-07-29 PROCEDURE — 85027 COMPLETE CBC AUTOMATED: CPT | Performed by: FAMILY MEDICINE

## 2023-07-29 RX ORDER — FAMOTIDINE 20 MG/1
10 TABLET, FILM COATED ORAL 2 TIMES DAILY
Refills: 0
Start: 2023-07-29

## 2023-07-29 RX ORDER — ASPIRIN 81 MG/1
81 TABLET, CHEWABLE ORAL DAILY
Qty: 90 TABLET | Refills: 0 | Status: SHIPPED | OUTPATIENT
Start: 2023-07-30 | End: 2023-10-28

## 2023-07-29 RX ORDER — LANCETS 33 GAUGE
EACH MISCELLANEOUS
Qty: 100 EACH | Refills: 0 | Status: SHIPPED | OUTPATIENT
Start: 2023-07-29

## 2023-07-29 RX ORDER — LISINOPRIL 10 MG/1
10 TABLET ORAL DAILY
Refills: 0
Start: 2023-07-30

## 2023-07-29 RX ORDER — ATORVASTATIN CALCIUM 80 MG/1
80 TABLET, FILM COATED ORAL
Qty: 30 TABLET | Refills: 0 | Status: SHIPPED | OUTPATIENT
Start: 2023-07-29

## 2023-07-29 RX ORDER — CLOPIDOGREL BISULFATE 75 MG/1
75 TABLET ORAL DAILY
Qty: 30 TABLET | Refills: 0 | Status: SHIPPED | OUTPATIENT
Start: 2023-07-30

## 2023-07-29 RX ORDER — BLOOD SUGAR DIAGNOSTIC
STRIP MISCELLANEOUS
Qty: 100 EACH | Refills: 0 | Status: SHIPPED | OUTPATIENT
Start: 2023-07-29

## 2023-07-29 RX ORDER — ASPIRIN 325 MG
325 TABLET ORAL DAILY
Refills: 0
Start: 2023-10-28

## 2023-07-29 RX ORDER — NICOTINE 21 MG/24HR
1 PATCH, TRANSDERMAL 24 HOURS TRANSDERMAL DAILY
Qty: 28 PATCH | Refills: 0 | Status: SHIPPED | OUTPATIENT
Start: 2023-07-30

## 2023-07-29 RX ORDER — OMEPRAZOLE 20 MG/1
CAPSULE, DELAYED RELEASE ORAL
Refills: 0
Start: 2023-07-29

## 2023-07-29 RX ORDER — BLOOD-GLUCOSE METER
KIT MISCELLANEOUS
Qty: 1 KIT | Refills: 0 | Status: SHIPPED | OUTPATIENT
Start: 2023-07-29

## 2023-07-29 RX ORDER — GLUCOSAMINE HCL/CHONDROITIN SU 500-400 MG
CAPSULE ORAL
Qty: 100 EACH | Refills: 0 | Status: SHIPPED | OUTPATIENT
Start: 2023-07-29

## 2023-07-29 RX ADMIN — FAMOTIDINE 10 MG: 20 TABLET, FILM COATED ORAL at 08:22

## 2023-07-29 RX ADMIN — SERTRALINE 100 MG: 100 TABLET, FILM COATED ORAL at 08:22

## 2023-07-29 RX ADMIN — ASPIRIN 81 MG CHEWABLE TABLET 81 MG: 81 TABLET CHEWABLE at 08:22

## 2023-07-29 RX ADMIN — CLOPIDOGREL BISULFATE 75 MG: 75 TABLET ORAL at 08:22

## 2023-07-29 RX ADMIN — ENOXAPARIN SODIUM 40 MG: 40 INJECTION SUBCUTANEOUS at 08:22

## 2023-07-29 RX ADMIN — NICOTINE 1 PATCH: 21 PATCH, EXTENDED RELEASE TRANSDERMAL at 08:22

## 2023-07-29 NOTE — ASSESSMENT & PLAN NOTE
Hold antihypertensives to allow for permissive hypertension  Per neurology okay to restart lisinopril starting tomorrow

## 2023-07-29 NOTE — ASSESSMENT & PLAN NOTE
Patient presented to the ER with right hand weakness where she was dropping things. Her  noticed her to have slurred speech. Symptoms started at around 10:30 AM and lasted about 15 minutes. CTA head/neck revealed acute ischemia of left lentiform nucleus, left insular ribbon/subinsular region  Patient loaded with 600 mg of Plavix and full dose ASA  Continue patient on 75 mg of Plavix and 81 mg of aspirin for total of 3 months and then patient to discontinue Plavix and start 325 mg of aspirin daily thereafter per neurology  high-dose statin  Lipid panel -  MRI showed acute/early subacute lacunar infarcts in left basal ganglia and left centrum semiovale  Echo with bubble study showed normal EF with normal diastolic function.   No PFO  Plan of care coordinated with neurology

## 2023-07-29 NOTE — SPEECH THERAPY NOTE
Speech Language Pathology    No Treatment/ Missed Visit Note    Speech & Swallow Evaluation attempted this morning. Patient is currently off the floor at MRI. Will try again at a later time.     Silvia Schuler MA CCC-SLP   Speech Language Pathologist  [phone]

## 2023-07-29 NOTE — PLAN OF CARE
Problem: SLP ADULT - SWALLOWING, IMPAIRED  Goal: Advance to least restrictive diet without signs or symptoms of aspiration for planned discharge setting. See evaluation for individualized goals. Description: Patient will:    1. Safely swallow regular consistency diet and thin liquids without signs of aspiration 100% of time. 1 day.     Outcome: Adequate for Discharge     Problem: SLP ADULT - SWALLOWING, IMPAIRED  Goal: Initial SLP swallow eval performed  Outcome: Completed

## 2023-07-29 NOTE — OCCUPATIONAL THERAPY NOTE
Occupational Therapy Evaluation/Treatment     07/29/23 1010   Note Type   Note type Evaluation   Pain Assessment   Pain Assessment Tool 0-10   Restrictions/Precautions   Other Precautions Fall Risk   Home Living   Type of [facility]  Two level; Able to live on main level with bedroom/bathroom   Bathroom Shower/Tub Walk-in shower   Bathroom Toilet Standard   Bathroom Accessibility [address]   Additional Comments Pt is a 71 y.o female who presented with R hand weakness and slurred speech. CTA head/neck revealed acute ischemia of left. Prior Function   Level of [place] Independent with ADLs; Independent with functional mobility; Independent with IADLS   Lives With Spouse   Receives Help From Family   IADLs Independent with driving; Independent with meal prep; Independent with medication management   Vocational Retired   Comments Pt reports being indp with BADLs, iADLs and functional mobility without use of AD. Pt drives.    General   Family/Caregiver Present No   Subjective   Subjective I hope I can go home today   ADL   Eating Assistance 7  Independent   Grooming Assistance 5  Supervision/Setup   Grooming Deficit Setup   UB Bathing Assistance 6  Modified Independent   LB Bathing Assistance 5  Supervision/Setup   UB Dressing Assistance 6  Modified independent   LB Dressing Assistance 5  Supervision/Setup   Toileting Assistance  5  Supervision/Setup   Functional Assistance 5  Supervision/Setup   Bed Mobility   Rolling R 7  Independent   Rolling L 7  Independent   Supine to Sit 7  Independent   Sit to Supine 7  Independent   Transfers   Sit to Stand 5  Supervision   Stand to Sit 5  Supervision   Toilet transfer 5  Supervision   Additional items Verbal cues;Standard toilet   Functional Mobility   Functional Mobility 5  Supervision   Balance   Static Sitting Good   Dynamic Sitting Fair +   Static Standing Fair   Dynamic Standing Fair   Activity Tolerance   Activity Tolerance Patient tolerated treatment well   Nurse Made Aware yes, Jovany   RUE Assessment   RUE Assessment WFL   LUE Assessment   LUE Assessment WFL   Vision-Basic Assessment   Current Vision Wears glasses all the time   Cognition   Overall Cognitive Status [facility]   Arousal/Participation Alert; Responsive; Cooperative   Attention Within functional limits   Orientation Level Oriented X4   Memory Within functional limits   Following Commands Follows all commands and directions without difficulty   Assessment   Limitation Decreased ADL status; Decreased endurance;Decreased UE strength;Decreased self-care trans;Decreased high-level ADLs   Prognosis Good   Assessment Patient evaluated by Occupational Therapy. Patient admitted with CVA (cerebral vascular accident) ([address]). The patients occupational profile, medical and therapy history includes a extensive additional review of physical, cognitive, or psychosocial history related to current functional performance. Comorbidities affecting functional mobility and ADLS include: anxiety, depression, GERD and hypertension. Prior to admission, patient was independent with functional mobility without assistive device, independent with ADLS, independent with IADLS, ambulating household distance, ambulating community distances and retired. The evaluation identifies the following performance deficits: weakness, decreased endurance, decreased coordination, decreased ADLS, decreased IADLS, decreased activity tolerance and decreased strength, that result in activity limitations and/or participation restrictions. This evaluation requires clinical decision making of high complexity, because the patient presents with comorbidites that affect occupational performance and required significant modification of tasks or assistance with consideration of multiple treatment options.   The Barthel Index was used as a functional outcome tool presenting with a score of Barthel Index Score: 55, indicating marked limitations of functional mobility and ADLS. The patient's raw score on the -PAC Daily Activity Inpatient Short Form is 20. A raw score of greater than or equal to 19 suggests the patient may benefit from discharge to home. Please refer to the recommendation of the Occupational Therapist for safe discharge planning. Please refer to the recommendation of the Occupational Therapist for safe DC planning. Patient will benefit from skilled Occupational Therapy services to address above deficits and facilitate a safe return to prior level of function. Goals   Patient Goals go home   STG Time Frame   (1-7 days)   Short Term Goal  Goals established to promote Patient Goals: go home:  \Grooming: independent standing at sink; Bathing: independent; Upper Body Dressing independent; Lower Body Dressing: independent; Toileting: independent; Patient will increase ambulatory standard toilet transfer to independent with no assistive device to increase performance and safety with ADLS and functional mobility;  Patient will increase standing tolerance to 5 minutes during ADL task to decrease assistance level and decrease fall risk;Patient will increase functional mobility to and from bathroom with no assistive device independently to increase performance with ADLS and to use a toilet; Patient will tolerate 5 minutes of UE ROM/strengthening to increase general activity tolerance and performance in ADLS/IADLS; Patient will improve functional activity tolerance to 5 minutes of sustained functional tasks to increase participation in basic self-care and decrease assistance level;  Patient will be able to to verbalize understanding and perform energy conservation/proper body mechanics during ADLS and functional mobility at least 75% of the time with minimal cueing to decrease signs of fatigue and increase stamina to return to prior level of function; Patient will increase dynamic sitting balance to good to improve the ability to sit at edge of bed or on a chair for ADLS;  Patient will increase static standing balance to fair+ to improve postural stability and decrease fall risk during standing ADLS and transfers. LTG Time Frame   (8-14 days)   Long Term Goal Patient will increase standing tolerance to 10 minutes during ADL task to decrease assistance level and decrease fall risk;  Patient will tolerate 9 minutes of UE ROM/strengthening to increase general activity tolerance and performance in ADLS/IADLS; Patient will improve functional activity tolerance to 10 minutes of sustained functional tasks to increase participation in basic self-care and decrease assistance level;  Patient will be able to to verbalize understanding and perform energy conservation/proper body mechanics during ADLS and functional mobility at least 90% of the time with no cueing to decrease signs of fatigue and increase stamina to return to prior level of function; Patient will increase dynamic standing balance to fair+ to improve postural stability and decrease fall risk during standing ADLS and transfers. Pt will score >/= 22/24 on AM-PAC Daily Activity Inpatient scale to promote safe independence with ADLs and functional mobility; Pt will score >/= 85/100 on Barthel Index in order to decrease caregiver assistance needed and increase ability to perform ADLs and functional mobility. Plan   Treatment Interventions ADL retraining;UE strengthening/ROM; Neuromuscular reeducation;Continued evaluation; Energy conservation; Activityengagement   Goal Expiration Date 08/12/23   OT Frequency   (5x/week)   Recommendation   OT Discharge Recommendation No rehabilitation needs   AM-PAC Daily Activity Inpatient   Lower Body Dressing 3   Bathing 3   Toileting 3   Upper Body Dressing 4   Grooming 3   Eating 4   Daily Activity Raw Score 20   Daily Activity Standardized Score (Calc for Raw Score >=11) 42.03   AM-PAC Applied Cognition Inpatient   Following a Speech/Presentation 4   Understanding Ordinary Conversation 4   Taking Medications 4   Remembering Where Things Are Placed or Put Away 4   Remembering List of 4-5 Errands 4   Taking Care of Complicated Tasks 4   Applied Cognition Raw Score 24   Applied Cognition Standardized Score 62.21   Barthel Index   Feeding 10   Bathing 0   Grooming Score 0   Dressing Score 5   Bladder Score 10   Bowels Score 10   Toilet Use Score 5   Transfers (Bed/Chair) Score 10   Mobility (Level Surface) Score 0   Stairs Score 5   Barthel Index Score 55   Additional Treatment Session   Start Time 1000   End Time 1010   Treatment Assessment S: "I hope I can go home today" O: Patient ambulated short household distance to/from bathroom with supervision,  toilet hygiene completed indp'ly;   Pt encouraged to sit OOB agreed to sit in recliner chair but after a few minutes, got up and wanted to return to bed. Provided pt with washcloth and towel for morning ADL and pt agreed and went back to bathroom to do bathing/washing. Per pt she did mouth care earlier. A: Patient  able to complete all ADLs and functional mobility without difficulty, at end of session patient supine in bed with all needs met. P: Home and with no rehab needs. End of Consult   Education Provided Yes   Patient Position at End of Consult Bed/Chair alarm activated; All needs within reach; Bedside chair   Nurse Communication Nurse aware of consult   Licensure   [facility] Number  Natalie Mckeon, OTR/L [phone]

## 2023-07-29 NOTE — CASE MANAGEMENT
Case Management Assessment & Discharge Planning Note    Patient name Armond Iqbal  Location [address] [place]-* MRN [id number]  : 1950 Date 2023       Current Admission Date: 2023  Current Admission Diagnosis:CVA (cerebral vascular accident) [facility])   Patient Active Problem List    Diagnosis Date Noted   • CVA (cerebral vascular accident) ([address]) 2023   • Occlusion and stenosis of left middle cerebral artery 2023   • Smoker 2023   • Anxiety and depression    • Essential hypertension    • GERD (gastroesophageal reflux disease)       LOS (days): 1  Geometric Mean LOS (GMLOS) (days):   Days to GMLOS:     OBJECTIVE:    Risk of Unplanned Readmission Score: 9.27     Current admission status: Inpatient  Referral Reason: Stroke    Preferred Pharmacy:   [address] [place] [address] [id number] [facility], [address]  [address] [facility] [address]  Phone: [phone] Fax: [phone]    Primary Care Provider: Alejandrina Mcadams DO    Primary Insurance: [facility]  Secondary Insurance:     ASSESSMENT:  Archie Proxies    There are no active Health Care Proxies on file.        Advance Directives  Does patient have a [address]?: No  Does patient currently have a [address] [facility] decision maker?: Yes, please see Health Care Proxy section  Does patient have Advance Directives?: No  Primary Contact: Loki Amin    Readmission Root Cause  30 Day Readmission: No    Patient Information  Admitted from[de-identified] Home  Mental Status: Alert  During Assessment patient was accompanied by: Not accompanied during assessment  Assessment information provided by[de-identified] Patient  Primary Caregiver: Self  Support Systems: Spouse/significant other  [place] of Residence: [address] [place] [address] do you live in?: [address]  Type of Current Residence: Other (Comment) (private residence)  In the last 12 months, was there a time when you were not able to pay the mortgage or rent on time?: No  In the last 12 months, how many places have you lived?: 1  In the last 12 months, was there a time when you did not have a steady place to sleep or slept in a shelter (including now)?: No  Homeless/housing insecurity resource given?: N/A  Living Arrangements: Lives w/ Spouse/significant other    Activities of Daily Living Prior to Admission  Functional Status: Independent  Completes ADLs independently?: Yes  Ambulates independently?: Yes  Does patient use assisted devices?: No  Does patient currently own DME?: No  Does patient have a history of Outpatient Therapy (PT/OT)?: No  Does the patient have a history of Short-Term Rehab?: No  Does patient have a history of HHC?: No  Does patient currently have [address]?: No    Patient Information Continued  Income Source: Pension/FCI  Does patient have prescription coverage?: Yes  Within the past 12 months, you worried that your food would run out before you got the money to buy more.: Never true  Within the past 12 months, the food you bought just didn't last and you didn't have money to get more.: Never true  Food insecurity resource given?: N/A  Does patient receive dialysis treatments?: Yes    Means of Transportation  Means of Transport to Appts[de-identified] Drives Self  In the past 12 months, has lack of transportation kept you from medical appointments or from getting medications?: No  In the past 12 months, has lack of transportation kept you from meetings, work, or from getting things needed for daily living?: No  Was application for public transport provided?: N/A      DISCHARGE DETAILS:    Discharge planning discussed with[de-identified] Patient  Freedom of Choice: Yes  Comments - Freedom of Choice: SW met with pt to assess needs and discuss plans. Pt's plan is to return home with  when discharged. Pt is anticipating discharge today. No discharge needs expressed or anticipated by pt.     Requested [address]         Is the patient interested in [address] at discharge?: No    DME Referral Provided  Referral made for DME?: No    Other Referral/Resources/Interventions Provided:  Interventions: None Indicated    Treatment Team Recommendation: Home  Discharge Destination Plan[de-identified] Home  Transport at Discharge : Family

## 2023-07-29 NOTE — ASSESSMENT & PLAN NOTE
Patient noted to have short segment critical high-grade stenosis of left middle cerebral artery M1 segment  Aspirin/Plavix/statin as noted above

## 2023-07-29 NOTE — PLAN OF CARE
Problem: PAIN - ADULT  Goal: Verbalizes/displays adequate comfort level or baseline comfort level  Description: Interventions:  - Encourage patient to monitor pain and request assistance  - Assess pain using appropriate pain scale  - Administer analgesics based on type and severity of pain and evaluate response  - Implement non-pharmacological measures as appropriate and evaluate response  - Consider cultural and social influences on pain and pain management  - Notify physician/advanced practitioner if interventions unsuccessful or patient reports new pain  Outcome: Progressing     Problem: SAFETY ADULT  Goal: Patient will remain free of falls  Description: INTERVENTIONS:  - Educate patient/family on patient safety including physical limitations  - Instruct patient to call for assistance with activity   - Consult OT/PT to assist with strengthening/mobility   - Keep Call bell within reach  - Keep bed low and locked with side rails adjusted as appropriate  - Keep care items and personal belongings within reach  - Initiate and maintain comfort rounds  - Make Fall Risk Sign visible to staff  - Offer Toileting every 2 Hours, in advance of need  - Initiate/Maintain bed alarm  - Obtain necessary fall risk management equipment: yellow socks  - Apply yellow socks and bracelet for high fall risk patients  - Consider moving patient to room near nurses station  Outcome: Progressing  Goal: Maintain or return to baseline ADL function  Description: INTERVENTIONS:  -  Assess patient's ability to carry out ADLs; assess patient's baseline for ADL function and identify physical deficits which impact ability to perform ADLs (bathing, care of mouth/teeth, toileting, grooming, dressing, etc.)  - Assess/evaluate cause of self-care deficits   - Assess range of motion  - Assess patient's mobility; develop plan if impaired  - Assess patient's need for assistive devices and provide as appropriate  - Encourage maximum independence but intervene and supervise when necessary  - Involve family in performance of ADLs  - Assess for home care needs following discharge   - Consider OT consult to assist with ADL evaluation and planning for discharge  - Provide patient education as appropriate  Outcome: Progressing

## 2023-07-29 NOTE — DISCHARGE SUMMARY
[address] [facility] ([place], [place])  Discharge- Marian Smith 1950, 71 y.o. female MRN: [id number]  Unit/Bed#: 913 [facility] 403-01 Encounter: [id number]  Primary Care Provider: Erick Camara DO   Date and time admitted to hospital: 7/28/2023 12:12 PM    * CVA (cerebral vascular accident) [facility])  Assessment & Plan  Patient presented to the ER with right hand weakness where she was dropping things. Her  noticed her to have slurred speech. Symptoms started at around 10:30 AM and lasted about 15 minutes. CTA head/neck revealed acute ischemia of left lentiform nucleus, left insular ribbon/subinsular region  Patient loaded with 600 mg of Plavix and full dose ASA  Continue patient on 75 mg of Plavix and 81 mg of aspirin for total of 3 months and then patient to discontinue Plavix and start 325 mg of aspirin daily thereafter per neurology  high-dose statin  Lipid panel -  MRI showed acute/early subacute lacunar infarcts in left basal ganglia and left centrum semiovale  Echo with bubble study showed normal EF with normal diastolic function. No PFO  Plan of care coordinated with neurology        Occlusion and stenosis of left middle cerebral artery  Assessment & Plan  Patient noted to have short segment critical high-grade stenosis of left middle cerebral artery M1 segment  Aspirin/Plavix/statin as noted above    Type 2 diabetes mellitus, without long-term current use of insulin [facility])  Assessment & Plan  Lab Results   Component Value Date    HGBA1C 6.5 (H) 07/28/2023       Recent Labs     07/28/23  1212   POCGLU 134     Discussed with patient and her  at bedside. As patient has had a stroke, would recommend initiating medication for diabetes even with A1c of 6.5. · After discussion they are in agreement.   Once daily metformin started for now and patient advised to follow-up with PCP after discharge for further management  · Common GI side effects of metformin discussed with patient including diarrhea, abdominal pain, nausea, vomiting    Smoker  Assessment & Plan  Smoking cessation. Nicotine patch. GERD (gastroesophageal reflux disease)  Assessment & Plan  Continue Pepcid and Prilosec as she uses at home    Essential hypertension  Assessment & Plan  Hold antihypertensives to allow for permissive hypertension  Per neurology okay to restart lisinopril starting tomorrow    Anxiety and depression  Assessment & Plan  Continue Zoloft, Xanax as needed. Medical Problems     Resolved Problems  Date Reviewed: 7/29/2023   None       Discharging Physician / Practitioner: Servando Szymanski DO  PCP: Alesha Carmichael DO  Admission Date:   Admission Orders (From admission, onward)     Ordered        07/28/23 [address] [place] [address]  Once                      Discharge Date: 07/29/23    Consultations During Hospital Stay:  · Neurology    Procedures Performed:   · TTE    Significant Findings / Test Results:   · See above    Incidental Findings:   · Short segment critical high-grade stenosis/near occlusion of the M1 segment of the left middle cerebral artery  · I reviewed the above mentioned incidental findings with the patient and/or family and they expressed understanding. Test Results Pending at Discharge (will require follow up):   · none     Outpatient Tests Requested:  · none    Complications: None    Reason for Admission: CVA    Hospital Course:   Raji Trevino is a 71 y.o. female patient who originally presented to the hospital on 7/28/2023 due to right hand weakness where she was dropping things.  also noted her to have slurring of speech. Symptoms started at around 10:30 AM on 7/28 and lasted about 15 minutes and then fully resolved. No weakness in her legs. Patient was admitted and underwent stroke work-up. She was seen by neurology. No further recurrence of symptoms since admission. Plan of care was coordinated with neurology again today. Cleared by neurology prior to discharge. Discharge plan discussed with patient and  at bedside    The patient, initially admitted to the hospital as inpatient, was discharged earlier than expected given the following: No further recurrence of symptoms, remained stable and completed all stroke work-up. Please see above list of diagnoses and related plan for additional information. Condition at Discharge: stable    Discharge Day Visit / Exam:   Subjective: Patient denies any further recurrence of symptoms since her episode yesterday. Feels well and wants to go home    Vitals: Blood Pressure: 166/81 (07/29/23 1200)  Pulse: 80 (07/29/23 1200)  Temperature: 97.9 °F (36.6 °C) (07/29/23 1200)  Temp Source: Oral (07/29/23 0318)  Respirations: 19 (07/29/23 1200)  Height: 5' 4" (162.6 cm) (07/29/23 0831)  Weight - Scale: 78.9 kg (174 lb) (07/29/23 0831)  SpO2: 94 % (07/29/23 1200)  Exam:   Physical Exam  Constitutional:       General: She is not in acute distress. Appearance: She is well-developed. HENT:      Head: Normocephalic and atraumatic. Eyes:      General: No scleral icterus. Cardiovascular:      Rate and Rhythm: Normal rate and regular rhythm. Pulmonary:      Effort: Pulmonary effort is normal. No respiratory distress. Breath sounds: Normal breath sounds. No wheezing or rales. Abdominal:      General: Bowel sounds are normal. There is no distension. Palpations: Abdomen is soft. Tenderness: There is no abdominal tenderness. Musculoskeletal:      Right lower leg: No edema. Left lower leg: No edema. Neurological:      Mental Status: She is alert and oriented to person, place, and time. Motor: No weakness. Comments: Some mild right-sided facial droop which is at baseline per patient. Speech is clear and fluent         Discussion with Family: Updated  () at bedside.     Discharge instructions/Information to patient and family:   See after visit summary for information provided to patient and family. Provisions for Follow-Up Care:  See after visit summary for information related to follow-up care and any pertinent home health orders. Disposition:   Home    Planned Readmission: no     Discharge Statement:  I spent > 30 minutes discharging the patient. This time was spent on the day of discharge. I had direct contact with the patient on the day of discharge. Greater than 50% of the total time was spent examining patient, answering all patient questions, arranging and discussing plan of care with patient as well as directly providing post-discharge instructions. Additional time then spent on discharge activities. Discharge Medications:  See after visit summary for reconciled discharge medications provided to patient and/or family.       **Please Note: This note may have been constructed using a voice recognition system**

## 2023-07-29 NOTE — PHYSICAL THERAPY NOTE
PHYSICAL THERAPY EVALUATION/TREATMENT     07/29/23 1030   PT Last Visit   PT Visit Date 07/29/23   Note Type   Note type Evaluation   Pain Assessment   Pain Assessment Tool 0-10   Pain Score No Pain   Restrictions/Precautions   Weight Bearing Precautions Per Order No   Other Precautions Chair Alarm; Bed Alarm; Fall Risk   Home Living   Type of [facility]  Two level; Able to live on main level with bedroom/bathroom; Bed/bath upstairs  (full bath on first level)   Bathroom Shower/Tub Walk-in shower   Home Equipment Cane   Prior Function   Level of [place] Independent with ADLs; Independent with functional mobility; Independent with IADLS   Lives With Spouse   Receives Help From Family   IADLs Independent with driving; Independent with meal prep; Independent with medication management   Vocational Retired   Comments +    General   Additional Pertinent History Pt is a 71year old female admitted with CVA.    Family/Caregiver Present No   Cognition   Overall Cognitive Status WFL  (flat affect)   Arousal/Participation Cooperative   Attention Within functional limits   Orientation Level Oriented X4   Following Commands Follows all commands and directions without difficulty   Subjective   Subjective Pt able to give answers to all interview questions;  "Can't I go home today?"   RLE Assessment   RLE Assessment WFL  (strength: WFLs)   LLE Assessment   LLE Assessment WFL  (strength: WFLs)   Bed Mobility   Additional Comments Pt presents in chair   Transfers   Sit to Stand 5  Supervision   Additional items Verbal cues   Stand to Sit 5  Supervision   Additional items Verbal cues   Ambulation/Elevation   Gait pattern   (mildly unsteady)   Gait Assistance 5  Supervision   Additional items Verbal cues   Assistive Device None   Distance 20 feet   Stair Management Assistance Not tested   Balance   Static Sitting Good   Dynamic Sitting Good   Static Standing Fair   Dynamic Standing Fair   Ambulatory Fair - Activity Tolerance   Activity Tolerance Patient tolerated treatment well   Nurse Made Aware yes: Jovany   Assessment   Prognosis Good   Problem List Impaired balance;Decreased mobility   Assessment Patient seen for Physical Therapy evaluation. Patient admitted with CVA (cerebral vascular accident) ([address] Comorbidities affecting patient's physical performance include: anxiety, depression GERD, HTN, chronic LBP with sciatica. Personal factors affecting patient at time of initial evaluation include: lives in two story house, anxiety, depression and limited insight into impairments. Prior to admission, patient was independent with functional mobility without assistive device, independent with ADLS, independent with IADLS, living in a multi-level home, living with  in a multi level home with a few steps to enter, ambulating household distance and ambulating community distances. Please find objective findings from Physical Therapy assessment regarding body systems outlined above with impairments and limitations including impaired balance, decreased endurance and gait deviations. The Barthel Index was used as a functional outcome tool presenting with a score of Barthel Index Score: 65 today indicating moderate limitations of functional mobility and ADLS. Patient's clinical presentation is currently evolving as seen in patient's presentation of new onset of impairment of functional mobility and decreased endurance. Pt would benefit from continued Physical Therapy treatment to address deficits as defined above and maximize level of functional mobility. As demonstrated by objective findings, the assigned level of complexity for this evaluation is moderate. The patient's AM-[facility] Basic Mobility Inpatient Short Form Raw Score is 22. A Raw score of greater than 16 suggests the patient may benefit from discharge to home. Please also refer to the recommendation of the Physical Therapist for safe discharge planning. Goals   Patient Goals to go home today   STG Expiration Date 08/01/23   Short Term Goal #1 Indep with all transfers supine <> short sit and sit <> stand with good balance   Short Term Goal #2 Indep amb. on level surfaces without AD for functional household distances with fair + balance   LTG Expiration Date 08/04/23   Long Term Goal #1 Indep amb. without AD for community distances with good balance with changing directions. Long Term Goal #2 Indep navigation of 1 flight of stairs to allow pt to ascend and descend stairs to bedroom at home. Plan   Treatment/Interventions Elevations; Endurance training;Gait training;Spoke to nursing;OT   PT Frequency Other (Comment)  (1 - 2 more sessions)   Recommendation   PT Discharge Recommendation No rehabilitation needs   Additional Comments Pt is very close to her baseline of function, but would like to see 1 or 2 more times to navigate stairs and work on walking balance . If d/c is immenent, pt is safe to d/c home . AM-PAC Basic Mobility Inpatient   Turning in Flat Bed Without Bedrails 4   Lying on Back to Sitting on Edge of Flat Bed Without Bedrails 4   Moving Bed to Chair 3   Standing Up From Chair Using Arms 4   Walk in Room 4   Climb 3-5 Stairs With Railing 3   Basic Mobility Inpatient Raw Score 22   Basic Mobility Standardized Score 47.4   Highest Level Of Mobility   JH-HLM Goal 7: Walk 25 feet or more   JH-HLM Achieved 7: Walk 25 feet or more   Modified Luis Daniel Scale   Modified Clay Scale 1   Barthel Index   Feeding 10   Bathing 0   Grooming Score 5   Dressing Score 10   Bladder Score 10   Bowels Score 10   Toilet Use Score 5   Transfers (Bed/Chair) Score 10   Mobility (Level Surface) Score 0   Stairs Score 5   Barthel Index Score 65   Additional Treatment Session   Start Time 1020   End Time 1030   Treatment Assessment Pt stood at chair and able to walk around room with supervision.   Pt then agreeable to ambulate further into hallway with Supervision for 150 feet with changes in direction . Pt returned to room and positioned in chair. A: Tolerated well. Pt has flat affect and does not offer information unless asked by PT. Pt states that she is walking as prior to admit. Pt is mildly unsteady at times , will keep pt on program for 1 or 2 more sessions to continue to assess dynamic balance with walking and to navigate stairs. If d/c is immenent , pt is safe to go home with family support. No rehab needs . Cont. as per plan during hospital course. End of Consult   Patient Position at End of Consult Bedside chair;Bed/Chair alarm activated; All needs within reach   End of Consult Comments Pt got up independently and wanted to get back into bed after PT left the room. Seat alarm activated. Pt assisted to bed via RN. Licensure   [place] License Number  [employer]. Raghavendra Shira PT  [id number]

## 2023-07-29 NOTE — ASSESSMENT & PLAN NOTE
Lab Results   Component Value Date    HGBA1C 6.5 (H) 07/28/2023       Recent Labs     07/28/23  1212   POCGLU 134     Discussed with patient and her  at bedside. As patient has had a stroke, would recommend initiating medication for diabetes even with A1c of 6.5. · After discussion they are in agreement.   Once daily metformin started for now and patient advised to follow-up with PCP after discharge for further management  · Common GI side effects of metformin discussed with patient including diarrhea, abdominal pain, nausea, vomiting

## 2023-07-29 NOTE — DISCHARGE INSTR - AVS FIRST PAGE
Please take 81mg aspirin and 75 mg Plavix for 3 months.   After 3 months you can stop the Plavix and take 325 mg of aspirin indefinitely    Stop smoking    As discussed on CT yesterday you were noted to have short segment critical high-grade stenosis/near occlusion of the M1 segment of the left middle cerebral artery    Per neurologist you can restart your lisinopril tomorrow

## 2023-07-29 NOTE — SPEECH THERAPY NOTE
Speech Language Pathology    Speech Language Pathology Bedside Swallow Evaluation      Patient Name: Shanna [id number] Date: 7/29/2023    Recommend:  Continue Regular consistency diet and Thin liquids  Medications with fluids one at a time  Standard aspiration and reflux precautions    Patient's speech is clear and language function is fluent. Discussed with patient, MD, and RN re: ST findings and recommendations. There are no further skilled ST needs at this time. Problem List  Principal Problem:    CVA (cerebral vascular accident) ([address])  Active Problems:    Occlusion and stenosis of left middle cerebral artery    Anxiety and depression    Essential hypertension    GERD (gastroesophageal reflux disease)    Smoker    Past Medical History  Past Medical History:   Diagnosis Date   • Anxiety    • Depression    • GERD (gastroesophageal reflux disease)    • Hypertension      Past Surgical History  Past Surgical History:   Procedure Laterality Date   • APPENDECTOMY     • TONSILLECTOMY        07/29/23 1120   Patient Information   Current Medical 71year-old female admitted secondary to right facial droop, right hand weakness, and slurred speech. Special Studies MRI results are currently pending   Past Medical History HTN, anxiety/depression, GERD, tobacco use   Social History Patient resides at home with spouse   Swallow Information   Current Risks for Dysphagia & Aspiration New Neuro event, dysarthria   Current Diet Regular; Thin liquids   Baseline Diet Regular; Thin liquids   Baseline Assessment   Behavior/Cognition Alert; Interactive; Cooperative   Speech/Language Status Within functional limits. No dysarthria noted presently.    Patient Positioning Upright in bed   Swallow Mechanism Exam   Labial Symmetry WFL   Labial Strength WFL   Labial ROM WFL   Facial Symmetry WFL   Facial Strength WFL   Lingual Symmetry WFL   Lingual Strength WFL   Lingual ROM WFL   Mandible WFL   Dentition Adequate   Volitional Cough Strong   Consistencies Assessed and Performance   Materials Administered Puree/Level 1; Mechanical Soft/Level 2; Soft/Level 3; Regular/Solid; Thin liquids   Oral Stage WFL   Pharyngeal Stage WFL   Pharyngeal Stage Comment No signs or symptoms of aspiration evident with all textures   Swallow Mechanics WFL   Esophageal Concerns No s/s reported   Summary   Swallow Summary Oral and pharyngeal swallowing skills are safe/ [facility] for regular solids and thin/ all liquids. Recommendations   Risk for Aspiration None   Recommendations Continue oral diet   Diet Solid Recommendation Regular consistency   Diet Liquid Recommendation Thin liquids   Recommended Form of Medications Whole with thin liquids one at a time   General Precautions Aspiration precautions; Upright as possible for all oral intakes;  Remain upright for 45 minutes after meals   Compensatory Swallowing Strategies External pacing   Results Reviewed with RN   Treatment Recommendations   Duration of treatment N/A   Follow up treatments   None currently   Dysphagia Goals   [facility] 7/29   Speech Therapy Prognosis   Prognosis Good

## 2023-07-29 NOTE — UTILIZATION REVIEW
Initial Clinical Review    Admission: Date/Time/Statement:   Admission Orders (From admission, onward)     Ordered        07/28/23 1455  INPATIENT ADMISSION  Once                   Orders Placed This Encounter   Procedures   • INPATIENT ADMISSION     Standing Status:   Standing     Number of Occurrences:   1     Order Specific Question:   Level of Care     Answer:   Level 2 Stepdown / HOT [14]     Order Specific Question:   Estimated length of stay     Answer:   More than 2 Midnights     Order Specific Question:   Certification     Answer:   I certify that inpatient services are medically necessary for this patient for a duration of greater than two midnights. See H&P and MD Progress Notes for additional information about the patient's course of treatment. ED Arrival Information     Expected   -    Arrival   7/28/2023 12:02    Acuity   Emergent            Means of arrival   Walk-In    Escorted by   Spouse    Service   Hospitalist    Admission type   Emergency            Arrival complaint   right sided weakness/slurrred speech/        Chief Complaint   Patient presents with   • CVA/TIA-like Symptoms     Pt's spouse states pt couldn't hold anything in her right hand and pt had slurred speech and droopy lower lip. Began about two hours ago and has since resolved. Pt is A&O x4 upon arrival. No blood thinners. Pt was outside at the time and symptoms developed after walking inside. Initial Presentation:   70 y/o female with PMHx HTN, smokes cigarettes 1+1/2 ppd. - presents to [facility] ED on 7//28/23 2nd transient right sided symptoms. Reports that approx 1 1/2 hour earlier at 10:30am, she developed an episode of slurred speech and was dropping things out of her right hand that lasted about 15 minutes. In ED - HR 77  /82   Exam: resolved speech difficulty and weakness.   CT head and neck showed short segment critical high grade stenosis /occlusion of M1 of left MCA and likely changes related to potential left BG stroke. Labs: CBC + BMP wnl. Troponin negative. ED Tx: AS, Plavix + Lipitor per Neurology recommendation. Admitted Inpatient 7/28/23 at 1455 -      CVA (cerebral vascular accident) [facility])  Presented to the ED with right hand weakness where she was dropping things. Her  noticed her to have slurred speech. Symptoms started at around 10:30 AM and lasted about 15 minutes. • Admit to telemetry  • CTA head/neck revealed acute ischemia of left lentiform nucleus, left insular ribbon/subinsular region  • Patient loaded with 600 mg of Plavix and full dose ASA  • Placed patient on 75 mg of Plavix and 81 mg of aspirin starting tomorrow   • high-dose statin  • Lipid panel, HgA1C in AM  • MRI in AM  • Echo with bubble study  • Plan of care coordinated with neurology      Occlusion and stenosis of left middle cerebral artery  CT scan shows short segment critical high-grade stenosis of left middle cerebral artery M1 segment    7/28/23 NEUROLOGY:  Assessment/Plan:   1. Suspected acute left BG/insular ischemic stroke  2. Left M1 stenosis/occlusion of unclear chronicity  3. HLD  4. HTN  5. Smoking addiction       No active symptoms from suspected stroke in left hemisphere. Her right facial asymmetry she says is old. She wasn't TNK candidate due to low NIH scale. She was given aspirin 325mg, plavix of 600mg and lipitor 80mg. Cont aspirin 81mg, plavix 75mg and lipitor 80mg tomm  MRI brain   TTE w/ shunt  Allow permissive HTN. Systolic atleast >635 for 24 hrs. Hold all antihypertensives, may use midodrine if needed. Neuro checks  Speech evaluation   PT/OT  Advised smoking cessation    7/29/23 - INPATIENT DAY 2:  MRI BRAIN -    Acute/early subacute lacunar infarcts in the left basal ganglia and left centrum semiovale. No acute hemorrhage.      7/29/23:  CVA (cerebral vascular accident  • Patient loaded with 600 mg of Plavix and full dose ASA  • Continue patient on 75 mg of Plavix and 81 mg of aspirin for total of 3 months and then patient to discontinue Plavix and start 325 mg of aspirin daily thereafter per neurology  • high-dose statin  • Lipid panel -  • MRI showed acute/early subacute lacunar infarcts in left basal ganglia and left centrum semiovale  • Echo with bubble study showed normal EF with normal diastolic function. No PFO  -Denies any further recurrence of symptoms since her episode yesterday. Feels well and wants to go home     Per Neurology - No further recurrence of symptoms, remained stable and completed all stroke work-up   - DISCHARGE .       ED Triage Vitals   Temperature Pulse Respirations Blood Pressure SpO2   07/28/23 1221 07/28/23 1217 07/28/23 1217 07/28/23 1217 07/28/23 1217   98.5 °F (36.9 °C) 77 18 (!) 179/82 98 %      Temp Source Heart Rate Source Patient Position - Orthostatic VS BP Location FiO2 (%)   07/28/23 1221 07/28/23 1217 07/28/23 1217 07/28/23 1217 --   Oral Monitor Lying Left arm      Wt Readings from Last 1 Encounters:   07/29/23 78.9 kg (174 lb)     Additional Vital Signs:   Date/Time Temp Pulse Resp BP MAP (mmHg) SpO2 O2 Device Patient Position - Orthostatic VS   07/29/23 1200 97.9 °F (36.6 °C) 80 19 166/81 -- 94 % -- --   07/29/23 10:11:15 -- -- -- 167/80 109 -- -- --   07/29/23 0900 -- -- -- -- -- 95 % None (Room air) --   07/29/23 0831 -- 87 -- 186/90 Abnormal  -- -- -- --   07/29/23 0823 -- 94 -- 170/89 -- 95 % -- --   07/29/23 0800 98 °F (36.7 °C) 99 18 186/90 Abnormal  122 95 % -- --   07/29/23 07:55:36 97.3 °F (36.3 °C) Abnormal  93 -- 186/90 Abnormal  122 91 % -- --   07/29/23 0400 -- 66 -- 135/69 91 92 % -- --   07/29/23 03:18:05 98.1 °F (36.7 °C) 70 18 149/66 94 93 % -- Lying   07/29/23 0200 -- 67 -- 113/57 76 92 % -- --   07/29/23 0000 -- 67 -- 136/72 93 93 % -- --   07/28/23 22:26:10 -- 80 18 139/78 98 90 % -- --   07/28/23 21:15:14 -- 73 18 161/86 111 93 % -- --   07/28/23 20:12:21 -- 73 17 167/94 118 94 % -- --   07/28/23 1748 98 °F (36.7 °C) 79 18 172/92 Abnormal  119 95 % -- --   07/28/23 17:32:49 97.7 °F (36.5 °C) 76 -- 171/90 Abnormal  117 94 % -- --   07/28/23 1700 -- 74 18 168/100 125 98 % None (Room air) Lying   07/28/23 1615 -- 77 18 183/91 Abnormal  131 97 % None (Room air) Lying   07/28/23 1600 -- 81 18 139/95 112 98 % None (Room air) Lying   07/28/23 1543 -- 78 18 191/91 Abnormal  -- 98 % None (Room air) Lying   07/28/23 1445 -- 81 18 195/138 Abnormal  161 97 % None (Room air) Lying   07/28/23 1400 -- 79 16 162/79 114 97 % None (Room air) Lying   07/28/23 1315 -- 74 18 162/94 121 96 % None (Room air) Lying   07/28/23 1300 -- 72 18 162/86 117 95 % None (Room air) Lying   07/28/23 1245 -- 71 18 153/75 107 96 % None (Room air) Lying   07/28/23 1230 -- 71 18 164/79 111 97 % None (Room air) Lying   07/28/23 1221 98.5 °F (36.9 °C) -- -- -- -- -- -- --   07/28/23 1217 -- 77 18 179/82 Abnormal  -- 98 % None (Room air) Lying      07/29 0701   -   P. O. 900   I.V. (mL/kg) 10 (0.1)   Total Intake(mL/kg) 910 (11.5)   Urine (mL/kg/hr) 600 (0.7)   Total Output 600   Net +310     Pertinent Labs/Diagnostic Test Results:   MRI brain wo contrast   Acute/early subacute lacunar infarcts in the left basal ganglia and left centrum semiovale. No acute hemorrhage. CTA head and neck with and without contrast   Short segment critical high-grade stenosis/near occlusion of the M1 segment of the left middle cerebral artery. Low-attenuation of the left lentiform nucleus as well as left insular ribbon/subinsular white matter consistent with acute ischemia and should be correlated with MRI. No associated hemorrhage.         Results from last 7 days   Lab Units 07/28/23  1228   SARS-COV-2  Negative     Results from last 7 days   Lab Units 07/29/23  0513 07/28/23  1228   WBC Thousand/uL 6.88 7.81   HEMOGLOBIN g/dL 12.7 12.7   HEMATOCRIT % 38.9 39.6   PLATELETS Thousands/uL 278 306       Results from last 7 days   Lab Units 07/29/23  0513 07/28/23  1228   SODIUM mmol/L 141 138   POTASSIUM mmol/L 4.0 4.4   CHLORIDE mmol/L 106 105   CO2 mmol/L 28 28   ANION GAP mmol/L 7 5   BUN mg/dL 11 16   CREATININE mg/dL 0.65 0.67   EGFR ml/min/1.73sq m 89 88   CALCIUM mg/dL 8.9 9.3   MAGNESIUM mg/dL 2.0  --      Results from last 7 days   Lab Units 07/28/23  1212   POC GLUCOSE mg/dl 134     Results from last 7 days   Lab Units 07/29/23  0513 07/28/23  1228   GLUCOSE RANDOM mg/dL 122 122     Results from last 7 days   Lab Units 07/28/23  1503   HEMOGLOBIN A1C % 6.5*   EAG mg/dl 140     Results from last 7 days   Lab Units 07/28/23  1434 07/28/23  1228   HS TNI 0HR ng/L  --  4   HS TNI 2HR ng/L 4  --    HSTNI D2 ng/L 0  --      Results from last 7 days   Lab Units 07/28/23  1228   PROTIME seconds 11.8   INR  0.86   PTT seconds 31     Results from last 7 days   Lab Units 07/28/23  1228   INFLUENZA A PCR  Negative   INFLUENZA B PCR  Negative   RSV PCR  Negative     ED Treatment:   Medication Administration from 07/28/2023 1201 to 07/28/2023 1721       Date/Time Order Dose Route Action     07/28/2023 1340 EDT iohexol (OMNIPAQUE) 350 MG/ML injection (SINGLE-DOSE) 85 mL 85 mL Intravenous Given     07/28/2023 1500 EDT aspirin tablet 325 mg 325 mg Oral Given     07/28/2023 1500 EDT clopidogrel (PLAVIX) tablet 600 mg 600 mg Oral Given     07/28/2023 1608 EDT atorvastatin (LIPITOR) tablet 80 mg 80 mg Oral Given     Past Medical History:   Diagnosis Date   • Anxiety    • Depression    • GERD (gastroesophageal reflux disease)    • Hypertension      Present on Admission:  • CVA (cerebral vascular accident) ([address])  • Anxiety and depression  • Essential hypertension  • GERD (gastroesophageal reflux disease)  • Smoker    Admitting Diagnosis: CVA (cerebral vascular accident) ([address]) [I63.9]  Occlusion and stenosis of left middle cerebral artery [I66.02]  Acute CVA (cerebrovascular accident) ([address]) [I63.9]    Age/Sex: 71 y.o. female    Admission Orders:  TELEMETRY  VS + Neuro Checks Q1HR X 4 - Q4HRS  NIH STROKE SCALE Q24hrs  X 2  Continuous Pulse Oximetry  UP + OOB as Tolerated  MRI Brain  ECHO  PT + OT Evals   Diet Cardiovascular; Cardiac    Scheduled Medications:  aspirin, 81 mg, Oral, Daily  atorvastatin, 80 mg, Oral, Daily With Dinner  clopidogrel, 75 mg, Oral, Daily  enoxaparin, 40 mg, Subcutaneous, Daily  famotidine, 10 mg, Oral, BID  nicotine, 1 patch, Transdermal, Daily  sertraline, 100 mg, Oral, Daily    Continuous IV Infusions: NONE    PRN Meds:  ALPRAZolam, 0.5 mg, Oral, 4x Daily PRN  hydrALAZINE, 5 mg, Intravenous, Q6H PRN    IP CONSULT TO NEUROLOGY  IP CONSULT TO CASE MANAGEMENT  IP CONSULT TO NUTRITION SERVICES    Network Utilization Review Department  ATTENTION: Please call with any questions or concerns to [phone] and carefully listen to the prompts so that you are directed to the right person. All voicemails are confidential.  Sarah Bernardo all requests for admission clinical reviews, approved or denied determinations and any other requests to dedicated fax number below belonging to the campus where the patient is receiving treatment.  List of dedicated fax numbers for the Facilities:  [place] DENIALS (Administrative/Medical Necessity) [phone]   [address] [facility] (Maternity/NICU/Pediatrics) [phone]   [address] [place] [address] Drive [phone]   [facility] [address] [facility] [phone]   [address] [facility] [address] [facility] [address] [place] [address] [phone]   [address] [place] [address] [facility] [phone]

## 2023-07-31 ENCOUNTER — APPOINTMENT (EMERGENCY)
Dept: RADIOLOGY | Facility: HOSPITAL | Age: 73
DRG: 042 | End: 2023-07-31
Payer: COMMERCIAL

## 2023-07-31 ENCOUNTER — HOSPITAL ENCOUNTER (INPATIENT)
Facility: HOSPITAL | Age: 73
LOS: 1 days | Discharge: HOME | DRG: 042 | End: 2023-08-02
Attending: STUDENT IN AN ORGANIZED HEALTH CARE EDUCATION/TRAINING PROGRAM | Admitting: INTERNAL MEDICINE
Payer: COMMERCIAL

## 2023-07-31 DIAGNOSIS — R47.1 DYSARTHRIA: ICD-10-CM

## 2023-07-31 DIAGNOSIS — R29.90 STROKE-LIKE SYMPTOMS: ICD-10-CM

## 2023-07-31 DIAGNOSIS — I63.9 CVA (CEREBRAL VASCULAR ACCIDENT) (HCC): Primary | ICD-10-CM

## 2023-07-31 DIAGNOSIS — I63.9 ACUTE ISCHEMIC STROKE (HCC): ICD-10-CM

## 2023-07-31 LAB
2HR DELTA HS TROPONIN: 0 NG/L
ANION GAP SERPL CALCULATED.3IONS-SCNC: 7 MMOL/L
APTT PPP: 29 SECONDS (ref 23–37)
ATRIAL RATE: 72 BPM
BUN SERPL-MCNC: 17 MG/DL (ref 5–25)
CALCIUM SERPL-MCNC: 9.6 MG/DL (ref 8.4–10.2)
CARDIAC TROPONIN I PNL SERPL HS: 5 NG/L
CARDIAC TROPONIN I PNL SERPL HS: 5 NG/L
CHLORIDE SERPL-SCNC: 105 MMOL/L (ref 96–108)
CO2 SERPL-SCNC: 26 MMOL/L (ref 21–32)
CREAT SERPL-MCNC: 0.7 MG/DL (ref 0.6–1.3)
ERYTHROCYTE [DISTWIDTH] IN BLOOD BY AUTOMATED COUNT: 14.4 % (ref 11.6–15.1)
FLUAV RNA RESP QL NAA+PROBE: NEGATIVE
FLUBV RNA RESP QL NAA+PROBE: NEGATIVE
GFR SERPL CREATININE-BSD FRML MDRD: 87 ML/MIN/1.73SQ M
GLUCOSE SERPL-MCNC: 105 MG/DL (ref 65–140)
GLUCOSE SERPL-MCNC: 106 MG/DL (ref 65–140)
HCT VFR BLD AUTO: 38 % (ref 34.8–46.1)
HGB BLD-MCNC: 12.5 G/DL (ref 11.5–15.4)
INR PPP: 0.89 (ref 0.84–1.19)
MCH RBC QN AUTO: 31.5 PG (ref 26.8–34.3)
MCHC RBC AUTO-ENTMCNC: 32.9 G/DL (ref 31.4–37.4)
MCV RBC AUTO: 96 FL (ref 82–98)
P AXIS: 69 DEGREES
PLATELET # BLD AUTO: 320 THOUSANDS/UL (ref 149–390)
PMV BLD AUTO: 9.4 FL (ref 8.9–12.7)
POTASSIUM SERPL-SCNC: 3.7 MMOL/L (ref 3.5–5.3)
PR INTERVAL: 128 MS
PROTHROMBIN TIME: 12.2 SECONDS (ref 11.6–14.5)
QRS AXIS: 38 DEGREES
QRSD INTERVAL: 72 MS
QT INTERVAL: 358 MS
QTC INTERVAL: 392 MS
RBC # BLD AUTO: 3.97 MILLION/UL (ref 3.81–5.12)
RSV RNA RESP QL NAA+PROBE: NEGATIVE
SARS-COV-2 RNA RESP QL NAA+PROBE: NEGATIVE
SODIUM SERPL-SCNC: 138 MMOL/L (ref 135–147)
T WAVE AXIS: 60 DEGREES
VENTRICULAR RATE: 72 BPM
WBC # BLD AUTO: 9.09 THOUSAND/UL (ref 4.31–10.16)

## 2023-07-31 PROCEDURE — 80048 BASIC METABOLIC PNL TOTAL CA: CPT | Performed by: STUDENT IN AN ORGANIZED HEALTH CARE EDUCATION/TRAINING PROGRAM

## 2023-07-31 PROCEDURE — 70498 CT ANGIOGRAPHY NECK: CPT

## 2023-07-31 PROCEDURE — 82948 REAGENT STRIP/BLOOD GLUCOSE: CPT

## 2023-07-31 PROCEDURE — 0241U HB NFCT DS VIR RESP RNA 4 TRGT: CPT | Performed by: STUDENT IN AN ORGANIZED HEALTH CARE EDUCATION/TRAINING PROGRAM

## 2023-07-31 PROCEDURE — 70496 CT ANGIOGRAPHY HEAD: CPT

## 2023-07-31 PROCEDURE — 99222 1ST HOSP IP/OBS MODERATE 55: CPT | Performed by: NURSE PRACTITIONER

## 2023-07-31 PROCEDURE — 85610 PROTHROMBIN TIME: CPT | Performed by: STUDENT IN AN ORGANIZED HEALTH CARE EDUCATION/TRAINING PROGRAM

## 2023-07-31 PROCEDURE — 85730 THROMBOPLASTIN TIME PARTIAL: CPT | Performed by: STUDENT IN AN ORGANIZED HEALTH CARE EDUCATION/TRAINING PROGRAM

## 2023-07-31 PROCEDURE — 85027 COMPLETE CBC AUTOMATED: CPT | Performed by: STUDENT IN AN ORGANIZED HEALTH CARE EDUCATION/TRAINING PROGRAM

## 2023-07-31 PROCEDURE — 99285 EMERGENCY DEPT VISIT HI MDM: CPT

## 2023-07-31 PROCEDURE — 84484 ASSAY OF TROPONIN QUANT: CPT | Performed by: STUDENT IN AN ORGANIZED HEALTH CARE EDUCATION/TRAINING PROGRAM

## 2023-07-31 PROCEDURE — 93005 ELECTROCARDIOGRAM TRACING: CPT

## 2023-07-31 PROCEDURE — 36415 COLL VENOUS BLD VENIPUNCTURE: CPT | Performed by: STUDENT IN AN ORGANIZED HEALTH CARE EDUCATION/TRAINING PROGRAM

## 2023-07-31 PROCEDURE — 99291 CRITICAL CARE FIRST HOUR: CPT | Performed by: STUDENT IN AN ORGANIZED HEALTH CARE EDUCATION/TRAINING PROGRAM

## 2023-07-31 PROCEDURE — 93010 ELECTROCARDIOGRAM REPORT: CPT | Performed by: INTERNAL MEDICINE

## 2023-07-31 RX ADMIN — IOHEXOL 85 ML: 350 INJECTION, SOLUTION INTRAVENOUS at 20:48

## 2023-07-31 NOTE — Clinical Note
Case was discussed with and the patient's admission status was agreed to be Admission Status: observation status to the service of Dr. Munson

## 2023-07-31 NOTE — UTILIZATION REVIEW
Initial Clinical Review    Admission: Date/Time/Statement:   Admission Orders (From admission, onward)     Ordered        07/28/23 1455  INPATIENT ADMISSION  Once                   Orders Placed This Encounter   Procedures   • INPATIENT ADMISSION     Standing Status:   Standing     Number of Occurrences:   1     Order Specific Question:   Level of Care     Answer:   Level 2 Stepdown / HOT [14]     Order Specific Question:   Estimated length of stay     Answer:   More than 2 Midnights     Order Specific Question:   Certification     Answer:   I certify that inpatient services are medically necessary for this patient for a duration of greater than two midnights. See H&P and MD Progress Notes for additional information about the patient's course of treatment. ED Arrival Information     Expected   -    Arrival   7/28/2023 12:02    Acuity   Emergent            Means of arrival   Walk-In    Escorted by   Spouse    Service   Hospitalist    Admission type   Emergency            Arrival complaint   right sided weakness/slurrred speech/        Chief Complaint   Patient presents with   • CVA/TIA-like Symptoms     Pt's spouse states pt couldn't hold anything in her right hand and pt had slurred speech and droopy lower lip. Began about two hours ago and has since resolved. Pt is A&O x4 upon arrival. No blood thinners. Pt was outside at the time and symptoms developed after walking inside. Initial Presentation:   72 y/o female with PMHx HTN, smokes cigarettes 1+1/2 ppd. - presents to [facility] ED on 7//28/23 2nd transient right sided symptoms. Reports that approx 1 1/2 hour earlier at 10:30am, she developed an episode of slurred speech and was dropping things out of her right hand that lasted about 15 minutes. In ED - HR 77  /82   Exam: resolved speech difficulty and weakness.   CT head and neck showed short segment critical high grade stenosis /occlusion of M1 of left MCA and likely changes related to potential left BG stroke. Labs: CBC + BMP wnl. Troponin negative. ED Tx: AS, Plavix + Lipitor per Neurology recommendation. Admitted Inpatient 7/28/23 at 1455 -      CVA (cerebral vascular accident) [facility])  Presented to the ED with right hand weakness where she was dropping things. Her  noticed her to have slurred speech. Symptoms started at around 10:30 AM and lasted about 15 minutes. • Admit to telemetry  • CTA head/neck revealed acute ischemia of left lentiform nucleus, left insular ribbon/subinsular region  • Patient loaded with 600 mg of Plavix and full dose ASA  • Placed patient on 75 mg of Plavix and 81 mg of aspirin starting tomorrow   • high-dose statin  • Lipid panel, HgA1C in AM  • MRI in AM  • Echo with bubble study  • Plan of care coordinated with neurology      Occlusion and stenosis of left middle cerebral artery  CT scan shows short segment critical high-grade stenosis of left middle cerebral artery M1 segment    7/28/23 NEUROLOGY:  Assessment/Plan:   1. Suspected acute left BG/insular ischemic stroke  2. Left M1 stenosis/occlusion of unclear chronicity  3. HLD  4. HTN  5. Smoking addiction       No active symptoms from suspected stroke in left hemisphere. Her right facial asymmetry she says is old. She wasn't TNK candidate due to low NIH scale. She was given aspirin 325mg, plavix of 600mg and lipitor 80mg. Cont aspirin 81mg, plavix 75mg and lipitor 80mg tomm  MRI brain   TTE w/ shunt  Allow permissive HTN. Systolic atleast >331 for 24 hrs. Hold all antihypertensives, may use midodrine if needed. Neuro checks  Speech evaluation   PT/OT  Advised smoking cessation    7/29/23 - INPATIENT DAY 2:  MRI BRAIN -    Acute/early subacute lacunar infarcts in the left basal ganglia and left centrum semiovale. No acute hemorrhage.      7/29/23:  CVA (cerebral vascular accident  • Patient loaded with 600 mg of Plavix and full dose ASA  • Continue patient on 75 mg of Plavix and 81 mg of aspirin for total of 3 months and then patient to discontinue Plavix and start 325 mg of aspirin daily thereafter per neurology  • high-dose statin  • Lipid panel -  • MRI showed acute/early subacute lacunar infarcts in left basal ganglia and left centrum semiovale  • Echo with bubble study showed normal EF with normal diastolic function. No PFO  -Denies any further recurrence of symptoms since her episode yesterday. Feels well and wants to go home     Per Neurology - No further recurrence of symptoms, remained stable and completed all stroke work-up   - DISCHARGE .       ED Triage Vitals   Temperature Pulse Respirations Blood Pressure SpO2   07/28/23 1221 07/28/23 1217 07/28/23 1217 07/28/23 1217 07/28/23 1217   98.5 °F (36.9 °C) 77 18 (!) 179/82 98 %      Temp Source Heart Rate Source Patient Position - Orthostatic VS BP Location FiO2 (%)   07/28/23 1221 07/28/23 1217 07/28/23 1217 07/28/23 1217 --   Oral Monitor Lying Left arm      Wt Readings from Last 1 Encounters:   07/29/23 78.9 kg (174 lb)     Additional Vital Signs:   Date/Time Temp Pulse Resp BP MAP (mmHg) SpO2 O2 Device Patient Position - Orthostatic VS   07/29/23 1200 97.9 °F (36.6 °C) 80 19 166/81 -- 94 % -- --   07/29/23 10:11:15 -- -- -- 167/80 109 -- -- --   07/29/23 0900 -- -- -- -- -- 95 % None (Room air) --   07/29/23 0831 -- 87 -- 186/90 Abnormal  -- -- -- --   07/29/23 0823 -- 94 -- 170/89 -- 95 % -- --   07/29/23 0800 98 °F (36.7 °C) 99 18 186/90 Abnormal  122 95 % -- --   07/29/23 07:55:36 97.3 °F (36.3 °C) Abnormal  93 -- 186/90 Abnormal  122 91 % -- --   07/29/23 0400 -- 66 -- 135/69 91 92 % -- --   07/29/23 03:18:05 98.1 °F (36.7 °C) 70 18 149/66 94 93 % -- Lying   07/29/23 0200 -- 67 -- 113/57 76 92 % -- --   07/29/23 0000 -- 67 -- 136/72 93 93 % -- --   07/28/23 22:26:10 -- 80 18 139/78 98 90 % -- --   07/28/23 21:15:14 -- 73 18 161/86 111 93 % -- --   07/28/23 20:12:21 -- 73 17 167/94 118 94 % -- --   07/28/23 1748 98 °F (36.7 °C) 79 18 172/92 Abnormal  119 95 % -- --   07/28/23 17:32:49 97.7 °F (36.5 °C) 76 -- 171/90 Abnormal  117 94 % -- --   07/28/23 1700 -- 74 18 168/100 125 98 % None (Room air) Lying   07/28/23 1615 -- 77 18 183/91 Abnormal  131 97 % None (Room air) Lying   07/28/23 1600 -- 81 18 139/95 112 98 % None (Room air) Lying   07/28/23 1543 -- 78 18 191/91 Abnormal  -- 98 % None (Room air) Lying   07/28/23 1445 -- 81 18 195/138 Abnormal  161 97 % None (Room air) Lying   07/28/23 1400 -- 79 16 162/79 114 97 % None (Room air) Lying   07/28/23 1315 -- 74 18 162/94 121 96 % None (Room air) Lying   07/28/23 1300 -- 72 18 162/86 117 95 % None (Room air) Lying   07/28/23 1245 -- 71 18 153/75 107 96 % None (Room air) Lying   07/28/23 1230 -- 71 18 164/79 111 97 % None (Room air) Lying   07/28/23 1221 98.5 °F (36.9 °C) -- -- -- -- -- -- --   07/28/23 1217 -- 77 18 179/82 Abnormal  -- 98 % None (Room air) Lying      07/29 0701   -   P. O. 900   I.V. (mL/kg) 10 (0.1)   Total Intake(mL/kg) 910 (11.5)   Urine (mL/kg/hr) 600 (0.7)   Total Output 600   Net +310     Pertinent Labs/Diagnostic Test Results:   MRI brain wo contrast   Acute/early subacute lacunar infarcts in the left basal ganglia and left centrum semiovale. No acute hemorrhage. CTA head and neck with and without contrast   Short segment critical high-grade stenosis/near occlusion of the M1 segment of the left middle cerebral artery. Low-attenuation of the left lentiform nucleus as well as left insular ribbon/subinsular white matter consistent with acute ischemia and should be correlated with MRI. No associated hemorrhage.         Results from last 7 days   Lab Units 07/28/23  1228   SARS-COV-2  Negative     Results from last 7 days   Lab Units 07/29/23  0513 07/28/23  1228   WBC Thousand/uL 6.88 7.81   HEMOGLOBIN g/dL 12.7 12.7   HEMATOCRIT % 38.9 39.6   PLATELETS Thousands/uL 278 306       Results from last 7 days   Lab Units 07/29/23  0513 07/28/23  1228   SODIUM mmol/L 141 138   POTASSIUM mmol/L 4.0 4.4   CHLORIDE mmol/L 106 105   CO2 mmol/L 28 28   ANION GAP mmol/L 7 5   BUN mg/dL 11 16   CREATININE mg/dL 0.65 0.67   EGFR ml/min/1.73sq m 89 88   CALCIUM mg/dL 8.9 9.3   MAGNESIUM mg/dL 2.0  --      Results from last 7 days   Lab Units 07/28/23  1212   POC GLUCOSE mg/dl 134     Results from last 7 days   Lab Units 07/29/23  0513 07/28/23  1228   GLUCOSE RANDOM mg/dL 122 122     Results from last 7 days   Lab Units 07/28/23  1503   HEMOGLOBIN A1C % 6.5*   EAG mg/dl 140     Results from last 7 days   Lab Units 07/28/23  1434 07/28/23  1228   HS TNI 0HR ng/L  --  4   HS TNI 2HR ng/L 4  --    HSTNI D2 ng/L 0  --      Results from last 7 days   Lab Units 07/28/23  1228   PROTIME seconds 11.8   INR  0.86   PTT seconds 31     Results from last 7 days   Lab Units 07/28/23  1228   INFLUENZA A PCR  Negative   INFLUENZA B PCR  Negative   RSV PCR  Negative     ED Treatment:   Medication Administration from 07/28/2023 1201 to 07/28/2023 1721       Date/Time Order Dose Route Action     07/28/2023 1340 EDT iohexol (OMNIPAQUE) 350 MG/ML injection (SINGLE-DOSE) 85 mL 85 mL Intravenous Given     07/28/2023 1500 EDT aspirin tablet 325 mg 325 mg Oral Given     07/28/2023 1500 EDT clopidogrel (PLAVIX) tablet 600 mg 600 mg Oral Given     07/28/2023 1608 EDT atorvastatin (LIPITOR) tablet 80 mg 80 mg Oral Given     Past Medical History:   Diagnosis Date   • Anxiety    • Depression    • GERD (gastroesophageal reflux disease)    • Hypertension      Present on Admission:  • CVA (cerebral vascular accident) ([address])  • Anxiety and depression  • Essential hypertension  • GERD (gastroesophageal reflux disease)  • Smoker  • Type 2 diabetes mellitus, without long-term current use of insulin (HCC)    Admitting Diagnosis: CVA (cerebral vascular accident) ([address]) [I63.9]  Occlusion and stenosis of left middle cerebral artery [I66.02]  Acute CVA (cerebrovascular accident) ([address]) [I63.9]    Age/Sex: 71 y.o. female    Admission Orders:  TELEMETRY  VS + Neuro Checks Q1HR X 4 - Q4HRS  NIH STROKE SCALE Q24hrs  X 2  Continuous Pulse Oximetry  UP + OOB as Tolerated  MRI Brain  ECHO  PT + OT Evals   Diet Cardiovascular; Cardiac    Scheduled Medications:  aspirin, 81 mg, Oral, Daily  atorvastatin, 80 mg, Oral, Daily With Dinner  clopidogrel, 75 mg, Oral, Daily  enoxaparin, 40 mg, Subcutaneous, Daily  famotidine, 10 mg, Oral, BID  nicotine, 1 patch, Transdermal, Daily  sertraline, 100 mg, Oral, Daily    Continuous IV Infusions: NONE    PRN Meds:  ALPRAZolam, 0.5 mg, Oral, 4x Daily PRN  hydrALAZINE, 5 mg, Intravenous, Q6H PRN    IP CONSULT TO NEUROLOGY  IP CONSULT TO CASE MANAGEMENT  IP CONSULT TO NUTRITION SERVICES    Network Utilization Review Department  ATTENTION: Please call with any questions or concerns to [phone] and carefully listen to the prompts so that you are directed to the right person. All voicemails are confidential.  Bj Wayne all requests for admission clinical reviews, approved or denied determinations and any other requests to dedicated fax number below belonging to the campus where the patient is receiving treatment.  List of dedicated fax numbers for the Facilities:  [place] BEV (Administrative/Medical Necessity) [phone]   [facility] 800 [facility] [phone]   [facility] 1000 [facility] [phone]   1508 [facility] 207 [facility] [id number] [place] [address] [phone]   [address] [facility] ADMISSION DISCHARGE   This is a Notification of Discharge from [address] Please be advised that this patient has been discharge from our facility. Below you will find the admission and discharge date and time including the patient’s disposition. UTILIZATION REVIEW CONTACT:  Kenisha Hair  Utilization   Network Utilization Review Department  Phone: [phone] x carefully listen to the prompts. All voicemails are confidential.  Email: [email or web address]     ADMISSION INFORMATION  PRESENTATION DATE: 7/28/2023 12:12 PM  OBERVATION ADMISSION DATE:   INPATIENT ADMISSION DATE: 7/28/23  2:55 PM   DISCHARGE DATE: 7/29/2023  2:54 PM   DISPOSITION:Home/Self Care    IMPORTANT INFORMATION:  Send all requests for admission clinical reviews, approved or denied determinations and any other requests to dedicated fax number below belonging to the campus where the patient is receiving treatment.  List of dedicated fax numbers:  [place] DENIALS (Administrative/Medical Necessity) [phone] 2303 [facility] (Maternity/NICU/Pediatrics) [phone]   [facility] [phone]   [facility] [phone] 1636 [facility] [phone]   [address] [place] [address] [phone]   [facility] [phone]   [address] [place] [address] [address] [place] [address] [phone]   [address] [place] [address] [phone] 3441 [facility] [phone] 2720 [facility] 3000 [facility] [phone]

## 2023-08-01 ENCOUNTER — APPOINTMENT (OUTPATIENT)
Dept: RADIOLOGY | Facility: HOSPITAL | Age: 73
DRG: 042 | End: 2023-08-01
Payer: COMMERCIAL

## 2023-08-01 PROBLEM — I63.9 ACUTE ISCHEMIC STROKE (HCC): Status: ACTIVE | Noted: 2023-08-01

## 2023-08-01 PROBLEM — R29.90 STROKE-LIKE SYMPTOMS: Status: ACTIVE | Noted: 2023-08-01

## 2023-08-01 LAB
4HR DELTA HS TROPONIN: 0 NG/L
ANION GAP SERPL CALCULATED.3IONS-SCNC: 4 MMOL/L
BUN SERPL-MCNC: 14 MG/DL (ref 5–25)
CALCIUM SERPL-MCNC: 8.7 MG/DL (ref 8.4–10.2)
CARDIAC TROPONIN I PNL SERPL HS: 5 NG/L
CHLORIDE SERPL-SCNC: 106 MMOL/L (ref 96–108)
CO2 SERPL-SCNC: 26 MMOL/L (ref 21–32)
CREAT SERPL-MCNC: 0.59 MG/DL (ref 0.6–1.3)
GFR SERPL CREATININE-BSD FRML MDRD: 92 ML/MIN/1.73SQ M
GLUCOSE P FAST SERPL-MCNC: 114 MG/DL (ref 65–99)
GLUCOSE SERPL-MCNC: 103 MG/DL (ref 65–140)
GLUCOSE SERPL-MCNC: 114 MG/DL (ref 65–140)
GLUCOSE SERPL-MCNC: 116 MG/DL (ref 65–140)
GLUCOSE SERPL-MCNC: 123 MG/DL (ref 65–140)
GLUCOSE SERPL-MCNC: 81 MG/DL (ref 65–140)
GLUCOSE SERPL-MCNC: 88 MG/DL (ref 65–140)
MAGNESIUM SERPL-MCNC: 2.1 MG/DL (ref 1.9–2.7)
POTASSIUM SERPL-SCNC: 5.7 MMOL/L (ref 3.5–5.3)
SODIUM SERPL-SCNC: 136 MMOL/L (ref 135–147)

## 2023-08-01 PROCEDURE — 83735 ASSAY OF MAGNESIUM: CPT | Performed by: NURSE PRACTITIONER

## 2023-08-01 PROCEDURE — 92610 EVALUATE SWALLOWING FUNCTION: CPT

## 2023-08-01 PROCEDURE — 99223 1ST HOSP IP/OBS HIGH 75: CPT | Performed by: PSYCHIATRY & NEUROLOGY

## 2023-08-01 PROCEDURE — 70551 MRI BRAIN STEM W/O DYE: CPT

## 2023-08-01 PROCEDURE — 92522 EVALUATE SPEECH PRODUCTION: CPT

## 2023-08-01 PROCEDURE — 80048 BASIC METABOLIC PNL TOTAL CA: CPT | Performed by: NURSE PRACTITIONER

## 2023-08-01 PROCEDURE — 84484 ASSAY OF TROPONIN QUANT: CPT | Performed by: NURSE PRACTITIONER

## 2023-08-01 PROCEDURE — 97163 PT EVAL HIGH COMPLEX 45 MIN: CPT

## 2023-08-01 PROCEDURE — 97110 THERAPEUTIC EXERCISES: CPT

## 2023-08-01 PROCEDURE — 82948 REAGENT STRIP/BLOOD GLUCOSE: CPT

## 2023-08-01 PROCEDURE — 97166 OT EVAL MOD COMPLEX 45 MIN: CPT

## 2023-08-01 PROCEDURE — 99232 SBSQ HOSP IP/OBS MODERATE 35: CPT | Performed by: INTERNAL MEDICINE

## 2023-08-01 RX ORDER — ALPRAZOLAM 0.5 MG/1
0.5 TABLET ORAL 4 TIMES DAILY PRN
Status: DISCONTINUED | OUTPATIENT
Start: 2023-08-01 | End: 2023-08-01

## 2023-08-01 RX ORDER — SERTRALINE HYDROCHLORIDE 100 MG/1
100 TABLET, FILM COATED ORAL
Status: DISCONTINUED | OUTPATIENT
Start: 2023-08-01 | End: 2023-08-02 | Stop reason: HOSPADM

## 2023-08-01 RX ORDER — ASPIRIN 81 MG/1
81 TABLET, CHEWABLE ORAL DAILY
Status: DISCONTINUED | OUTPATIENT
Start: 2023-08-01 | End: 2023-08-02 | Stop reason: HOSPADM

## 2023-08-01 RX ORDER — ATORVASTATIN CALCIUM 80 MG/1
80 TABLET, FILM COATED ORAL
Status: DISCONTINUED | OUTPATIENT
Start: 2023-08-01 | End: 2023-08-02 | Stop reason: HOSPADM

## 2023-08-01 RX ORDER — NICOTINE 21 MG/24HR
1 PATCH, TRANSDERMAL 24 HOURS TRANSDERMAL DAILY
Status: DISCONTINUED | OUTPATIENT
Start: 2023-08-01 | End: 2023-08-02 | Stop reason: HOSPADM

## 2023-08-01 RX ORDER — ALPRAZOLAM 0.25 MG/1
0.25 TABLET ORAL 4 TIMES DAILY PRN
Status: DISCONTINUED | OUTPATIENT
Start: 2023-08-01 | End: 2023-08-02 | Stop reason: HOSPADM

## 2023-08-01 RX ORDER — ENOXAPARIN SODIUM 100 MG/ML
40 INJECTION SUBCUTANEOUS
Status: DISCONTINUED | OUTPATIENT
Start: 2023-08-01 | End: 2023-08-02 | Stop reason: HOSPADM

## 2023-08-01 RX ORDER — ONDANSETRON 2 MG/ML
4 INJECTION INTRAMUSCULAR; INTRAVENOUS EVERY 6 HOURS PRN
Status: DISCONTINUED | OUTPATIENT
Start: 2023-08-01 | End: 2023-08-02 | Stop reason: HOSPADM

## 2023-08-01 RX ORDER — CLOPIDOGREL BISULFATE 75 MG/1
300 TABLET ORAL ONCE
Status: COMPLETED | OUTPATIENT
Start: 2023-08-01 | End: 2023-08-01

## 2023-08-01 RX ORDER — INSULIN LISPRO 100 [IU]/ML
1-5 INJECTION, SOLUTION INTRAVENOUS; SUBCUTANEOUS
Status: DISCONTINUED | OUTPATIENT
Start: 2023-08-01 | End: 2023-08-02 | Stop reason: HOSPADM

## 2023-08-01 RX ORDER — CLOPIDOGREL BISULFATE 75 MG/1
75 TABLET ORAL DAILY
Status: DISCONTINUED | OUTPATIENT
Start: 2023-08-01 | End: 2023-08-02 | Stop reason: HOSPADM

## 2023-08-01 RX ORDER — PANTOPRAZOLE SODIUM 40 MG/1
40 TABLET, DELAYED RELEASE ORAL
Status: DISCONTINUED | OUTPATIENT
Start: 2023-08-01 | End: 2023-08-02 | Stop reason: HOSPADM

## 2023-08-01 RX ORDER — ACETAMINOPHEN 325 MG/1
650 TABLET ORAL EVERY 6 HOURS PRN
Status: DISCONTINUED | OUTPATIENT
Start: 2023-08-01 | End: 2023-08-02 | Stop reason: HOSPADM

## 2023-08-01 RX ORDER — FAMOTIDINE 20 MG/1
10 TABLET, FILM COATED ORAL 2 TIMES DAILY
Status: DISCONTINUED | OUTPATIENT
Start: 2023-08-01 | End: 2023-08-02 | Stop reason: HOSPADM

## 2023-08-01 RX ADMIN — ATORVASTATIN CALCIUM 80 MG: 80 TABLET, FILM COATED ORAL at 17:12

## 2023-08-01 RX ADMIN — ASPIRIN 81 MG CHEWABLE TABLET 81 MG: 81 TABLET CHEWABLE at 08:22

## 2023-08-01 RX ADMIN — ALPRAZOLAM 0.25 MG: 0.25 TABLET ORAL at 22:18

## 2023-08-01 RX ADMIN — CLOPIDOGREL BISULFATE 300 MG: 75 TABLET ORAL at 19:30

## 2023-08-01 RX ADMIN — ENOXAPARIN SODIUM 40 MG: 40 INJECTION SUBCUTANEOUS at 00:27

## 2023-08-01 RX ADMIN — ALPRAZOLAM 0.25 MG: 0.25 TABLET ORAL at 12:24

## 2023-08-01 RX ADMIN — FAMOTIDINE 10 MG: 20 TABLET, FILM COATED ORAL at 08:22

## 2023-08-01 RX ADMIN — FAMOTIDINE 10 MG: 20 TABLET, FILM COATED ORAL at 17:12

## 2023-08-01 RX ADMIN — PANTOPRAZOLE SODIUM 40 MG: 40 TABLET, DELAYED RELEASE ORAL at 05:00

## 2023-08-01 RX ADMIN — ENOXAPARIN SODIUM 40 MG: 40 INJECTION SUBCUTANEOUS at 22:18

## 2023-08-01 RX ADMIN — CLOPIDOGREL BISULFATE 75 MG: 75 TABLET ORAL at 08:22

## 2023-08-01 RX ADMIN — ALPRAZOLAM 0.5 MG: 0.5 TABLET ORAL at 00:27

## 2023-08-01 RX ADMIN — SERTRALINE 100 MG: 100 TABLET, FILM COATED ORAL at 17:12

## 2023-08-01 NOTE — CASE MANAGEMENT
Case Management Assessment & Discharge Planning Note    Patient name Shanna Fuentes  Location [address] [place] 5-* MRN [id number]  : 1950 Date 2023       Current Admission Date: 2023  Current Admission Diagnosis:Stroke-like symptoms   Patient Active Problem List    Diagnosis Date Noted   • Stroke-like symptoms 2023   • Type 2 diabetes mellitus, without long-term current use of insulin ([address]) 2023   • CVA (cerebral vascular accident) ([address]) 2023   • Occlusion and stenosis of left middle cerebral artery 2023   • Smoker 2023   • Anxiety and depression    • Essential hypertension    • GERD (gastroesophageal reflux disease)       LOS (days): 0  Geometric Mean LOS (GMLOS) (days):   Days to GMLOS:     OBJECTIVE:            Current admission status: Observation  Referral Reason: Stroke    Preferred Pharmacy:   [address] [place] [address] [id number] [facility], [address]  [address] [place] [id number]  Phone: [phone] Fax: [phone]    Primary Care Provider: Tricia Orlando DO    Primary Insurance: [facility]  Secondary Insurance:     ASSESSMENT:  Archie Proxies    There are no active Health Care Proxies on file.         Obs Notice Signed: 23    Readmission Root Cause  30 Day Readmission: No    Patient Information  Admitted from[de-identified] Home  Mental Status: Alert  During Assessment patient was accompanied by: Spouse  Assessment information provided by[de-identified] Patient  Primary Caregiver: Spouse  Caregiver's Name[de-identified] Wen Wood Relationship to Patient[de-identified] Significant Other  Caregiver's Telephone Number[de-identified] [phone]  Support Systems: Spouse/significant other  [place] of Residence: [address] [place] [address] do you live in?: [address]  Type of Current Residence: 2 story home  In the last 12 months, was there a time when you were not able to pay the mortgage or rent on time?: No  In the last 12 months, how many places have you lived?: 1  In the last 12 months, was there a time when you did not have a steady place to sleep or slept in a shelter (including now)?: No  Homeless/housing insecurity resource given?: N/A  Living Arrangements: Lives w/ Spouse/significant other    Activities of Daily Living Prior to Admission  Functional Status: Independent  Completes ADLs independently?: Yes  Ambulates independently?: Yes  Does patient use assisted devices?: No  Does patient currently own DME?: No  Does patient have a history of Outpatient Therapy (PT/OT)?: No  Does the patient have a history of Short-Term Rehab?: No  Does patient have a history of HHC?: No  Does patient currently have [address]?: No     Patient Information Continued  Does patient have prescription coverage?: Yes  Within the past 12 months, you worried that your food would run out before you got the money to buy more.: Never true  Within the past 12 months, the food you bought just didn't last and you didn't have money to get more.: Never true  Food insecurity resource given?: N/A  Does patient receive dialysis treatments?: No     Means of Transportation  Means of Transport to Appts[de-identified] Family transport  In the past 12 months, has lack of transportation kept you from medical appointments or from getting medications?: No  In the past 12 months, has lack of transportation kept you from meetings, work, or from getting things needed for daily living?: No  Was application for public transport provided?: N/A    DISCHARGE DETAILS:    Discharge planning discussed with[de-identified] Patient and  Alclaudio De Leon        CM contacted family/caregiver?: Yes     Did patient/caregiver verbalize understanding of patient care needs?: Yes  Were patient/caregiver advised of the risks associated with not following Treatment Team discharge recommendations?: Yes    Contacts  Patient Contacts: Brandon Kruse  Relationship to Patient[de-identified] Family  Contact Method:  In Person  Reason/Outcome: Continuity of Care, Discharge Planning    Requested Home Health Care         Is the patient interested in [facility] at discharge?: No    DME Referral Provided  Referral made for DME?: No    Other Referral/Resources/Interventions Provided:  Interventions: None Indicated  Referral Comments: CM met with patient and  Scot Ziegler at bedside, introduced self and role, complete assessment and discuss discharge planning. Patient stated she has been independent with adls and iadls and  helps if needed. CM offered hhc for skilled nursing and patient refused. She stated she is aware of the purpose of her medications that she is taking and she can manage well at this time and does not feel the need for visiting nurses. No other CM needs indicated at this time. Patient's  Rustamshane Karlie to provide transport at discharge.      Treatment Team Recommendation: Home  Discharge Destination Plan[de-identified] Home  Transport at Discharge : Family

## 2023-08-01 NOTE — SPEECH THERAPY NOTE
SLP Evaluation      Patient Name: Johanny Montero    [id number] Date: 8/1/2023     Problem List  Principal Problem:    Stroke-like symptoms  Active Problems:    CVA (cerebral vascular accident) ([address])    Anxiety and depression    Essential hypertension    GERD (gastroesophageal reflux disease)    Smoker    Type 2 diabetes mellitus, without long-term current use of insulin ([address])      Past Medical History  Past Medical History:   Diagnosis Date   • Anxiety    • CVA (cerebral vascular accident) ([address])    • Depression    • Diabetes ([address])    • GERD (gastroesophageal reflux disease)    • Hypertension        Past Surgical History  Past Surgical History:   Procedure Laterality Date   • APPENDECTOMY     • TONSILLECTOMY           Impressions:  Patient presented with no symptoms of dysarthria or dysphagia. She presents with a baseline dysphonia (hoarse vocal quality which I presume is related to smoking). Regarding language skills, in informal testing today, she comprehended simple questions and commands and evidenced no deficits in naming, expressing basic needs, reading on the menu or writing biographical data in simple sentences. However, she presented with mildly delayed processing of complex questions and commands and reduced divergent naming. Plan:  Await MRI results and continue with diagnostic tasks to rule out need for additional therapy at this time      HISTORY AND PHYSICAL:     Johanny Montero is a 71 y.o. female with PMH of recent CVA (developed sxs of slurred speech last week but sxs resolved as per SLP Evaluation completed 7/29/23), hypertension, GERD, nicotine abuse, depression with anxiety, type 2 diabetes who presented 7/31pm th worsening slurred speech since midmorning today with unsteadiness and confusion, trouble with math found towards the evening by . CT of head:  No evidence of acute intracranial hemorrhage. Evolving left basal ganglia ischemia.  CTA:  Left M1/M2 segment previous area of near occlusion is now patent/recanalized. Small subcentimeter nodules in the upper lobes in addition to some emphysematous changes. With history of smoking, follow-up with screening low-dose unenhanced chest CT is recommended on a nonemergent outpatient basis. MRI of brain: pending. Stroke Team consulted at this time. Speech and Swallowing Mechanism Exam   Facial: mild R weakness at rest but good ROM in volitional tasks  Labial: WFL  Lingual: WFL  Velum: symmetrical  Mandible: adequate ROM  Dentition: adequate  Respiratory Support: on RA      Respiration and Phonation  Maximum Phonation Time  (Normal 15-20 seconds for healthy older adult with standard deviation of 5.5-6):  WNL  Able to sustain phonation counting from 1 to 40  Sustains phonation across words, phrases, sentences and in normal conversational speech  Vocal Quality:  hoarse (presume related to smoking; pt denies any recent change in voice quality)    Articulation:  Words and phrases: precise articulation  Sentence Level and Conversational Speech:  precise articulation    Diadochokinesis:   puh tuh kuh (pattycake or buttercup)- (normal should exceed 8 reps in 10 seconds): 8 reps in 5 secs    Resonation: Normal nasality    S/S Oral apraxia:  None    S/S Verbal Apraxia:  None      RECEPTIVE AND EXPRESSIVE LANGUAGE SKILLS:   She comprehended simple questions and commands and evidenced no deficits in naming, expressing basic needs, reading on the menu or writing biographical data in simple sentences. However, she presented with mildly delayed processing of complex questions and commands and reduced divergent naming. SWALLOWING SKILLS:  Pt was assessed with thin liquid in individual and successive sips, as well as with soft solid foods (muffin, bites of fig bar). She declined lesly crackers. Mastication, bolus formation and transfer appeared to be within normal limits. Swallowing initiation was prompted and laryngeal rise was good by palpation. No coughing, throat clearing, change in vocal quality noted with intake today.

## 2023-08-01 NOTE — H&P
[address]  H&P  Name: Viviann Sacks 71 y.o. female I MRN: [id number]  Unit/Bed#: 4 [address] Date of Admission: 7/31/2023   Date of Service: 8/1/2023 I Hospital Day: 0      Assessment/Plan   * Stroke-like symptoms  Assessment & Plan  Patient presents with worsening slurred speech since midmorning today with unsteadiness and confusion, trouble with math found towards the evening by .  states she had stroke on Friday last week, was having slurred speech and right hand weakness, speech got worse since midmorning and patient was off balance,fell softly on buttocks at home with no apparent injury.  reports he picked up two prescriptions on Sunday and he is not sure if patient took medications correctly at home. · Chart review. Patient was hospitalized 7/28-7/29 for right hand weakness, slurred speech. Work-up showed acute/early subacute lacunar infarcts in the left basal ganglia and left centrum semiovale. Patient was discharged home on DAPT and Lipitor 80mg p.o. daily. · Stroke alert in ED. · CT head - No evidence of acute intracranial hemorrhage. Evolving left basal ganglia ischemia. · CTA head and neck - Left M1/M2 segment previous area of near occlusion is now patent/recanalized. · Neurology recommends repeat MRI of the brain, continue same medications, suspect central thromboembolic etiology like A-fib. · We will follow stroke pathway  · MRI of the brain in a.m. · Telemetry  · No PFO or ASD on recent echo  · Recent , A1c 6.5  · Patient reports she took aspirin at home, not sure about Plavix or Lipitor. Will start Plavix and Lipitor tomorrow. · Permissive hypertension  · PT OT ST  · Consult neurology      CVA (cerebral vascular accident) [facility])  Assessment & Plan  · As above  · Repeat MRI of the brain in a.m. · Telemetry  · Continue home medications.     Type 2 diabetes mellitus, without long-term current use of insulin (HCC)  Assessment & Plan  Lab Results   Component Value Date    HGBA1C 6.5 (H) 07/28/2023       Recent Labs     07/31/23 2024 08/01/23  0020   POCGLU 106 103       Blood Sugar Average: Last 72 hrs:  (P) 104.5     · On metformin at home. Will hold while inpatient. · Recent A1c 6.5  · SSI  · Diabetic diet    Smoker  Assessment & Plan  · Nicotine patch, smoking cessation    GERD (gastroesophageal reflux disease)  Assessment & Plan  · Continue PPI and Pepcid    Essential hypertension  Assessment & Plan  · On lisinopril at home. Will hold due to worsening stroke symptoms. · Monitor BP closely    Anxiety and depression  Assessment & Plan  · Continue Zoloft, Xanax as needed          VTE Prophylaxis: Enoxaparin (Lovenox)  / reason for no mechanical VTE prophylaxis On Lovenox   Code Status: Full code  POLST: POLST form is not discussed and not completed at this time. Anticipated Length of Stay:  Patient will be admitted on an Observation basis with an anticipated length of stay of  < 2 midnights. Justification for Hospital Stay: Worsening stroke symptoms    Total Time for Visit, including Counseling / Coordination of Care: 45 minutes. Greater than 50% of this total time spent on direct patient counseling and coordination of care. Chief Complaint:   Worsening slurred speech, off balance, confused    History of Present Illness:    Randy Peres is a 71 y.o. female with PMH of recent CVA, hypertension, GERD, nicotine abuse, depression with anxiety, type 2 diabetes who presents with worsening slurred speech since midmorning today with unsteadiness and confusion, trouble with math found towards the evening by .  states she had stroke on Friday last week, was having slurred speech and right hand weakness, speech got worse since midmorning and patient was off balance,fell softly on buttocks at home with no apparent injury.    reports he picked up two prescriptions on Sunday and he is not sure if patient took medications correctly at home. Patient awake alert oriented x3 on exam.  Denies chest pain, headache, dizziness, SOB, nausea vomiting, fever, chills. Patient reports right-sided weakness, denies trouble swallowing, denies trouble understanding people, denies paresthesia, denies vision changes. Review of Systems:    Review of Systems   Constitutional: Positive for activity change. Neurological: Positive for speech difficulty. Off balance, trouble with math, confused   Psychiatric/Behavioral: Positive for confusion. All other systems reviewed and are negative. Past Medical and Surgical History:     Past Medical History:   Diagnosis Date   • Anxiety    • CVA (cerebral vascular accident) ([address] )    • Depression    • Diabetes ([address] )    • GERD (gastroesophageal reflux disease)    • Hypertension        Past Surgical History:   Procedure Laterality Date   • APPENDECTOMY     • TONSILLECTOMY         Meds/Allergies:    Prior to Admission medications    Medication Sig Start Date End Date Taking?  Authorizing Provider   ALPRAZolam Ellmendoza Delgado) 0.5 mg tablet Take 0.5 mg by mouth 4 (four) times a day as needed for anxiety Pt takes one 0.5 mg tablet as needed for anxiety up to four times daily   Yes Historical Provider, MD   aluminum-magnesium hydroxide 200-200 MG/5ML suspension Take 15 mL by mouth as needed for heartburn or indigestion 7/29/23  Yes Kyara Olivera,    aspirin 81 mg chewable tablet Chew 1 tablet (81 mg total) daily Do not start before July 30, 2023. 7/30/23 10/28/23 Yes Kyara Olivera DO   atorvastatin (LIPITOR) 80 mg tablet Take 1 tablet (80 mg total) by mouth daily with dinner 7/29/23  Yes Kyara Olivera DO   clopidogrel (PLAVIX) 75 mg tablet Take 1 tablet (75 mg total) by mouth daily Do not start before July 30, 2023. 7/30/23  Yes Kyara Olivera DO   famotidine (PEPCID) 20 mg tablet Take 0.5 tablets (10 mg total) by mouth 2 (two) times a day 7/29/23  Yes Kyara Olivera DO   lisinopril (ZESTRIL) 10 mg tablet Take 1 tablet (10 mg total) by mouth daily Do not start before July 30, 2023. 7/30/23  Yes Kyara Olivera DO   omeprazole (PriLOSEC) 20 mg delayed release capsule Take it as you normally do at home 7/29/23  Yes Kyara Olivera DO   sertraline (ZOLOFT) 100 mg tablet Take 100 mg by mouth daily with dinner   Yes Historical Provider, MD   Alcohol Swabs 70 % PADS May substitute brand based on insurance coverage. Check glucose BID. 7/29/23   Kyara Olivera DO   aspirin 325 mg tablet Take 1 tablet (325 mg total) by mouth daily Do not start before October 28, 2023. Patient not taking: Reported on 7/31/2023 Do not start before October 28, 2023. 10/28/23   Kyara Olivera DO   Blood Glucose Monitoring Suppl (OneTouch Verio Reflect) w/Device KIT May substitute brand based on insurance coverage. Check glucose BID. 7/29/23   Kyara Olivera DO   glucose blood (OneTouch Verio) test strip May substitute brand based on insurance coverage. Check glucose BID. 7/29/23   Kyara Olivera DO   metFORMIN (GLUCOPHAGE) 500 mg tablet Take 1 tablet (500 mg total) by mouth daily with breakfast Do not start before July 31, 2023. 7/31/23   Kyara Olivera DO   nicotine (NICODERM CQ) 21 mg/24 hr TD 24 hr patch Place 1 patch on the skin over 24 hours daily Do not start before July 30, 2023. Patient not taking: Reported on 7/31/2023 7/30/23   Kyara Olivera DO   OneTouch Delica Lancets 17M MISC May substitute brand based on insurance coverage. Check glucose BID. 7/29/23   Kyara Olivera DO     I have reviewed home medications with patient family member. Allergies:    Allergies   Allergen Reactions   • Amoxil [Amoxicillin] Other (See Comments)     "mouth felt on fire"       Social History:     Marital Status: /Civil Union   Occupation: Former  Patient Pre-hospital Living Situation:   Patient Pre-hospital Level of Mobility: Independent  Patient Pre-hospital Diet Restrictions: Cardiac diet diabetic diet  Substance Use History:   Social History     Substance and Sexual Activity   Alcohol Use Not Currently   • Alcohol/week: 28.0 standard drinks of alcohol   • Types: 28 Cans of beer per week    Comment: every night has "a few" beers     Social History     Tobacco Use   Smoking Status Heavy Smoker   • Packs/day: 1.50   • Types: Cigarettes   Smokeless Tobacco Never     Social History     Substance and Sexual Activity   Drug Use No       Family History:    non-contributory    Physical Exam:     Vitals:   Blood Pressure: 130/63 (08/01/23 0240)  Pulse: 64 (08/01/23 0240)  Temperature: 97.7 °F (36.5 °C) (07/31/23 2241)  Temp Source: Oral (07/31/23 2110)  Respirations: 18 (07/31/23 2241)  Height: 5' 4" (162.6 cm) (07/31/23 2241)  Weight - Scale: 73.7 kg (162 lb 6.4 oz) (07/31/23 2241)  SpO2: 94 % (08/01/23 0240)    Physical Exam  Vitals and nursing note reviewed. Constitutional:       Appearance: She is well-developed. HENT:      Head: Normocephalic and atraumatic. Eyes:      Extraocular Movements: Extraocular movements intact. Pupils: Pupils are equal, round, and reactive to light. Neck:      Thyroid: No thyromegaly. Vascular: No JVD. Trachea: No tracheal deviation. Cardiovascular:      Rate and Rhythm: Normal rate and regular rhythm. Heart sounds: Normal heart sounds. Pulmonary:      Effort: Pulmonary effort is normal. No respiratory distress. Breath sounds: Normal breath sounds. No wheezing or rales. Abdominal:      General: Bowel sounds are normal. There is no distension. Palpations: Abdomen is soft. Tenderness: There is no abdominal tenderness. There is no guarding. Musculoskeletal:         General: No swelling. Cervical back: Neck supple. Right lower leg: No edema. Left lower leg: No edema. Skin:     General: Skin is warm and dry. Neurological:      Mental Status: She is alert and oriented to person, place, and time.       Comments: Right facial droop, mild slurred speech, follows commands, awake alert x3, right upper extremity and right lower extremity strength 4/5, left side extremity strength 5/5. Psychiatric:         Judgment: Judgment normal.         Additional Data:     Lab Results: I have personally reviewed pertinent reports. Results from last 7 days   Lab Units 07/31/23 2054   WBC Thousand/uL 9.09   HEMOGLOBIN g/dL 12.5   HEMATOCRIT % 38.0   PLATELETS Thousands/uL 320     Results from last 7 days   Lab Units 07/31/23 2054   POTASSIUM mmol/L 3.7   CHLORIDE mmol/L 105   CO2 mmol/L 26   BUN mg/dL 17   CREATININE mg/dL 0.70   CALCIUM mg/dL 9.6     Results from last 7 days   Lab Units 07/31/23 2054   INR  0.89       Imaging: I have personally reviewed pertinent reports. CTA stroke alert (head/neck)    Result Date: 7/31/2023  Narrative: CTA NECK AND BRAIN WITH CONTRAST INDICATION: Stroke Alert COMPARISON:   None. TECHNIQUE:   Post contrast imaging was performed after administration of iodinated contrast through the neck and brain. Post contrast axial 0.625 mm images timed to opacify the arterial system. 3D rendering was performed on an independent workstation. MIP reconstructions performed. Coronal reconstructions were performed of the noncontrast portion of the brain. Radiation dose length product (DLP) for this visit:  481.26 mGy-cm . This examination, like all CT scans performed in the [facility], was performed utilizing techniques to minimize radiation dose exposure, including the use of iterative  reconstruction and automated exposure control. IV Contrast:  85 mL of iohexol (OMNIPAQUE) IMAGE QUALITY:   Diagnostic FINDINGS: CERVICAL VASCULATURE AORTIC ARCH AND GREAT VESSELS: Mild atherosclerotic disease of the arch, proximal great vessels and visualized subclavian vessels. No significant stenosis. RIGHT VERTEBRAL ARTERY CERVICAL SEGMENT:  Normal origin. The vessel is normal in caliber throughout the neck.  LEFT VERTEBRAL ARTERY CERVICAL SEGMENT:  Normal origin. The vessel is normal in caliber throughout the neck. RIGHT EXTRACRANIAL CAROTID SEGMENT:  Moderate atherosclerotic disease of the bifurcation. LEFT EXTRACRANIAL CAROTID SEGMENT:  Moderate atherosclerotic disease of the bifurcation. NASCET criteria was used to determine the degree of internal carotid artery diameter stenosis. INTRACRANIAL VASCULATURE INTERNAL CAROTID ARTERIES:  Normal enhancement of the intracranial portions of the internal carotid arteries. Normal ophthalmic artery origins. Normal ICA terminus. ANTERIOR CIRCULATION:  Symmetric A1 segments and anterior cerebral arteries with normal enhancement. Normal anterior communicating artery. MIDDLE CEREBRAL ARTERY CIRCULATION: Left M1/M2 segment previous area of near occlusion is now patent/recanalized. DISTAL VERTEBRAL ARTERIES:  Normal distal vertebral arteries. Posterior inferior cerebellar artery origins are normal. Normal vertebral basilar junction. BASILAR ARTERY:  Basilar artery is normal in caliber. Normal superior cerebellar arteries. POSTERIOR CEREBRAL ARTERIES: Both posterior cerebral arteries arises from the basilar tip. Both arteries demonstrate normal enhancement. Normal posterior communicating arteries. VENOUS STRUCTURES:  Normal. NON VASCULAR ANATOMY BONY STRUCTURES:  No acute osseous abnormality. SOFT TISSUES OF THE NECK:  Normal. THORACIC INLET: Small subcentimeter nodules in the upper lobes in addition to some emphysematous changes. With history of smoking, follow-up with screening low-dose unenhanced chest CT is recommended on a nonemergent outpatient basis. Impression: Left M1/M2 segment previous area of near occlusion is now patent/recanalized. Small subcentimeter nodules in the upper lobes in addition to some emphysematous changes. With history of smoking, follow-up with screening low-dose unenhanced chest CT is recommended on a nonemergent outpatient basis.  I personally discussed this study with Dr. Alfreda Frank on 7/31/2023 8:42 PM. Workstation performed: [id number]     CT stroke alert brain    Result Date: 7/31/2023  Narrative: CT BRAIN - STROKE ALERT PROTOCOL INDICATION:   Stroke Alert. COMPARISON: 7/29/2023 TECHNIQUE:  CT examination of the brain was performed. In addition to axial images, coronal reformatted images were created and submitted for interpretation. Radiation dose length product (DLP) for this visit:  907.53 mGy-cm . This examination, like all CT scans performed in the [facility], was performed utilizing techniques to minimize radiation dose exposure, including the use of iterative  reconstruction and automated exposure control. IMAGE QUALITY:  Diagnostic. FINDINGS: PARENCHYMA: No evidence of acute intracranial hemorrhage. Evolving left basal ganglia ischemia. VENTRICLES AND EXTRA-AXIAL SPACES:  Normal for the patient's age. VISUALIZED ORBITS: Normal visualized orbits. PARANASAL SINUSES: Normal visualized paranasal sinuses. CALVARIUM AND EXTRACRANIAL SOFT TISSUES:   Normal.     Impression: No evidence of acute intracranial hemorrhage. Evolving left basal ganglia ischemia. I personally discussed this study with Dr. Alfreda Frank on 7/31/2023 8:42 PM. Workstation performed: [id number]     Echo complete w/ contrast if indicated    Result Date: 7/29/2023  Narrative: •  Left Ventricle: Left ventricular cavity size is normal. Wall thickness is normal. The left ventricular ejection fraction is 63%. Systolic function is normal. Wall motion is normal. Diastolic function is normal. •  Atrial Septum: No patent foramen ovale detected using saline contrast injection with provocation by Valsalva. MRI brain wo contrast    Result Date: 7/29/2023  Narrative: MRI BRAIN WITHOUT CONTRAST INDICATION: CVA, TIA. COMPARISON:   CT/CTA from yesterday. . TECHNIQUE:  Multiplanar, multisequence imaging of the brain was performed. IMAGE QUALITY:  Diagnostic.  FINDINGS: BRAIN PARENCHYMA: There are foci of restricted diffusion in the left basal ganglia and left centrum semiovale consistent with acute/early subacute lacunar infarcts. Left basal ganglia infarct is stable with CT. Infarct in the centrum semiovale is more conspicuous on MRI. No other restricted diffusion. No acute hemorrhage, mass or mass effect. Mild chronic microangiopathic change most pronounced in the thomas. VENTRICLES:  Normal for the patient's age. SELLA AND PITUITARY GLAND:  Normal. ORBITS:  Normal. PARANASAL SINUSES:  Normal. VASCULATURE:  Evaluation of the major intracranial vasculature demonstrates appropriate flow voids. CALVARIUM AND SKULL BASE:  Normal. EXTRACRANIAL SOFT TISSUES:  Normal.     Impression: Acute/early subacute lacunar infarcts in the left basal ganglia and left centrum semiovale. No acute hemorrhage. Workstation performed: [id number]     CTA head and neck with and without contrast    Result Date: 7/28/2023  Narrative: CTA NECK AND BRAIN WITH AND WITHOUT CONTRAST INDICATION: Neuro deficit, acute, stroke suspected TIA symptoms, R sided weakness, R facial droop COMPARISON:   None. TECHNIQUE:  Routine CT imaging of the Brain without contrast.  Post contrast imaging was performed after administration of iodinated contrast through the neck and brain. Post contrast axial 0.625 mm images timed to opacify the arterial system. 3D rendering was performed on an independent workstation. MIP reconstructions performed. Coronal reconstructions were performed of the noncontrast portion of the brain. Radiation dose length product (DLP) for this visit:  1287.78 mGy-cm . This examination, like all CT scans performed in the [facility], was performed utilizing techniques to minimize radiation dose exposure, including the use of iterative reconstruction and automated exposure control.  IV Contrast:  85 mL of iohexol (OMNIPAQUE) IMAGE QUALITY:   Diagnostic FINDINGS: NONCONTRAST BRAIN PARENCHYMA: There is low-attenuation involving the left lentiform nucleus as well as insular/subinsular region VENTRICLES AND EXTRA-AXIAL SPACES:  Normal for the patient's age. VISUALIZED ORBITS: Normal visualized orbits. PARANASAL SINUSES: Normal visualized paranasal sinuses. CERVICAL VASCULATURE AORTIC ARCH AND GREAT VESSELS:  Mild atherosclerotic disease of the arch, proximal great vessels and visualized subclavian vessels. No significant stenosis. RIGHT VERTEBRAL ARTERY CERVICAL SEGMENT:  Normal origin. The vessel is normal in caliber throughout the neck. LEFT VERTEBRAL ARTERY CERVICAL SEGMENT:  Normal origin. The vessel is normal in caliber throughout the neck. RIGHT EXTRACRANIAL CAROTID SEGMENT:  Mild atherosclerotic disease of the distal common carotid artery and proximal cervical internal carotid artery without significant stenosis compared to the more distal ICA. LEFT EXTRACRANIAL CAROTID SEGMENT:  Mild atherosclerotic disease of the distal common carotid artery and proximal cervical internal carotid artery without significant stenosis compared to the more distal ICA. NASCET criteria was used to determine the degree of internal carotid artery diameter stenosis. INTRACRANIAL VASCULATURE INTERNAL CAROTID ARTERIES: Atherosclerotic plaque along the cavernous and supraclinoid segments of the internal carotid arteries. ANTERIOR CIRCULATION: The anterior cerebral arteries are patent bilaterally. Right A1 segment is hypoplastic. MIDDLE CEREBRAL ARTERY CIRCULATION: There is short segment high-grade stenosis/near occlusion of the M1 segment of the left middle cerebral artery. Right middle cerebral artery is patent without evidence for high-grade stenosis or focal occlusion. DISTAL VERTEBRAL ARTERIES: There is a fetal origin of the right posterior cerebral artery. There is moderate to severe stenosis along the proximal P2 segment of the left posterior cerebral artery. BASILAR ARTERY:  Basilar artery is normal in caliber.   Normal superior cerebellar arteries. POSTERIOR CEREBRAL ARTERIES: Both posterior cerebral arteries arises from the basilar tip. Both arteries demonstrate normal enhancement. VENOUS STRUCTURES:  Normal. NON VASCULAR ANATOMY BONY STRUCTURES:  No acute osseous abnormality. SOFT TISSUES OF THE NECK:  Unremarkable. THORACIC INLET:  Normal.     Impression: Short segment critical high-grade stenosis/near occlusion of the M1 segment of the left middle cerebral artery. Low-attenuation of the left lentiform nucleus as well as left insular ribbon/subinsular white matter consistent with acute ischemia and should be correlated with MRI. No associated hemorrhage. I personally discussed this study with Eda Kam on 7/28/2023 2:11 PM. Workstation performed: [id number]       EKG, Pathology, and Other Studies Reviewed on Admission:   · EKG: NSR on 7/28    [employer] Records Reviewed: Yes     ** Please Note: Dragon 360 Dictation voice to text software may have been used in the creation of this document.  **

## 2023-08-01 NOTE — PLAN OF CARE
Problem: MOBILITY - ADULT  Goal: Maintain or return to baseline ADL function  Description: INTERVENTIONS:  -  Assess patient's ability to carry out ADLs; assess patient's baseline for ADL function and identify physical deficits which impact ability to perform ADLs (bathing, care of mouth/teeth, toileting, grooming, dressing, etc.)  - Assess/evaluate cause of self-care deficits   - Assess range of motion  - Assess patient's mobility; develop plan if impaired  - Assess patient's need for assistive devices and provide as appropriate  - Encourage maximum independence but intervene and supervise when necessary  - Involve family in performance of ADLs  - Assess for home care needs following discharge   - Consider OT consult to assist with ADL evaluation and planning for discharge  - Provide patient education as appropriate  Outcome: Progressing  Goal: Maintains/Returns to pre admission functional level  Description: INTERVENTIONS:  - Perform BMAT or MOVE assessment daily.   - Set and communicate daily mobility goal to care team and patient/family/caregiver. - Collaborate with rehabilitation services on mobility goals if consulted  - Perform Range of Motion 4 times a day. - Reposition patient every 2 hours.   - Dangle patient 3 times a day  - Stand patient 3 times a day  - Ambulate patient 3 times a day  - Out of bed to chair 3 times a day   - Out of bed for meals 3 times a day  - Out of bed for toileting  - Record patient progress and toleration of activity level   Outcome: Progressing     Problem: PAIN - ADULT  Goal: Verbalizes/displays adequate comfort level or baseline comfort level  Description: Interventions:  - Encourage patient to monitor pain and request assistance  - Assess pain using appropriate pain scale  - Administer analgesics based on type and severity of pain and evaluate response  - Implement non-pharmacological measures as appropriate and evaluate response  - Consider cultural and social influences on pain and pain management  - Notify physician/advanced practitioner if interventions unsuccessful or patient reports new pain  Outcome: Progressing     Problem: INFECTION - ADULT  Goal: Absence or prevention of progression during hospitalization  Description: INTERVENTIONS:  - Assess and monitor for signs and symptoms of infection  - Monitor lab/diagnostic results  - Monitor all insertion sites, i.e. indwelling lines, tubes, and drains  - Monitor endotracheal if appropriate and nasal secretions for changes in amount and color  - [place] appropriate cooling/warming therapies per order  - Administer medications as ordered  - Instruct and encourage patient and family to use good hand hygiene technique  - Identify and instruct in appropriate isolation precautions for identified infection/condition  Outcome: Progressing     Problem: SAFETY ADULT  Goal: Maintain or return to baseline ADL function  Description: INTERVENTIONS:  -  Assess patient's ability to carry out ADLs; assess patient's baseline for ADL function and identify physical deficits which impact ability to perform ADLs (bathing, care of mouth/teeth, toileting, grooming, dressing, etc.)  - Assess/evaluate cause of self-care deficits   - Assess range of motion  - Assess patient's mobility; develop plan if impaired  - Assess patient's need for assistive devices and provide as appropriate  - Encourage maximum independence but intervene and supervise when necessary  - Involve family in performance of ADLs  - Assess for home care needs following discharge   - Consider OT consult to assist with ADL evaluation and planning for discharge  - Provide patient education as appropriate  Outcome: Progressing  Goal: Maintains/Returns to pre admission functional level  Description: INTERVENTIONS:  - Perform BMAT or MOVE assessment daily.   - Set and communicate daily mobility goal to care team and patient/family/caregiver.    - Collaborate with rehabilitation services on mobility goals if consulted  - Perform Range of Motion 4 times a day. - Reposition patient every 2 hours.   - Dangle patient 3 times a day  - Stand patient 3 times a day  - Ambulate patient 3 times a day  - Out of bed to chair 3 times a day   - Out of bed for meals 3 times a day  - Out of bed for toileting  - Record patient progress and toleration of activity level   Outcome: Progressing  Goal: Patient will remain free of falls  Description: INTERVENTIONS:  - Educate patient/family on patient safety including physical limitations  - Instruct patient to call for assistance with activity   - Consult OT/PT to assist with strengthening/mobility   - Keep Call bell within reach  - Keep bed low and locked with side rails adjusted as appropriate  - Keep care items and personal belongings within reach  - Initiate and maintain comfort rounds  - Make Fall Risk Sign visible to staff  - Offer Toileting every 2 Hours, in advance of need  - Initiate/Maintain bed alarm  - Obtain necessary fall risk management equipment: yellow socks  - Apply yellow socks and bracelet for high fall risk patients  - Consider moving patient to room near nurses station  Outcome: Progressing     Problem: DISCHARGE PLANNING  Goal: Discharge to home or other facility with appropriate resources  Description: INTERVENTIONS:  - Identify barriers to discharge w/patient and caregiver  - Arrange for needed discharge resources and transportation as appropriate  - Identify discharge learning needs (meds, wound care, etc.)  - Arrange for interpretive services to assist at discharge as needed  - Refer to Case Management Department for coordinating discharge planning if the patient needs post-hospital services based on physician/advanced practitioner order or complex needs related to functional status, cognitive ability, or social support system  Outcome: Progressing     Problem: Knowledge Deficit  Goal: Patient/family/caregiver demonstrates understanding of disease process, treatment plan, medications, and discharge instructions  Description: Complete learning assessment and assess knowledge base. Interventions:  - Provide teaching at level of understanding  - Provide teaching via preferred learning methods  Outcome: Progressing     Problem: Neurological Deficit  Goal: Neurological status is stable or improving  Description: Interventions:  - Monitor and assess patient's level of consciousness, motor function, sensory function, and level of assistance needed for ADLs. - Monitor and report changes from baseline. Collaborate with interdisciplinary team to initiate plan and implement interventions as ordered. - Provide and maintain a safe environment. - Consider seizure precautions. - Consider fall precautions. - Consider aspiration precautions. - Consider bleeding precautions. Outcome: Progressing     Problem: Activity Intolerance/Impaired Mobility  Goal: Mobility/activity is maintained at optimum level for patient  Description: Interventions:  - Assess and monitor patient  barriers to mobility and need for assistive/adaptive devices. - Assess patient's emotional response to limitations. - Collaborate with interdisciplinary team and initiate plans and interventions as ordered. - Encourage independent activity per ability.  - Maintain proper body alignment. - Perform active/passive rom as tolerated/ordered. - Plan activities to conserve energy.  - Turn patient as appropriate  Outcome: Progressing     Problem: Communication Impairment  Goal: Ability to express needs and understand communication  Description: Assess patient's communication skills and ability to understand information. Patient will demonstrate use of effective communication techniques, alternative methods of communication and understanding even if not able to speak. - Encourage communication and provide alternate methods of communication as needed.   - Collaborate with case management/social services for discharge needs. - Include patient/family/caregiver in decisions related to communication. Outcome: Progressing     Problem: Potential for Aspiration  Goal: Non-ventilated patient's risk of aspiration is minimized  Description: Assess and monitor vital signs, respiratory status, and labs (WBC). Monitor for signs of aspiration (tachypnea, cough, rales, wheezing, cyanosis, fever). - Assess and monitor patient's ability to swallow. - Place patient up in chair to eat if possible. - HOB up at 90 degrees to eat if unable to get patient up into chair.  - Supervise patient during oral intake. - Instruct patient/ family to take small bites. - Instruct patient/ family to take small single sips when taking liquids. - Follow patient-specific strategies generated by speech pathologist.  Outcome: Progressing     Problem: Nutrition  Goal: Nutrition/Hydration status is improving  Description: Monitor and assess patient's nutrition/hydration status for malnutrition (ex- brittle hair, bruises, dry skin, pale skin and conjunctiva, muscle wasting, smooth red tongue, and disorientation). Collaborate with interdisciplinary team and initiate plan and interventions as ordered. Monitor patient's weight and dietary intake as ordered or per policy. Utilize nutrition screening tool and intervene per policy. Determine patient's food preferences and provide high-protein, high-caloric foods as appropriate. - Assist patient with eating.  - Allow adequate time for meals.  - Encourage patient to take dietary supplement as ordered. - Collaborate with clinical nutritionist.  - Include patient/family/caregiver in decisions related to nutrition.   Outcome: Progressing     Problem: NEUROSENSORY - ADULT  Goal: Achieves stable or improved neurological status  Description: INTERVENTIONS  - Monitor and report changes in neurological status  - Monitor vital signs such as temperature, blood pressure, glucose, and any other labs ordered   - Initiate measures to prevent increased intracranial pressure  - Monitor for seizure activity and implement precautions if appropriate      Outcome: Progressing  Goal: Achieves maximal functionality and self care  Description: INTERVENTIONS  - Monitor swallowing and airway patency with patient fatigue and changes in neurological status  - Encourage and assist patient to increase activity and self care.    - Encourage visually impaired, hearing impaired and aphasic patients to use assistive/communication devices  Outcome: Progressing

## 2023-08-01 NOTE — ASSESSMENT & PLAN NOTE
Lab Results   Component Value Date    HGBA1C 6.5 (H) 07/28/2023       Recent Labs     07/31/23 2024 08/01/23  0020 08/01/23  0717   POCGLU 106 103 116       Blood Sugar Average: Last 72 hrs:  (P) [id number]     - No need to repeat A1C, obtained last week during admission.   - Euglycemic goals, management per primary team

## 2023-08-01 NOTE — UTILIZATION REVIEW
Initial Clinical Review    Observation 7/31/23 @ 2123 converted to inpatient admission 8/1/23 for continued care & tx for acute ischemic stroke    Admission: Date/Time/Statement:   Admission Orders (From admission, onward)     Ordered        08/01/23 [facility] [address]  Inpatient Admission  Once            07/31/23 2123  Place in Observation  Once                      Orders Placed This Encounter   Procedures   • Inpatient Admission     Standing Status:   Standing     Number of Occurrences:   1     Order Specific Question:   Level of Care     Answer:   Med Surg [16]     Order Specific Question:   Estimated length of stay     Answer:   More than 2 Midnights     Order Specific Question:   Certification     Answer:   I certify that inpatient services are medically necessary for this patient for a duration of greater than two midnights. See H&P and MD Progress Notes for additional information about the patient's course of treatment. ED Arrival Information     Expected   -    Arrival   7/31/2023 20:09    Acuity   Emergent            Means of arrival   Walk-In    Escorted by   Family Member    Service   Hospitalist    Admission type   Emergency            Arrival complaint   slurred speech            Chief Complaint   Patient presents with   • Altered Mental Status     Here with  who states pt seemed tired today from previous stroke a couple of days ago. since 10 am voice started sounding very weak and since about 4pm lip seemed different, seems much more confused, unsure if she took any of her meds today       Initial Presentation: 71 y.o. female   71 y.o. female with PMH of recent CVA, hypertension, GERD, nicotine abuse, depression with anxiety, type 2 diabetes who presents with worsening slurred speech since midmorning today with unsteadiness and confusion, trouble with math found towards the evening by .    states she had stroke on Friday last week, was having slurred speech and right hand weakness, speech got worse since midmorning and patient was off balance,fell softly on buttocks at home with no apparent injury.  reports he picked up two prescriptions on Sunday and he is not sure if patient took medications correctly at home. Patient was hospitalized 7/28-7/29 for right hand weakness, slurred speech. Work-up showed acute/early subacute lacunar infarcts in the left basal ganglia and left centrum semiovale. Patient was discharged home on DAPT and Lipitor 80mg p.o. daily. Patient was hospitalized 7/28-7/29 for right hand weakness, slurred speech. Work-up showed acute/early subacute lacunar infarcts in the left basal ganglia and left centrum semiovale. Patient was discharged home on DAPT and Lipitor 80mg p.o. daily. Presents hypertensive, awake alert oriented x3 on exam, right-sided weakness. Admission on work-up showing evolving L basal ganglia ischemia. Placed in observation status for stroke-like symptoms. MRI ordered, neuro consulted. Observation to IP admission 8/1/23:  Repeat MRI of the brain-interim new infarct involving left caudate body. Unchanged recent infarct involving left lentiform nucleus and smaller foci in the left centrum semiovale. Neuro consulted. Per neuro: On repeat MRI brain today, there is evidence of new area of ischemic infarct in same region of left BG. Her prior noted left M1/M2 has been recanalized. Give additional dose of plavix 300mg today and continue aspirin. Cont lipitor 80mg. REBEKAH,  loop recorder placement ordered. New event likely occurred from missing aspirin, plavix for 2 days but we can't exclude cardioembolic reason so will monitor for A fib over time. Date: 8/2/23   Day 3: Has surpassed a 2nd midnight with active treatments and services, which include REBEKAH, loop recorder placement planned. Neuro checks, therapies.      ED Triage Vitals   Temperature Pulse Respirations Blood Pressure SpO2   07/31/23 2110 07/31/23 2027 07/31/23 2027 07/31/23 2027 07/31/23 2027   97.6 °F (36.4 °C) 77 18 (!) 197/95 92 %      Temp Source Heart Rate Source Patient Position - Orthostatic VS BP Location FiO2 (%)   07/31/23 2110 07/31/23 2027 -- -- --   Oral Monitor         Pain Score       07/31/23 2236       No Pain          Wt Readings from Last 1 Encounters:   07/31/23 73.7 kg (162 lb 6.4 oz)     Additional Vital Signs:   Date/Time Temp Pulse Resp BP MAP (mmHg) SpO2 O2 Device   08/01/23 0545 -- 65 -- 137/67 90 93 % --   08/01/23 0445 -- 66 -- 135/62 86 94 % --   08/01/23 0345 -- 65 -- 131/65 87 95 % --   08/01/23 0240 -- 64 -- 130/63 85 94 % --   08/01/23 0145 -- 67 -- 133/60 84 90 % --   08/01/23 0045 -- 65 -- 147/62 90 94 % --   07/31/23 2345 -- 66 -- 136/62 87 93 % --   07/31/23 2245 -- 72 -- 176/81 Abnormal  113 96 % --   07/31/23 22:41:25 97.7 °F (36.5 °C) 72 18 176/81 Abnormal  113 97 % None (Room air)   07/31/23 2205 -- 74 20 -- -- 95 % --   07/31/23 22:04:20 -- -- -- 142/72 -- -- --   07/31/23 2200 -- 72 20 -- -- 95 % --   07/31/23 2155 -- 74 20 141/70 -- 94 % None (Room air)   07/31/23 2150 -- 68 19 -- -- 95 % --   07/31/23 21:49:20 -- -- -- 141/70 -- -- --   07/31/23 2145 -- 68 23 Abnormal  -- -- 95 % --   07/31/23 21:41:40 -- -- -- 153/73 -- -- --   07/31/23 2140 -- 77 19 153/73 -- 94 % None (Room air)   07/31/23 2135 -- 74 24 Abnormal  -- -- 95 % --   07/31/23 2130 -- 74 25 Abnormal  -- -- 95 % --   07/31/23 21:27:13 -- -- -- 157/83 -- -- --   07/31/23 2125 -- 74 22 157/83 -- 97 % None (Room air)   07/31/23 2120 -- 78 23 Abnormal  -- -- 96 % --   07/31/23 21:19:20 -- -- -- 167/78 -- -- --   07/31/23 2115 -- 80 22 -- -- 96 % --   07/31/23 21:11:53 -- -- -- 168/76 -- -- --   07/31/23 2110 97.6 °F (36.4 °C) 77 18 168/76 -- 96 % None (Room air)   07/31/23 2105 -- 78 17 -- -- -- --   07/31/23 21:04:20 -- -- -- 138/83 -- -- --   07/31/23 2100 -- 78 17 -- -- -- --   07/31/23 20:55:55 -- 82 19 121/81 -- -- None (Room air)   07/31/23 2055 -- 82 24 Abnormal  -- -- -- -- 07/31/23 2050 -- 84 22 -- -- -- --   07/31/23 20:40:40 -- 77 18 202/88 Abnormal  -- 90 % None (Room air)   07/31/23 2034 -- 78 18 197/95 Abnormal  -- 92 % None (Room air)   07/31/23 2027 -- 77 18 197/95 Abnormal  -- 92 % None (Room air)       Pertinent Labs/Diagnostic Test Results:   MRI brain wo contrast   Final Result  (08/01 1409)      Interim new infarct involving left caudate body. Unchanged recent infarcts involving left lentiform nucleus and smaller foci in the left centrum semiovale. No mass effect or hemorrhagic transformation. CTA stroke alert (head/neck)   Final Result  (07/31 2156)      Left M1/M2 segment previous area of near occlusion is now patent/recanalized. Small subcentimeter nodules in the upper lobes in addition to some emphysematous changes. With history of smoking, follow-up with screening low-dose unenhanced chest CT is recommended on a nonemergent outpatient basis. CT stroke alert brain   Final Result  (07/31 2046)      No evidence of acute intracranial hemorrhage. Evolving left basal ganglia ischemia.         7/31 EKg=  Normal sinus rhythm  Septal infarct , age undetermined    Results from last 7 days   Lab Units 07/31/23 2054 07/28/23  1228   SARS-COV-2  Negative Negative     Results from last 7 days   Lab Units 07/31/23 2054 07/29/23  0513 07/28/23  1228   WBC Thousand/uL 9.09 6.88 7.81   HEMOGLOBIN g/dL 12.5 12.7 12.7   HEMATOCRIT % 38.0 38.9 39.6   PLATELETS Thousands/uL 320 278 306     Results from last 7 days   Lab Units 08/01/23  0458 07/31/23 2054 07/29/23  0513 07/28/23  1228   SODIUM mmol/L 136 138 141 138   POTASSIUM mmol/L 5.7* 3.7 4.0 4.4   CHLORIDE mmol/L 106 105 106 105   CO2 mmol/L 26 26 28 28   ANION GAP mmol/L 4 7 7 5   BUN mg/dL 14 17 11 16   CREATININE mg/dL 0.59* 0.70 0.65 0.67   EGFR ml/min/1.73sq m 92 87 89 88   CALCIUM mg/dL 8.7 9.6 8.9 9.3   MAGNESIUM mg/dL 2.1  --  2.0  --      Results from last 7 days   Lab Units 08/02/23  0704 08/01/23 2013 08/01/23  1540 08/01/23  1114 08/01/23  0717 08/01/23  0020 07/31/23 2024 07/28/23  1212   POC GLUCOSE mg/dl 116 88 81 123 116 103 106 134     Results from last 7 days   Lab Units 08/01/23  0458 07/31/23 2054 07/29/23  0513 07/28/23  1228   GLUCOSE RANDOM mg/dL 114 105 122 122     Results from last 7 days   Lab Units 07/28/23  1503   HEMOGLOBIN A1C % 6.5*   EAG mg/dl 140     Results from last 7 days   Lab Units 08/01/23  0458 07/31/23  2249 07/31/23 2054 07/28/23  1434 07/28/23  1228   HS TNI 0HR ng/L  --   --  5  --  4   HS TNI 2HR ng/L  --  5  --  4  --    HSTNI D2 ng/L  --  0  --  0  --    HS TNI 4HR ng/L 5  --   --   --   --    HSTNI D4 ng/L 0  --   --   --   --      Results from last 7 days   Lab Units 07/31/23 2054 07/28/23  1228   PROTIME seconds 12.2 11.8   INR  0.89 0.86   PTT seconds 29 31     Results from last 7 days   Lab Units 07/31/23 2054 07/28/23  1228   INFLUENZA A PCR  Negative Negative   INFLUENZA B PCR  Negative Negative   RSV PCR  Negative Negative     ED Treatment:   Medication Administration from 07/31/2023 2009 to 07/31/2023 2232       Date/Time Order Dose Route Action     07/31/2023 2048 EDT iohexol (OMNIPAQUE) 350 MG/ML injection (SINGLE-DOSE) 85 mL 85 mL Intravenous Given        Past Medical History:   Diagnosis Date   • Anxiety    • CVA (cerebral vascular accident) ([address])    • Depression    • Diabetes ([address])    • GERD (gastroesophageal reflux disease)    • Hypertension      Present on Admission:  • Type 2 diabetes mellitus, without long-term current use of insulin (HCC)  • Smoker  • GERD (gastroesophageal reflux disease)  • Essential hypertension  • Anxiety and depression  • CVA (cerebral vascular accident) ([address])      Admitting Diagnosis: Altered mental status [R41.82]  CVA (cerebral vascular accident) ([address]) [I63.9]  Age/Sex: 71 y.o. female  Admission Orders:  Cont pulse ox  Telemetry  O2 top keep sats >94%  Contact & airborne isolation  Neuro checks: Every 1 hour x 4 hours, then every 2 hours x 8 hours, then every 4 hours x 72 hours  Pt/ot/st eval & tx  Consult neuro  Nursing dysphagia screen  accuchecks    Scheduled Medications:  aspirin, 81 mg, Oral, Daily  atorvastatin, 80 mg, Oral, Daily With Dinner  clopidogrel, 75 mg, Oral, Daily  enoxaparin, 40 mg, Subcutaneous, HS  famotidine, 10 mg, Oral, BID  insulin lispro, 1-5 Units, Subcutaneous, TID AC  insulin lispro, 1-5 Units, Subcutaneous, HS  nicotine, 1 patch, Transdermal, Daily  pantoprazole, 40 mg, Oral, Early Morning  sertraline, 100 mg, Oral, Daily With Dinner      PRN Meds:  acetaminophen, 650 mg, Oral, Q6H PRN  ALPRAZolam, 0.25 mg, Oral, 4x Daily PRN  ondansetron, 4 mg, Intravenous, Q6H PRN    Network Utilization Review Department  ATTENTION: Please call with any questions or concerns to [phone] and carefully listen to the prompts so that you are directed to the right person. All voicemails are confidential.  Kaya Ship all requests for admission clinical reviews, approved or denied determinations and any other requests to dedicated fax number below belonging to the campus where the patient is receiving treatment.  List of dedicated fax numbers for the Facilities:  [place] DENIALS (Administrative/Medical Necessity) [phone] [facility] [facility] [phone]   [address] [place] [address] Drive [phone]   [facility] [address] [facility] [phone]   [address] [facility] [id number] [place] [address] [phone]   [address] [facility] [address] [facility] [address] [phone]

## 2023-08-01 NOTE — OCCUPATIONAL THERAPY NOTE
Occupational Therapy Evaluation       08/01/23 1020   Note Type   Note type Evaluation   Pain Assessment   Pain Assessment Tool 0-10   Pain Score No Pain   Restrictions/Precautions   Other Precautions Fall Risk   Home Living   Type of [facility]  Two level; Able to live on main level with bedroom/bathroom  (No TOMASA)   Bathroom Shower/Tub Walk-in shower   Bathroom Toilet Standard   Bathroom Equipment Other (Comment)  (None)   Home Equipment Other (Comment)  (None)   Prior Function   Level of [place] Independent with ADLs; Independent with functional mobility; Independent with IADLS   Lives With Spouse   Receives Help From Family   IADLs Independent with driving; Independent with meal prep; Independent with medication management   Comments Patient reports PTA independent in all ADLs and mobility without AD   General   Additional Pertinent History Patient presented to hospital with c/o slurred speehc, unsteadiness, admit to r/o TIA/CVA; Of note, patient recently in hospital 7/28-29 for acute/early subacute lacunar infarcts in the left basal ganglia   ADL   Eating Assistance 7  Independent   Grooming Assistance 7  Independent   UB Bathing Assistance 7  Independent   LB Bathing Assistance 7  Independent   UB Dressing Assistance 7  Independent   LB Dressing Assistance 7  Independent   LB Dressing Deficit Don/doff R sock; Don/doff L sock   Toileting Assistance  7  Independent   Toileting Deficit Clothing management up;Clothing management down;Perineal hygiene   Bed Mobility   Supine to Sit 7  Independent   Sit to Supine 7  Independent   Transfers   Sit to Stand 5  Supervision   Stand to Sit 5  Supervision   Toilet transfer 7  Independent   Additional items Standard toilet  (Ambulatory transfer, no AD)   Additional Comments Initially supervision for STS transfers, progressing to independent with increased activity   Functional Mobility   Functional Mobility 5  Supervision   Additional Comments Patient ambulated short household distance to/from bathroom without AD   Balance   Static Sitting Good   Dynamic Sitting Fair +   Static Standing Fair +   Dynamic Standing Fair +   Activity Tolerance   Activity Tolerance Patient tolerated treatment well   RUE Assessment   RUE Assessment WFL  (MMT grossly 5/5 throughout)   LUE Assessment   LUE Assessment WFL  (MMT grossly 5/5 throughout)   Hand Function   Gross Motor Coordination Functional   Fine Motor Coordination Functional   Sensation   Light Touch No apparent deficits   Vision-Basic Assessment   Current Vision Wears glasses all the time   Vision - Complex Assessment   Ocular Range of Motion Intact   Tracking Intact   Acuity Able to read employee name badge without difficulty   Cognition   Overall Cognitive Status [facility]   Arousal/Participation Alert; Cooperative  (Flat affect)   Attention Within functional limits   Orientation Level Oriented X4   Following Commands Follows all commands and directions without difficulty   Assessment   Prognosis Good   Assessment Patient evaluated by Occupational Therapy. Patient admitted with Stroke-like symptoms. The patients occupational profile, medical and therapy history includes a expanded review of medical and/or therapy records and additional review of physical, cognitive, or psychosocial history related to current functional performance. Comorbidities affecting functional mobility and ADLS include: HTN, GERD, depression, anxiety, CVA, diabetes. Prior to admission, patient was independent with functional mobility without assistive device, independent with ADLS and independent with IADLS. The evaluation identifies the following performance deficits: no deficits, that result in activity limitations and/or participation restrictions.  This evaluation requires clinical decision making of moderate complexity, because the patient may present with comorbidities that affect occupational performance and required minimal or moderate modification of tasks or assistance with the consideration of several treatment options. The Barthel Index was used as a functional outcome tool presenting with a score of Barthel Index Score: 90, indicating minimal limitations of functional mobility and ADLS. The patient's raw score on the AM-PAC Daily Activity Inpatient Short Form is 24. A raw score of greater than or equal to 19 suggests the patient may benefit from discharge to home. Please refer to the recommendation of the Occupational Therapist for safe discharge planning. Patient presents near baseline functional status, currently independent in all ADLs;  No further skilled inpatient OT services indicated at this time     Goals   Patient Goals Go home   Plan   OT Frequency Eval only   Recommendation   OT Discharge Recommendation No rehabilitation needs   AM-PAC Daily Activity Inpatient   Lower Body Dressing 4   Bathing 4   Toileting 4   Upper Body Dressing 4   Grooming 4   Eating 4   Daily Activity Raw Score 24   Daily Activity Standardized Score (Calc for Raw Score >=11) 57.54   AM-PAC Applied Cognition Inpatient   Following a Speech/Presentation 4   Understanding Ordinary Conversation 4   Taking Medications 4   Remembering Where Things Are Placed or Put Away 4   Remembering List of 4-5 Errands 4   Taking Care of Complicated Tasks 3   Applied Cognition Raw Score 23   Applied Cognition Standardized Score 53.08   Barthel Index   Feeding 10   Bathing 5   Grooming Score 5   Dressing Score 10   Bladder Score 10   Bowels Score 10   Toilet Use Score 10   Transfers (Bed/Chair) Score 15   Mobility (Level Surface) Score 10   Stairs Score 5   Barthel Index Score 80   Licensure   [place] License Number  Devan Deanna OTR/TIM [id number]

## 2023-08-01 NOTE — ASSESSMENT & PLAN NOTE
Patient presents with worsening slurred speech since midmorning today with unsteadiness and confusion, trouble with math found towards the evening by .  states she had stroke on Friday last week, was having slurred speech and right hand weakness, speech got worse since midmorning and patient was off balance,fell softly on buttocks at home with no apparent injury.  reports he picked up two prescriptions on Sunday and he is not sure if patient took medications correctly at home. · Chart review. Patient was hospitalized 7/28-7/29 for right hand weakness, slurred speech. Work-up showed acute/early subacute lacunar infarcts in the left basal ganglia and left centrum semiovale. Patient was discharged home on DAPT and Lipitor 80mg p.o. daily. · Stroke alert in ED. · CT head - No evidence of acute intracranial hemorrhage. Evolving left basal ganglia ischemia. · CTA head and neck - Left M1/M2 segment previous area of near occlusion is now patent/recanalized. · Neurology recommends repeat MRI of the brain, continue same medications, suspect central thromboembolic etiology like A-fib. · We will follow stroke pathway  · MRI of the brain in a.m. · Telemetry  · No PFO or ASD on recent echo  · Recent , A1c 6.5  · Patient reports she took aspirin at home, not sure about Plavix or Lipitor. Will start Plavix and Lipitor tomorrow.   · Permissive hypertension  · PT OT ST  · Consult neurology

## 2023-08-01 NOTE — ASSESSMENT & PLAN NOTE
- Smoking cessation discussed with patient at bedside today and was strongly encouraged. - Discussed that if patient continues to smoke, she will continue to be at increased risk for additional strokes. - Nicotine patch if desired during admission.

## 2023-08-01 NOTE — ASSESSMENT & PLAN NOTE
Lab Results   Component Value Date    HGBA1C 6.5 (H) 07/28/2023       Recent Labs     08/01/23  0020 08/01/23  0717 08/01/23  1114 08/01/23  1540   POCGLU 103 116 123 81       Blood Sugar Average: Last 72 hrs:  (P) 105.8     · On metformin at home. Will hold while inpatient. · Recent A1c 6.5  · Continue Humalog sliding scale with Accu-Cheks ACHS. Continue diabetic diet.

## 2023-08-01 NOTE — ASSESSMENT & PLAN NOTE
- Admission last week for CVA, see above under "stroke like symptoms" for details. - Continue DAPT/high dose statin. - Awaiting MRI brain. lower/Bilateral:

## 2023-08-01 NOTE — ASSESSMENT & PLAN NOTE
Patient presents with worsening slurred speech since midmorning today with unsteadiness and confusion, trouble with math found towards the evening by .  states she had stroke on Friday last week, was having slurred speech and right hand weakness, speech got worse since midmorning and patient was off balance,fell softly on buttocks at home with no apparent injury.  reports he picked up two prescriptions on Sunday and he is not sure if patient took medications correctly at home. · Chart review. Patient was hospitalized 7/28-7/29 for right hand weakness, slurred speech. Work-up showed acute/early subacute lacunar infarcts in the left basal ganglia and left centrum semiovale. Patient was discharged home on DAPT and Lipitor 80mg p.o. daily. · Stroke alert in ED. · CT head - No evidence of acute intracranial hemorrhage. Evolving left basal ganglia ischemia. · CTA head and neck - Left M1/M2 segment previous area of near occlusion is now patent/recanalized. · Neurology recommends repeat MRI of the brain, continue same medications, suspect central thromboembolic etiology like A-fib. · Telemetry did not show any evidence of atrial fibrillation  · No PFO or ASD on recent echo  · Recent , A1c 6.5  · Patient reports she took aspirin at home, not sure about Plavix or Lipitor. Patient might have missed 1 or 2 doses of Plavix at home  · Permissive hypertension  · Neurology input appreciated. · Repeat MRI of the brain-interim new infarct involving left caudate body.   Unchanged recent infarct involving left lentiform nucleus and smaller foci in the left centrum semiovale  · Discussed with neurology-patient to be scheduled for ERBEKAH and loop recorder tomorrow  · Plavix 300 mg loading given

## 2023-08-01 NOTE — ASSESSMENT & PLAN NOTE
Lab Results   Component Value Date    HGBA1C 6.5 (H) 07/28/2023       Recent Labs     07/31/23 2024 08/01/23  0020   POCGLU 106 103       Blood Sugar Average: Last 72 hrs:  (P) 104.5     · On metformin at home. Will hold while inpatient.   · Recent A1c 6.5  · SSI  · Diabetic diet

## 2023-08-01 NOTE — PHYSICAL THERAPY NOTE
PHYSICAL THERAPY EVALUATION/TREATMENT     08/01/23 1345   Note Type   Note type Evaluation   Pain Assessment   Pain Assessment Tool 0-10   Pain Score No Pain   Restrictions/Precautions   Other Precautions Fall Risk   Home Living   Type of [facility]  One level; Able to live on main level with bedroom/bathroom  (No stairs to enter)   Home Equipment Cane   Additional Comments Patient independent without assistive device prior to admission   Prior Function   Level of [place] Independent with functional mobility; Independent with ADLs; Independent with IADLS   Lives With Spouse   Receives Help From Family   IADLs Independent with driving; Independent with meal prep; Independent with medication management   Comments  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 Experienced altered mental status and gait dysfunction as per . Patient without memory of incident.   Patient with recent admission 3 days ago with stroke findings had returned home with no services needed   Family/Caregiver Present Yes  (Spouse present)   Cognition   Overall Cognitive Status WFL   Attention Within functional limits   Orientation Level Oriented X4   Following Commands Follows all commands and directions without difficulty   Subjective   Subjective Patient states having no memory issues prior to admission   RLE Assessment   RLE Assessment   (Range of motion within functional limits, strength 3+/4 -)   LLE Assessment   LLE Assessment   (Range of motion within functional limits, strength 3+/4 -)   Coordination   Movements are Fluid and Coordinated 0   Coordination and Movement Description Decreased balance and coordination with higher-level balance activity   Bed Mobility   Supine to Sit 7  Independent   Sit to Supine 7  Independent   Transfers   Sit to Stand 5  Supervision   Stand to Sit 5  Supervision   Ambulation/Elevation   Gait Assistance   (Supervision to min assist)   Additional items Assist x 1;Verbal cues; Tactile cues   Assistive Device   (None)   Distance 15 feet with change in direction. Guarded gait patterning with shortened stride length   Balance   Static Sitting Good   Dynamic Sitting Fair +   Static Standing Fair   Dynamic Standing Fair   Ambulatory Fair   Activity Tolerance   Activity Tolerance Patient tolerated treatment well;Patient limited by fatigue   Nurse Made Aware Yes   Assessment   Prognosis Good   Problem List Decreased strength;Decreased range of motion;Decreased endurance; Impaired balance;Decreased mobility; Decreased coordination;Decreased cognition   Assessment Patient seen for Physical Therapy evaluation. Patient admitted with Stroke-like symptoms. Comorbidities affecting patient's physical performance include: . Personal factors affecting patient at time of initial evaluation include: lives in two story house, inability to ambulate household distances, inability to navigate community distances, inability to navigate level surfaces without external assistance, inability to perform dynamic tasks in community, inability to perform current job functions, inability to perform physical activity, inability to perform ADLS and inability to perform IADLS .  Prior to admission, patient was independent with functional mobility without assistive device, independent with ADLS, independent with IADLS, living in a multi-level home, ambulating household distance, ambulating community distances, works part time and home with family assist.  Please find objective findings from Physical Therapy assessment regarding body systems outlined above with impairments and limitations including weakness, decreased ROM, impaired balance, decreased endurance, impaired coordination, gait deviations, decreased activity tolerance, decreased functional mobility tolerance, fall risk and decreased cognition. The Barthel Index was used as a functional outcome tool presenting with a score of Barthel Index Score: 80 today indicating marked limitations of functional mobility and ADLS. Patient's clinical presentation is currently unstable/unpredictable as seen in patient's presentation of vital sign response, changing level of pain, varying levels of cognitive performance, increased fall risk, new onset of impairment of functional mobility, decreased endurance and new onset of weakness. Pt would benefit from continued Physical Therapy treatment to address deficits as defined above and maximize level of functional mobility. As demonstrated by objective findings, the assigned level of complexity for this evaluation is high. The [facility] Basic Mobility Inpatient Short Form Raw Score is 20. A Raw score of greater than 16 suggests the patient may benefit from discharge to home. Please also refer to the recommendation of the Physical Therapist for safe discharge planning. Goals   Patient Goals To get back to independent and go home   STG Expiration Date 08/08/23   Short Term Goal #1 Transfers and gait independently without assistive device   Short Term Goal #2 Gait endurance to functional community distances, improved coordination with gait activity to good   LTG Expiration Date 08/15/23   Long Term Goal #1 Strength bilateral lower extremities 4+/5   Long Term Goal #2 Follow-up with outpatient balance center for coordination and gait dysfunction   Plan   Treatment/Interventions ADL retraining;Functional transfer training;LE strengthening/ROM; Therapeutic exercise; Endurance training;Patient/family training;Equipment eval/education;Gait training; Compensatory technique education   PT Frequency Other (Comment)  (Daily)   Recommendation   PT Discharge Recommendation Home with outpatient rehabilitation   Additional Comments Outpatient balance center   [address] [place] [address] Inpatient   Turning in Flat Bed Without Bedrails 4   Lying on Back to Sitting on Edge of Flat Bed Without Bedrails 4   Moving Bed to Chair 3   Standing Up From Chair Using Arms 4   Walk in Room 3   Climb 3-5 Stairs With Railing 2   Basic Mobility Inpatient Raw Score 20   Basic Mobility Standardized Score 43.99   Highest Level Of Mobility   -HLM Goal 6: Walk 10 steps or more   JH-HLM Achieved 6: Walk 10 steps or more   Modified Pitt Scale   Modified Pitt Scale 2   Barthel Index   Feeding 10   Bathing 0   Grooming Score 5   Dressing Score 10   Bladder Score 10   Bowels Score 10   Toilet Use Score 10   Transfers (Bed/Chair) Score 10   Mobility (Level Surface) Score 10   Stairs Score 5   Barthel Index Score 80   Additional Treatment Session   Start Time 1405   End Time 1420   Treatment Assessment s: Patient without pain O: Bilateral lower extremity exercise completed as listed below A: Patient will benefit from continued physical therapy to recover balance coordination and endurance.   Education completed in importance of follow-through with outpatient balance center or outpatient PT setting   Exercises   Hip Flexion Sitting;10 reps;Bilateral  (Alternating)   Hip Abduction Sitting;10 reps;Bilateral  (Alternating)   Knee AROM Long Arc Quad Sitting;10 reps;Bilateral  (Alternating)   Ankle Pumps Sitting;10 reps;Bilateral   Marching Standing;20 reps;Bilateral   Balance training  Sidestepping and backward walking completed for balance coordination patient required min to mod assist at times for balance loss with activity   Licensure   [place] License Number  Marcell So, PT [id number]

## 2023-08-01 NOTE — ASSESSMENT & PLAN NOTE
55-year-old female with recent hospitalization last week at [facility] for stroke (left basal ganglia/centrum semiovale), diabetes, tobacco use, hypertension, GERD. Patient's recent stroke symptomatology included slurred speech, right hand weakness:     She presented last night to the ED following worsening of said stroke symptoms (worsening dysarthria, unsteadiness with a fall at home). Undergoing work-up for new infarct; however, symptoms may be attributable to recrudescence given her recent stroke several days ago (and worsening of her symptoms). Neuroimaging this admission:  - CT head 7/31: No acute intracranial pathology, no hemorrhage, evolving recent L BG infarct noted  - CTA head/neck 7/31: No significant vascular abnormalities, previously visualized left M1 severe stenosis is now patent/recanalized. Neurodiagnostics from last week's admission:  - Recent MRI brain 7/29: Acute/early subacute left basal ganglia/left centrum ovale infarct  - CTA head/neck 7/28: Critical/high-grade stenosis of left M1 segment  - 2D echocardiogram 7/29: EF 98%, normal systolic function/wall motion, normal atria size bilaterally, no PFO with saline contrast/Valsalva  - Hemoglobin A1c of 6.5, lipid panel with LDL of 126    Plan:  - Stroke pathway was initiated on admission overnight.  - Repeat MRI brain is pending; if negative does not need continuation of stroke pathway. - No need for repeat stroke pathway diagnostics (Echo, A1c, Lipid panel) as all were obtained just last week during workup. Pending results of MRI brain, may consider additional work-up with REBEKAH +/- loop recorder.  - Continuing DAPT with aspirin/plavix and high intensity statin. - Supportive care  - Telemetry monitoring.  - Allow for permissive hypertension with BP goal <220/110. Treat with PRN antihypertensives. - Goal normothermia and euglycemia.   - PT/OT/Speech therapy. - Stroke education.   - Monitor exam and notify with changes.    - Will discuss case with attending neurologist. Please see attending attestation for additional details and recommendations.

## 2023-08-01 NOTE — PROGRESS NOTES
[address]  Progress Note  Name: Gia Neumann  MRN: [id number]  Unit/Bed#: 4 [place] 420-01 I Date of Admission: 7/31/2023   Date of Service: 8/1/2023 I Hospital Day: 0    Assessment/Plan   * Acute ischemic stroke [facility])  Assessment & Plan  Patient presents with worsening slurred speech since midmorning today with unsteadiness and confusion, trouble with math found towards the evening by .  states she had stroke on Friday last week, was having slurred speech and right hand weakness, speech got worse since midmorning and patient was off balance,fell softly on buttocks at home with no apparent injury.  reports he picked up two prescriptions on Sunday and he is not sure if patient took medications correctly at home. · Chart review. Patient was hospitalized 7/28-7/29 for right hand weakness, slurred speech. Work-up showed acute/early subacute lacunar infarcts in the left basal ganglia and left centrum semiovale. Patient was discharged home on DAPT and Lipitor 80mg p.o. daily. · Stroke alert in ED. · CT head - No evidence of acute intracranial hemorrhage. Evolving left basal ganglia ischemia. · CTA head and neck - Left M1/M2 segment previous area of near occlusion is now patent/recanalized. · Neurology recommends repeat MRI of the brain, continue same medications, suspect central thromboembolic etiology like A-fib. · Telemetry did not show any evidence of atrial fibrillation  · No PFO or ASD on recent echo  · Recent , A1c 6.5  · Patient reports she took aspirin at home, not sure about Plavix or Lipitor. Patient might have missed 1 or 2 doses of Plavix at home  · Permissive hypertension  · Neurology input appreciated. · Repeat MRI of the brain-interim new infarct involving left caudate body.   Unchanged recent infarct involving left lentiform nucleus and smaller foci in the left centrum semiovale  · Discussed with neurology-patient to be scheduled for REBEKAH and loop recorder tomorrow  · Plavix 300 mg loading given      Type 2 diabetes mellitus, without long-term current use of insulin [facility])  Assessment & Plan  Lab Results   Component Value Date    HGBA1C 6.5 (H) 07/28/2023       Recent Labs     08/01/23  0020 08/01/23  0717 08/01/23  1114 08/01/23  1540   POCGLU 103 116 123 81       Blood Sugar Average: Last 72 hrs:  (P) 105.8     · On metformin at home. Will hold while inpatient. · Recent A1c 6.5  · Continue Humalog sliding scale with Accu-Cheks ACHS. Continue diabetic diet. Smoker  Assessment & Plan  · Nicotine patch, smoking cessation    GERD (gastroesophageal reflux disease)  Assessment & Plan  · Continue PPI and Pepcid    Essential hypertension  Assessment & Plan  · On lisinopril at home. Will hold due to worsening stroke symptoms. · Monitor BP closely    Anxiety and depression  Assessment & Plan  · Continue Zoloft, Xanax as needed    CVA (cerebral vascular accident) ([address])  Assessment & Plan  · As above             VTE Pharmacologic Prophylaxis:   Moderate Risk (Score 3-4) - Pharmacological DVT Prophylaxis Ordered: enoxaparin (Lovenox). Patient Centered Rounds: I performed bedside rounds with nursing staff today. Discussions with Specialists or Other Care Team Provider: Neurology    Education and Discussions with Family / Patient: Updated  () at bedside. Total Time Spent on Date of Encounter in care of patient: 45 minutes This time was spent on one or more of the following: performing physical exam; counseling and coordination of care; obtaining or reviewing history; documenting in the medical record; reviewing/ordering tests, medications or procedures; communicating with other healthcare professionals and discussing with patient's family/caregivers.     Current Length of Stay: 0 day(s)  Current Patient Status: Inpatient   Certification Statement: The patient will continue to require additional inpatient hospital stay due to Acute stroke  Discharge Plan: Anticipate discharge tomorrow to home. Code Status: Level 1 - Full Code    Subjective:   Patient is feeling much better. Speech has improved. Denies any headache or dizziness or weakness or tingling or numbness. Objective:     Vitals:   Temp (24hrs), Av.9 °F (36.6 °C), Min:97.6 °F (36.4 °C), Max:98.3 °F (36.8 °C)    Temp:  [97.6 °F (36.4 °C)-98.3 °F (36.8 °C)] 98 °F (36.7 °C)  HR:  [63-84] 63  Resp:  [17-25] 18  BP: (121-202)/(60-95) 159/89  SpO2:  [90 %-97 %] 94 %  Body mass index is 27.88 kg/m². Input and Output Summary (last 24 hours):   No intake or output data in the 24 hours ending 23    Physical Exam:   Physical Exam  Constitutional:       Appearance: Normal appearance. HENT:      Head: Normocephalic and atraumatic. Nose: Nose normal.      Mouth/Throat:      Mouth: Mucous membranes are moist.      Pharynx: Oropharynx is clear. Eyes:      Extraocular Movements: Extraocular movements intact. Pupils: Pupils are equal, round, and reactive to light. Cardiovascular:      Rate and Rhythm: Normal rate and regular rhythm. Pulmonary:      Effort: Pulmonary effort is normal.      Breath sounds: Normal breath sounds. Abdominal:      General: Bowel sounds are normal. There is no distension. Palpations: Abdomen is soft. Tenderness: There is no abdominal tenderness. Musculoskeletal:         General: No swelling. Cervical back: Normal range of motion and neck supple. Skin:     General: Skin is warm and dry. Neurological:      General: No focal deficit present. Mental Status: She is alert.           Additional Data:     Labs:  Results from last 7 days   Lab Units 23  2054   WBC Thousand/uL 9.09   HEMOGLOBIN g/dL 12.5   HEMATOCRIT % 38.0   PLATELETS Thousands/uL 320     Results from last 7 days   Lab Units 23  0458   SODIUM mmol/L 136   POTASSIUM mmol/L 5.7*   CHLORIDE mmol/L 106   CO2 mmol/L 26   BUN mg/dL 14 CREATININE mg/dL 0.59*   ANION GAP mmol/L 4   CALCIUM mg/dL 8.7   GLUCOSE RANDOM mg/dL 114     Results from last 7 days   Lab Units 07/31/23  2054   INR  0.89     Results from last 7 days   Lab Units 08/01/23  1540 08/01/23  1114 08/01/23  0717 08/01/23  0020 07/31/23  2024 07/28/23  1212   POC GLUCOSE mg/dl 81 123 116 103 106 134     Results from last 7 days   Lab Units 07/28/23  1503   HEMOGLOBIN A1C % 6.5*           Lines/Drains:  Invasive Devices     Peripheral Intravenous Line  Duration           Peripheral IV 07/31/23 Left Antecubital <1 day                  Telemetry:  Telemetry Orders (From admission, onward)             24 Hour Telemetry Monitoring  Continuous x 24 Hours (Telem)        Question:  Reason for 24 Hour Telemetry  Answer:  TIA/Suspected CVA/ Confirmed CVA                 Telemetry Reviewed: Normal Sinus Rhythm  Indication for Continued Telemetry Use: No indication for continued use. Will discontinue.               Imaging: Reviewed radiology reports from this admission including: CT head and MRI brain    Recent Cultures (last 7 days):         Last 24 Hours Medication List:   Current Facility-Administered Medications   Medication Dose Route Frequency Provider Last Rate   • acetaminophen  650 mg Oral Q6H PRN HOUSTON Bhandari     • ALPRAZolam  0.25 mg Oral 4x Daily PRN Shantelle Hanna DO     • aspirin  81 mg Oral Daily HOUSTON Bhandari     • atorvastatin  80 mg Oral Daily With HOUSTON Sullivan     • clopidogrel  300 mg Oral Once Nidia Sanchez MD     • clopidogrel  75 mg Oral Daily HOUSTON Bhandari     • enoxaparin  40 mg Subcutaneous HS HOUSTON Bhandari     • famotidine  10 mg Oral BID HOUSTON Bhandari     • insulin lispro  1-5 Units Subcutaneous TID AC HOUSTON Bhandari     • insulin lispro  1-5 Units Subcutaneous HS HOUSTON Bhandari     • nicotine  1 patch Transdermal Daily HOUSTON Bhandari     • ondansetron  4 mg Intravenous Q6H PRN HOUSTON Hunt     • pantoprazole  40 mg Oral Early Morning HOUSTON Bhandari     • sertraline  100 mg Oral Daily With Dinner Jakob and HOUSTON Martínez          Today, Patient Was Seen By: Ana Laura Joyner MD    **Please Note: This note may have been constructed using a voice recognition system. **

## 2023-08-02 ENCOUNTER — ANESTHESIA EVENT (INPATIENT)
Dept: NON INVASIVE DIAGNOSTICS | Facility: HOSPITAL | Age: 73
DRG: 042 | End: 2023-08-02
Payer: COMMERCIAL

## 2023-08-02 ENCOUNTER — APPOINTMENT (OUTPATIENT)
Dept: NON INVASIVE DIAGNOSTICS | Facility: HOSPITAL | Age: 73
DRG: 042 | End: 2023-08-02
Attending: INTERNAL MEDICINE
Payer: COMMERCIAL

## 2023-08-02 ENCOUNTER — PREP FOR PROCEDURE (OUTPATIENT)
Dept: OTHER | Facility: HOSPITAL | Age: 73
End: 2023-08-02

## 2023-08-02 VITALS
WEIGHT: 162 LBS | HEIGHT: 64 IN | DIASTOLIC BLOOD PRESSURE: 79 MMHG | SYSTOLIC BLOOD PRESSURE: 164 MMHG | RESPIRATION RATE: 18 BRPM | HEART RATE: 84 BPM | OXYGEN SATURATION: 95 % | BODY MASS INDEX: 27.66 KG/M2 | TEMPERATURE: 97.8 F

## 2023-08-02 DIAGNOSIS — I63.9 ACUTE ISCHEMIC STROKE (HCC): Primary | ICD-10-CM

## 2023-08-02 LAB
ANION GAP SERPL CALCULATED.3IONS-SCNC: 8 MMOL/L
ATRIAL RATE: 81 BPM
BUN SERPL-MCNC: 12 MG/DL (ref 5–25)
CALCIUM SERPL-MCNC: 9.2 MG/DL (ref 8.4–10.2)
CHLORIDE SERPL-SCNC: 105 MMOL/L (ref 96–108)
CO2 SERPL-SCNC: 25 MMOL/L (ref 21–32)
CREAT SERPL-MCNC: 0.59 MG/DL (ref 0.6–1.3)
GFR SERPL CREATININE-BSD FRML MDRD: 92 ML/MIN/1.73SQ M
GLUCOSE SERPL-MCNC: 116 MG/DL (ref 65–140)
GLUCOSE SERPL-MCNC: 122 MG/DL (ref 65–140)
GLUCOSE SERPL-MCNC: 123 MG/DL (ref 65–140)
P AXIS: 74 DEGREES
POTASSIUM SERPL-SCNC: 3.8 MMOL/L (ref 3.5–5.3)
PR INTERVAL: 132 MS
QRS AXIS: 38 DEGREES
QRSD INTERVAL: 74 MS
QT INTERVAL: 368 MS
QTC INTERVAL: 427 MS
SL CV LV EF: 55
SODIUM SERPL-SCNC: 138 MMOL/L (ref 135–147)
T WAVE AXIS: 63 DEGREES
VENTRICULAR RATE: 81 BPM

## 2023-08-02 PROCEDURE — 99239 HOSP IP/OBS DSCHRG MGMT >30: CPT | Performed by: INTERNAL MEDICINE

## 2023-08-02 PROCEDURE — 99232 SBSQ HOSP IP/OBS MODERATE 35: CPT | Performed by: PSYCHIATRY & NEUROLOGY

## 2023-08-02 PROCEDURE — B245ZZ4 ULTRASONOGRAPHY OF LEFT HEART, TRANSESOPHAGEAL: ICD-10-PCS | Performed by: INTERNAL MEDICINE

## 2023-08-02 PROCEDURE — 33285 INSJ SUBQ CAR RHYTHM MNTR: CPT | Performed by: INTERNAL MEDICINE

## 2023-08-02 PROCEDURE — 93010 ELECTROCARDIOGRAM REPORT: CPT | Performed by: INTERNAL MEDICINE

## 2023-08-02 PROCEDURE — 93320 DOPPLER ECHO COMPLETE: CPT | Performed by: INTERNAL MEDICINE

## 2023-08-02 PROCEDURE — 93312 ECHO TRANSESOPHAGEAL: CPT | Performed by: INTERNAL MEDICINE

## 2023-08-02 PROCEDURE — 93312 ECHO TRANSESOPHAGEAL: CPT

## 2023-08-02 PROCEDURE — C1764 EVENT RECORDER, CARDIAC: HCPCS | Performed by: INTERNAL MEDICINE

## 2023-08-02 PROCEDURE — 82948 REAGENT STRIP/BLOOD GLUCOSE: CPT

## 2023-08-02 PROCEDURE — 93325 DOPPLER ECHO COLOR FLOW MAPG: CPT | Performed by: INTERNAL MEDICINE

## 2023-08-02 PROCEDURE — 80048 BASIC METABOLIC PNL TOTAL CA: CPT | Performed by: INTERNAL MEDICINE

## 2023-08-02 PROCEDURE — 0JH602Z INSERTION OF MONITORING DEVICE INTO CHEST SUBCUTANEOUS TISSUE AND FASCIA, OPEN APPROACH: ICD-10-PCS | Performed by: INTERNAL MEDICINE

## 2023-08-02 DEVICE — ICM LNQ22 LINQ II USA
Type: IMPLANTABLE DEVICE | Status: FUNCTIONAL
Brand: LINQ II™

## 2023-08-02 RX ORDER — SODIUM CHLORIDE 9 MG/ML
INJECTION, SOLUTION INTRAVENOUS CONTINUOUS PRN
Status: DISCONTINUED | OUTPATIENT
Start: 2023-08-02 | End: 2023-08-02

## 2023-08-02 RX ORDER — CHLORHEXIDINE GLUCONATE 0.12 MG/ML
15 RINSE ORAL ONCE
Status: CANCELLED | OUTPATIENT
Start: 2023-08-02 | End: 2023-08-02

## 2023-08-02 RX ORDER — PROPOFOL 10 MG/ML
INJECTION, EMULSION INTRAVENOUS AS NEEDED
Status: DISCONTINUED | OUTPATIENT
Start: 2023-08-02 | End: 2023-08-02

## 2023-08-02 RX ORDER — CHLORHEXIDINE GLUCONATE 0.12 MG/ML
15 RINSE ORAL ONCE
Status: DISCONTINUED | OUTPATIENT
Start: 2023-08-02 | End: 2023-08-02 | Stop reason: HOSPADM

## 2023-08-02 RX ORDER — LIDOCAINE HYDROCHLORIDE 10 MG/ML
INJECTION, SOLUTION EPIDURAL; INFILTRATION; INTRACAUDAL; PERINEURAL AS NEEDED
Status: DISCONTINUED | OUTPATIENT
Start: 2023-08-02 | End: 2023-08-02

## 2023-08-02 RX ADMIN — SODIUM CHLORIDE: 0.9 INJECTION, SOLUTION INTRAVENOUS at 12:52

## 2023-08-02 RX ADMIN — LIDOCAINE HYDROCHLORIDE 40 MG: 10 INJECTION, SOLUTION EPIDURAL; INFILTRATION; INTRACAUDAL; PERINEURAL at 13:24

## 2023-08-02 RX ADMIN — LIDOCAINE HYDROCHLORIDE 50 MG: 10 INJECTION, SOLUTION EPIDURAL; INFILTRATION; INTRACAUDAL; PERINEURAL at 13:20

## 2023-08-02 RX ADMIN — PROPOFOL 100 MG: 10 INJECTION, EMULSION INTRAVENOUS at 13:20

## 2023-08-02 NOTE — PHYSICAL THERAPY NOTE
PHYSICAL THERAPY     08/02/23 1344   Note Type   Note Type Cancelled Session   Cancel Reasons Patient off floor/test  (REBEKAH, loop recorder insertion.  will re-attempt at a later time as appropriate.)   Licensure   [place] License Number  Wang Daily PT [id number]

## 2023-08-02 NOTE — SEDATION DOCUMENTATION
Pt tolerated REBEKAH; pt remains in procedure room for loop insertion.  Pls see Loop insertion documentation

## 2023-08-02 NOTE — PROGRESS NOTES
Neurology Consult Follow Up      Willis Kruse is a 71 y.o. female  [address] [place]        Assessment/Recommendations:    1. Recent left basal ganglia/insular stroke  2. Severe left M1 stenosis with near occlusion  3. Diabetes  4. HTN  5. HLD  6. Tobacco use    -Monitored on telemetry  -Neurological assessments  -Continue aspirin 81 mg daily x 3 months then DC   -Continue Plavix 75 mg daily x 3 months then DC  -after 3 months of DAPT, start on full dose aspirin 325mg daily  -Continue Lipitor 80 mg daily  -Recommendations to maintain higher systolic pressure of 1 86-8 80 for the next 2 days. Resume blood pressure management on Friday, 8/4/2023. Can use midodrine if needed  -MRI of the brain with new infarct involving the left caudate body, unchanged infarcts to the left lentiform nuclear this and a smaller foci in the left centrum semiovale without hemorrhagic conversion  -Recent echocardiogram done 7/29/2023 with an EF of 63%. Diastolic function and wall motion normal.  Atrium normal in size. No PFO or intra-atrial communication seen  -Lipid profile and A1c completed during hospitalization 7/28 to 7/29/2023. -REBEKAH with loop recorder placement today prior to discharge  -PT/OT/ST  -Consider more aggressive efforts in smoking cessation    Patient is a 64-year-old female recently hospitalized on 7/28/2023 with acute left basal ganglia and and centrum semiovale infarcts. She was placed on dual antiplatelet therapies and high-dose statin prior to his discharge. Her MRI had been completed and showed evidence of this infarct, CTA of the head and neck showed significant left M1 to M2 stenosis with near occlusion. She was recommended to maintain on dual antiplatelet therapies as well as high-dose statins. Patient had returned to the hospital with worsening dysarthria and unsteadiness at home.   She was sent for repeat MRI which does show evidence of new infarct around the left basal ganglion, similar area to 7/28/2023 infarcts. It was noted by her  that there was a delay in her restarting on her dual antiplatelet therapies. According to the  they had difficulties getting the prescription filled at the pharmacy was unable to pick it up until 8/1/2023. Patient also restarted smoking, she was encouraged to stop smoking due to its effects on vascular risks as well as significant vascular atherosclerotic disease noted on her CTA. Patient did have improvements in her symptoms through her hospital stay. Suspect this was in relation to not receiving her medications for several days however cannot completely exclude the possibility of cardioembolic reasons for which we should evaluate with a REBEKAH. If negative for thrombus would recommend loop recorder placement for evaluations of atrial fibrillation over time. Patient did have her REBEKAH and loop recorder placed. No thrombus was seen in the left atrial appendage, no septal communication seen. EF was 55%. Patient previously refused nicotine patches, she was recommended to consider trialing some form of smoking cessation technique as this can impair or increase her vascular risks. Encouraged her to maintain medication compliance with her dual antiplatelet therapies for 3 months then discontinue both baby aspirin and plavix and start on full dose aspirin 325mg daily, which will be long-term at this point with high-dose statin. Armond Iqbal will need follow up in 8-10 weeks with general attending or advance practitioner. She will not require outpatient neurological testing. Chief Complaint:    Subjective:   Patient denies any complaints today, she is pending REBEKAH and loop recorder placement. Offered to answer any questions, patient indicated that she had no current questions regarding the procedures.   She did ask about her medication regimen, recommended patient remain on her dual antiplatelet therapies as previously documented and encouraged tobacco cessation. Past Medical History:   Diagnosis Date   • Anxiety    • CVA (cerebral vascular accident) ([address])    • Depression    • Diabetes ([address])    • GERD (gastroesophageal reflux disease)    • Hypertension      Social History     Socioeconomic History   • Marital status: /Civil Union     Spouse name: Not on file   • Number of children: Not on file   • Years of education: Not on file   • Highest education level: Not on file   Occupational History   • Not on file   Tobacco Use   • Smoking status: Heavy Smoker     Packs/day: 1.50     Types: Cigarettes   • Smokeless tobacco: Never   Vaping Use   • Vaping Use: Never used   Substance and Sexual Activity   • Alcohol use: Not Currently     Alcohol/week: 28.0 standard drinks of alcohol     Types: 28 Cans of beer per week     Comment: every night has "a few" beers   • Drug use: No   • Sexual activity: Not on file   Other Topics Concern   • Not on file   Social History Narrative   • Not on file     Social Determinants of Health     Financial Resource Strain: Not on file   Food Insecurity: No Food Insecurity (8/1/2023)    Hunger Vital Sign    • Worried About Running Out of Food in the Last Year: Never true    • Ran Out of Food in the Last Year: Never true   Transportation Needs: No Transportation Needs (8/1/2023)    PRAPARE - Transportation    • Lack of Transportation (Medical): No    • Lack of Transportation (Non-Medical): No   Physical Activity: Not on file   Stress: Not on file   Social Connections: Not on file   Intimate Partner Violence: Not on file   Housing Stability: Low Risk  (8/1/2023)    Housing Stability Vital Sign    • Unable to Pay for Housing in the Last Year: No    • Number of Places Lived in the Last Year: 1    • Unstable Housing in the Last Year: No     History reviewed. No pertinent family history. ROS:  Please see HPI for positive symptoms. No fever, no chills, no weight change.    Ocular: No diplopia, no blurred vision, spot/zigzag lines   HEENT:  No sore throat, headache or congestion. No neck pain. COR:  No chest pain. No palpitations. Lungs:  no sob  GI:  no  nausea, no vomiting, no diarrhea, no constipation, no anorexia. :  No dysuria, frequency, or urgency. No hematuria. Musculoskeletal:  No joint pain or swelling   Skin:  No rash or itching. Psychiatric:  no anxiety, no depression. Endocrine:  No polyuria or polydipsia. Objective:  /79   Pulse 84   Temp 97.8 °F (36.6 °C) (Oral)   Resp 18   Ht 5' 4" (1.626 m)   Wt 73.5 kg (162 lb)   SpO2 95%   BMI 27.81 kg/m²     General: alert   Mental status: oriented x3  Attention: normal  Knowledge: fair  Language and Speech: normal  Cranial nerves:   CN II: Visual fields are full to confrontation. Fundoscopic exam is normal with sharp discs and no vascular changes. Pupils are 3 mm and briskly reactive to light. CN III, IV, VI: At primary gaze, there is no eye deviation. CN V: Facial sensation is intact in all 3 divisions bilaterally. Corneal responses are intact. CN VII: Face is symmetrical, with normal eye closure and smile. CN VIII: Hearing is normal to rubbing fingers  CN IX, X: Palate elevates symmetrically. Phonation is normal.  CN XI: Head turning and shoulder shrug are intact  CN XII: Tongue is midline with normal movements and no atrophy. Motor: There is no pronator drift of out-stretched arms. Muscle bulk and tone are normal.      Muscle exam  Arm Right Left Leg Right Left   Deltoid 5/5 5/5 Iliopsoas 5/5 5/5   Biceps 5/5 5/5 Quads 5/5 5/5   Triceps 5/5 5/5 Hamstrings 5/5 5/5   Wrist Extension 5/5 5/5 Ankle Dorsi Flexion 5/5 5/5   Wrist Flexion 5/5 5/5 Ankle Plantar Flexion 5/5 5/5       Sensory: normal to light touch, temperature, vibration.  Proprioception intact  Exception: Decrease sensation to cold temperature in left lower extremity compared to right, remaining sensations equal.  Gait: steady  Coordination: finger to nose and heel to toe normal     Reflexes    RJ BJ TJ KJ AJ Plantars [place]'s   Right 2+ 2+  2+ 2+ Downgoing Not present   Left 2+ 2+  2+ 2+ Downgoing Not present      Heart: Regular rate and rhythm  Lung: No audible wheezing or stridor heard  Abd: soft, non-tender, non-distended with positive bowel sounds in all quads  Skin: dry and intact    Labs:      Lab Results   Component Value Date    WBC 9.09 07/31/2023    HGB 12.5 07/31/2023    HCT 38.0 07/31/2023    MCV 96 07/31/2023     07/31/2023     Lab Results   Component Value Date    HGBA1C 6.5 (H) 07/28/2023     Lab Results   Component Value Date    ALT 16 04/29/2023    AST 17 04/29/2023    ALKPHOS 67 04/29/2023     Lab Results   Component Value Date    CALCIUM 9.2 08/02/2023    K 3.8 08/02/2023    CO2 25 08/02/2023     08/02/2023    BUN 12 08/02/2023    CREATININE 0.59 (L) 08/02/2023 7/29/2023       Review of reports and notes reveal:   Independent review of films/reports:  MRI brain wo contrast    Result Date: 8/1/2023  Interim new infarct involving left caudate body. Unchanged recent infarcts involving left lentiform nucleus and smaller foci in the left centrum semiovale. No mass effect or hemorrhagic transformation. Workstation performed: [id number]     CTA stroke alert (head/neck)    Result Date: 7/31/2023  Left M1/M2 segment previous area of near occlusion is now patent/recanalized. Small subcentimeter nodules in the upper lobes in addition to some emphysematous changes. With history of smoking, follow-up with screening low-dose unenhanced chest CT is recommended on a nonemergent outpatient basis. I personally discussed this study with Dr. Ajit Ryder on 7/31/2023 8:42 PM. Workstation performed: [id number]     CT stroke alert brain    Result Date: 7/31/2023  No evidence of acute intracranial hemorrhage. Evolving left basal ganglia ischemia.  I personally discussed this study with Dr. Ajit Ryder on 7/31/2023 8:42 PM. Workstation performed: [id number] MRI brain wo contrast    Result Date: 7/29/2023  Acute/early subacute lacunar infarcts in the left basal ganglia and left centrum semiovale. No acute hemorrhage. Workstation performed: [id number]     CTA head and neck with and without contrast    Result Date: 7/28/2023  Short segment critical high-grade stenosis/near occlusion of the M1 segment of the left middle cerebral artery. Low-attenuation of the left lentiform nucleus as well as left insular ribbon/subinsular white matter consistent with acute ischemia and should be correlated with MRI. No associated hemorrhage. I personally discussed this study with Darcie Lafleur on 7/28/2023 2:11 PM. Workstation performed: [id number]         Thank you for this consult.     Total time of encounter:  30 min  More than 50% of the time was used in counseling and/or coordination of care  Extent of couseling and/or coordination of care

## 2023-08-02 NOTE — INCIDENTAL FINDINGS
The following findings require follow up:  Radiographic finding   Finding: Small lung nodules in the upper lobes with emphysema   Follow up required: CT chest   Follow up should be done within 4 week(s)    Please notify the following clinician to assist with the follow up:   Primary care physician

## 2023-08-02 NOTE — PLAN OF CARE
Problem: MOBILITY - ADULT  Goal: Maintain or return to baseline ADL function  Description: INTERVENTIONS:  -  Assess patient's ability to carry out ADLs; assess patient's baseline for ADL function and identify physical deficits which impact ability to perform ADLs (bathing, care of mouth/teeth, toileting, grooming, dressing, etc.)  - Assess/evaluate cause of self-care deficits   - Assess range of motion  - Assess patient's mobility; develop plan if impaired  - Assess patient's need for assistive devices and provide as appropriate  - Encourage maximum independence but intervene and supervise when necessary  - Involve family in performance of ADLs  - Assess for home care needs following discharge   - Consider OT consult to assist with ADL evaluation and planning for discharge  - Provide patient education as appropriate  Outcome: Progressing     Problem: MOBILITY - ADULT  Goal: Maintains/Returns to pre admission functional level  Description: INTERVENTIONS:  - Perform BMAT or MOVE assessment daily.   - Set and communicate daily mobility goal to care team and patient/family/caregiver.    - Collaborate with rehabilitation services on mobility goals if consulted  - Out of bed for toileting  - Record patient progress and toleration of activity level   Outcome: Progressing     Problem: PAIN - ADULT  Goal: Verbalizes/displays adequate comfort level or baseline comfort level  Description: Interventions:  - Encourage patient to monitor pain and request assistance  - Assess pain using appropriate pain scale  - Administer analgesics based on type and severity of pain and evaluate response  - Implement non-pharmacological measures as appropriate and evaluate response  - Consider cultural and social influences on pain and pain management  - Notify physician/advanced practitioner if interventions unsuccessful or patient reports new pain  Outcome: Progressing     Problem: INFECTION - ADULT  Goal: Absence or prevention of progression during hospitalization  Description: INTERVENTIONS:  - Assess and monitor for signs and symptoms of infection  - Monitor lab/diagnostic results  - Monitor all insertion sites, i.e. indwelling lines, tubes, and drains  - [place] appropriate cooling/warming therapies per order  - Administer medications as ordered  - Instruct and encourage patient and family to use good hand hygiene technique  - Identify and instruct in appropriate isolation precautions for identified infection/condition  Outcome: Progressing     Problem: SAFETY ADULT  Goal: Patient will remain free of falls  Description: INTERVENTIONS:  - Educate patient/family on patient safety including physical limitations  - Instruct patient to call for assistance with activity   - Consult OT/PT to assist with strengthening/mobility   - Keep Call bell within reach  - Keep bed low and locked with side rails adjusted as appropriate  - Keep care items and personal belongings within reach  - Initiate and maintain comfort rounds  - Make Fall Risk Sign visible to staff  - Apply yellow socks and bracelet for high fall risk patients  - Consider moving patient to room near nurses station  Outcome: Progressing     Problem: DISCHARGE PLANNING  Goal: Discharge to home or other facility with appropriate resources  Description: INTERVENTIONS:  - Identify barriers to discharge w/patient and caregiver  - Arrange for needed discharge resources and transportation as appropriate  - Identify discharge learning needs (meds, wound care, etc.)  - Arrange for interpretive services to assist at discharge as needed  - Refer to Case Management Department for coordinating discharge planning if the patient needs post-hospital services based on physician/advanced practitioner order or complex needs related to functional status, cognitive ability, or social support system  Outcome: Progressing     Problem: Knowledge Deficit  Goal: Patient/family/caregiver demonstrates understanding of disease process, treatment plan, medications, and discharge instructions  Description: Complete learning assessment and assess knowledge base. Interventions:  - Provide teaching at level of understanding  - Provide teaching via preferred learning methods  Outcome: Progressing     Problem: Neurological Deficit  Goal: Neurological status is stable or improving  Description: Interventions:  - Monitor and assess patient's level of consciousness, motor function, sensory function, and level of assistance needed for ADLs. - Monitor and report changes from baseline. Collaborate with interdisciplinary team to initiate plan and implement interventions as ordered. - Provide and maintain a safe environment. - Consider seizure precautions. - Consider fall precautions. - Consider aspiration precautions. - Consider bleeding precautions. Outcome: Progressing     Problem: Communication Impairment  Goal: Ability to express needs and understand communication  Description: Assess patient's communication skills and ability to understand information. Patient will demonstrate use of effective communication techniques, alternative methods of communication and understanding even if not able to speak. - Encourage communication and provide alternate methods of communication as needed. - Collaborate with case management/ for discharge needs. - Include patient/family/caregiver in decisions related to communication. Outcome: Progressing     Problem: Potential for Aspiration  Goal: Non-ventilated patient's risk of aspiration is minimized  Description: Assess and monitor vital signs, respiratory status, and labs (WBC). Monitor for signs of aspiration (tachypnea, cough, rales, wheezing, cyanosis, fever). - Assess and monitor patient's ability to swallow. - Place patient up in chair to eat if possible.   - HOB up at 90 degrees to eat if unable to get patient up into chair.  - Supervise patient during oral intake. - Instruct patient/ family to take small bites. - Instruct patient/ family to take small single sips when taking liquids. - Follow patient-specific strategies generated by speech pathologist.  Outcome: Progressing     Problem: Nutrition  Goal: Nutrition/Hydration status is improving  Description: Monitor and assess patient's nutrition/hydration status for malnutrition (ex- brittle hair, bruises, dry skin, pale skin and conjunctiva, muscle wasting, smooth red tongue, and disorientation). Collaborate with interdisciplinary team and initiate plan and interventions as ordered. Monitor patient's weight and dietary intake as ordered or per policy. Utilize nutrition screening tool and intervene per policy. Determine patient's food preferences and provide high-protein, high-caloric foods as appropriate. - Allow adequate time for meals.  - Encourage patient to take dietary supplement as ordered. - Collaborate with clinical nutritionist.  - Include patient/family/caregiver in decisions related to nutrition. Outcome: Progressing     Problem: NEUROSENSORY - ADULT  Goal: Achieves stable or improved neurological status  Description: INTERVENTIONS  - Monitor and report changes in neurological status  - Monitor vital signs such as temperature, blood pressure, glucose, and any other labs ordered   - Initiate measures to prevent increased intracranial pressure  - Monitor for seizure activity and implement precautions if appropriate      Outcome: Progressing     Problem: NEUROSENSORY - ADULT  Goal: Achieves maximal functionality and self care  Description: INTERVENTIONS  - Monitor swallowing and airway patency with patient fatigue and changes in neurological status  - Encourage and assist patient to increase activity and self care.    - Encourage visually impaired, hearing impaired and aphasic patients to use assistive/communication devices  Outcome: Progressing     Problem: Nutrition/Hydration-ADULT  Goal: Nutrient/Hydration intake appropriate for improving, restoring or maintaining nutritional needs  Description: Monitor and assess patient's nutrition/hydration status for malnutrition. Collaborate with interdisciplinary team and initiate plan and interventions as ordered. Monitor patient's weight and dietary intake as ordered or per policy. Utilize nutrition screening tool and intervene as necessary. Determine patient's food preferences and provide high-protein, high-caloric foods as appropriate.      INTERVENTIONS:  - Monitor oral intake, urinary output, labs, and treatment plans  - Assess nutrition and hydration status and recommend course of action  - Evaluate amount of meals eaten  - Assist patient with eating if necessary   - Allow adequate time for meals  - Recommend/ encourage appropriate diets, oral nutritional supplements, and vitamin/mineral supplements  - Order, calculate, and assess calorie counts as needed  - Assess need for intravenous fluids  - Provide specific nutrition/hydration education as appropriate  - Include patient/family/caregiver in decisions related to nutrition  Outcome: Progressing

## 2023-08-02 NOTE — PLAN OF CARE
Problem: MOBILITY - ADULT  Goal: Maintain or return to baseline ADL function  Description: INTERVENTIONS:  -  Assess patient's ability to carry out ADLs; assess patient's baseline for ADL function and identify physical deficits which impact ability to perform ADLs (bathing, care of mouth/teeth, toileting, grooming, dressing, etc.)  - Assess/evaluate cause of self-care deficits   - Assess range of motion  - Assess patient's mobility; develop plan if impaired  - Assess patient's need for assistive devices and provide as appropriate  - Encourage maximum independence but intervene and supervise when necessary  - Involve family in performance of ADLs  - Assess for home care needs following discharge   - Consider OT consult to assist with ADL evaluation and planning for discharge  - Provide patient education as appropriate  Outcome: Progressing  Goal: Maintains/Returns to pre admission functional level  Description: INTERVENTIONS:  - Perform BMAT or MOVE assessment daily.   - Set and communicate daily mobility goal to care team and patient/family/caregiver. - Collaborate with rehabilitation services on mobility goals if consulted  - Perform Range of Motion 4 times a day. - Reposition patient every 2 hours.   - Dangle patient 3 times a day  - Stand patient 3 times a day  - Ambulate patient 3 times a day  - Out of bed to chair 3 times a day   - Out of bed for meals 3 times a day  - Out of bed for toileting  - Record patient progress and toleration of activity level   Outcome: Progressing     Problem: PAIN - ADULT  Goal: Verbalizes/displays adequate comfort level or baseline comfort level  Description: Interventions:  - Encourage patient to monitor pain and request assistance  - Assess pain using appropriate pain scale  - Administer analgesics based on type and severity of pain and evaluate response  - Implement non-pharmacological measures as appropriate and evaluate response  - Consider cultural and social influences on pain and pain management  - Notify physician/advanced practitioner if interventions unsuccessful or patient reports new pain  Outcome: Progressing     Problem: INFECTION - ADULT  Goal: Absence or prevention of progression during hospitalization  Description: INTERVENTIONS:  - Assess and monitor for signs and symptoms of infection  - Monitor lab/diagnostic results  - Monitor all insertion sites, i.e. indwelling lines, tubes, and drains  - Monitor endotracheal if appropriate and nasal secretions for changes in amount and color  - [place] appropriate cooling/warming therapies per order  - Administer medications as ordered  - Instruct and encourage patient and family to use good hand hygiene technique  - Identify and instruct in appropriate isolation precautions for identified infection/condition  Outcome: Progressing     Problem: SAFETY ADULT  Goal: Maintain or return to baseline ADL function  Description: INTERVENTIONS:  -  Assess patient's ability to carry out ADLs; assess patient's baseline for ADL function and identify physical deficits which impact ability to perform ADLs (bathing, care of mouth/teeth, toileting, grooming, dressing, etc.)  - Assess/evaluate cause of self-care deficits   - Assess range of motion  - Assess patient's mobility; develop plan if impaired  - Assess patient's need for assistive devices and provide as appropriate  - Encourage maximum independence but intervene and supervise when necessary  - Involve family in performance of ADLs  - Assess for home care needs following discharge   - Consider OT consult to assist with ADL evaluation and planning for discharge  - Provide patient education as appropriate  Outcome: Progressing  Goal: Maintains/Returns to pre admission functional level  Description: INTERVENTIONS:  - Perform BMAT or MOVE assessment daily.   - Set and communicate daily mobility goal to care team and patient/family/caregiver.    - Collaborate with rehabilitation services on mobility goals if consulted  - Perform Range of Motion 4 times a day. - Reposition patient every 2 hours.   - Dangle patient 3 times a day  - Stand patient 3 times a day  - Ambulate patient 3 times a day  - Out of bed to chair 3 times a day   - Out of bed for meals 3 times a day  - Out of bed for toileting  - Record patient progress and toleration of activity level   Outcome: Progressing  Goal: Patient will remain free of falls  Description: INTERVENTIONS:  - Educate patient/family on patient safety including physical limitations  - Instruct patient to call for assistance with activity   - Consult OT/PT to assist with strengthening/mobility   - Keep Call bell within reach  - Keep bed low and locked with side rails adjusted as appropriate  - Keep care items and personal belongings within reach  - Initiate and maintain comfort rounds  - Make Fall Risk Sign visible to staff  - Offer Toileting every 2 Hours, in advance of need  - Initiate/Maintain bed alarm  - Obtain necessary fall risk management equipment: yellow socks  - Apply yellow socks and bracelet for high fall risk patients  - Consider moving patient to room near nurses station  Outcome: Progressing     Problem: DISCHARGE PLANNING  Goal: Discharge to home or other facility with appropriate resources  Description: INTERVENTIONS:  - Identify barriers to discharge w/patient and caregiver  - Arrange for needed discharge resources and transportation as appropriate  - Identify discharge learning needs (meds, wound care, etc.)  - Arrange for interpretive services to assist at discharge as needed  - Refer to Case Management Department for coordinating discharge planning if the patient needs post-hospital services based on physician/advanced practitioner order or complex needs related to functional status, cognitive ability, or social support system  Outcome: Progressing     Problem: Knowledge Deficit  Goal: Patient/family/caregiver demonstrates understanding of disease process, treatment plan, medications, and discharge instructions  Description: Complete learning assessment and assess knowledge base. Interventions:  - Provide teaching at level of understanding  - Provide teaching via preferred learning methods  Outcome: Progressing     Problem: Neurological Deficit  Goal: Neurological status is stable or improving  Description: Interventions:  - Monitor and assess patient's level of consciousness, motor function, sensory function, and level of assistance needed for ADLs. - Monitor and report changes from baseline. Collaborate with interdisciplinary team to initiate plan and implement interventions as ordered. - Provide and maintain a safe environment. - Consider seizure precautions. - Consider fall precautions. - Consider aspiration precautions. - Consider bleeding precautions. Outcome: Progressing     Problem: Activity Intolerance/Impaired Mobility  Goal: Mobility/activity is maintained at optimum level for patient  Description: Interventions:  - Assess and monitor patient  barriers to mobility and need for assistive/adaptive devices. - Assess patient's emotional response to limitations. - Collaborate with interdisciplinary team and initiate plans and interventions as ordered. - Encourage independent activity per ability.  - Maintain proper body alignment. - Perform active/passive rom as tolerated/ordered. - Plan activities to conserve energy.  - Turn patient as appropriate  Outcome: Progressing     Problem: Communication Impairment  Goal: Ability to express needs and understand communication  Description: Assess patient's communication skills and ability to understand information. Patient will demonstrate use of effective communication techniques, alternative methods of communication and understanding even if not able to speak. - Encourage communication and provide alternate methods of communication as needed.   - Collaborate with case management/social services for discharge needs. - Include patient/family/caregiver in decisions related to communication. Outcome: Progressing     Problem: Potential for Aspiration  Goal: Non-ventilated patient's risk of aspiration is minimized  Description: Assess and monitor vital signs, respiratory status, and labs (WBC). Monitor for signs of aspiration (tachypnea, cough, rales, wheezing, cyanosis, fever). - Assess and monitor patient's ability to swallow. - Place patient up in chair to eat if possible. - HOB up at 90 degrees to eat if unable to get patient up into chair.  - Supervise patient during oral intake. - Instruct patient/ family to take small bites. - Instruct patient/ family to take small single sips when taking liquids. - Follow patient-specific strategies generated by speech pathologist.  Outcome: Progressing     Problem: Nutrition  Goal: Nutrition/Hydration status is improving  Description: Monitor and assess patient's nutrition/hydration status for malnutrition (ex- brittle hair, bruises, dry skin, pale skin and conjunctiva, muscle wasting, smooth red tongue, and disorientation). Collaborate with interdisciplinary team and initiate plan and interventions as ordered. Monitor patient's weight and dietary intake as ordered or per policy. Utilize nutrition screening tool and intervene per policy. Determine patient's food preferences and provide high-protein, high-caloric foods as appropriate. - Assist patient with eating.  - Allow adequate time for meals.  - Encourage patient to take dietary supplement as ordered. - Collaborate with clinical nutritionist.  - Include patient/family/caregiver in decisions related to nutrition.   Outcome: Progressing     Problem: NEUROSENSORY - ADULT  Goal: Achieves stable or improved neurological status  Description: INTERVENTIONS  - Monitor and report changes in neurological status  - Monitor vital signs such as temperature, blood pressure, glucose, and any other labs ordered   - Initiate measures to prevent increased intracranial pressure  - Monitor for seizure activity and implement precautions if appropriate      Outcome: Progressing  Goal: Achieves maximal functionality and self care  Description: INTERVENTIONS  - Monitor swallowing and airway patency with patient fatigue and changes in neurological status  - Encourage and assist patient to increase activity and self care. - Encourage visually impaired, hearing impaired and aphasic patients to use assistive/communication devices  Outcome: Progressing     Problem: Nutrition/Hydration-ADULT  Goal: Nutrient/Hydration intake appropriate for improving, restoring or maintaining nutritional needs  Description: Monitor and assess patient's nutrition/hydration status for malnutrition. Collaborate with interdisciplinary team and initiate plan and interventions as ordered. Monitor patient's weight and dietary intake as ordered or per policy. Utilize nutrition screening tool and intervene as necessary. Determine patient's food preferences and provide high-protein, high-caloric foods as appropriate.      INTERVENTIONS:  - Monitor oral intake, urinary output, labs, and treatment plans  - Assess nutrition and hydration status and recommend course of action  - Evaluate amount of meals eaten  - Assist patient with eating if necessary   - Allow adequate time for meals  - Recommend/ encourage appropriate diets, oral nutritional supplements, and vitamin/mineral supplements  - Order, calculate, and assess calorie counts as needed  - Recommend, monitor, and adjust tube feedings and TPN/PPN based on assessed needs  - Assess need for intravenous fluids  - Provide specific nutrition/hydration education as appropriate  - Include patient/family/caregiver in decisions related to nutrition  Outcome: Progressing

## 2023-08-02 NOTE — SPEECH THERAPY NOTE
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 Unchanged recent infarcts involving left lentiform nucleus and smaller foci in the left centrum semiovale. No mass effect or hemorrhagic transformation. Pt off floor for procedure. Will f/u withon 48 hrs.    Diandra Hudson [id number] [facility] [id number]

## 2023-08-02 NOTE — ASSESSMENT & PLAN NOTE
· Patient is on lisinopril which can be restarted upon discharge. Lisinopril was on hold during hospitalization to allow permissive hypertension.

## 2023-08-02 NOTE — ANESTHESIA POSTPROCEDURE EVALUATION
Post-Op Assessment Note    CV Status:  Stable  Pain Score: 0    Pain management: adequate     Mental Status:  Alert and awake   Hydration Status:  Stable   PONV Controlled:  None   Airway Patency:  Patent and adequate      Post Op Vitals Reviewed: Yes      Staff: CRNA         No notable events documented.     BP     Temp      Pulse     Resp      SpO2

## 2023-08-02 NOTE — DISCHARGE SUMMARY
[address]  Discharge- Marian Smith 1950, 71 y.o. female MRN: [id number]  Unit/Bed#: [address] [place] [address] Encounter: [id number]  Primary Care Provider: Erick Camara DO   Date and time admitted to hospital: 7/31/2023  8:18 PM    * Acute ischemic stroke [facility])  Assessment & Plan  Patient presents with worsening slurred speech  with unsteadiness and confusion, trouble with math found towards the evening by .  states she had stroke on Friday last week, was having slurred speech and right hand weakness, speech got worse since midmorning and patient was off balance,fell softly on buttocks at home with no apparent injury.  reports he picked up two prescriptions on Sunday and he is not sure if patient took medications correctly at home. · Chart review. Patient was hospitalized 7/28-7/29 for right hand weakness, slurred speech. Work-up showed acute/early subacute lacunar infarcts in the left basal ganglia and left centrum semiovale. Patient was discharged home on DAPT and Lipitor 80mg p.o. daily. · Stroke alert in ED. · CT head - No evidence of acute intracranial hemorrhage. Evolving left basal ganglia ischemia. · CTA head and neck - Left M1/M2 segment previous area of near occlusion is now patent/recanalized. · Telemetry did not show any evidence of atrial fibrillation  · No PFO or ASD on recent echo  · Recent , A1c 6.5  · Patient reports she took aspirin at home, not sure about Plavix or Lipitor. Patient might have missed 1 or 2 doses of Plavix at home  · Lisinopril was on hold during hospitalization to allow permissive hypertension which can be restarted. · Neurology input appreciated. · Repeat MRI of the brain-interim new infarct involving left caudate body. Unchanged recent infarct involving left lentiform nucleus and smaller foci in the left centrum semiovale  · Patient had reload of Plavix 300 mg on 8/1/2023.   · Patient underwent REBEKAH which showed no evidence of clots and loop recorder placement today. Type 2 diabetes mellitus, without long-term current use of insulin [facility])  Assessment & Plan  Lab Results   Component Value Date    HGBA1C 6.5 (H) 07/28/2023       Recent Labs     08/01/23  1540 08/01/23 2013 08/02/23  0704 08/02/23  1121   POCGLU 81 88 116 122       Blood Sugar Average: Last 72 hrs:  (P) 106.875     · Metformin has been on hold during hospitalization which can be restarted upon discharge. · Recent A1c 6.5  · Continue diabetic diet. Patient was on Humalog sliding scale. Smoker  Assessment & Plan  · Nicotine patch, smoking cessation    GERD (gastroesophageal reflux disease)  Assessment & Plan  · Continue PPI and Pepcid    Essential hypertension  Assessment & Plan  · Patient is on lisinopril which can be restarted upon discharge. Lisinopril was on hold during hospitalization to allow permissive hypertension. Anxiety and depression  Assessment & Plan  · Continue Zoloft, Xanax as needed    CVA (cerebral vascular accident) [facility])  Assessment & Plan  · As above          Medical Problems     Resolved Problems  Date Reviewed: 8/2/2023   None       Discharging Physician / Practitioner: Shagufta Lyons MD  PCP: Asher Block DO  Admission Date:   Admission Orders (From admission, onward)     Ordered        08/01/23 812 [facility] [address]  Inpatient Admission  Once            07/31/23 2123  Place in Observation  Once                      Discharge Date: 08/02/23    Consultations During Hospital Stay:  · Neurology    Procedures Performed:   · REBEKAH-preliminary report showed no clots or smoke  · Loop recorder placement    Significant Findings / Test Results:   · The brain showed evolving left basal ganglia ischemia  · CTA of the head and neck-left M1 M2 segment near occlusion is not patent and recanalized. · MRI of the brain with new infarct involving left caudate body.   Unchanged recent infarcts involving left lentiform nucleus and smaller foci in the left centrum semiovale       Outpatient Tests Requested:  CT chest to follow-up on lung nodules. Outpatient follow-up with neurology in 8 to 10 weeks    Complications: None    Reason for Admission: Slurred speech with confusion    Hospital Course:   Nicolette Nair is a 71 y.o. female patient with history of recent CVA, diabetes, hypertension, GERD, anxiety who originally presented to the hospital on 7/31/2023 due to worsening slurred speech with confusion and trouble with math. CAT scan of the brain was unremarkable in the ED. CT of the head and neck with left M1 M2 segment near occlusion which is recanalized. MRI of the brain later showed new infarct involving the left caudate body. Patient was seen by neurology and was reloaded with Plavix. Patient also had REBEKAH with loop recorder placement. Patient to be discharged home with an outpatient follow-up with neurology. Please see above list of diagnoses and related plan for additional information. Condition at Discharge: stable    Discharge Day Visit / Exam:   Subjective: Patient is feeling well. Anxious to go home. Denies any chest pain, headache, dizziness. Speech has improved. Denies any tingling numbness or weakness. Vitals: Blood Pressure: 164/79 (08/02/23 1403)  Pulse: 84 (08/02/23 1403)  Temperature: 97.8 °F (36.6 °C) (08/02/23 0736)  Temp Source: Oral (08/02/23 0736)  Respirations: 18 (08/02/23 1403)  Height: 5' 4" (162.6 cm) (08/02/23 1351)  Weight - Scale: 73.5 kg (162 lb) (08/02/23 1351)  SpO2: 95 % (08/02/23 1403)  Exam:   Physical Exam  Constitutional:       Appearance: Normal appearance. HENT:      Head: Normocephalic and atraumatic. Nose: Nose normal.      Mouth/Throat:      Mouth: Mucous membranes are moist.      Pharynx: Oropharynx is clear. Eyes:      Extraocular Movements: Extraocular movements intact. Pupils: Pupils are equal, round, and reactive to light.    Cardiovascular:      Rate and Rhythm: Normal rate and regular rhythm. Pulmonary:      Effort: Pulmonary effort is normal.      Breath sounds: Normal breath sounds. Abdominal:      General: Bowel sounds are normal. There is no distension. Palpations: Abdomen is soft. Tenderness: There is no abdominal tenderness. Musculoskeletal:         General: No swelling. Cervical back: Normal range of motion and neck supple. Skin:     General: Skin is warm and dry. Neurological:      General: No focal deficit present. Mental Status: She is alert. Discharge instructions/Information to patient and family:   See after visit summary for information provided to patient and family. Provisions for Follow-Up Care:  See after visit summary for information related to follow-up care and any pertinent home health orders. Disposition:   Home    Planned Readmission: No     Discharge Statement:  I spent 45 minutes discharging the patient. This time was spent on the day of discharge. I had direct contact with the patient on the day of discharge. Greater than 50% of the total time was spent examining patient, answering all patient questions, arranging and discussing plan of care with patient as well as directly providing post-discharge instructions. Additional time then spent on discharge activities. Discharge Medications:  See after visit summary for reconciled discharge medications provided to patient and/or family.       **Please Note: This note may have been constructed using a voice recognition system**

## 2023-08-02 NOTE — ANESTHESIA PREPROCEDURE EVALUATION
Procedure:  REBEKAH    Relevant Problems   CARDIO   (+) Essential hypertension      ENDO   (+) Type 2 diabetes mellitus, without long-term current use of insulin (HCC)      GI/HEPATIC   (+) GERD (gastroesophageal reflux disease)      NEURO/PSYCH   (+) Acute ischemic stroke (HCC)   (+) Anxiety and depression   (+) CVA (cerebral vascular accident) ([address])      PULMONARY   (+) Smoker        Physical Exam    Airway    Mallampati score: II  TM Distance: >3 FB  Neck ROM: full     Dental       Cardiovascular  Rhythm: regular, Rate: normal,     Pulmonary  Breath sounds clear to auscultation,     Other Findings        Anesthesia Plan  ASA Score- 3     Anesthesia Type- IV sedation with anesthesia with ASA Monitors. Additional Monitors:   Airway Plan:           Plan Factors-    Chart reviewed. Patient is a current smoker. Patient instructed to abstain from smoking on day of procedure. Patient did not smoke on day of surgery. Induction- intravenous. Postoperative Plan-     Informed Consent- Anesthetic plan and risks discussed with patient. I personally reviewed this patient with the CRNA. Discussed and agreed on the Anesthesia Plan with the CRNA. Diego Medina

## 2023-08-02 NOTE — ASSESSMENT & PLAN NOTE
Lab Results   Component Value Date    HGBA1C 6.5 (H) 07/28/2023       Recent Labs     08/01/23  1540 08/01/23 2013 08/02/23  0704 08/02/23  1121   POCGLU 81 88 116 122       Blood Sugar Average: Last 72 hrs:  (P) 106.875     · Metformin has been on hold during hospitalization which can be restarted upon discharge. · Recent A1c 6.5  · Continue diabetic diet. Patient was on Humalog sliding scale.

## 2023-08-03 NOTE — ED PROVIDER NOTES
History  Chief Complaint   Patient presents with   • Altered Mental Status     Here with  who states pt seemed tired today from previous stroke a couple of days ago. since 10 am voice started sounding very weak and since about 4pm lip seemed different, seems much more confused, unsure if she took any of her meds today     Patient is a 80-year-old female, past medical history including anxiety, diabetes, GERD, hypertension, and status post recent CVA, who presents the emergency department for difficulty speaking. Patient was recently discharged from the hospital after suffering a stroke. She was doing fine up until midmorning today when she started to slur her words. Her , who is at bedside, also noted that patient was having some difficulty performing simple mathematical problems. She subsequently presents for further evaluation. Patient states that she has noticed a change in her voice. Otherwise, she has no complaints. Denies any headache, dizziness, vision changes. No nausea, vomiting, or diarrhea. No chest pain or shortness of breath. No other complaints or concerns. Chart reviewed. Patient with recent admission for TIA/CVA. Initially presented on 7/28 after having transient right upper extremity weakness and right facial droop. CTA done in the emergency department revealed short segment critical high-grade stenosis/near occlusion of the M1 segment of the left MCA. There was also signs of acute ischemia of the left lentiform nucleus in the left insular ribbon. She was admitted on the stroke pathway and discharged on 7/29/2023. Prior to Admission Medications   Prescriptions Last Dose Informant Patient Reported? Taking?    ALPRAZolam (XANAX) 0.5 mg tablet 7/31/2023 Self Yes Yes   Sig: Take 0.5 mg by mouth 4 (four) times a day as needed for anxiety Pt takes one 0.5 mg tablet as needed for anxiety up to four times daily   Alcohol Swabs 70 % PADS   No No   Sig: May substitute brand based on insurance coverage. Check glucose BID. Blood Glucose Monitoring Suppl (OneTouch Verio Reflect) w/Device KIT   No No   Sig: May substitute brand based on insurance coverage. Check glucose BID. OneTouch Delica Lancets 90Z MISC   No No   Sig: May substitute brand based on insurance coverage. Check glucose BID. aluminum-magnesium hydroxide 200-200 MG/5ML suspension 7/31/2023  No Yes   Sig: Take 15 mL by mouth as needed for heartburn or indigestion   aspirin 325 mg tablet Not Taking  No No   Sig: Take 1 tablet (325 mg total) by mouth daily Do not start before October 28, 2023. aspirin 81 mg chewable tablet 7/31/2023  No Yes   Sig: Chew 1 tablet (81 mg total) daily Do not start before July 30, 2023. atorvastatin (LIPITOR) 80 mg tablet 7/31/2023  No Yes   Sig: Take 1 tablet (80 mg total) by mouth daily with dinner   clopidogrel (PLAVIX) 75 mg tablet 7/31/2023  No Yes   Sig: Take 1 tablet (75 mg total) by mouth daily Do not start before July 30, 2023.   famotidine (PEPCID) 20 mg tablet 7/31/2023  No Yes   Sig: Take 0.5 tablets (10 mg total) by mouth 2 (two) times a day   glucose blood (OneTouch Verio) test strip   No No   Sig: May substitute brand based on insurance coverage. Check glucose BID. lisinopril (ZESTRIL) 10 mg tablet 7/31/2023  No Yes   Sig: Take 1 tablet (10 mg total) by mouth daily Do not start before July 30, 2023. metFORMIN (GLUCOPHAGE) 500 mg tablet Unknown  No No   Sig: Take 1 tablet (500 mg total) by mouth daily with breakfast Do not start before July 31, 2023. nicotine (NICODERM CQ) 21 mg/24 hr TD 24 hr patch Not Taking  No No   Sig: Place 1 patch on the skin over 24 hours daily Do not start before July 30, 2023.    omeprazole (PriLOSEC) 20 mg delayed release capsule 7/31/2023  No Yes   Sig: Take it as you normally do at home   sertraline (ZOLOFT) 100 mg tablet 7/30/2023 Self Yes Yes   Sig: Take 100 mg by mouth daily with dinner      Facility-Administered Medications: None Past Medical History:   Diagnosis Date   • Anxiety    • CVA (cerebral vascular accident) ([address])    • Depression    • Diabetes ([address])    • GERD (gastroesophageal reflux disease)    • Hypertension        Past Surgical History:   Procedure Laterality Date   • APPENDECTOMY     • TONSILLECTOMY         History reviewed. No pertinent family history. I have reviewed and agree with the history as documented. E-Cigarette/Vaping   • E-Cigarette Use Never User      E-Cigarette/Vaping Substances     Social History     Tobacco Use   • Smoking status: Heavy Smoker     Packs/day: 1.50     Types: Cigarettes   • Smokeless tobacco: Never   Vaping Use   • Vaping Use: Never used   Substance Use Topics   • Alcohol use: Not Currently     Alcohol/week: 28.0 standard drinks of alcohol     Types: 28 Cans of beer per week     Comment: every night has "a few" beers   • Drug use: No       Review of Systems   Constitutional: Negative for chills and fever. Gastrointestinal: Negative for diarrhea, nausea and vomiting. Neurological: Positive for speech difficulty. Negative for dizziness, facial asymmetry and headaches. All other systems reviewed and are negative. Physical Exam  Physical Exam  Vitals and nursing note reviewed. Constitutional:       General: She is not in acute distress. Appearance: She is well-developed. She is not diaphoretic. HENT:      Head: Normocephalic and atraumatic. Right Ear: External ear normal.      Left Ear: External ear normal.      Nose: Nose normal.   Eyes:      General: Lids are normal. No scleral icterus. Cardiovascular:      Rate and Rhythm: Normal rate and regular rhythm. Heart sounds: Normal heart sounds. No murmur heard. No friction rub. No gallop. Pulmonary:      Effort: Pulmonary effort is normal. No respiratory distress. Breath sounds: Normal breath sounds. No wheezing or rales. Abdominal:      Palpations: Abdomen is soft. Tenderness:  There is no abdominal tenderness. There is no guarding or rebound. Musculoskeletal:         General: No deformity. Normal range of motion. Cervical back: Normal range of motion and neck supple. Skin:     General: Skin is warm and dry. Neurological:      General: No focal deficit present. Mental Status: She is alert and oriented to person, place, and time. GCS: GCS eye subscore is 4. GCS verbal subscore is 5. GCS motor subscore is 6. Cranial Nerves: Dysarthria present. No facial asymmetry. Sensory: Sensation is intact. No sensory deficit. Motor: Motor function is intact. No weakness.    Psychiatric:         Mood and Affect: Mood normal.         Behavior: Behavior normal.         Vital Signs  ED Triage Vitals   Temperature Pulse Respirations Blood Pressure SpO2   07/31/23 2110 07/31/23 2027 07/31/23 2027 07/31/23 2027 07/31/23 2027   97.6 °F (36.4 °C) 77 18 (!) 197/95 92 %      Temp Source Heart Rate Source Patient Position - Orthostatic VS BP Location FiO2 (%)   07/31/23 2110 07/31/23 2027 -- -- --   Oral Monitor         Pain Score       07/31/23 2236       No Pain           Vitals:    08/02/23 1256 08/02/23 1351 08/02/23 1354 08/02/23 1403   BP: (!) 202/91 (!) 202/91 (!) 175/85 164/79   Pulse: 77 (!) 113 88 84         Visual Acuity  Visual Acuity    Flowsheet Row Most Recent Value   L Pupil Size (mm) 3   R Pupil Size (mm) 3   L Pupil Shape Round   R Pupil Shape Round          ED Medications  Medications   iohexol (OMNIPAQUE) 350 MG/ML injection (SINGLE-DOSE) 85 mL (85 mL Intravenous Given 7/31/23 2048)   clopidogrel (PLAVIX) tablet 300 mg (300 mg Oral Given 8/1/23 1930)       Diagnostic Studies  Results Reviewed     Procedure Component Value Units Date/Time    HS Troponin I 2hr [[id number]]  (Normal) Collected: 07/31/23 2249    Lab Status: Final result Specimen: Blood from Arm, Left Updated: 07/31/23 2325     hs TnI 2hr 5 ng/L      Delta 2hr hsTnI 0 ng/L     FLU/RSV/COVID - if FLU/RSV clinically relevant [[id number]]  (Normal) Collected: 07/31/23 2054    Lab Status: Final result Specimen: Nares from Nose Updated: 07/31/23 2136     SARS-CoV-2 Negative     INFLUENZA A PCR Negative     INFLUENZA B PCR Negative     RSV PCR Negative    Narrative:      FOR PEDIATRIC PATIENTS - copy/paste COVID Guidelines URL to browser: [email or web address]    SARS-CoV-2 assay is a Nucleic Acid Amplification assay intended for the  qualitative detection of nucleic acid from SARS-CoV-2 in nasopharyngeal  swabs. Results are for the presumptive identification of SARS-CoV-2 RNA. Positive results are indicative of infection with SARS-CoV-2, the virus  causing COVID-19, but do not rule out bacterial infection or co-infection  with other viruses. Laboratories within the [facility] and its  [facility] are required to report all positive results to the appropriate  public health authorities. Negative results do not preclude SARS-CoV-2  infection and should not be used as the sole basis for treatment or other  patient management decisions. Negative results must be combined with  clinical observations, patient history, and epidemiological information. This test has not been FDA cleared or approved. This test has been authorized by FDA under an Emergency Use Authorization  (EUA). This test is only authorized for the duration of time the  declaration that circumstances exist justifying the authorization of the  emergency use of an in vitro diagnostic tests for detection of SARS-CoV-2  virus and/or diagnosis of COVID-19 infection under section 564(b)(1) of  the Act, 21 U. S.C. 922BDA-5(G)(4), unless the authorization is terminated  or revoked sooner. The test has been validated but independent review by FDA  and CLIA is pending. Test performed using [employer] GeneXpert: This RT-PCR assay targets N2,  a region unique to SARS-CoV-2.  A conserved region in the E-gene was chosen  for pan-Sarbecovirus detection which includes SARS-CoV-2. According to CMS-2020-01-R, this platform meets the definition of high-throughput technology.     HS Troponin 0hr (reflex protocol) [[id number]]  (Normal) Collected: 07/31/23 2054    Lab Status: Final result Specimen: Blood from Arm, Left Updated: 07/31/23 2124     hs TnI 0hr 5 ng/L     CBC and Platelet [[id number]]  (Normal) Collected: 07/31/23 2054    Lab Status: Final result Specimen: Blood from Arm, Left Updated: 07/31/23 2117     WBC 9.09 Thousand/uL      RBC 3.97 Million/uL      Hemoglobin 12.5 g/dL      Hematocrit 38.0 %      MCV 96 fL      MCH 31.5 pg      MCHC 32.9 g/dL      RDW 14.4 %      Platelets 196 Thousands/uL      MPV 9.4 fL     Basic metabolic panel [[id number]] Collected: 07/31/23 2054    Lab Status: Final result Specimen: Blood from Arm, Left Updated: 07/31/23 2116     Sodium 138 mmol/L      Potassium 3.7 mmol/L      Chloride 105 mmol/L      CO2 26 mmol/L      ANION GAP 7 mmol/L      BUN 17 mg/dL      Creatinine 0.70 mg/dL      Glucose 105 mg/dL      Calcium 9.6 mg/dL      eGFR 87 ml/min/1.73sq m     Narrative:      [place] guidelines for Chronic Kidney Disease (CKD):   •  Stage 1 with normal or high GFR (GFR > 90 mL/min/1.73 square meters)  •  Stage 2 Mild CKD (GFR = 60-89 mL/min/1.73 square meters)  •  Stage 3A Moderate CKD (GFR = 45-59 mL/min/1.73 square meters)  •  Stage 3B Moderate CKD (GFR = 30-44 mL/min/1.73 square meters)  •  Stage 4 Severe CKD (GFR = 15-29 mL/min/1.73 square meters)  •  Stage 5 End Stage CKD (GFR <15 mL/min/1.73 square meters)  Note: GFR calculation is accurate only with a steady state creatinine    Protime-INR [[id number]]  (Normal) Collected: 07/31/23 2054    Lab Status: Final result Specimen: Blood from Arm, Left Updated: 07/31/23 2112     Protime 12.2 seconds      INR 0.89    APTT [[id number]]  (Normal) Collected: 07/31/23 2054    Lab Status: Final result Specimen: Blood from Arm, Left Updated: 07/31/23 2112     PTT 29 seconds     Fingerstick Glucose (POCT) [[id number]]  (Normal) Collected: 07/31/23 2024    Lab Status: Final result Updated: 07/31/23 2026     POC Glucose 106 mg/dl                  MRI brain wo contrast   Final Result by Rosalee Schwab, MD (08/01 1409)      Interim new infarct involving left caudate body. Unchanged recent infarcts involving left lentiform nucleus and smaller foci in the left centrum semiovale. No mass effect or hemorrhagic transformation. Workstation performed: [id number]         CTA stroke alert (head/neck)   Final Result by Mary Kenny MD (07/31 2156)      Left M1/M2 segment previous area of near occlusion is now patent/recanalized. Small subcentimeter nodules in the upper lobes in addition to some emphysematous changes. With history of smoking, follow-up with screening low-dose unenhanced chest CT is recommended on a nonemergent outpatient basis. I personally discussed this study with Dr. Michelle Mederos on 7/31/2023 8:42 PM.                           Workstation performed: [id number]         CT stroke alert brain   Final Result by Mary Kenny MD (07/31 2046)      No evidence of acute intracranial hemorrhage. Evolving left basal ganglia ischemia.          I personally discussed this study with Dr. Michelle Mederos on 7/31/2023 8:42 PM.            Workstation performed: [id number]                    Procedures  ECG 12 Lead Documentation Only    Date/Time: 8/2/2023 11:46 PM    Performed by: Caryn Agudelo DO  Authorized by: Caryn Agudelo DO    ECG reviewed by me, the ED Provider: yes    Patient location:  ED  Interpretation:     Interpretation: normal    Rate:     ECG rate:  81    ECG rate assessment: normal    Rhythm:     Rhythm: sinus rhythm    Ectopy:     Ectopy: none    QRS:     QRS axis:  Normal  Conduction:     Conduction: normal    ST segments:     ST segments:  Normal  T waves:     T waves: normal    CriticalCare Time    Date/Time: 7/31/2023 8:19 PM    Performed by: Lorie Feldman DO  Authorized by: Lorie Feldman DO    Critical care provider statement:     Critical care time (minutes):  31    Critical care start time:  7/31/2023 8:19 PM    Critical care end time:  7/31/2023 9:14 PM    Critical care time was exclusive of:  Separately billable procedures and treating other patients    Critical care was necessary to treat or prevent imminent or life-threatening deterioration of the following conditions:  CNS failure or compromise    Critical care was time spent personally by me on the following activities:  Obtaining history from patient or surrogate, development of treatment plan with patient or surrogate, discussions with consultants, examination of patient, review of old charts, re-evaluation of patient's condition, ordering and review of radiographic studies and ordering and review of laboratory studies    I assumed direction of critical care for this patient from another provider in my specialty: no               ED Course  ED Course as of 08/02/23 2350   Mon Jul 31, 2023 2048 CT stroke alert brain  No evidence of acute intracranial hemorrhage. Evolving left basal ganglia ischemia. 2100 Discussed with neuro. Admit stroke pathway. No change to meds. Keep on telemetry. 2123 Discussed with SLIM.  54[facility] admission                  Stroke Assessment     Row Name 07/31/23 2028             NIH Stroke Scale    Interval Baseline      Level of Consciousness (1a.) 0      LOC Questions (1b.) 0      LOC Commands (1c.) 0      Best Gaze (2.) 0      Visual (3.) 0      Facial Palsy (4.) 1      Motor Arm, Left (5a.) 0      Motor Arm, Right (5b.) 0      Motor Leg, Left (6a.) 0      Motor Leg, Right (6b.) 0      Limb Ataxia (7.) 0      Sensory (8.) 0      Best Language (9.) 0      Dysarthria (10.) 1      Extinction and Inattention (11.) (Formerly Neglect) 0      Total 2              Flowsheet Row Most Recent Value   Thrombolytic Decision Options Thrombolytic Decision Patient not a candidate. Patient is not a candidate options Unclear time of onset outside appropriate time window., Recent significant trauma/stroke. Medical Decision Making  Patient is a 71 y.o. female who presents to the ED for dysarthria. Patient is nontoxic, well-appearing. Vitals are stable. On exam she has mild slurred speech. Symptoms concerning for acute CVA (recrudescence of old stroke versus new stroke). EKG without evidence of STEMI or ischemia, fingerstick BS not hypoglycemic, and clinical picture does not suggest other stroke mimic. I considered, but think less likely that symptoms are secondary to a dissection, AMI, hypoglycemia, other metabolic derangement including hepatic/uremic encephalopathy, medication side effect, or post-ictal Saurabh’s paralysis. Plan: CT CTA Head and Neck w/ and w/out contrast, stroke labs, EKG, Neurology stroke consult, admit                 Portions of the record may have been created with voice recognition software. Occasional wrong word or "sound a like" substitutions may have occurred due to the inherent limitations of voice recognition software. Read the chart carefully and recognize, using context, where substitutions have occurred. CVA (cerebral vascular accident) [facility]): acute illness or injury  Amount and/or Complexity of Data Reviewed  External Data Reviewed: radiology and notes. Labs: ordered. Radiology: ordered. Decision-making details documented in ED Course. ECG/medicine tests: ordered and independent interpretation performed. Risk  Prescription drug management. Decision regarding hospitalization.           Disposition  Final diagnoses:   CVA (cerebral vascular accident) [facility])   Dysarthria     Time reflects when diagnosis was documented in both MDM as applicable and the Disposition within this note     Time User Action Codes Description Comment    7/31/2023  8:27 PM Catarino Tran Add [I63.9] CVA (cerebral vascular accident) ([address])     8/1/2023 12:11 AM Uriel Contreras Add [R29.90] Stroke-like symptoms     8/2/2023  8:43 AM Marylen Gauss Add [I63.9] Acute ischemic stroke ([address])     8/2/2023 11:50 PM Darnell Castro Add [R47.1] Dysarthria       ED Disposition     ED Disposition   Admit    Condition   Stable    Date/Time   Wed Aug 2, 2023 11:50 PM    Comment   Case was discussed with SUZETTE and the patient's admission status was agreed to be Admission Status: observation status to the service of Dr. Josue Nathan .            Follow-up Information     Follow up With Specialties Details Why Contact Info    Nely Bangura MD Neurology Follow up in 8 week(s)  [address] [facility],  Cardiology Follow up in 4 week(s)  [address] [facility]  [phone]            Discharge Medication List as of 8/2/2023  4:06 PM      CONTINUE these medications which have NOT CHANGED    Details   ALPRAZolam (XANAX) 0.5 mg tablet Take 0.5 mg by mouth 4 (four) times a day as needed for anxiety Pt takes one 0.5 mg tablet as needed for anxiety up to four times daily, Historical Med      aluminum-magnesium hydroxide 200-200 MG/5ML suspension Take 15 mL by mouth as needed for heartburn or indigestion, Starting Sat 7/29/2023, No Print      aspirin 81 mg chewable tablet Chew 1 tablet (81 mg total) daily Do not start before July 30, 2023., Starting Sun 7/30/2023, Until Sat 10/28/2023, Normal      atorvastatin (LIPITOR) 80 mg tablet Take 1 tablet (80 mg total) by mouth daily with dinner, Starting Sat 7/29/2023, Normal      clopidogrel (PLAVIX) 75 mg tablet Take 1 tablet (75 mg total) by mouth daily Do not start before July 30, 2023., Starting Sun 7/30/2023, Normal      famotidine (PEPCID) 20 mg tablet Take 0.5 tablets (10 mg total) by mouth 2 (two) times a day, Starting Sat 7/29/2023, No Print      lisinopril (ZESTRIL) 10 mg tablet Take 1 tablet (10 mg total) by mouth daily Do not start before July 30, 2023., Starting Sun 7/30/2023, No Print      omeprazole (PriLOSEC) 20 mg delayed release capsule Take it as you normally do at home, No Print      sertraline (ZOLOFT) 100 mg tablet Take 100 mg by mouth daily with dinner, Historical Med      Alcohol Swabs 70 % PADS May substitute brand based on insurance coverage. Check glucose BID., Normal      aspirin 325 mg tablet Take 1 tablet (325 mg total) by mouth daily Do not start before October 28, 2023., Starting Sat 10/28/2023, No Print      Blood Glucose Monitoring Suppl (OneTouch Verio Reflect) w/Device KIT May substitute brand based on insurance coverage. Check glucose BID., Normal      glucose blood (OneTouch Verio) test strip May substitute brand based on insurance coverage. Check glucose BID., Normal      metFORMIN (GLUCOPHAGE) 500 mg tablet Take 1 tablet (500 mg total) by mouth daily with breakfast Do not start before July 31, 2023., Starting Mon 7/31/2023, Normal      nicotine (NICODERM CQ) 21 mg/24 hr TD 24 hr patch Place 1 patch on the skin over 24 hours daily Do not start before July 30, 2023., Starting Sun 7/30/2023, Normal      OneTouch Delica Lancets 78Z MISC May substitute brand based on insurance coverage.  Check glucose BID., Normal             Outpatient Discharge Orders   Discharge Diet     Activity as tolerated       PDMP Review     None          ED Provider  Electronically Signed by           James Corrales DO  08/02/23 1162

## 2023-08-03 NOTE — UTILIZATION REVIEW
NOTIFICATION OF ADMISSION DISCHARGE   This is a Notification of Discharge from [facility]. Please be advised that this patient has been discharge from our facility. Below you will find the admission and discharge date and time including the patient’s disposition. UTILIZATION REVIEW CONTACT:  Kallie Pompa  Utilization   Network Utilization Review Department  Phone: [phone] x carefully listen to the prompts. All voicemails are confidential.  Email: [email or web address]     ADMISSION INFORMATION  PRESENTATION DATE: 7/31/2023  8:18 PM  OBERVATION ADMISSION DATE:   INPATIENT ADMISSION DATE: 8/1/23  6:29 PM   DISCHARGE DATE: 8/2/2023  5:54 PM   DISPOSITION:Home/Self Care    IMPORTANT INFORMATION:  Send all requests for admission clinical reviews, approved or denied determinations and any other requests to dedicated fax number below belonging to the campus where the patient is receiving treatment.  List of dedicated fax numbers:  [place] DENIALS (Administrative/Medical Necessity) [phone] [address] [facility] (Maternity/NICU/Pediatrics) [phone]   [facility] [phone]   [facility] [phone] [address] [phone]   [address] [place] [address] [phone]   [facility] [phone]   [address] [place] [address] [address] [facility] [phone]   [address] [place] [address] [phone] [address] [facility] [phone]   [address] [facility] [address] [phone]

## 2023-08-07 ENCOUNTER — TELEPHONE (OUTPATIENT)
Dept: NEUROLOGY | Facility: CLINIC | Age: 73
End: 2023-08-07

## 2023-08-07 NOTE — TELEPHONE ENCOUNTER
[facility]:    CHAPITO 7/21/23/CARRIE    SCHEDULED PATIENT WITH PAULINO VILLANUEVA IN [facility] 12/15/23 @ 2PM ADDED PATIENT TO WAIT LIST      PER NOTES:     Elizabeth Valverde will need follow up in 8-10 weeks with general attending or advance practitioner.  She will not require outpatient neurological testing.

## 2023-08-08 NOTE — TELEPHONE ENCOUNTER
The soonest available appointment in on 11/08/23 @ 3:15pm with Ozzy Chirinos, you can offer the patient that appointment and place the patient on the wait list for the next available appointment.     Please assist the patient with scheduling

## 2023-08-15 NOTE — TELEPHONE ENCOUNTER
Contacted the patient to offer the appointment on 08/28/23 @11:30, the patient  declined stating that he has other appointments that is currently scheduled for that day. I informed the  that the patient will stay on the wait list and once another appointment become available I will give him a call.

## 2023-08-21 ENCOUNTER — OFFICE VISIT (OUTPATIENT)
Dept: NEUROLOGY | Facility: CLINIC | Age: 73
End: 2023-08-21
Payer: COMMERCIAL

## 2023-08-21 VITALS
BODY MASS INDEX: 25.78 KG/M2 | SYSTOLIC BLOOD PRESSURE: 160 MMHG | OXYGEN SATURATION: 95 % | HEIGHT: 64 IN | DIASTOLIC BLOOD PRESSURE: 80 MMHG | WEIGHT: 151 LBS | HEART RATE: 87 BPM | TEMPERATURE: 96 F

## 2023-08-21 DIAGNOSIS — I69.30 CHRONIC ISCHEMIC LEFT MCA STROKE: ICD-10-CM

## 2023-08-21 DIAGNOSIS — E11.9 TYPE 2 DIABETES MELLITUS (HCC): ICD-10-CM

## 2023-08-21 DIAGNOSIS — R47.89 WORD FINDING DIFFICULTY: Primary | ICD-10-CM

## 2023-08-21 PROCEDURE — 99214 OFFICE O/P EST MOD 30 MIN: CPT | Performed by: PSYCHIATRY & NEUROLOGY

## 2023-08-21 RX ORDER — CLOPIDOGREL BISULFATE 75 MG/1
75 TABLET ORAL DAILY
Qty: 30 TABLET | Refills: 3 | Status: SHIPPED | OUTPATIENT
Start: 2023-08-21

## 2023-08-21 RX ORDER — THIAMINE HCL 100 MG
100 TABLET ORAL DAILY
COMMUNITY
Start: 2023-08-14

## 2023-08-21 NOTE — PROGRESS NOTES
Return NeuroOutpatient Note        Rosa Lazar  [id number]  71 y.o.  1950       CVA (cerebral vascular accident) and Type 2 diabetes mellitus, without long-term current use of         History obtained from:  Patient and      HPI/Subjective:    Rosa Lazar is a 72 yo F with PMH of recent stroke returns as f/u. Patient was seen by us at [place] on 23 and had suffered an acute left BG stroke. After discharge, she couldn't get supply of DAPT and there were some misses dosages and she returned with new infarcts within same region 2-3 days later. Patient had also started smoking that weekend. She has now stopped smoking and has been asked to take DAPT for 3 months. She had REBEKAH and had loop placed to look for A fib. Her CTA had revealed left M1/M2 segment near occlusion which was later shown to be recanalized. From stroke, patient has persistent right facial droop, word finding difficulty. She has no extremity weakness. Her A1c was 6.5 and she is started on metformin. Her LDL was 126. She was started on lipitor 80mg daily. She does have pulmonary nodules that need repeat study in future which is being addressed by her PCP.          Past Medical History:   Diagnosis Date   • Anxiety    • CVA (cerebral vascular accident) ([address])    • Depression    • Diabetes ([address])    • GERD (gastroesophageal reflux disease)    • Hypertension      Social History     Socioeconomic History   • Marital status: /Civil Union     Spouse name: Not on file   • Number of children: Not on file   • Years of education: Not on file   • Highest education level: Not on file   Occupational History   • Not on file   Tobacco Use   • Smoking status: Former     Packs/day: 1.50     Types: Cigarettes     Quit date: 2023     Years since quittin.0   • Smokeless tobacco: Never   Vaping Use   • Vaping Use: Never used   Substance and Sexual Activity   • Alcohol use: Not Currently     Alcohol/week: 28.0 standard drinks of alcohol     Types: 28 Cans of beer per week     Comment: every night has "a few" beers   • Drug use: No   • Sexual activity: Not on file   Other Topics Concern   • Not on file   Social History Narrative   • Not on file     Social Determinants of Health     Financial Resource Strain: Not on file   Food Insecurity: No Food Insecurity (8/1/2023)    Hunger Vital Sign    • Worried About Running Out of Food in the Last Year: Never true    • Ran Out of Food in the Last Year: Never true   Transportation Needs: No Transportation Needs (8/1/2023)    PRAPARE - Transportation    • Lack of Transportation (Medical): No    • Lack of Transportation (Non-Medical): No   Physical Activity: Not on file   Stress: Not on file   Social Connections: Not on file   Intimate Partner Violence: Not on file   Housing Stability: Low Risk  (8/1/2023)    Housing Stability Vital Sign    • Unable to Pay for Housing in the Last Year: No    • Number of Places Lived in the Last Year: 1    • Unstable Housing in the Last Year: No     History reviewed. No pertinent family history. Allergies   Allergen Reactions   • Amoxil [Amoxicillin] Other (See Comments)     "mouth felt on fire"     Current Outpatient Medications on File Prior to Visit   Medication Sig Dispense Refill   • Alcohol Swabs 70 % PADS May substitute brand based on insurance coverage.  Check glucose BID. 100 each 0   • ALPRAZolam (XANAX) 0.5 mg tablet Take 0.5 mg by mouth 4 (four) times a day as needed for anxiety Pt takes one 0.5 mg tablet as needed for anxiety up to four times daily     • aspirin 81 mg chewable tablet Chew 1 tablet (81 mg total) daily Do not start before July 30, 2023. 90 tablet 0   • atorvastatin (LIPITOR) 80 mg tablet Take 1 tablet (80 mg total) by mouth daily with dinner 30 tablet 0   • famotidine (PEPCID) 20 mg tablet Take 0.5 tablets (10 mg total) by mouth 2 (two) times a day  0   • lisinopril (ZESTRIL) 10 mg tablet Take 1 tablet (10 mg total) by mouth daily Do not start before July 30, 2023.  0   • omeprazole (PriLOSEC) 20 mg delayed release capsule Take it as you normally do at home  0   • sertraline (ZOLOFT) 100 mg tablet Take 100 mg by mouth daily with dinner 2 tablets daily     • thiamine (VITAMIN B1) 100 mg tablet Take 100 mg by mouth daily     • [DISCONTINUED] clopidogrel (PLAVIX) 75 mg tablet Take 1 tablet (75 mg total) by mouth daily Do not start before July 30, 2023. 30 tablet 0   • [DISCONTINUED] metFORMIN (GLUCOPHAGE) 500 mg tablet Take 1 tablet (500 mg total) by mouth daily with breakfast Do not start before July 31, 2023. 30 tablet 0   • aluminum-magnesium hydroxide 200-200 MG/5ML suspension Take 15 mL by mouth as needed for heartburn or indigestion (Patient not taking: Reported on 8/21/2023)  0   • [START ON 10/28/2023] aspirin 325 mg tablet Take 1 tablet (325 mg total) by mouth daily Do not start before October 28, 2023. (Patient not taking: Reported on 8/21/2023 Do not start before October 28, 2023.)  0   • Blood Glucose Monitoring Suppl (OneTouch Verio Reflect) w/Device KIT May substitute brand based on insurance coverage. Check glucose BID. (Patient not taking: Reported on 8/21/2023) 1 kit 0   • glucose blood (OneTouch Verio) test strip May substitute brand based on insurance coverage. Check glucose BID. (Patient not taking: Reported on 8/21/2023) 100 each 0   • nicotine (NICODERM CQ) 21 mg/24 hr TD 24 hr patch Place 1 patch on the skin over 24 hours daily Do not start before July 30, 2023. (Patient not taking: Reported on 8/21/2023) 28 patch 0   • OneTouch Delica Lancets 93N MISC May substitute brand based on insurance coverage. Check glucose BID. (Patient not taking: Reported on 8/21/2023) 100 each 0     No current facility-administered medications on file prior to visit. Review of Systems   Refer to positive review of systems in HPI.    Review of Systems    Constitutional- No fever  Eyes- No visual change  ENT- Hearing normal  CV- No chest pain  Resp- No Shortness of breath  GI- No diarrhea  - Bladder normal  MS- No Arthritis   Skin- No rash  Psych- No depression  Endo- No DM  Heme- No nodes    Vitals:    08/21/23 1356   BP: 160/80   BP Location: Left arm   Patient Position: Sitting   Cuff Size: Standard   Pulse: 87   Temp: (!) 96 °F (35.6 °C)   TempSrc: Tympanic   SpO2: 95%   Weight: 68.5 kg (151 lb)   Height: 5' 4" (1.626 m)       PHYSICAL EXAM:  Appearance: No Acute Distress  Ophthalmoscopic: Disc Flat, Normal fundus  Mental status:  Orientation: Awake, Alert, and Orientedx3  Memory: Registation 3/3 Recall 3/3  Attention: normal  Knowledge: good  Language: No aphasia  Speech: No dysarthria  Cranial Nerves:  2 No Visual Defect on Confrontation, Pupils round, equal, reactive to light  3,4,6 Extraocular Movements Intact, no nystagmus  5 Facial Sensation Intact  7 right facial asymmetry   8 Intact hearing  9,10 Palate symmetric, normal gag  11 Good shoulder shrug  12 Tongue Midline  Gait: Stable  Coordination: No ataxia with finger to nose testing, and heel to shin  Sensory: Intact, Symmetric to pinprick, light touch, vibration, and joint position  Muscle Tone: Normal              Muscle exam:  Arm Right Left Leg Right Left   Deltoid 5/5 5/5 Iliopsoas 5/5 5/5   Biceps 5/5 5/5 Quads 5/5 5/5   Triceps 5/5 5/5 Hamstrings 5/5 5/5   Wrist Extension 5/5 5/5 Ankle Dorsi Flexion 5/5 5/5   Wrist Flexion 5/5 5/5 Ankle Plantar Flexion 5/5 5/5   Interossei 5/5 5/5 Ankle Eversion 5/5 5/5   APB 5/5 5/5 Ankle Inversion 5/5 5/5       Reflexes   RJ BJ TJ KJ AJ Plantars [place]jillo's   Right 2+ 2+ 2+ 2+ 2+ Downgoing Not present   Left 2+ 2+ 2+ 2+ 2+ Downgoing Not present     Personal review of  Labs:                    Diagnoses and all orders for this visit:      1. Word finding difficulty  Ambulatory Referral to Speech Therapy      2. Chronic ischemic left MCA stroke  clopidogrel (PLAVIX) 75 mg tablet    Ambulatory Referral to Speech Therapy      3.  Type 2 diabetes mellitus ([address])  metFORMIN (GLUCOPHAGE) 500 mg tablet            Patient has minor speech impediment as her residual defect from stroke. Will refer her for speech therapy. She is to resume DAPT until early Nov.   She has loop in place. She is physically active. She is to resume lipitor 80mg daily.              Total time of encounter:  30 min  More than 50% of the time was used in counseling and/or coordination of care  Extent of counseling and/or coordination of care        MD [facility]  [address] [place] [address]  [phone]

## 2023-08-28 ENCOUNTER — IN-CLINIC DEVICE VISIT (OUTPATIENT)
Dept: CARDIOLOGY CLINIC | Facility: CLINIC | Age: 73
End: 2023-08-28
Payer: COMMERCIAL

## 2023-08-28 DIAGNOSIS — Z95.818 PRESENCE OF OTHER CARDIAC IMPLANTS AND GRAFTS: Primary | ICD-10-CM

## 2023-08-28 PROCEDURE — 93291 INTERROG DEV EVAL SCRMS IP: CPT | Performed by: INTERNAL MEDICINE

## 2023-08-28 NOTE — PROGRESS NOTES
Results for orders placed or performed in visit on 08/28/23   Cardiac EP device report    Narrative    MDT [address] [facility] INTERROGATED IN THE [place] OFFICE: BATTERY VOLTAGE ADEQUATE (GOOD). PRESENTING RHYTHM NS 68 BPM. NO PATIENT OR DEVICE ACTIVATED EPISODES. WOUND CHECK: INCISION CLEAN AND DRY WITH EDGES APPROXIMATED; WOUND CARE AND RESTRICTIONS REVIEWED WITH PATIENT. NORMAL DEVICE FUNCTION.  AM/NC

## 2023-08-30 ENCOUNTER — EVALUATION (OUTPATIENT)
Dept: SPEECH THERAPY | Facility: CLINIC | Age: 73
End: 2023-08-30
Payer: COMMERCIAL

## 2023-08-30 DIAGNOSIS — R47.89 WORD FINDING DIFFICULTY: Primary | ICD-10-CM

## 2023-08-30 DIAGNOSIS — I69.30 CHRONIC ISCHEMIC LEFT MCA STROKE: ICD-10-CM

## 2023-08-30 PROCEDURE — 92523 SPEECH SOUND LANG COMPREHEN: CPT

## 2023-08-30 NOTE — PROGRESS NOTES
Speech-Language Pathology Initial Evaluation    Today's date: 2023   Patient’s name: Dwayne Dale  : 1950  MRN: [id number]  Safety measures:   Referring provider: Jodie Osborn MD    Encounter Diagnosis     ICD-10-CM    1. Chronic ischemic left MCA stroke  I69.30 Ambulatory Referral to Speech Therapy      2. Word finding difficulty  R47.89 Ambulatory Referral to Speech Therapy        Visit tracking:  -Referring provider: Keo Redd MD  -Billing guidelines: CMS  -Visit # 1  -Insurance: [facility] Medicare  -RE due 2023    Subjective comments: Patient arrived with her spouse to the evaluation. Patient's goal(s): Improved word finding    Reason for referral: Decreased language skills  Prior functional status: Communication effective and appropriate in all situations  Clinically complex situations: Discharge from SNF or Hospital in the last 30 days    History: Patient is a 71 y.o. female who was referred to outpatient skilled Speech Therapy services for a speech-language evaluation s/p a stroke in 2023. Patient reported she was washing dishes when she couldn't hold onto the dishes anymore. This prompted her to go to the ED. MRI () showed acute/early subacute lacunar infarcts in the left basal ganglia and left centrum semiovale. Patient was discharged returned to the hospital again for an evaluation of more stroke-like symptoms. MRI () showed new infarct involving left caudate body. Unchanged recent infarcts involving left lentiform nucleus and smaller foci in the left centrum semiovale. Patient mainly reported difficulty with "remembering things", specifically words. Her spouse reported she had difficulty with inputting the correct numbers into the remote while watching TV. Patient noted with slight R facial droop. She declined any difficulty with swallowing. Of note, patient noted with slight droop of L eye.  Patient and her spouse were unable to state whether this was baseline. Patient declined any new stoke-like symptoms. Blood pressure noted to be 140/70. SLP advised patient to go to the emergency room if she begins to notice any new symptoms. Mental status: Alert  Behavior status: Cooperative  Communication modalities: Verbal  Rehabilitation prognosis: Excellent rehab potential to reach and maintain prior level of function       Assessments    The Western Aphasia Battery-Revised (WAB-R) is designed to evaluate a patient's language function following CVA, dementia, or other acquired neurological disorder. It measures a patient’s linguistic skills, such as speech content, fluency, auditory comprehension, repetition, naming, reading, and writing. The WAB-R also measures a patient’s nonlinguistic skills, including drawing, calculation, block design, and apraxia. The purpose of this standardized assessment is to (1) determine the presence, severity, and type of aphasia; (2) measure the patient's level of performance to provide a baseline for detecting change over time; (3) provide a comprehensive assessment of the patient's language assets and deficits in order to guide treatment and management; and (4) infer the location and etiology of the lesion causing aphasia. 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 Due to time constraints, part 2 was not completed. Scores to be reported in the next session. Goals    Short-term goals:  1. Patient will complete the WAB - 2 to further assess reading, writing, and cognitive linguistic skills. 2. Patient will completed mid-high level word finding tasks with 80% accuracy. Goals to be addended as appropriate according to additional testing. Long-term goals:  1.  Patient with improve expressive and receptive language skills. Impressions/Recommendations    Impressions:   Patient presents with mild mixed receptive/expressive aphasia characterized by reduced comprehension of mildly complex information and 3-step commands. Expressively, Jessica Andrews speaks in short sentences and demonstrates frequnt pauses for word finding. She had difficulty with the generative naming task and was able to name 7 items in a concrete category (animals) in one minute. She had difficulty with word finding during conversation, and relied on her  to provide details. Jessica Andrews would benefit from skilled therapy targeting expansion of expressive language and word finding. Recommendations:  -Patient would benefit from outpatient skilled Speech Therapy services: Speech-language therapy    -Frequency: 2x weekly  -Duration: 4-6 weeks    -Intervention certification from: 6/17/8542  -Intervention certification to: 37/73/3186    -Intervention comments: Jessica Andrews participated in all assessments.

## 2023-09-06 ENCOUNTER — OFFICE VISIT (OUTPATIENT)
Dept: SPEECH THERAPY | Facility: CLINIC | Age: 73
End: 2023-09-06
Payer: COMMERCIAL

## 2023-09-06 ENCOUNTER — OFFICE VISIT (OUTPATIENT)
Dept: CARDIOLOGY CLINIC | Facility: CLINIC | Age: 73
End: 2023-09-06
Payer: COMMERCIAL

## 2023-09-06 VITALS
OXYGEN SATURATION: 95 % | HEART RATE: 74 BPM | WEIGHT: 150 LBS | DIASTOLIC BLOOD PRESSURE: 64 MMHG | HEIGHT: 64 IN | SYSTOLIC BLOOD PRESSURE: 122 MMHG | BODY MASS INDEX: 25.61 KG/M2

## 2023-09-06 DIAGNOSIS — I69.30 CHRONIC ISCHEMIC LEFT MCA STROKE: Primary | ICD-10-CM

## 2023-09-06 DIAGNOSIS — Z95.818 PRESENCE OF OTHER CARDIAC IMPLANTS AND GRAFTS: Primary | ICD-10-CM

## 2023-09-06 DIAGNOSIS — R47.89 WORD FINDING DIFFICULTY: ICD-10-CM

## 2023-09-06 PROCEDURE — 93000 ELECTROCARDIOGRAM COMPLETE: CPT | Performed by: PHYSICIAN ASSISTANT

## 2023-09-06 PROCEDURE — 99204 OFFICE O/P NEW MOD 45 MIN: CPT | Performed by: PHYSICIAN ASSISTANT

## 2023-09-06 PROCEDURE — 92507 TX SP LANG VOICE COMM INDIV: CPT

## 2023-09-06 NOTE — PROGRESS NOTES
Consultation - Cardiology Office  [facility]. Dwayne Dale 71 y.o. female MRN: [id number]  : 1950  Unit/Bed#:  Encounter: [id number]      Assessment/Plan:     1. Hypertension.    - BP acceptable. - Continue lisinopril 10 mg daily. 2. Hyperlipidemia. - Currently on Lipitor 80 mg daily. - 23 lipid panel: Cholesterol 216, triglycerides 97, HDL 71, .    3. CVA. - Left basal ganglia and left centrum semiovale CVA (2023). - Was admitted 23-23 due to CVA (presented with slurred speech and right hand weakness). 23 MRI demonstrated acute/early subacute lacunar infarcts in the left basal ganglia and left centrum semiovale. She was discharged on DAPT and Lipitor however patient missed 2 days worth of medications and started smoking on discharge. She represented on 23 for slurred speech and recurrent right hand weakness. She was admitted from 23-23. Patient had loop recorder implant placed on 23.   - Currently on Plavix 75 mg daily and aspirin 81 mg daily. - Currently on Lipitor 80 mg daily.  - Evaluated by Neurology outpatient on 2023. Plan for DAPT for 3 months. 4. S/p loop recorder implantation.     - s/p loop recorder implantation ([employer]) on 23 in setting of cryptogenic stroke. - 23 cardiac EP device report: Battery voltage adequate. Presenting rhythm normal sinus, 60 bpm.  No patient or device activated episodes. Normal device function. 5. Type II diabetes. - 23 HgbA1c: 6.5.   - Currently on metformin 500 mg daily. - Lifestyle and dietary modifications discussed with patient. 6. GERD.    - Currently on omeprazole 20 mg daily and Pepcid 10 mg twice daily. 7. Tobacco abuse. - Patient with recent history of tobacco abuse. Patient reports she has stopped smoking.     Discussion summary and Plan:      Patient / Mehul Rocha was advised and educated to call our office  immediately if patient has any new symptoms of chest pain/shortness of breath, near-syncope, syncope, light headedness sustained palpitations or any other cardiovascular symptoms before their scheduled follow-up appointment. Office number was provided #[phone]. Thank you for your consultation. If you have any question please call me at [phone]    Counseling :  A description of the counseling. Goals and Barriers. Patient's ability to self care: Yes  Medication side effect reviewed with patient in detail and all their questions answered to their satisfaction. Primary Care Physician Requesting Consult: Isael Patterson DO    Reason for Consult / Principal Problem: follow up s/p loop recorder implantation on 8/02/23. HPI :     Fiorella Gil is a 71y.o. year old female who was referred by primary care doctor s/p loop recorder implantation on 8/02/23. Patient has a PMHx of HTN, HLD, CVA (8/2023), DMII, and GERD. She was admitted 7/28/23-7/29/23 due to CVA (presented with slurred speech and right hand weakness). 7/29/23 MRI demonstrated acute/early subacute lacunar infarcts in the left basal ganglia and left centrum semiovale. She was discharged on DAPT and Lipitor however patient missed 2 days worth of medications and started smoking on discharge. She represented on 7/31/23 for slurred speech and recurrent right hand weakness. She was admitted from 7/31/23-8/02/23. Patient had loop recorder implant placed on 8/02/23. Patient reports since discharge on 8/02 she has felt well, periodically fatigued. She reports she has stopped smoking and drinking, states she has no issues with cravings at this time. States that she has not been using nicotine supplementation. Denies experiencing chest pain, palpitations, shortness of breath, changes in vision, changes in speech, extremity weakness, lightheadedness, dizziness, headache, nausea, or vomiting.     Review of Systems   Constitutional: Positive for fatigue. Negative for activity change, chills, diaphoresis and unexpected weight change. Respiratory: Negative for chest tightness, shortness of breath and wheezing. Cardiovascular: Negative for chest pain, palpitations and leg swelling. Historical Information   Past Medical History:   Diagnosis Date   • Anxiety    • CVA (cerebral vascular accident) ([address])    • Depression    • Diabetes ([address])    • GERD (gastroesophageal reflux disease)    • Hypertension      Past Surgical History:   Procedure Laterality Date   • APPENDECTOMY     • CARDIAC ELECTROPHYSIOLOGY PROCEDURE N/A 2023    Procedure: Cardiac loop recorder implant;  Surgeon: Aloha Nageotte, DO;  Location: [address] [place] [address] CATH LAB; Service: Cardiology   • TONSILLECTOMY       Social History     Substance and Sexual Activity   Alcohol Use Not Currently   • Alcohol/week: 28.0 standard drinks of alcohol   • Types: 28 Cans of beer per week    Comment: every night has "a few" beers     Social History     Substance and Sexual Activity   Drug Use No     Social History     Tobacco Use   Smoking Status Former   • Packs/day: 1.50   • Types: Cigarettes   • Quit date: 2023   • Years since quittin.1   Smokeless Tobacco Never     Family History: No family history on file. Meds/Allergies     Allergies   Allergen Reactions   • Amoxil [Amoxicillin] Other (See Comments)     "mouth felt on fire"       Current Outpatient Medications:   •  Alcohol Swabs 70 % PADS, May substitute brand based on insurance coverage.  Check glucose BID., Disp: 100 each, Rfl: 0  •  ALPRAZolam (XANAX) 0.5 mg tablet, Take 0.5 mg by mouth 4 (four) times a day as needed for anxiety Pt takes one 0.5 mg tablet as needed for anxiety up to four times daily, Disp: , Rfl:   •  aluminum-magnesium hydroxide 200-200 MG/5ML suspension, Take 15 mL by mouth as needed for heartburn or indigestion (Patient not taking: Reported on 2023), Disp: , Rfl: 0  •  [START ON 10/28/2023] aspirin 325 mg tablet, Take 1 tablet (325 mg total) by mouth daily Do not start before October 28, 2023. (Patient not taking: Reported on 8/21/2023 Do not start before October 28, 2023.), Disp: , Rfl: 0  •  aspirin 81 mg chewable tablet, Chew 1 tablet (81 mg total) daily Do not start before July 30, 2023., Disp: 90 tablet, Rfl: 0  •  atorvastatin (LIPITOR) 80 mg tablet, Take 1 tablet (80 mg total) by mouth daily with dinner, Disp: 30 tablet, Rfl: 0  •  Blood Glucose Monitoring Suppl (OneTouch Verio Reflect) w/Device KIT, May substitute brand based on insurance coverage. Check glucose BID. (Patient not taking: Reported on 8/21/2023), Disp: 1 kit, Rfl: 0  •  clopidogrel (PLAVIX) 75 mg tablet, Take 1 tablet (75 mg total) by mouth daily, Disp: 30 tablet, Rfl: 3  •  famotidine (PEPCID) 20 mg tablet, Take 0.5 tablets (10 mg total) by mouth 2 (two) times a day, Disp: , Rfl: 0  •  glucose blood (OneTouch Verio) test strip, May substitute brand based on insurance coverage. Check glucose BID. (Patient not taking: Reported on 8/21/2023), Disp: 100 each, Rfl: 0  •  lisinopril (ZESTRIL) 10 mg tablet, Take 1 tablet (10 mg total) by mouth daily Do not start before July 30, 2023., Disp: , Rfl: 0  •  metFORMIN (GLUCOPHAGE) 500 mg tablet, Take 1 tablet (500 mg total) by mouth daily with breakfast, Disp: 30 tablet, Rfl: 2  •  nicotine (NICODERM CQ) 21 mg/24 hr TD 24 hr patch, Place 1 patch on the skin over 24 hours daily Do not start before July 30, 2023. (Patient not taking: Reported on 8/21/2023), Disp: 28 patch, Rfl: 0  •  omeprazole (PriLOSEC) 20 mg delayed release capsule, Take it as you normally do at home, Disp: , Rfl: 0  •  OneTouch Delica Lancets 32K MISC, May substitute brand based on insurance coverage. Check glucose BID.  (Patient not taking: Reported on 8/21/2023), Disp: 100 each, Rfl: 0  •  sertraline (ZOLOFT) 100 mg tablet, Take 100 mg by mouth daily with dinner 2 tablets daily, Disp: , Rfl:   •  thiamine (VITAMIN B1) 100 mg tablet, Take 100 mg by mouth daily, Disp: , Rfl:     Vitals: There were no vitals taken for this visit. There is no height or weight on file to calculate BMI. Wt Readings from Last 3 Encounters:   23 68.5 kg (151 lb)   23 73.5 kg (162 lb)   23 78.9 kg (174 lb)     There were no vitals filed for this visit. BP Readings from Last 3 Encounters:   23 160/80   23 164/79   23 166/81         Physical Exam  Vitals reviewed. Constitutional:       General: She is not in acute distress. Cardiovascular:      Rate and Rhythm: Normal rate and regular rhythm. Pulses: Normal pulses. Heart sounds: Murmur heard. Pulmonary:      Effort: Pulmonary effort is normal. No respiratory distress. Abdominal:      General: Abdomen is flat. There is no distension. Palpations: Abdomen is soft. Tenderness: There is no abdominal tenderness. Musculoskeletal:      Right lower leg: No edema. Left lower leg: No edema. Skin:     General: Skin is warm and dry. Comments: Left chest incision (s/p loop implantation) well-healed. Signs of infection, no erythema, no ecchymosis. Neurological:      Mental Status: She is alert and oriented to person, place, and time. Diagnostic Studies Review Cardio:      EK23 EKG: Normal sinus rhythm, 74 bpm.  Septal infarct, age undetermined noted on 19 EKG. Cardiac testing:     REBEKAH    Result date: 23    Left Ventricle Left ventricular cavity size is normal. Wall thickness is normal. The left ventricular ejection fraction is 55% by visual estimation. . Systolic function is normal.  Wall motion is normal.      Right Ventricle Right ventricular cavity size is normal. Systolic function is normal. Wall thickness is normal.      Left Atrium The atrium is normal in size. Right Atrium The atrium is normal in size.       Atrial Septum No patent foramen ovale detected using color flow Doppler at rest.      Left Atrial Appendage Left atrial appendage is normal in size. There is normal function. There is no thrombus. Aortic Valve The aortic valve is trileaflet. The leaflets are not thickened. The leaflets are not calcified. The leaflets exhibit normal mobility. There is no evidence of regurgitation. The aortic valve has no significant stenosis. Mitral Valve Mitral valve structure is normal. There is mild regurgitation. There is no evidence of stenosis. Tricuspid Valve Tricuspid valve structure is normal. There is trace regurgitation. There is no evidence of stenosis. The right ventricular systolic pressure is normal.      Pulmonic Valve Pulmonic valve structure is normal. There is no evidence of regurgitation. There is no evidence of stenosis. Ascending Aorta The aortic root is normal in size. There is no evidence of aortic dissection. There is no aneurysm in the aorta. Cardiac EP device report    Result Date: 8/28/2023  Narrative: BARBARA LNQ22/ ACTIVE SYSTEM IS MRI CONDITIONAL DEVICE INTERROGATED IN THE [place] OFFICE: BATTERY VOLTAGE ADEQUATE (GOOD). PRESENTING RHYTHM NS 68 BPM. NO PATIENT OR DEVICE ACTIVATED EPISODES. WOUND CHECK: INCISION CLEAN AND DRY WITH EDGES APPROXIMATED; WOUND CARE AND RESTRICTIONS REVIEWED WITH PATIENT. NORMAL DEVICE FUNCTION. AM/NC     Echo complete     Result date: 7/29/23    Left Ventricle Left ventricular cavity size is normal. Wall thickness is normal. The left ventricular ejection fraction is 63%. Systolic function is normal.  Wall motion is normal. Diastolic function is normal.      Right Ventricle Right ventricular cavity size is normal. Systolic function is normal.      Left Atrium The atrium is normal in size. Right Atrium The atrium is normal in size. Atrial Septum No patent foramen ovale detected using saline contrast injection with provocation by Valsalva. Aortic Valve The aortic valve is trileaflet. The leaflets are mildly thickened. The leaflets are not calcified. The leaflets exhibit normal mobility. There is no evidence of regurgitation. The aortic valve has no significant stenosis. Mitral Valve There is no evidence of regurgitation. There is no evidence of stenosis. The mitral valve has normal structure and normal function. Tricuspid Valve Tricuspid valve structure is normal. There is trace regurgitation. There is no evidence of stenosis. Pulmonic Valve Pulmonic valve structure is normal. There is no evidence of regurgitation. There is no evidence of stenosis. Ascending Aorta The aortic root is normal in size. IVC/SVC The inferior vena cava is normal in size. Pericardium There is no pericardial effusion. The pericardium is normal in appearance. Imaging:  Chest X-Ray:   No Chest XR results available for this patient. CT-scan of the chest:     CTA head and neck with and without contrast    Result Date: 7/28/2023  Impression Short segment critical high-grade stenosis/near occlusion of the M1 segment of the left middle cerebral artery. Low-attenuation of the left lentiform nucleus as well as left insular ribbon/subinsular white matter consistent with acute ischemia and should be correlated with MRI. No associated hemorrhage.  I personally discussed this study with Marilyn Berry on 7/28/2023 2:11 PM. Workstation performed: [id number]     Lab Review   Lab Results   Component Value Date    WBC 9.09 07/31/2023    HGB 12.5 07/31/2023    HCT 38.0 07/31/2023    MCV 96 07/31/2023    RDW 14.4 07/31/2023     07/31/2023     BMP:  Lab Results   Component Value Date    SODIUM 138 08/02/2023    K 3.8 08/02/2023     08/02/2023    CO2 25 08/02/2023    BUN 12 08/02/2023    CREATININE 0.59 (L) 08/02/2023    GLUC 123 08/02/2023    GLUF 114 (H) 08/01/2023    CALCIUM 9.2 08/02/2023    EGFR 92 08/02/2023    MG 2.1 08/01/2023     Troponins:    LFT:  Lab Results   Component Value Date    AST 17 04/29/2023    ALT 16 04/29/2023    ALKPHOS 67 04/29/2023    TP 6.7 04/29/2023    ALB 4.1 04/29/2023      Lab Results   Component Value Date    QRV0DMFKSFJJ 4.928 (H) 05/04/2019     No components found for: "TSH3"  Lab Results   Component Value Date    HGBA1C 6.5 (H) 07/28/2023     Lipid Profile:   Lab Results   Component Value Date    CHOLESTEROL 216 (H) 07/29/2023    HDL 71 07/29/2023    LDLCALC 126 (H) 07/29/2023    TRIG 97 07/29/2023     Lab Results   Component Value Date    CHOLESTEROL 216 (H) 07/29/2023    CHOLESTEROL 225 (H) 07/28/2023     Lab Results   Component Value Date    TROPONINI <0.02 10/08/2021       Dusty Menchaca PA-C

## 2023-09-06 NOTE — PROGRESS NOTES
Daily Speech Treatment Note    Today's date: 2023  Patient’s name: Salbador Juarez  : 1950  MRN: [id number]    Referring provider: Marylene Captain, MD    Encounter Diagnosis     ICD-10-CM    1. Chronic ischemic left MCA stroke  I69.30       2. Word finding difficulty  R47.89             POC expires Auth Status Total   Visits  Start date  Expiration date PT/OT + Visit Limit? Co-Insurance   2023 Authorized bomn 23  No                                             Visit/Unit Tracking  AUTH Status: Authorized Date              Visits  Authed: bomn Used 1 2              Remaining                      Dummy Auth Tracking  1 2 3 4 5 6   7 8 9 10 11 12   13 14 15 16 17 18   19 20 21 22 23 24   25 26 27 28 29 30   31 32 33 34 35 36          Visit tracking:  Visit #2    Subjective: Tamy Ramos attended to all cognitive linguistic tasks. Goals  Short-term goals:    Goal 1. Patient will complete the WAB - 2 to further assess reading, writing, and cognitive linguistic skills. The following subtests were administered:    Reading comprehension (40/40)  Reading commands (20/20)    Writing dictated words (10/10)  Writing letters and numbers (7.5/7.5)  Construction, Visuospatial, and Calculation ()  Block Design ()  Calculation ()    CLQT Story retelling index score* (6)  *Cut score: 5    Tamy Ramos presented with difficulties completing executive function based drawing tasks (clock drawing, cube). She required maximum breakdown/clueing for problem solving to set the clock to "ten past eleven". Overt communication breakdown regarding scheduling occurred at the end of the session due to reduced comprehension/processing of mildly complex spoken information. Goals to be addended to reflect updated plan of care. Goal 1.  Patient will completed mid-high level word finding tasks with 80% accuracy, to be completed within the next 4-6 Goal 2. Bran Berger will complete executive functioning tasks with 80% accuracy, to be completed within the next 4-6 weeks. (NEW GOAL)  Goal 3. Bran Berger with follow verbally spoken 2-4 step commands with 80% accuracy, to be completed within the next 4-6 weeks. (NEW GOAL)    Plan:  -Continue with current plan of care.

## 2023-09-13 ENCOUNTER — OFFICE VISIT (OUTPATIENT)
Dept: SPEECH THERAPY | Facility: CLINIC | Age: 73
End: 2023-09-13
Payer: COMMERCIAL

## 2023-09-13 DIAGNOSIS — R47.89 WORD FINDING DIFFICULTY: ICD-10-CM

## 2023-09-13 DIAGNOSIS — I69.30 CHRONIC ISCHEMIC LEFT MCA STROKE: Primary | ICD-10-CM

## 2023-09-13 PROCEDURE — 92507 TX SP LANG VOICE COMM INDIV: CPT

## 2023-09-13 NOTE — PROGRESS NOTES
Daily Speech Treatment Note    Today's date: 2023  Patient’s name: Adam Noel  : 1950  MRN: [id number]    Referring provider: Beverly Pedro MD    Encounter Diagnosis     ICD-10-CM    1. Chronic ischemic left MCA stroke  I69.30       2. Word finding difficulty  R47.89             POC expires Auth Status Total   Visits  Start date  Expiration date PT/OT + Visit Limit? Co-Insurance   2023 Authorized bomn 23  No                                             Visit/Unit Tracking  AUTH Status: Authorized Date             Visits  Authed: bomn Used 1 2 3             Remaining                      Dummy Auth Tracking  1 2 3 4 5 6   7 8 9 10 11 12   13 14 15 16 17 18   19 20 21 22 23 24   25 26 27 28 29 30   31 32 33 34 35 36          Visit tracking:  Visit #3    Subjective: Dahlia Link arrived to the session unaccompanied. She attended to all tasks. Goals  Short-term goals:    Goal 1. Patient will completed mid-high level word finding tasks with 80% accuracy, to be completed within the next 4-6 weeks  Generative naming during simple sorting task was at 87%. Long pauses for word finding were present. Of note, patient with many (7) attention errors during simple task. Dahlia Link completed a simple category matrix with 56% accuracy. Long and overt pauses were present for word finding. Accuracy increased given minimal-moderate semantic clueing. Goal 2. Dahlia Link will complete executive functioning tasks with 80% accuracy, to be completed within the next 4-6 weeks. Did not target this session. Goal 3. Dahlia Read with follow verbally spoken 2-4 step commands with 80% accuracy, to be completed within the next 4-6 weeks. During a structured task, Dahlia Link followed 2-step commands with 40% accuracy.  Accuracy of 100% attainted following 1-2 repetitions of commands. NEW GOAL:  Participate in RBANS to further assess cognitive linguistic skills. Plan of care to be addended based on RBANS results. Plan:  -Continue with current plan of care.

## 2023-09-14 ENCOUNTER — OFFICE VISIT (OUTPATIENT)
Dept: SPEECH THERAPY | Facility: CLINIC | Age: 73
End: 2023-09-14
Payer: COMMERCIAL

## 2023-09-14 DIAGNOSIS — I69.30 CHRONIC ISCHEMIC LEFT MCA STROKE: Primary | ICD-10-CM

## 2023-09-14 DIAGNOSIS — R47.89 WORD FINDING DIFFICULTY: ICD-10-CM

## 2023-09-14 PROCEDURE — 96125 COGNITIVE TEST BY HC PRO: CPT

## 2023-09-14 NOTE — PROGRESS NOTES
Daily Speech Treatment Note    Today's date: 2023  Patient’s name: Nadir Bullock  : 1950  MRN: [id number]    Referring provider: Patricia Snowden MD    Encounter Diagnosis     ICD-10-CM    1. Chronic ischemic left MCA stroke  I69.30       2. Word finding difficulty  R47.89             POC expires Auth Status Total   Visits  Start date  Expiration date PT/OT + Visit Limit? Co-Insurance   2023 Authorized bomn 23  No                                             Visit/Unit Tracking  AUTH Status: Authorized Date            Visits  Authed: bomn Used 1 2 3 4            Remaining                      Dummy Auth Tracking  1 2 3 4 5 6   7 8 9 10 11 12   13 14 15 16 17 18   19 20 21 22 23 24   25 26 27 28 29 30   31 32 33 34 35 36          Visit tracking:  Visit #4    Subjective: Nolan Longoria arrived to the session unaccompanied. Goals  Short-term goals:    Goal 1. Patient will complete mid-high level word finding tasks with 80% accuracy, to be completed within the next 4-6 weeks  Did not target. Goal 2. Nolan Longoria will complete linguistic-based executive functioning tasks with 80% accuracy, to be completed within the next 4-6 weeks. Did not target. Goal 3. Nolan Longoria with follow verbally spoken 2-4 step commands with 80% accuracy, to be completed within the next 4-6 weeks. Did not target. NEW GOAL ()  Participate in RBANS to further assess cognitive linguistic skills. The Repeatable Battery for the Assessment of Neuropsychological Status (RBANS) is a brief, individually-administered assessment which measures attention, language, visuospatial/constructional abilities, and immediate & delayed memory. The RBANS is intended for use with adolescents to adults, ages 15 to 80 years.  The following results were obtained during the administration of the assessment. Form:A     Cognitive Domain/Subtest: Index Score: Percentile Rank: Classification:   IMMEDIATE MEMORY 65 1%ile Extremely Low   1. List Learning (16/40)   2. Story Memory (9/24)       VISUOSPATIAL/  CONSTRUCTIONAL 74 5%ile Borderline   3. Figure Copy (12/20)   4. Line Orientation (15/20)       LANGUAGE 82 12%ile Low Average   5. Picture Naming (9/10)   6. Semantic Fluency (8/40)       ATTENTION 60 4%ile Borderline   7. Digit Span (7/16)   8. Coding (21/89)       DELAYED MEMORY 79 8%ile Borderline   9. List Recall (0/10)   10. List Recognition (19/20)   11. Story Recall (0/12)   12. 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 She displays deficits in the areas of immediate and delayed memory, language, and attention. Immediate memory was most significantly impaired for the list recall and story recall subtests. Previous language testing revealed reduced comprehension/processing of mildly complex information, so do suspect low immediate memory score is affected by underlying impairments with language processing. Moses Spence was noted with long pauses, and the amount of words/story elements recalled decreased with continued repetitions. Regarding expressive language, fluency naming (also known as generative naming) was poor. Generative naming involves rapid naming within specific semantic or phonemic boundaries, specifically fruits and vegetable. Generative naming is a means of assessing executive function within the setting of expressive language as the task combines naming, working memory, self monitoring and sustained attention.  Processing was speed notably reduced, and long pauses were present throughout the exam. Moses Spence also needed frequent redirection and /repetition of directions, likely secondary to deficits in attention and information processing. Will addend plan of care and add goals for attention and memory. NEW GOALS FROM  RBANS- ADDDED (9/14)    Goal 4. Agnieszka El will complete attention dependent structured tasks with 80% accuracy, to be completed within the next 4-6 weeks. Goal 5. Agnieszka El will complete immediate, delayed, and working memory structured tasks, to be completed within the next 4-6 weeks. Plan:  -Continue with current plan of care.

## 2023-09-20 ENCOUNTER — OFFICE VISIT (OUTPATIENT)
Dept: SPEECH THERAPY | Facility: CLINIC | Age: 73
End: 2023-09-20
Payer: COMMERCIAL

## 2023-09-20 DIAGNOSIS — R47.89 WORD FINDING DIFFICULTY: ICD-10-CM

## 2023-09-20 DIAGNOSIS — Z86.73 CHRONIC ISCHEMIC LEFT MCA STROKE: Primary | ICD-10-CM

## 2023-09-20 PROCEDURE — 92507 TX SP LANG VOICE COMM INDIV: CPT

## 2023-09-20 NOTE — PROGRESS NOTES
Daily Speech Treatment Note    Today's date: 2023  Patient’s name: Pedro Kramer  : 1950  MRN: [id number]    Referring provider: Peg Ardon MD    Encounter Diagnosis     ICD-10-CM    1. Chronic ischemic left MCA stroke  I69.30       2. Word finding difficulty  R47.89             POC expires Auth Status Total   Visits  Start date  Expiration date PT/OT + Visit Limit? Co-Insurance   2023 Authorized bomn 23  No                                             Visit/Unit Tracking  AUTH Status: Authorized Date           Visits  Authed: bomn Used 1 2 3 4 5           Remaining                      Dummy Auth Tracking  1 2 3 4 5 6   7 8 9 10 11 12   13 14 15 16 17 18   19 20 21 22 23 24   25 26 27 28 29 30   31 32 33 34 35 36          Visit tracking:  Visit #5    Subjective: Agnieszka Jean arrived to the session unaccompanied. She attended to all tasks. Goals  Short-term goals:    Goal 1. Patient will completed mid-high level word finding tasks with 80% accuracy, to be completed within the next 4-6 weeks    Agnieszka Jean completed a simple word deduction from three written clues task with 80% accuracy. Long and overt pauses were present for word finding. Goal 2. Agnieszka Jean will complete executive functioning tasks with 80% accuracy, to be completed within the next 4-6 weeks. Did not target. Goal 4. Agnieszka Jean with follow verbally spoken 2-4 step commands with 80% accuracy, to be completed within the next 4-6 weeks. During a structured task, Agnieszka Jean followed 1- step commands with 100% commands. 2-step commands were followed with 50% accuracy. She required repetition x1 for accuracy of 100%. Goal 5. Agnieszka Jean will complete attention dependent structured tasks with 80% accuracy, to be completed within the next 4-6 weeks.    Orpha Iron displayed 0 attention errors in a task that involved sorting cards into three piles and naming an item in a category when two specific and pre determined cards were displayed. Visual support was provided. This is a significant increased in accuracy compared to the prior session. Goal 6. Dominique Jordan will complete immediate, delayed, and working memory structured tasks, to be completed within the next 4-6 weeks. .  Immediate recall for an 8-item word list generated in goal 5 was at 63% accuracy. Plan:  -Continue with current plan of care.

## 2023-09-21 ENCOUNTER — OFFICE VISIT (OUTPATIENT)
Dept: SPEECH THERAPY | Facility: CLINIC | Age: 73
End: 2023-09-21
Payer: COMMERCIAL

## 2023-09-21 DIAGNOSIS — Z86.73 CHRONIC ISCHEMIC LEFT MCA STROKE: Primary | ICD-10-CM

## 2023-09-21 DIAGNOSIS — R47.89 WORD FINDING DIFFICULTY: ICD-10-CM

## 2023-09-21 PROCEDURE — 92507 TX SP LANG VOICE COMM INDIV: CPT

## 2023-09-21 NOTE — PROGRESS NOTES
Daily Speech Treatment Note    Today's date: 2023  Patient’s name: Kymberly Cote  : 1950  MRN: [id number]    Referring provider: Mario Swan MD    Encounter Diagnosis     ICD-10-CM    1. Chronic ischemic left MCA stroke  I69.30       2. Word finding difficulty  R47.89             POC expires Auth Status Total   Visits  Start date  Expiration date PT/OT + Visit Limit? Co-Insurance   2023 Authorized bomn 23  No                                             Visit/Unit Tracking  AUTH Status: Authorized Date          Visits  Authed: bomn Used 1 2 3 4 5 6          Remaining                      Dummy Auth Tracking  1 2 3 4 5 6   7 8 9 10 11 12   13 14 15 16 17 18   19 20 21 22 23 24   25 26 27 28 29 30   31 32 33 34 35 36          Visit tracking:  Visit #5    Subjective: Gabriele Appiah arrived to the session unaccompanied. She attended to all tasks. Goals  Short-term goals:    Goal 1. Patient will completed mid-high level word finding tasks with 80% accuracy, to be completed within the next 4-6 weeks    Gabriele Appiah completed a simple category matrix with 50% accuracy. Overt and long pauses for word finding were present. Accuracy increased given moderate semantic clueing. Goal 2. Gabriele Appiah will complete executive functioning tasks with 80% accuracy, to be completed within the next 4-6 weeks. Did not target. Goal 4. Gabriele Appiah with follow verbally spoken and written 2-4 step commands with 80% accuracy, to be completed within the next 4-6 weeks. During a structured task, Gabriele pApiah followed written 1-step directions (involving a field of 5 pictures) with 85% accuracy. She followed spoken 1-step directions (involving a field of 5 pictures) with 85% accuracy. When directions were repeated x1, accuracy of 100% was achieved.  She was observed with significantly delayed processing time for task completion. Goal 5. Dory Brennan will complete attention dependent structured tasks with 80% accuracy, to be completed within the next 4-6 weeks. During completion of a simple category matrix, Dory Brennan required multiple reminders to stay within phonemic boundaries for accurate task completion. Goal 6. Dory Brennan will complete immediate, delayed, and working memory structured tasks, to be completed within the next 4-6 weeks. .  Did not target. Plan:  -Continue with current plan of care.

## 2023-09-27 ENCOUNTER — APPOINTMENT (OUTPATIENT)
Dept: SPEECH THERAPY | Facility: CLINIC | Age: 73
End: 2023-09-27
Payer: COMMERCIAL

## 2023-09-28 ENCOUNTER — APPOINTMENT (OUTPATIENT)
Dept: SPEECH THERAPY | Facility: CLINIC | Age: 73
End: 2023-09-28
Payer: COMMERCIAL

## 2023-10-04 ENCOUNTER — OFFICE VISIT (OUTPATIENT)
Dept: SPEECH THERAPY | Facility: CLINIC | Age: 73
End: 2023-10-04
Payer: COMMERCIAL

## 2023-10-04 DIAGNOSIS — R47.89 WORD FINDING DIFFICULTY: ICD-10-CM

## 2023-10-04 DIAGNOSIS — Z86.73 CHRONIC ISCHEMIC LEFT MCA STROKE: Primary | ICD-10-CM

## 2023-10-04 PROCEDURE — 92507 TX SP LANG VOICE COMM INDIV: CPT

## 2023-10-04 NOTE — PROGRESS NOTES
Daily Speech Treatment Note    Today's date: 10/4/2023  Patient’s name: Farrukh Pruitt  : 1950  MRN: [id number]    Referring provider: Sarthak Ambrosio MD    Encounter Diagnosis     ICD-10-CM    1. Chronic ischemic left MCA stroke  Z86.73       2. Word finding difficulty  R47.89             POC expires Auth Status Total   Visits  Start date  Expiration date PT/OT + Visit Limit? Co-Insurance   2023 Authorized bomn 23  No                                             Visit/Unit Tracking  AUTH Status: Authorized Date 8/30 9/6 9/13 9/14 9/20 9/21 10/4        Visits  Authed: bomn Used 1 2 3 4 5 6 7         Remaining                      Dummy Auth Tracking  1 2 3 4 5 6   7 8 9 10 11 12   13 14 15 16 17 18   19 20 21 22 23 24   25 26 27 28 29 30   31 32 33 34 35 36          Visit tracking:  Visit #7    Subjective: Bran Berger arrived to the session unaccompanied. She attended to all tasks. Goals  Short-term goals:    Goal 1. Patient will completed mid-high level word finding tasks with 80% accuracy, to be completed within the next 4-6 weeks    Generative naming during a mid level sorting task was at 100%. Goal 2. Bran Berger will complete executive functioning tasks with 80% accuracy, to be completed within the next 4-6 weeks. Executive function skills were targeted through tasks related to money. Waldo Walker was able to use coins to match a specific number (17 cents, 22 cents, etc). with 80% accuracy. She required minimal cueing for task breakdown and accuracy increased as task continued. She was able to organize a 10-item grocery list into logical categories with 100% accuracy. She was then tasked with using a calculator to determine the total cost of groceries. She struggled with this task- accuracy was at 0%. She required maximum clinician support to complete. Goal 4. Nolan Longoria with follow verbally spoken and written 2-4 step commands with 80% accuracy, to be completed within the next 4-6 weeks. Did not target. Goal 5. Nolan Longoria will complete attention dependent structured tasks with 80% accuracy, to be completed within the next 4-6 weeks. Nolan Longoria displayed (4) errors attention errors during a mid level sorting task. During this task, she was required to sort cards into three piles and to name an item in a category ([place]) when two specific and predetermined number cards were displayed. Visual support was not provided during the activity. Goal 6. Nolan Longoria will complete immediate, delayed, and working memory structured tasks, to be completed within the next 4-6 weeks. .  Immediate recall for 8-item word list was at 100%. She benefited from a sub categorization system to aid in organization and recall. She also benefited from verbal rehearsal.     Plan:  -Continue with current plan of care.

## 2023-10-05 ENCOUNTER — OFFICE VISIT (OUTPATIENT)
Dept: SPEECH THERAPY | Facility: CLINIC | Age: 73
End: 2023-10-05
Payer: COMMERCIAL

## 2023-10-05 DIAGNOSIS — Z86.73 CHRONIC ISCHEMIC LEFT MCA STROKE: Primary | ICD-10-CM

## 2023-10-05 DIAGNOSIS — R47.89 WORD FINDING DIFFICULTY: ICD-10-CM

## 2023-10-05 DIAGNOSIS — R48.8 OTHER SYMBOLIC DYSFUNCTIONS: ICD-10-CM

## 2023-10-05 PROCEDURE — 92507 TX SP LANG VOICE COMM INDIV: CPT

## 2023-10-05 NOTE — PROGRESS NOTES
Daily Speech Treatment Note    Today's date: 10/5/2023  Patient’s name: Samantha Desouza  : 1950  MRN: [id number]    Referring provider: Olinda Lama MD    Encounter Diagnosis     ICD-10-CM    1. Chronic ischemic left MCA stroke  Z86.73       2. Word finding difficulty  R47.89       3. 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 Co-Insurance   2023 Authorized bomn 23  No                                             Visit/Unit Tracking  AUTH Status: Authorized  #[id number] Date 8/30 9/6 9/13 9/14 9/20 9/21 10/4 10/5       Visits  Authed: bomn Used 1 2 3 4 5 6 7 8        Remaining                      Dummy Auth Tracking  1 2 3 4 5 6   7 8 9 10 11 12   13 14 15 16 17 18   19 20 21 22 23 24   25 26 27 28 29 30   31 32 33 34 35 36          Visit tracking:  Visit #8    Subjective: Dominique Jordan arrived to the session unaccompanied. She attended to all tasks. Goals  Short-term goals:    Goal 1. Patient will completed mid-high level word finding tasks with 80% accuracy, to be completed within the next 4-6 weeks  Simple deduction puzzle was completed with 62% accuracy. Accuracy increased given minimal-moderate semantic clueing. Goal 2. Dominique Jordan will complete executive functioning tasks with 80% accuracy, to be completed within the next 4-6 weeks. Dominique Jordan was able to sequence 5- written steps with 100% given significantly increased processing time. For a list of 6 written steps, accuracy decreased to 70% accuracy. She benefited from clinician reminders to go back and check her work, as well as clueing for critical thinking. Goal 4. Dominique Jordan with follow verbally spoken and written 2-4 step commands with 80% accuracy, to be completed within the next 4-6 weeks.    Dominique Jordan followed verbally spoken 2-step commands with 50% initial accuracy. Accuracy of 80% attained given 1-2 additional repetitions of directions. Goal 5. Armando Specter will complete attention dependent structured tasks with 80% accuracy, to be completed within the next 4-6 weeks. Did not target. Goal 6. Armando Specter will complete immediate, delayed, and working memory structured tasks, to be completed within the next 4-6 weeks. .  Did not target. Plan:  -Continue with current plan of care.

## 2023-10-11 ENCOUNTER — TELEPHONE (OUTPATIENT)
Dept: NEUROLOGY | Facility: CLINIC | Age: 73
End: 2023-10-11

## 2023-10-11 ENCOUNTER — OFFICE VISIT (OUTPATIENT)
Dept: SPEECH THERAPY | Facility: CLINIC | Age: 73
End: 2023-10-11
Payer: COMMERCIAL

## 2023-10-11 DIAGNOSIS — Z86.73 CHRONIC ISCHEMIC LEFT MCA STROKE: Primary | ICD-10-CM

## 2023-10-11 DIAGNOSIS — R47.89 WORD FINDING DIFFICULTY: ICD-10-CM

## 2023-10-11 DIAGNOSIS — R48.8 OTHER SYMBOLIC DYSFUNCTIONS: ICD-10-CM

## 2023-10-11 PROCEDURE — 92507 TX SP LANG VOICE COMM INDIV: CPT
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
## 2023-10-11 NOTE — TELEPHONE ENCOUNTER
Pt called to be seen sooner by Dr. Gabe Elliott for worsening symptoms and concerns.  Please assist.

## 2023-10-12 ENCOUNTER — APPOINTMENT (OUTPATIENT)
Dept: SPEECH THERAPY | Facility: CLINIC | Age: 73
End: 2023-10-12
Payer: COMMERCIAL

## 2023-10-16 NOTE — TELEPHONE ENCOUNTER
Called [phone], spoke to pt's  and states that pt is not avail right now. She went to the store. He will have her call us back.

## 2023-10-18 ENCOUNTER — OFFICE VISIT (OUTPATIENT)
Dept: SPEECH THERAPY | Facility: CLINIC | Age: 73
End: 2023-10-18
Payer: COMMERCIAL

## 2023-10-18 DIAGNOSIS — R47.89 WORD FINDING DIFFICULTY: ICD-10-CM

## 2023-10-18 DIAGNOSIS — R48.8 OTHER SYMBOLIC DYSFUNCTIONS: ICD-10-CM

## 2023-10-18 DIAGNOSIS — Z86.73 CHRONIC ISCHEMIC LEFT MCA STROKE: Primary | ICD-10-CM

## 2023-10-18 PROCEDURE — 92507 TX SP LANG VOICE COMM INDIV: CPT

## 2023-10-18 NOTE — PROGRESS NOTES
Daily Speech Treatment Note    Today's date: 10/18/2023  Patient’s name: Radha Gusman  : 1950  MRN: [id number]    Referring provider: Jerry Adams MD    Encounter Diagnosis     ICD-10-CM    1. Chronic ischemic left MCA stroke  Z86.73       2. Word finding difficulty  R47.89       3. Other symbolic dysfunctions  G44.3                 POC expires Auth Status Total   Visits  Start date  Expiration date PT/OT + Visit Limit? Co-Insurance   2023 Authorized bomn 23  No                                             Visit/Unit Tracking  AUTH Status: Authorized  #[id number] Date 8/30 9/6 9/13 9/14 9/20 9/21 10/4 10/5 10/11 10/18     Visits  Authed: bomn Used 1 2 3 4 5 6 7 8 9 10      Remaining                      Dummy Auth Tracking  1 2 3 4 5 6   7 8 9 10 11 12   13 14 15 16 17 18   19 20 21 22 23 24   25 26 27 28 29 30   31 32 33 34 35 36          Visit tracking:  Visit #10    Subjective: Armando Murguia arrived to the session unaccompanied. She attended to all cognitive linguistic tasks. Goals  Short-term goals:    Goal 1. Patient will completed mid-high level word finding tasks with 80% accuracy, to be completed within the next 4-6 weeks  Armando Murguia completed a simple category matrix with 68% accuracy. Armando Murguia demonstrated difficulty with second half of activity, which may be related to deficits in sustained attention. Accuracy increased given minimal-moderate semantic clueing. Goal 2. Armando Murguia will complete executive functioning tasks with 80% accuracy, to be completed within the next 4-6 weeks. Did not target. Goal 4. Armando Murguia with follow verbally spoken and written 2-4 step commands with 80% accuracy, to be completed within the next 4-6 weeks. Did not target. Goal 5.  Armando Murguia will complete attention dependent structured tasks with 80% accuracy, to be completed within the next 4-6 weeks. Vimal Patterson completed an attention task which required her to repeat back a 3-item word list in order based on various physical attributes (shortest to longest, heaviest to shortest, etc) with 50% initial accuracy. Accuracy of 100% achieved given additional repetitions and visual support. Goal 6. Vimal Patterson will complete immediate, delayed, and working memory structured tasks, to be completed within the next 4-6 weeks. .  Did not target. Plan:  -Continue with current plan of care.

## 2023-10-19 ENCOUNTER — OFFICE VISIT (OUTPATIENT)
Dept: SPEECH THERAPY | Facility: CLINIC | Age: 73
End: 2023-10-19
Payer: COMMERCIAL

## 2023-10-19 DIAGNOSIS — R48.8 OTHER SYMBOLIC DYSFUNCTIONS: ICD-10-CM

## 2023-10-19 DIAGNOSIS — R47.89 WORD FINDING DIFFICULTY: ICD-10-CM

## 2023-10-19 DIAGNOSIS — Z86.73 CHRONIC ISCHEMIC LEFT MCA STROKE: Primary | ICD-10-CM

## 2023-10-19 PROCEDURE — 92507 TX SP LANG VOICE COMM INDIV: CPT

## 2023-10-19 NOTE — PROGRESS NOTES
Daily Speech Treatment Note    Today's date: 10/19/2023  Patient’s name: Alba Pinto  : 1950  MRN: [id number]    Referring provider: Micah Barr MD    Encounter Diagnosis     ICD-10-CM    1. Chronic ischemic left MCA stroke  Z86.73       2. Word finding difficulty  R47.89       3. Other symbolic dysfunctions  V78.7                   POC expires Auth Status Total   Visits  Start date  Expiration date PT/OT + Visit Limit? Co-Insurance   2023 Authorized bomn 23  No                                             Visit/Unit Tracking  AUTH Status: Authorized  #[id number] Date 8/30 9/6 9/13 9/14 9/20 9/21 10/4 10/5 10/11 10/18 10/19    Visits  Authed: bomn Used 1 2 3 4 5 6 7 8 9 10 11     Remaining                      Dummy Auth Tracking  1 2 3 4 5 6   7 8 9 10 11 12   13 14 15 16 17 18   19 20 21 22 23 24   25 26 27 28 29 30   31 32 33 34 35 36          Visit tracking:  Visit #11    Subjective: Dory Brennan arrived to the session unaccompanied. She attended to all cognitive linguistic tasks. Goals  Short-term goals:    Goal 1. Patient will completed mid-high level word finding tasks with 80% accuracy, to be completed within the next 4-6 weeks  Dory Brennan completed a mid level word word deduction activity with 75% accuracy. She benefited from semantic clueing with increased accuracy noted. Goal 2. Dory Brennan will complete executive functioning tasks with 80% accuracy, to be completed within the next 4-6 weeks. Dory Brennan completed a simple linguistically based executive function puzzle with 22% initial accuracy. She required moderate-maximum clues for task breakdown and process of elimination. Goal 4. Dory Brennan with follow verbally spoken and written 2-4 step commands with 80% accuracy, to be completed within the next 4-6 weeks. Did not target. Goal 5. Reva Merino will complete attention dependent structured tasks with 80% accuracy, to be completed within the next 4-6 weeks. Reva Merino displayed 0 attention errors during a task task that required her to organize a group of 16 words into 4 related word lists. Additional processing time required for accurate task completion. Goal 6. Reva Merino will complete immediate, delayed, and working memory structured tasks, to be completed within the next 4-6 weeks. .  Immediate recall for 8-item word list (2 sets of 4 related words) was at 100% accuracy. Delayed recall was at 75% accuracy. Plan:  -Continue with current plan of care.

## 2023-10-19 NOTE — TELEPHONE ENCOUNTER
Called [phone], spoke to pt's  and states that pt is not avail right now. States that he told his wife the other day to call us re: her worsening symptoms. He will remind the pt to call us.      Will close this enc for now

## 2023-10-26 ENCOUNTER — APPOINTMENT (OUTPATIENT)
Dept: SPEECH THERAPY | Facility: CLINIC | Age: 73
End: 2023-10-26
Payer: COMMERCIAL

## 2023-10-30 ENCOUNTER — OFFICE VISIT (OUTPATIENT)
Facility: CLINIC | Age: 73
End: 2023-10-30
Payer: COMMERCIAL

## 2023-10-30 DIAGNOSIS — R47.89 WORD FINDING DIFFICULTY: ICD-10-CM

## 2023-10-30 DIAGNOSIS — R48.8 OTHER SYMBOLIC DYSFUNCTIONS: ICD-10-CM

## 2023-10-30 DIAGNOSIS — Z86.73 CHRONIC ISCHEMIC LEFT MCA STROKE: Primary | ICD-10-CM

## 2023-10-30 PROCEDURE — 92507 TX SP LANG VOICE COMM INDIV: CPT

## 2023-10-30 NOTE — PROGRESS NOTES
Daily Speech Treatment Note    Today's date: 10/30/2023  Patient’s name: Adam Noel  : 1950  MRN: [id number]    Referring provider: Beverly Pedro MD    No diagnosis found. POC expires Auth Status Total   Visits  Start date  Expiration date PT/OT + Visit Limit? Co-Insurance   2023 Authorized bomn 23  No                                             Visit/Unit Tracking  AUTH Status: Authorized  #[id number] Date 8/30 9/6 9/13 9/14 9/20 9/21 10/4 10/5 10/11 10/18 10/19 10/30   Visits  Authed: bomn Used 1 2 3 4 5 6 7 8 9 10 11 12    Remaining                      Dummy Auth Tracking  1 2 3 4 5 6   7 8 9 10 11 12   13 14 15 16 17 18   19 20 21 22 23 24   25 26 27 28 29 30   31 32 33 34 35 36          Visit tracking:  Visit #12    Subjective: Dahlia Link arrived to the session unaccompanied. She attended to all cognitive linguistic tasks. Goals  Short-term goals:    Goal 1. Patient will completed mid-high level word finding tasks with 80% accuracy, to be completed within the next 4-6 weeks  Did not target. Goal 2. Dahlia Link will complete executive functioning tasks with 80% accuracy, to be completed within the next 4-6 weeks. Dahlia Link completed a simple linguistically based executive function task that required generating two similarities and differences for related words. Dahlia Link had difficulty with this task. She completed it with 20% accuracy independently. She required moderate-max clueing for mental flexibility and critical thinking for accurate task completion. Goal 4. Dahlia Link with follow verbally spoken and written 2-4 step commands with 80% accuracy, to be completed within the next 4-6 weeks. 2-step directions were followed with 100% accuracy. Initial accuracy for 3-step directions was at 0%.  Dahlia Link required 2-3 additional repetitions to attain accuracy of 75%. Goal 5. Dahlia Read will complete attention dependent structured tasks with 80% accuracy, to be completed within the next 4-6 weeks. Did not attempt. Goal 6. Dahlia Read will complete immediate, delayed, and working memory structured tasks, to be completed within the next 4-6 weeks. .  Did not attempt. Plan:  -Continue with current plan of care.

## 2023-11-01 ENCOUNTER — OFFICE VISIT (OUTPATIENT)
Facility: CLINIC | Age: 73
End: 2023-11-01
Payer: COMMERCIAL

## 2023-11-01 DIAGNOSIS — Z86.73 CHRONIC ISCHEMIC LEFT MCA STROKE: Primary | ICD-10-CM

## 2023-11-01 DIAGNOSIS — R47.89 WORD FINDING DIFFICULTY: ICD-10-CM

## 2023-11-01 DIAGNOSIS — R48.8 OTHER SYMBOLIC DYSFUNCTIONS: ICD-10-CM

## 2023-11-01 PROCEDURE — 92507 TX SP LANG VOICE COMM INDIV: CPT

## 2023-11-01 NOTE — PROGRESS NOTES
Daily Speech Treatment Note    Today's date: 2023  Patient’s name: Lupe Verduzco  : 1950  MRN: [id number]    Referring provider: Dinh Quevedo MD    Encounter Diagnosis     ICD-10-CM    1. Chronic ischemic left MCA stroke  Z86.73       2. Word finding difficulty  R47.89       3. Other symbolic dysfunctions  M19.2                     POC expires Auth Status Total   Visits  Start date  Expiration date PT/OT + Visit Limit? Co-Insurance   2023 Authorized bomn 23  No                                             Visit/Unit Tracking  AUTH Status: Authorized  #[id number] Date               Visits  Authed: bomn Used 71 43 86 68 57 20 02 10 17 10 04 66    Remaining                      Dummy Auth Tracking  1 2 3 4 5 6   7 8 9 10 11 12   13 14 15 16 17 18   19 20 21 22 23 24   25 26 27 28 29 30   31 32 33 34 35 36          Visit tracking:  Visit #13    Subjective: Angelika Silva arrived to the session unaccompanied. She attended to all cognitive linguistic tasks. Goals  Short-term goals:    Goal 1. Patient will completed mid-high level word finding tasks with 80% accuracy, to be completed within the next 4-6 weeks  Generative naming for simple categories (food, animals) was at 60%. She benefited from semantic clueing to be more specific (I.e. zoo animals, desserts). Angelika Silva generated the category for three semantically related words (abstract) with 60% accuracy. She required min semantic clueing for improved accuracy. Goal 2. Angelika Silva will complete executive functioning tasks with 80% accuracy, to be completed within the next 4-6 weeks. Did not target. Goal 4. Angelika Silva with follow verbally spoken and written 2-4 step commands with 80% accuracy, to be completed within the next 4-6 weeks. Did not target. Goal 5.  Angelika Silva will complete attention dependent structured tasks with 80% accuracy, to be completed within the next 4-6 weeks. See goal (6). Goal 6. Tulio Lim will complete immediate, delayed, and working memory structured tasks, to be completed within the next 4-6 weeks. Tulio Lim was tasked with tracking three separate colors that corresponded to separate categories during a simple shape puzzle. She demonstrated difficulty with this task. She displayed (6) attention/memory errors. She required frequent reminders for which color corresponded to which category. Plan:  -Continue with current plan of care.

## 2023-11-06 ENCOUNTER — OFFICE VISIT (OUTPATIENT)
Facility: CLINIC | Age: 73
End: 2023-11-06
Payer: COMMERCIAL

## 2023-11-06 DIAGNOSIS — R48.8 OTHER SYMBOLIC DYSFUNCTIONS: ICD-10-CM

## 2023-11-06 DIAGNOSIS — R47.89 WORD FINDING DIFFICULTY: ICD-10-CM

## 2023-11-06 DIAGNOSIS — Z86.73 CHRONIC ISCHEMIC LEFT MCA STROKE: Primary | ICD-10-CM

## 2023-11-06 PROCEDURE — 92507 TX SP LANG VOICE COMM INDIV: CPT

## 2023-11-06 NOTE — PROGRESS NOTES
Daily Speech Treatment Note    Today's date: 2023  Patient’s name: Amarilis Robert  : 1950  MRN: [id number]    Referring provider: Cassie Armstrong MD    Encounter Diagnosis     ICD-10-CM    1. Chronic ischemic left MCA stroke  Z86.73       2. Word finding difficulty  R47.89       3. Other symbolic dysfunctions  G35.5                       POC expires Auth Status Total   Visits  Start date  Expiration date PT/OT + Visit Limit? Co-Insurance   2023 Authorized bomn 23  No                                             Visit/Unit Tracking  AUTH Status: Authorized  #[id number] Date              Visits  Authed: bomn Used 13 14 15 16 17 18 19 20 21 22 23 24    Remaining                      Dummy Auth Tracking  1 2 3 4 5 6   7 8 9 10 11 12   13 14 15 16 17 18   19 20 21 22 23 24   25 26 27 28 29 30   31 32 33 34 35 36          Visit tracking:  Visit #14    Subjective: Jabari Denton arrived to the session unaccompanied. She attended to all cognitive linguistic tasks. Goals  Short-term goals:    Goal 1. Patient will completed mid-high level word finding tasks with 80% accuracy, to be completed within the next 4-6 weeks  Jabari Denton completed semantic feature analysis of common photos with 20% accuracy independently. She required min-mod semantic clueing for specificity and for accurate task completion. She benefited from clinician modeling and visualization strategies. She also benefited from repetition. Goal 2. Jabari Denton will complete executive functioning tasks with 80% accuracy, to be completed within the next 4-6 weeks. Did not target. Goal 4. Jabari Denton with follow verbally spoken and written 2-4 step commands with 80% accuracy, to be completed within the next 4-6 weeks. Jabari Denton followed verbally spoken 2-step commands with 50% initial accuracy.  She required 1 additional repetition to achieve 90% accuracy. During this activity, background music was played as an added distraction. Goal 5. Arturo Unger will complete attention dependent structured tasks with 80% accuracy, to be completed within the next 4-6 weeks. Did not target. Goal 6. Arturo Unger will complete immediate, delayed, and working memory structured tasks, to be completed within the next 4-6 weeks. .  Did not target. Plan:  -Continue with current plan of care.

## 2023-11-07 DIAGNOSIS — E11.9 TYPE 2 DIABETES MELLITUS (HCC): ICD-10-CM

## 2023-11-08 ENCOUNTER — OFFICE VISIT (OUTPATIENT)
Facility: CLINIC | Age: 73
End: 2023-11-08
Payer: COMMERCIAL

## 2023-11-08 DIAGNOSIS — R48.8 OTHER SYMBOLIC DYSFUNCTIONS: ICD-10-CM

## 2023-11-08 DIAGNOSIS — R47.89 WORD FINDING DIFFICULTY: ICD-10-CM

## 2023-11-08 DIAGNOSIS — Z86.73 CHRONIC ISCHEMIC LEFT MCA STROKE: Primary | ICD-10-CM

## 2023-11-08 PROCEDURE — 92507 TX SP LANG VOICE COMM INDIV: CPT

## 2023-11-08 NOTE — PROGRESS NOTES
Daily Speech Treatment Note    Today's date: 2023  Patient’s name: Dania Santiago  : 1950  MRN: [id number]    Referring provider: Rohit Pacheco MD    Encounter Diagnosis     ICD-10-CM    1. Chronic ischemic left MCA stroke  Z86.73       2. Word finding difficulty  R47.89       3. Other symbolic dysfunctions  N74.3                         POC expires Auth Status Total   Visits  Start date  Expiration date PT/OT + Visit Limit? Co-Insurance   2023 Authorized bomn 23  No                                             Visit/Unit Tracking  AUTH Status: Authorized  #[id number] Date             Visits  Authed: bomn Used 13 14 15 16 17 18 23 23 21 22 23 24    Remaining                      Dummy Auth Tracking  1 2 3 4 5 6   7 8 9 10 11 12   13 14 15 16 17 18   19 20 21 22 23 24   25 26 27 28 29 30   31 32 33 34 35 36          Visit tracking:  Visit #15    Subjective: Jessica Carrera arrived to the session unaccompanied. She attended to all cognitive linguistic tasks. Goals  Short-term goals:    Goal 1. Patient will completed mid-high level word finding tasks with 80% accuracy, to be completed within the next 4-6 weeks  Jessicamandi Carrera completed a mid level category matrix with 50% accuracy. Goal 2. Jessica Carrera will complete executive functioning tasks with 80% accuracy, to be completed within the next 4-6 weeks. Jessica Carrera noted with multiple overt executive function errors during (Goal 1). Goal 4. Jessica Carrera with follow verbally spoken and written 2-4 step commands with 80% accuracy, to be completed within the next 4-6 weeks. Did not target. Goal 5. Jessica Carrera will complete attention dependent structured tasks with 80% accuracy, to be completed within the next 4-6 weeks. Did not target. Goal 6.  Jessica Carrera will complete immediate, delayed, and working memory structured tasks, to be completed within the next 4-6 weeks. .  Did not target. Plan:  -Continue with current plan of care.

## 2023-11-13 ENCOUNTER — OFFICE VISIT (OUTPATIENT)
Facility: CLINIC | Age: 73
End: 2023-11-13
Payer: COMMERCIAL

## 2023-11-13 ENCOUNTER — TELEPHONE (OUTPATIENT)
Dept: NEUROLOGY | Facility: CLINIC | Age: 73
End: 2023-11-13

## 2023-11-13 DIAGNOSIS — R48.8 OTHER SYMBOLIC DYSFUNCTIONS: ICD-10-CM

## 2023-11-13 DIAGNOSIS — Z86.73 CHRONIC ISCHEMIC LEFT MCA STROKE: Primary | ICD-10-CM

## 2023-11-13 DIAGNOSIS — R47.89 WORD FINDING DIFFICULTY: ICD-10-CM

## 2023-11-13 PROCEDURE — 92507 TX SP LANG VOICE COMM INDIV: CPT

## 2023-11-13 NOTE — PROGRESS NOTES
Daily Speech Treatment Note    Today's date: 2023  Patient’s name: Samantha Desouza  : 1950  MRN: [id number]    Referring provider: Olinda Lama MD    Encounter Diagnosis     ICD-10-CM    1. Chronic ischemic left MCA stroke  Z86.73       2. Word finding difficulty  R47.89       3. Other symbolic dysfunctions  D46.9                           POC expires Auth Status Total   Visits  Start date  Expiration date PT/OT + Visit Limit? Co-Insurance   2023 Authorized bomn 23  No                                             Visit/Unit Tracking  AUTH Status: Authorized  #[id number] Date            Visits  Authed: bomn Used 13 14 15 16 17 18 19 20 21 22 23 24    Remaining                      Dummy Auth Tracking  1 2 3 4 5 6   7 8 9 10 11 12   13 14 15 16 17 18   19 20 21 22 23 24   25 26 27 28 29 30   31 32 33 34 35 36          Visit tracking:  Visit #16    Subjective: Dominique Jordan arrived to the session unaccompanied. She attended to all cognitive linguistic tasks. Goals  Short-term goals:    Goal 1. Patient will completed mid-high level word finding tasks with 80% accuracy, to be completed within the next 4-6 weeks  Long and overt pauses for word finding were present during generative naming for simple concrete categories (food/countries). Goal 2. Dominique Jordan will complete executive functioning tasks with 80% accuracy, to be completed within the next 4-6 weeks. Did not target. Goal 4. Dominique Jordan with follow verbally spoken and written 2-4 step commands with 80% accuracy, to be completed within the next 4-6 weeks. Dominique Jordan followed 3-step written directions with 33% initial accuracy. She required additional processing times and reminders to self correct/re-read directions to ensure accurate task completion. Goal 5.  Dominique Jordan will complete attention dependent structured tasks with 80% accuracy, to be completed within the next 4-6 weeks. Angelika Silva displayed multiple (10) attention errors during a mid level sorting task. During this task, she was required to sort cards into three piles and to name an item in a category when two specific and predetermined number cards were displayed, and name an item in a different category when two different specific and predetermined number cards were displayed. Visual support was not provided during the activity. Goal 6. Angelika Silva will complete immediate, delayed, and working memory structured tasks, to be completed within the next 4-6 weeks. .  Immediate recall for an 8-item word list generated in goal (5) was at 100%. Delayed recall was also at 100%    Plan:  -Continue with current plan of care.

## 2023-11-15 ENCOUNTER — OFFICE VISIT (OUTPATIENT)
Facility: CLINIC | Age: 73
End: 2023-11-15
Payer: COMMERCIAL

## 2023-11-15 DIAGNOSIS — R47.89 WORD FINDING DIFFICULTY: ICD-10-CM

## 2023-11-15 DIAGNOSIS — R48.8 OTHER SYMBOLIC DYSFUNCTIONS: ICD-10-CM

## 2023-11-15 DIAGNOSIS — Z86.73 CHRONIC ISCHEMIC LEFT MCA STROKE: Primary | ICD-10-CM

## 2023-11-15 PROCEDURE — 92507 TX SP LANG VOICE COMM INDIV: CPT

## 2023-11-15 NOTE — PROGRESS NOTES
Daily Speech Treatment Note    Today's date: 11/15/2023  Patient’s name: Kymberly Cote  : 1950  MRN: [id number]    Referring provider: Mario Swan MD    Encounter Diagnosis     ICD-10-CM    1. Chronic ischemic left MCA stroke  Z86.73       2. Word finding difficulty  R47.89       3. Other symbolic dysfunctions  N03.2                             POC expires Auth Status Total   Visits  Start date  Expiration date PT/OT + Visit Limit? Co-Insurance   2023 Authorized bomn 23  No                                             Visit/Unit Tracking  AUTH Status: Authorized  #[id number] Date 11/1 11/6 11/8 11/13 11/15          Visits  Authed: bomn Used 13 14 15 16 17 18 19 20 21 22 23 24    Remaining                         Visit tracking:  Visit #17    Subjective: Gabriele Appiah arrived to the session unaccompanied. She attended to all cognitive linguistic tasks. Goals  Short-term goals:    Goal 1. Patient will completed mid-high level word finding tasks with 80% accuracy, to be completed within the next 4-6 weeks  Long and overt pauses for word finding were present during generative naming for simple concrete categories during goal (5) (animals/thanksgiving food). Goal 2. Gabriele Appiah will complete executive functioning tasks with 80% accuracy, to be completed within the next 4-6 weeks. Did not target. Goal 4. Gabriele Appiah with follow verbally spoken and written 2-4 step commands with 80% accuracy, to be completed within the next 4-6 weeks. Did not target. Goal 5. Gabriele Appiah will complete attention dependent structured tasks with 80% accuracy, to be completed within the next 4-6 weeks. Gabriele Appiah displayed multiple (9) attention errors during a mid level sorting task.  During this task, she was required to sort cards into three piles and to name an item in a category when two specific and predetermined number cards were displayed, and name an item in a different category when two different specific and predetermined face cards were displayed. Visual support was not provided during the activity. Dominique Jordan complete a simple visual-based divided attention activity with 85% accuracy given additional processing time. Goal 6. Dominique Jordan will complete immediate, delayed, and working memory structured tasks, to be completed within the next 4-6 weeks. .  Immediate recall for a 7-item word list generated in goal (5) was at 100%    Plan:  -Continue with current plan of care.

## 2023-11-17 DIAGNOSIS — E11.9 TYPE 2 DIABETES MELLITUS (HCC): ICD-10-CM

## 2023-11-22 ENCOUNTER — OFFICE VISIT (OUTPATIENT)
Facility: CLINIC | Age: 73
End: 2023-11-22
Payer: COMMERCIAL

## 2023-11-22 DIAGNOSIS — R47.89 WORD FINDING DIFFICULTY: ICD-10-CM

## 2023-11-22 DIAGNOSIS — Z86.73 CHRONIC ISCHEMIC LEFT MCA STROKE: Primary | ICD-10-CM

## 2023-11-22 DIAGNOSIS — R48.8 OTHER SYMBOLIC DYSFUNCTIONS: ICD-10-CM

## 2023-11-22 PROCEDURE — 92507 TX SP LANG VOICE COMM INDIV: CPT

## 2023-11-22 NOTE — PROGRESS NOTES
Daily Speech Treatment Note    Today's date: 2023  Patient’s name: Brian Trujillo  : 1950  MRN: [id number]    Referring provider: Marina Palumbo MD    Encounter Diagnosis     ICD-10-CM    1. Chronic ischemic left MCA stroke  Z86.73       2. Word finding difficulty  R47.89       3. Other symbolic dysfunctions  D39.5                               POC expires Auth Status Total   Visits  Start date  Expiration date PT/OT + Visit Limit? Co-Insurance   2023 Authorized bomn 23  No                                             Visit/Unit Tracking  AUTH Status: Authorized  #[id number] Date 11/1 11/6 11/8 11/13 11/15 11-         Visits  Authed: bomn Used 13 14 15 16 17 18 19 20 21 22 23 24    Remaining                         Visit tracking:  Visit #18     Subjective: Arturo Unger arrived to the session unaccompanied. She attended to all cognitive linguistic tasks. Goals  Short-term goals:    Goal 1. Patient will completed mid-high level word finding tasks with 80% accuracy, to be completed within the next 4-6 weeks  Arturo Unger identified category for (3) semantically related words (abstract) in 100% of opportunities. She was able to add an additional item to the list with 50% accuracy. Long and overt pauses present for word finding. She benefited from visualization strategy. Goal 2. Arturo Unger will complete executive functioning tasks with 80% accuracy, to be completed within the next 4-6 weeks. Simple deduction puzzle was introduced ([address] [place] [address] [address], Deduction Puzzle #1). Arturo Unger was able to complete with 66% accuracy. She required min-mod clueing for process of elimination and task breakdown for accurate task completion. Goal 4. Arturo Unger with follow verbally spoken and written 2-4 step commands with 80% accuracy, to be completed within the next 4-6 weeks.    Did not target. Goal 5. Bran Berger will complete attention dependent structured tasks with 80% accuracy, to be completed within the next 4-6 weeks. Did not target. Goal 6. Bran Berger will complete immediate, delayed, and working memory structured tasks, to be completed within the next 4-6 weeks. .  Immediate recall for a 7-item word list generated in goal (5) was at 100%    Plan:  -Continue with current plan of care.

## 2023-11-27 ENCOUNTER — APPOINTMENT (OUTPATIENT)
Facility: CLINIC | Age: 73
End: 2023-11-27
Payer: COMMERCIAL

## 2023-11-29 ENCOUNTER — OFFICE VISIT (OUTPATIENT)
Facility: CLINIC | Age: 73
End: 2023-11-29
Payer: COMMERCIAL

## 2023-11-29 DIAGNOSIS — R47.89 WORD FINDING DIFFICULTY: ICD-10-CM

## 2023-11-29 DIAGNOSIS — Z86.73 CHRONIC ISCHEMIC LEFT MCA STROKE: Primary | ICD-10-CM

## 2023-11-29 DIAGNOSIS — R48.8 OTHER SYMBOLIC DYSFUNCTIONS: ICD-10-CM

## 2023-11-29 PROCEDURE — 96125 COGNITIVE TEST BY HC PRO: CPT

## 2023-11-29 NOTE — PROGRESS NOTES
Speech Language Pathology Re-Evaluation/Updated Plan of Care    Today's date: 2023  Patient name: Ana Maria Lundberg    : 1950        Referring provider: Irma Wills MD  Dx:   Encounter Diagnosis     ICD-10-CM    1. Chronic ischemic left MCA stroke  Z86.73       2. Word finding difficulty  R47.89       3. Other symbolic dysfunctions  L28.2                     POC expires Auth Status Total   Visits  Start date  Expiration date PT/OT + Visit Limit? Co-Insurance   2024 Authorized bomn 23  No                                             Visit/Unit Tracking  AUTH Status: Authorized  #[id number] Date 11/1 11/6 11/8 11/13 11/15 11-22 11-29        Visits  Authed: bomn Used 13 14 15 16 17 18 19 20 21 22 23 24    Remaining                         Visit tracking:  Visit #19       Subjective Comments: Patient arrived to session unaccompanied; she attended well to [facility] re-evaluation. Safety Measures: None at this time    Reason for Referral:Change in cognitive status and Decreased language skills    Medical History significant for:   Past Medical History:   Diagnosis Date    Anxiety     CVA (cerebral vascular accident) ([address])     Depression     Diabetes ([address] )     GERD (gastroesophageal reflux disease)     Hypertension      Clinically Complex Situations:Previous therapy to address similar deficits      Medication List:   Current Outpatient Medications   Medication Sig Dispense Refill    Alcohol Swabs 70 % PADS May substitute brand based on insurance coverage.  Check glucose BID. 100 each 0    ALPRAZolam (XANAX) 0.5 mg tablet Take 0.5 mg by mouth 4 (four) times a day as needed for anxiety Pt takes one 0.5 mg tablet as needed for anxiety up to four times daily      aluminum-magnesium hydroxide 200-200 MG/5ML suspension Take 15 mL by mouth as needed for heartburn or indigestion (Patient not taking: Reported on 2023)  0    aspirin 325 mg tablet Take 1 tablet (325 mg total) by mouth daily Do not start before October 28, 2023. (Patient not taking: Reported on 8/21/2023 Do not start before October 28, 2023.)  0    aspirin 81 mg chewable tablet Chew 1 tablet (81 mg total) daily Do not start before July 30, 2023. 90 tablet 0    atorvastatin (LIPITOR) 80 mg tablet Take 1 tablet (80 mg total) by mouth daily with dinner 30 tablet 0    Blood Glucose Monitoring Suppl (OneTouch Verio Reflect) w/Device KIT May substitute brand based on insurance coverage. Check glucose BID. (Patient not taking: Reported on 8/21/2023) 1 kit 0    clopidogrel (PLAVIX) 75 mg tablet Take 1 tablet (75 mg total) by mouth daily 30 tablet 3    famotidine (PEPCID) 20 mg tablet Take 0.5 tablets (10 mg total) by mouth 2 (two) times a day  0    glucose blood (OneTouch Verio) test strip May substitute brand based on insurance coverage. Check glucose BID. (Patient not taking: Reported on 8/21/2023) 100 each 0    lisinopril (ZESTRIL) 10 mg tablet Take 1 tablet (10 mg total) by mouth daily Do not start before July 30, 2023.  0    metFORMIN (GLUCOPHAGE) 500 mg tablet take 1 tablet by mouth EVERY MORNING WITH BREAKFAST 90 tablet 2    omeprazole (PriLOSEC) 20 mg delayed release capsule Take it as you normally do at home  0    OneTouch Delica Lancets 64Z MISC May substitute brand based on insurance coverage. Check glucose BID. (Patient not taking: Reported on 8/21/2023) 100 each 0    sertraline (ZOLOFT) 100 mg tablet Take 100 mg by mouth daily with dinner 2 tablets daily      thiamine (VITAMIN B1) 100 mg tablet Take 100 mg by mouth daily       No current facility-administered medications for this visit. Allergies:    Allergies   Allergen Reactions    Amoxil [Amoxicillin] Other (See Comments)     "mouth felt on fire"     Primary Language: English  Preferred Language: English     Home Environment/ Lifestyle:Lives with spouse  Current / Prior Services being received:     Mental Status: Alert  Behavior Status:Cooperative  Recent Speech/ Language therapy:Outpatient rehab  Rehabilitation Prognosis:Good rehab potential to reach the established goals    Assessments: The Repeatable Battery for the Assessment of Neuropsychological Status (RBANS) is a brief, individually-administered assessment which measures attention, language, visuospatial/constructional abilities, and immediate & delayed memory. The RBANS is intended for use with adolescents to adults, ages 15 to 80 years. The following results were obtained during the administration of the assessment. Form: B    Cognitive Domain/Subtest: Index Score: Percentile Rank: Classification: IE Index Score: Status:   IMMEDIATE MEMORY 81 10%ile LOW AVERAGE 65 IMPROVEMENT        1. List Learning (19/40)          2. Story Memory (13/24)           VISUOSPATIAL/  CONSTRUCTIONAL 87 19%ile LOW AVERAGE 74 IMPROVEMENT        3. Figure Copy (16/20)          4. Line Orientation (16/20)           LANGUAGE 85 16%ile LOW AVERAGE 82 IMPROVEMENT        5. Picture Naming (9/10)          6. Semantic Fluency (11/40)           ATTENTION 60 .4%ile EXTREMELY LOW 64 DECLINE        7. Digit Span (7/16)          8. Coding (17/89)           DELAYED MEMORY 68 2%ile EXTREMELY LOW 79 DECLINE        9. List Recall (2/10)          10. List Recognition (18/20)          11. Story Recall (5/12)          12. Figure Recall (4/20)           Sum of Index Scores:  381  361 IMPROVEMENT   Total Score:  70      Percentile: 2%ile      Classification: EXTREMELY LOW          “IE” indicates the scores from the initial evaluation (9/14/23). Form: A      *Patient named 11 concrete category members (Zoo animals) in 60 sec (norm=15+). -- BELOW AVERAGE **IMPROVEMENT FROM IE      Goals  Short Term Goals:    Goal 1. Patient will complete mid-high level word finding tasks with 80% accuracy, to be completed within the next 4-6 weeks--PARTIALLY MET     Goal 2.  Sravan Carbine will complete executive functioning tasks with 80% accuracy, to be completed within the next 4-6 weeks. --PARTIALLY MET     Goal 4. Yovana Smith with follow verbally spoken and written 2-4 step commands with 80% accuracy, to be completed within the next 4-6 weeks. --PARTIALLY MET     Goal 5. Yovana Smith will complete attention dependent structured tasks with 80% accuracy, to be completed within the next 4-6 weeks. --PARTIALLY MET        Goal 6. Yovana Smith will complete immediate, delayed, and working memory structured tasks, to be completed within the next 4-6 weeks. --PARTIALLY MET    Long Term Goals:    Goal 1. Patient will improve cognitive linguistic skills. Impressions/ Recommendations  Impressions:  Yovana Smith continues to present with (improving) cognitive linguistic impairments secondary to CVA. The most significant gains were in the areas of immediate memory and language. Regarding immediate memory, Nicoles raw score on the list learning subtest increased from 16 to 19. Her raw score on the story memory subtest improved from 9 to 13. Her index score is now classified as low average, as opposed to extremely low. Regarding language, Yovana Smith improved on the picture naming and semantic fluency subtests. Yolis's attention index score declined due to a lower raw score on the visually-based attention coding subtest. Coding is a visually-based divided attention task. Her raw score on the digit span subtest was the same as the IE (7). Despite her lower attention index score, Siomaras improved performance on the list learning and story memory subtests suggests gains in attention for fleeting information presented auditorally. The reason for the lower delayed recall index score is because Nicoles raw score on the list recognition subtest decreased from 19 to 18. Despite this, her raw scores on the list recall and story recall subtests increased, which does suggest gains in delayed recall. Regarding expressive language, fluency naming (also known as generative naming) remains below average.  Generative naming involves rapid naming within specific semantic or phonemic boundaries, specifically zoo animals  Generative naming is a means of assessing executive function within the setting of expressive language as the task combines naming, working memory, self monitoring and sustained attention. Processing was speed notably reduced, and long pauses were present throughout the exam, likely secondary to deficits in attention and information processing. Continued therapy targeting the aforementioned areas remains warranted as maximum gains have not been achieved. Recommendations:   Patients would benefit from: Cognitive-Linguistic therapy   Frequency:2x weekly   Duration:Other 3 months    Intervention certification RGK:  Intervention certification Y  Intervention Comments:Yolis attended well to today's re-evaluation.

## 2023-12-02 DIAGNOSIS — Z86.73 CHRONIC ISCHEMIC LEFT MCA STROKE: ICD-10-CM

## 2023-12-02 RX ORDER — CLOPIDOGREL BISULFATE 75 MG/1
75 TABLET ORAL DAILY
Qty: 30 TABLET | Refills: 3 | Status: SHIPPED | OUTPATIENT
Start: 2023-12-02

## 2023-12-07 ENCOUNTER — REMOTE DEVICE CLINIC VISIT (OUTPATIENT)
Dept: CARDIOLOGY CLINIC | Facility: CLINIC | Age: 73
End: 2023-12-07
Payer: COMMERCIAL

## 2023-12-07 DIAGNOSIS — Z95.818 PRESENCE OF CARDIAC DEVICE: Primary | ICD-10-CM

## 2023-12-07 PROCEDURE — 93298 REM INTERROG DEV EVAL SCRMS: CPT | Performed by: INTERNAL MEDICINE

## 2023-12-07 PROCEDURE — G2066 INTER DEVC REMOTE 30D: HCPCS | Performed by: INTERNAL MEDICINE

## 2023-12-07 NOTE — PROGRESS NOTES
Results for orders placed or performed in visit on 12/07/23   Cardiac EP device report    Narrative     Medical [address]: BATTERY STATUS "OK." NO PATIENT OR DEVICE ACTIVATED EPISODES.--REYES

## 2023-12-11 ENCOUNTER — OFFICE VISIT (OUTPATIENT)
Facility: CLINIC | Age: 73
End: 2023-12-11
Payer: COMMERCIAL

## 2023-12-11 DIAGNOSIS — R47.89 WORD FINDING DIFFICULTY: ICD-10-CM

## 2023-12-11 DIAGNOSIS — Z86.73 CHRONIC ISCHEMIC LEFT MCA STROKE: Primary | ICD-10-CM

## 2023-12-11 DIAGNOSIS — R48.8 OTHER SYMBOLIC DYSFUNCTIONS: ICD-10-CM

## 2023-12-11 PROCEDURE — 92507 TX SP LANG VOICE COMM INDIV: CPT

## 2023-12-11 NOTE — PROGRESS NOTES
Daily Speech Treatment Note/Progress Summary    Today's date: 2023  Patient’s name: Mary García  : 1950  MRN: [id number]    Referring provider: Alonso Moreno MD    Encounter Diagnosis     ICD-10-CM    1. Chronic ischemic left MCA stroke  Z86.73       2. Word finding difficulty  R47.89       3. Other symbolic dysfunctions  F14.9                                 POC expires Auth Status Total   Visits  Start date  Expiration date PT/OT + Visit Limit? Co-Insurance   2024 Authorized bomn 23   No                                                                            Visit/Unit Tracking  AUTH Status: Authorized  #[id number] Date 11/1 11/6 11/8 11/13 11/15 11-22 11-29 12-11       Visits  Authed: bomn Used 13 14 15 16 17 18 19 20 21 22 23 24    Remaining                         Visit tracking:  Visit #20    Subjective: Alonso Claire arrived to the session unaccompanied. She attended to all cognitive linguistic tasks. Goals  Short-term goals:    Goal 1. Patient will completed mid-high level word finding tasks with 80% accuracy, to be completed within the next 4-6 weeks-- Did not target    Goal 2. [facility] will complete executive functioning tasks with 80% accuracy, to be completed within the next 4-6 weeks. [facility] completed executive function based deduction task which relied on working memory, divided attention, and organization ([employer]) with 60% initial accuracy. As task progressed, accuracy achieved. She benefited from intermittent cues for deductive reasoning and to double check her work. She required a significant amount of extra time for task completion. Goal 4. [facility] with follow verbally spoken and written 2-4 step commands with 80% accuracy, to be completed within the next 4-6 weeks. --Did not target. Goal 5.  [facility] will complete attention dependent structured tasks with 80% accuracy, to be completed within the next 4-6 weeks. --Did not target. Goal 6. Vimal Patterson will complete immediate, delayed, and working memory structured tasks, to be completed within the next 4-6 weeks. Vimal Patterson was tasked with answering questions based on category inclusion/exclusion about a 4-item word list read aloud. Accuracy was at 80%. She required 1-2 additional repetitions to achieve 100% accuracy. Of note, processing speed judged to be reduced. Progress Summary  Vimal Patterson has made progress towards all goal. Results of recent re-evaluation showed significant gains in areas of immediate memory and language. Regarding immediate memory, Nicoles raw score on the list learning subtest increased from 16 to 19. Her raw score on the story memory subtest improved from 9 to 13. Her index score is now classified as low average, as opposed to extremely low. Regarding language, Vimal Patterson improved on the picture naming and semantic fluency subtests. She also showed improvements on the generative naming task, which is a means of assessing executive function within the setting of expressive language as the task combines naming, working memory, self monitoring and sustained attention. Continued therapy remains warranted at this time, as maximum gains are yet to be achieved. Plan:  -Continue with current plan of care.

## 2023-12-13 ENCOUNTER — OFFICE VISIT (OUTPATIENT)
Facility: CLINIC | Age: 73
End: 2023-12-13
Payer: COMMERCIAL

## 2023-12-13 DIAGNOSIS — R47.89 WORD FINDING DIFFICULTY: ICD-10-CM

## 2023-12-13 DIAGNOSIS — R48.8 OTHER SYMBOLIC DYSFUNCTIONS: ICD-10-CM

## 2023-12-13 DIAGNOSIS — Z86.73 CHRONIC ISCHEMIC LEFT MCA STROKE: Primary | ICD-10-CM

## 2023-12-13 PROCEDURE — 92507 TX SP LANG VOICE COMM INDIV: CPT

## 2023-12-13 NOTE — PROGRESS NOTES
Daily Speech Treatment Note    Today's date: 2023  Patient’s name: Ana Maria Lundberg  : 1950  MRN: [id number]    Referring provider: Irma Wills MD    Encounter Diagnosis     ICD-10-CM    1. Chronic ischemic left MCA stroke  Z86.73       2. Word finding difficulty  R47.89       3. Other symbolic dysfunctions  X85.5                                   POC expires Auth Status Total   Visits  Start date  Expiration date PT/OT + Visit Limit? Co-Insurance   2024 Authorized bomn 23   No                                                                            Visit/Unit Tracking  AUTH Status: Authorized  #[id number] Date 11/1 11/6 11/8 11/13 11/15 11-22 11-29 12-11 12-13      Visits  Authed: bomn Used 13 14 15 16 17 18 19 20 21 22 23 24    Remaining                         Visit tracking:  Visit #21    Subjective: Elaine Gracia arrived to the session unaccompanied. She attended to all cognitive linguistic tasks. Goals  Short-term goals:    Goal 1. Patient will completed mid-high level word finding tasks with 80% accuracy, to be completed within the next 4-6 weeks. Elaine Gracia completed executive function based word finding activity with 68% (Guessing a word a specific attribute given a first letter phonemic constraint- e.g. "Something soft that begins with H." She required min-mod semantic clueing for accuracy of 100%. Goal 2. Elaine Gracia will complete executive functioning tasks with 80% accuracy, to be completed within the next 4-6 weeks--Did not target. Goal 4. Elaine Gracia with follow verbally spoken and written 2-4 step commands with 80% accuracy, to be completed within the next 4-6 weeks. --Did not target. Goal 5. Elaine Gracia will complete attention dependent structured tasks with 80% accuracy, to be completed within the next 4-6 weeks.  Elaine Gracia displayed (0) attention errors during a mid level sorting task. During this task, she was required to sort cards into three piles and to name an item in a category when two specific and predetermined number cards were displayed. Visual support was not provided during the activity. Goal 6. Dory Brennan will complete immediate, delayed, and working memory structured tasks, to be completed within the next 4-6 weeks. Immediate memory for 8-item word list was at 100% accuracy. She benefited from sub organization system for increased organization and recall. She also benefited from verbal rehearsal.      -Continue with current plan of care.

## 2023-12-18 ENCOUNTER — OFFICE VISIT (OUTPATIENT)
Facility: CLINIC | Age: 73
End: 2023-12-18
Payer: COMMERCIAL

## 2023-12-18 DIAGNOSIS — Z86.73 CHRONIC ISCHEMIC LEFT MCA STROKE: Primary | ICD-10-CM

## 2023-12-18 DIAGNOSIS — R48.8 OTHER SYMBOLIC DYSFUNCTIONS: ICD-10-CM

## 2023-12-18 DIAGNOSIS — R47.89 WORD FINDING DIFFICULTY: ICD-10-CM

## 2023-12-18 PROCEDURE — 92507 TX SP LANG VOICE COMM INDIV: CPT

## 2023-12-18 NOTE — PROGRESS NOTES
Daily Speech Treatment Note    Today's date: 2023  Patient’s name: Yolis Flores  : 1950  MRN: [id number]    Referring provider: Shady Metcalf MD    Encounter Diagnosis     ICD-10-CM    1. Chronic ischemic left MCA stroke  Z86.73       2. Word finding difficulty  R47.89       3. Other symbolic dysfunctions  R48.8                                     POC expires Auth Status Total   Visits  Start date  Expiration date PT/OT + Visit Limit? Co-Insurance   2024 Authorized bomn 23   No                                                                            Visit/Unit Tracking  AUTH Status: Authorized  #[id number] Date 11/1 11/6 11/8 11/13 11/15 11-22 11-29 12-11 12-13 12/18     Visits  Authed: bomn Used 13 14 15 16 17 18 19 20 21 22 23 24    Remaining                         Visit tracking:  Visit #22    Subjective: Yolis arrived to the session unaccompanied. She attended to all cognitive linguistic tasks.                                                                                                                                    Goals  Short-term goals:    Goal 1. Patient will completed mid-high level word finding tasks with 80% accuracy, to be completed within the next 4-6 weeks.  Yolis completed executive function based word finding activity with 20% accuracy. She benefited from min-mod semantic clueing.     Goal 2. Yolis will complete executive functioning tasks with 80% accuracy, to be completed within the next 4-6 weeks--Did not target.    Goal 4. Yolis with follow verbally spoken and written 2-4 step commands with 80% accuracy, to be completed within the next 4-6 weeks. --Did not target.     Goal 5. Yolis will complete attention dependent structured tasks with 80% accuracy, to be completed within the next 4-6 weeks. Yolis displayed (5) attention errors during sorting task that combined attention and categorization. During this task, she was required to  sort cards into three piles and to name an item in a category when two specific and predetermined number cards were displayed, and name an item in a separate category when two specific and pre determined face cards were displayed. Yolis was provided some visual support (numbers and face cards were displayed, but she had to keep track of categories).       Goal 6. Yolis will complete immediate, delayed, and working memory structured tasks, to be completed within the next 4-6 weeks.  Immediate memory for 8-item word list was at 87% accuracy.  Delayed recall was at 100%. She benefited from sub organization system for increased organization and recall. She also benefited from verbal rehearsal.      -Continue with current plan of care.

## 2023-12-20 ENCOUNTER — APPOINTMENT (OUTPATIENT)
Facility: CLINIC | Age: 73
End: 2023-12-20
Payer: COMMERCIAL

## 2024-01-03 ENCOUNTER — OFFICE VISIT (OUTPATIENT)
Facility: CLINIC | Age: 74
End: 2024-01-03
Payer: COMMERCIAL

## 2024-01-03 DIAGNOSIS — R48.8 OTHER SYMBOLIC DYSFUNCTIONS: ICD-10-CM

## 2024-01-03 DIAGNOSIS — R47.89 WORD FINDING DIFFICULTY: ICD-10-CM

## 2024-01-03 DIAGNOSIS — Z86.73 CHRONIC ISCHEMIC LEFT MCA STROKE: Primary | ICD-10-CM

## 2024-01-03 PROCEDURE — 92507 TX SP LANG VOICE COMM INDIV: CPT

## 2024-01-03 NOTE — PROGRESS NOTES
Progress Note - Cardiology Office  [facility]    Yolis Flores 73 y.o. female MRN: [id number]  : 1950  Encounter: [id number]      Assessment:     Hypertension.  Hyperlipidemia.  CVA.  S/p loop recorder implantation.  Type 2 diabetes.  GERD.    Discussion Summary and Plan:    1.  Hypertension.  - BP during today's office visit, 138/58.   - Continue lisinopril 10 mg daily.  - 23 TTE: LVEF 63%.  Left ventricle wall motion is normal.  Diastolic function is normal.  Trace tricuspid valve regurgitation.  - CMP ordered.      2. Hyperlipidemia.  - Currently on Lipitor 80 mg daily.  - 23 lipid panel: Cholesterol 216, triglycerides 97, HDL 71, .  - Repeat lipid panel ordered.     3. CVA.  - Left basal ganglia and left centrum semiovale CVA (2023).   - Currently on Plavix 75 mg daily and aspirin 81 mg daily.  - Currently on Lipitor 80 mg daily.  - Evaluated by Neurology outpatient on 2023.  Plan for DAPT for 3 months.  - Appears patient has missed multiple neurology appointments.  Next neurology appointment scheduled for 3/20/2024.     4. S/p loop recorder implantation.   - s/p loop recorder implantation ([employer]) on 23 in setting of cryptogenic stroke.  - 23 cardiac EP device report: Battery status okay.  No patient or device activated episodes.  - 23 cardiac EP device report: Battery voltage adequate.  Presenting rhythm normal sinus, 60 bpm.  No patient or device activated episodes.  Normal device function.     5. Type II diabetes.  - 23 HgbA1c: 6.5.   - Care per PCP.      6. GERD.  - Currently on omeprazole 20 mg daily and Pepcid 10 mg twice daily.  - Care per PCP.       Patient / Caretaker was advised and educated to call our office  immediately if  patient has any new symptoms of chest pain/shortness of breath, near-syncope, syncope, light headedness sustained palpitations  or any other cardiovascular symptoms before their scheduled  follow-up appointment.  Office number was provided #[phone].  Please call [phone] if any questions.  Counseling :  A description of the counseling.  Goals and Barriers.  Patient's ability to self care: Yes  Medication side effect reviewed with patient in detail and all their questions answered to their satisfaction.    HPI :     Yolis Flores is a 73 y.o. female with PMHx of HTN, HLD, CVA (acute/early subacute lacunar infarcts in the left basal ganglia and left centrum semiovale, 7/2023), DMII, and GERD who presented for routine outpatient cardiology follow-up.     Patient was last seen in outpatient cardiology office on 9/6/2023.    9/06/23:        She was admitted 7/28/23-7/29/23 due to CVA (presented with slurred speech and right hand weakness). 7/29/23 MRI demonstrated acute/early subacute lacunar infarcts in the left basal ganglia and left centrum semiovale. She was discharged on DAPT and Lipitor however patient missed 2 days worth of medications and started smoking on discharge.  She represented on 7/31/23 for slurred speech and recurrent right hand weakness. She was admitted from 7/31/23-8/02/23. Patient had loop recorder implant placed on 8/02/23.       Patient reports since discharge on 8/02 she has felt well, periodically fatigued.  She reports she has stopped smoking and drinking, states she has no issues with cravings at this time.  States that she has not been using nicotine supplementation.  Denies experiencing chest pain, palpitations, shortness of breath, changes in vision, changes in speech, extremity weakness, lightheadedness, dizziness, headache, nausea, or vomiting.    1/04/24:        Since last outpatient office visit on 9/6/2023 patient reports she has been doing well but states she gets fatigued easily. Patient has been participating in speech pathology.  She states that the speech pathologist reports she is progressing well.  Patient offers no complaints today.  She denies  "experiencing chest pain, palpitations, shortness of breath at rest or with exertion, lower extremity swelling, lightheadedness, dizziness, headache, nausea, or vomiting.        Review of Systems   Constitutional:  Positive for fatigue. Negative for activity change, appetite change, chills, diaphoresis, fever and unexpected weight change.   Respiratory:  Negative for choking, chest tightness, shortness of breath and wheezing.    Cardiovascular:  Negative for chest pain, palpitations and leg swelling.   Gastrointestinal:  Negative for abdominal distention, abdominal pain, constipation, diarrhea, nausea and vomiting.   Skin: Negative.    Neurological:  Negative for dizziness, syncope, facial asymmetry, light-headedness, numbness and headaches.       Historical Information   Past Medical History:   Diagnosis Date    Anxiety     CVA (cerebral vascular accident) ([facility])     Depression     Diabetes (HCC)     GERD (gastroesophageal reflux disease)     Hypertension      Past Surgical History:   Procedure Laterality Date    APPENDECTOMY      CARDIAC ELECTROPHYSIOLOGY PROCEDURE N/A 2023    Procedure: Cardiac loop recorder implant;  Surgeon: Charla Anthony DO;  Location: [facility];  Service: Cardiology    TONSILLECTOMY       Social History     Substance and Sexual Activity   Alcohol Use Not Currently    Alcohol/week: 28.0 standard drinks of alcohol    Types: 28 Cans of beer per week    Comment: every night has \"a few\" beers     Social History     Substance and Sexual Activity   Drug Use No     Social History     Tobacco Use   Smoking Status Former    Current packs/day: 0.00    Types: Cigarettes    Quit date: 2023    Years since quittin.4   Smokeless Tobacco Never     Family History: History reviewed. No pertinent family history.    Meds/Allergies     Allergies   Allergen Reactions    Amoxil [Amoxicillin] Other (See Comments)     \"mouth felt on fire\"       Current Outpatient Medications:     Alcohol Swabs 70 % " PADS, May substitute brand based on insurance coverage. Check glucose BID., Disp: 100 each, Rfl: 0    ALPRAZolam (XANAX) 0.5 mg tablet, Take 0.5 mg by mouth 4 (four) times a day as needed for anxiety Pt takes one 0.5 mg tablet as needed for anxiety up to four times daily, Disp: , Rfl:     aspirin 81 mg chewable tablet, Chew 1 tablet (81 mg total) daily Do not start before July 30, 2023., Disp: 90 tablet, Rfl: 0    atorvastatin (LIPITOR) 80 mg tablet, Take 1 tablet (80 mg total) by mouth daily with dinner, Disp: 30 tablet, Rfl: 0    famotidine (PEPCID) 20 mg tablet, Take 0.5 tablets (10 mg total) by mouth 2 (two) times a day, Disp: , Rfl: 0    lisinopril (ZESTRIL) 10 mg tablet, Take 1 tablet (10 mg total) by mouth daily Do not start before July 30, 2023., Disp: , Rfl: 0    metFORMIN (GLUCOPHAGE) 500 mg tablet, take 1 tablet by mouth EVERY MORNING WITH BREAKFAST, Disp: 90 tablet, Rfl: 2    omeprazole (PriLOSEC) 20 mg delayed release capsule, Take it as you normally do at home, Disp: , Rfl: 0    omeprazole (PriLOSEC) 40 MG capsule, Take 40 mg by mouth daily, Disp: , Rfl:     sertraline (ZOLOFT) 100 mg tablet, Take 100 mg by mouth daily with dinner 2 tablets daily, Disp: , Rfl:     thiamine (VITAMIN B1) 100 mg tablet, Take 100 mg by mouth daily, Disp: , Rfl:     Blood Glucose Monitoring Suppl (OneTouch Verio Reflect) w/Device KIT, May substitute brand based on insurance coverage. Check glucose BID. (Patient not taking: Reported on 8/21/2023), Disp: 1 kit, Rfl: 0    clopidogrel (PLAVIX) 75 mg tablet, take 1 tablet by mouth once daily, Disp: 30 tablet, Rfl: 3    glucose blood (OneTouch Verio) test strip, May substitute brand based on insurance coverage. Check glucose BID. (Patient not taking: Reported on 8/21/2023), Disp: 100 each, Rfl: 0    OneTouch Delica Lancets 33G MISC, May substitute brand based on insurance coverage. Check glucose BID. (Patient not taking: Reported on 8/21/2023), Disp: 100 each, Rfl: 0    Vitals:  "Blood pressure 138/58, pulse 87, height 5' 4\" (1.626 m), weight 71.2 kg (157 lb), SpO2 97%.    Body mass index is 26.95 kg/m².  Wt Readings from Last 3 Encounters:   01/04/24 71.2 kg (157 lb)   09/06/23 68 kg (150 lb)   08/21/23 68.5 kg (151 lb)     Vitals:    01/04/24 1303   Weight: 71.2 kg (157 lb)     BP Readings from Last 3 Encounters:   01/04/24 138/58   09/06/23 122/64   08/21/23 160/80       Physical Exam:  Physical Exam  Vitals reviewed.   Constitutional:       General: She is not in acute distress.  Cardiovascular:      Rate and Rhythm: Normal rate and regular rhythm.      Pulses: Normal pulses.      Heart sounds: Murmur heard.   Pulmonary:      Effort: Pulmonary effort is normal. No respiratory distress.      Breath sounds: Normal breath sounds.   Abdominal:      General: Abdomen is flat. There is no distension.      Palpations: Abdomen is soft.      Tenderness: There is no abdominal tenderness.   Musculoskeletal:      Right lower leg: No edema.      Left lower leg: No edema.   Skin:     General: Skin is warm and dry.   Neurological:      Mental Status: She is alert and oriented to person, place, and time.     Diagnostic Studies Review Cardio:      Cardiac testing:   Cardiac EP device report    Result Date: 12/7/2023  Narrative: MDKANIKA LNQ22/ ACTIVE SYSTEM IS MRI CONDITIONAL CARELINK TRANSMISSION: BATTERY STATUS \"OK.\" NO PATIENT OR DEVICE ACTIVATED EPISODES.--REYES     REBEKAH     Result date: 8/02/23     Left Ventricle Left ventricular cavity size is normal. Wall thickness is normal. The left ventricular ejection fraction is 55% by visual estimation.. Systolic function is normal.  Wall motion is normal.      Right Ventricle Right ventricular cavity size is normal. Systolic function is normal. Wall thickness is normal.      Left Atrium The atrium is normal in size.      Right Atrium The atrium is normal in size.      Atrial Septum No patent foramen ovale detected using color flow Doppler at rest.      Left Atrial " Appendage Left atrial appendage is normal in size. There is normal function. There is no thrombus.      Aortic Valve The aortic valve is trileaflet. The leaflets are not thickened. The leaflets are not calcified. The leaflets exhibit normal mobility. There is no evidence of regurgitation. The aortic valve has no significant stenosis.      Mitral Valve Mitral valve structure is normal. There is mild regurgitation. There is no evidence of stenosis.      Tricuspid Valve Tricuspid valve structure is normal. There is trace regurgitation. There is no evidence of stenosis. The right ventricular systolic pressure is normal.      Pulmonic Valve Pulmonic valve structure is normal. There is no evidence of regurgitation. There is no evidence of stenosis.      Ascending Aorta The aortic root is normal in size. There is no evidence of aortic dissection. There is no aneurysm in the aorta.         Cardiac EP device report     Result Date: 8/28/2023  Narrative: BARBARA LNQ22/ ACTIVE SYSTEM IS MRI CONDITIONAL DEVICE INTERROGATED IN THE [place] OFFICE: BATTERY VOLTAGE ADEQUATE (GOOD). PRESENTING RHYTHM NS 68 BPM. NO PATIENT OR DEVICE ACTIVATED EPISODES. WOUND CHECK: INCISION CLEAN AND DRY WITH EDGES APPROXIMATED; WOUND CARE AND RESTRICTIONS REVIEWED WITH PATIENT. NORMAL DEVICE FUNCTION. AM/NC      Echo complete      Result date: 7/29/23     Left Ventricle Left ventricular cavity size is normal. Wall thickness is normal. The left ventricular ejection fraction is 63%. Systolic function is normal.  Wall motion is normal. Diastolic function is normal.      Right Ventricle Right ventricular cavity size is normal. Systolic function is normal.      Left Atrium The atrium is normal in size.      Right Atrium The atrium is normal in size.      Atrial Septum No patent foramen ovale detected using saline contrast injection with provocation by Valsalva.      Aortic Valve The aortic valve is trileaflet. The leaflets are mildly thickened. The leaflets  "are not calcified. The leaflets exhibit normal mobility. There is no evidence of regurgitation. The aortic valve has no significant stenosis.      Mitral Valve There is no evidence of regurgitation. There is no evidence of stenosis. The mitral valve has normal structure and normal function.      Tricuspid Valve Tricuspid valve structure is normal. There is trace regurgitation. There is no evidence of stenosis.      Pulmonic Valve Pulmonic valve structure is normal. There is no evidence of regurgitation. There is no evidence of stenosis.      Ascending Aorta The aortic root is normal in size.      IVC/SVC The inferior vena cava is normal in size.      Pericardium There is no pericardial effusion. The pericardium is normal in appearance.          Imaging:  Chest X-Ray:   No Chest XR results available for this patient.    CT-scan of the chest:       Lab Review   Lab Results   Component Value Date    WBC 9.09 07/31/2023    HGB 12.5 07/31/2023    HCT 38.0 07/31/2023    MCV 96 07/31/2023    RDW 14.4 07/31/2023     07/31/2023     BMP:  Lab Results   Component Value Date    SODIUM 138 08/02/2023    K 3.8 08/02/2023     08/02/2023    CO2 25 08/02/2023    BUN 12 08/02/2023    CREATININE 0.59 (L) 08/02/2023    GLUC 123 08/02/2023    GLUF 114 (H) 08/01/2023    CALCIUM 9.2 08/02/2023    EGFR 92 08/02/2023    MG 2.1 08/01/2023     Troponins:    LFT:  Lab Results   Component Value Date    AST 17 04/29/2023    ALT 16 04/29/2023    ALKPHOS 67 04/29/2023    TP 6.7 04/29/2023    ALB 4.1 04/29/2023      No components found for: \"TSH3\"  Lab Results   Component Value Date    FZF2ACSARDON 4.928 (H) 05/04/2019     Lab Results   Component Value Date    HGBA1C 6.5 (H) 07/28/2023     Lipid Profile:   Lab Results   Component Value Date    CHOLESTEROL 216 (H) 07/29/2023    HDL 71 07/29/2023    LDLCALC 126 (H) 07/29/2023    TRIG 97 07/29/2023     Lab Results   Component Value Date    CHOLESTEROL 216 (H) 07/29/2023    CHOLESTEROL 225 " "(H) 07/28/2023     Lab Results   Component Value Date    TROPONINI <0.02 10/08/2021     No results found for: \"NTBNP\"   No results found for this or any previous visit (from the past 672 hour(s)).          Maria Eugenia Navarro PA-C  "

## 2024-01-03 NOTE — PROGRESS NOTES
Daily Speech Treatment Note    Today's date: 1/3/2024  Patient’s name: Yolis Flores  : 1950  MRN: [id number]    Referring provider: Shady Metcalf MD    Encounter Diagnosis     ICD-10-CM    1. Chronic ischemic left MCA stroke  Z86.73       2. Word finding difficulty  R47.89       3. Other symbolic dysfunctions  R48.8                                       POC expires Auth Status Total   Visits  Start date  Expiration date PT/OT + Visit Limit? Co-Insurance   2024 Authorized bomn 23   No                                                                            Visit/Unit Tracking  AUTH Status: Authorized  #[id number] Date 11/1 11/6 11/8 11/13 11/15 11-22 11-29 12-11 12-13 12/18 1-3    Visits  Authed: bomn Used 13 14 15 16 17 18 19 20 21 22 23 24    Remaining                         Visit tracking:  Visit #23    Subjective: Yolis arrived to the session 15 minutes late. She was unaccompanied and attended to all cognitive linguistic tasks.                                                                                                                                    Goals  Short-term goals:    Goal 1. Patient will completed mid-high level word finding tasks with 80% accuracy, to be completed within the next 4-6 weeks.  Yolis completed mid level category matrix with 66% accuracy. Long and overt pauses present for word finding. She benefited from semantic and visual clues with increased accuracy noted.    Goal 2. Yolis will complete executive functioning tasks with 80% accuracy, to be completed within the next 4-6 weeks--Did not target.    Goal 4. Yolis with follow verbally spoken and written 2-4 step commands with 80% accuracy, to be completed within the next 4-6 weeks.--DNT     Goal 5. Yolis will complete attention dependent structured tasks with 80% accuracy, to be completed within the next 4-6 weeks.--DNT      Goal 6. Yolis will complete immediate, delayed, and  working memory structured tasks, to be completed within the next 4-6 weeks.--DNT      -Continue with current plan of care.

## 2024-01-04 ENCOUNTER — OFFICE VISIT (OUTPATIENT)
Dept: CARDIOLOGY CLINIC | Facility: CLINIC | Age: 74
End: 2024-01-04
Payer: COMMERCIAL

## 2024-01-04 VITALS
SYSTOLIC BLOOD PRESSURE: 138 MMHG | OXYGEN SATURATION: 97 % | BODY MASS INDEX: 26.8 KG/M2 | DIASTOLIC BLOOD PRESSURE: 58 MMHG | HEIGHT: 64 IN | HEART RATE: 87 BPM | WEIGHT: 157 LBS

## 2024-01-04 DIAGNOSIS — E78.5 HYPERLIPIDEMIA, UNSPECIFIED HYPERLIPIDEMIA TYPE: ICD-10-CM

## 2024-01-04 DIAGNOSIS — I10 ESSENTIAL HYPERTENSION: Primary | ICD-10-CM

## 2024-01-04 PROCEDURE — 99213 OFFICE O/P EST LOW 20 MIN: CPT | Performed by: PHYSICIAN ASSISTANT

## 2024-01-04 RX ORDER — OMEPRAZOLE 40 MG/1
40 CAPSULE, DELAYED RELEASE ORAL DAILY
COMMUNITY
Start: 2023-12-05

## 2024-01-08 ENCOUNTER — OFFICE VISIT (OUTPATIENT)
Facility: CLINIC | Age: 74
End: 2024-01-08
Payer: COMMERCIAL

## 2024-01-08 DIAGNOSIS — R48.8 OTHER SYMBOLIC DYSFUNCTIONS: ICD-10-CM

## 2024-01-08 DIAGNOSIS — Z86.73 CHRONIC ISCHEMIC LEFT MCA STROKE: Primary | ICD-10-CM

## 2024-01-08 DIAGNOSIS — R47.89 WORD FINDING DIFFICULTY: ICD-10-CM

## 2024-01-08 PROCEDURE — 92507 TX SP LANG VOICE COMM INDIV: CPT

## 2024-01-08 NOTE — PROGRESS NOTES
Daily Speech Treatment Note    Today's date: 2024  Patient’s name: Yolis Flores  : 1950  MRN: [id number]    Referring provider: Shady Metaclf MD    Encounter Diagnosis     ICD-10-CM    1. Chronic ischemic left MCA stroke  Z86.73       2. Word finding difficulty  R47.89       3. Other symbolic dysfunctions  R48.8                                         POC expires Auth Status Total   Visits  Start date  Expiration date PT/OT + Visit Limit? Co-Insurance   2024 Authorized bomn 23   No                                                                            Visit/Unit Tracking  AUTH Status: Authorized  #[id number] Date 11/1 11/6 11/8 11/13 11/15 11-22 11-29 12-11 12-13 12/18 1-3 1-8   Visits  Authed: bomn Used 13 14 15 16 17 18 19 20 21 22 23 24    Remaining                         Visit tracking:  Visit #24    Subjective: Yolis arrived on time to the session. She was unaccompanied and attended to all cognitive linguistic tasks.                                                                                                                                    Goals  Short-term goals:    Goal 1. Patient will completed mid-high level word finding tasks with 80% accuracy, to be completed within the next 4-6 weeks.--DNT    Goal 2. Yolis will complete executive functioning tasks with 80% accuracy, to be completed within the next 4-6 weeks--Did not target.    Goal 4. Yolis with follow verbally spoken and written 2-4 step commands with 80% accuracy, to be completed within the next 4-6 weeks.  Yolis followed 1-step directions with 50% initial accuracy. 90% accuracy attained after 1 additional repetition.    Goal 5. Yolis will complete attention dependent structured tasks with 80% accuracy, to be completed within the next 4-6 weeks.--DNT  Yolis displayed (8) attention errors during sorting task that combined attention and categorization. During this task, she was required to  sort cards into three piles and to name an item in a category when two specific and predetermined number cards were displayed, and name an item in a separate category when two specific and pre determined face cards were displayed. Yolis was provided some visual support (numbers and face cards were displayed, but she had to keep track of which cards corresponded to the correct categories).      Goal 6. Yolis will complete immediate, delayed, and working memory structured tasks, to be completed within the next 4-6 weeks.  Immediate recall for 8-item word list generated in goal (5) was at 75% accuracy. She benefited from sub categorization system and verbal rehearsal. Delayed recall at 75%      -Continue with current plan of care.

## 2024-01-10 ENCOUNTER — OFFICE VISIT (OUTPATIENT)
Facility: CLINIC | Age: 74
End: 2024-01-10
Payer: COMMERCIAL

## 2024-01-10 DIAGNOSIS — R47.89 WORD FINDING DIFFICULTY: ICD-10-CM

## 2024-01-10 DIAGNOSIS — R48.8 OTHER SYMBOLIC DYSFUNCTIONS: ICD-10-CM

## 2024-01-10 DIAGNOSIS — Z86.73 CHRONIC ISCHEMIC LEFT MCA STROKE: Primary | ICD-10-CM

## 2024-01-10 PROCEDURE — 92507 TX SP LANG VOICE COMM INDIV: CPT

## 2024-01-10 NOTE — PROGRESS NOTES
Daily Speech Treatment Note    Today's date: 1/10/2024  Patient’s name: Yolis Flores  : 1950  MRN: [id number]    Referring provider: Shady Metcalf MD    Encounter Diagnosis     ICD-10-CM    1. Chronic ischemic left MCA stroke  Z86.73       2. Word finding difficulty  R47.89       3. Other symbolic dysfunctions  R48.8                                           POC expires Auth Status Total   Visits  Start date  Expiration date PT/OT + Visit Limit? Co-Insurance   2024 Authorized bomn 23   No                                                                            Visit/Unit Tracking  AUTH Status: Authorized  #[id number] Date 11/1 11/6 11/8 11/13 11/15 11-22 11-29 12-11 12-13 12/18 1-3 1-8   Visits  Authed: bomn Used 13 14 15 16 17 18 19 20 21 22 23 24    Remaining                  AUTH Status: Authorized  #[id number] Date 1/10              Visits  Authed: bomn Used 25               Remaining                       Visit tracking:  Visit #25    Subjective: Yolis arrived on time to the session. She was unaccompanied and attended to all cognitive linguistic tasks.                                                                                                                                    Goals  Short-term goals:    Goal 1. Patient will completed mid-high level word finding tasks with 80% accuracy, to be completed within the next 4-6 weeks.--DNT    Goal 2. Yolis will complete executive functioning tasks with 80% accuracy, to be completed within the next 4-6 weeks--DNT    Goal 4. Yolis with follow verbally spoken and written 2-4 step commands with 80% accuracy, to be completed within the next 4-6 weeks.--DNT    Goal 5. Yolis will complete attention dependent structured tasks with 80% accuracy, to be completed within the next 4-6 weeks.  Yolis displayed (7) attention errors during sorting task that combined attention and categorization. During this task, she was required to  sort cards into three piles and to name an item in a category when two specific and predetermined number cards were displayed, and name an item in a separate category when a specific and pre determined face card was displayed. Yolis was provided some visual support (Category headings were displayed but she had to keep track of which specific cards corresponded to the correct categories).      Goal 6. Yolis will complete immediate, delayed, and working memory structured tasks, to be completed within the next 4-6 weeks.  Yolis answered questions about a 4 -item word list that was read allowed (based on attributes) with 66% accuracy. She required 1 additional repetition to attain an accuracy of 100%.     -Continue with current plan of care.

## 2024-01-15 ENCOUNTER — OFFICE VISIT (OUTPATIENT)
Facility: CLINIC | Age: 74
End: 2024-01-15
Payer: COMMERCIAL

## 2024-01-15 DIAGNOSIS — R47.89 WORD FINDING DIFFICULTY: ICD-10-CM

## 2024-01-15 DIAGNOSIS — R48.8 OTHER SYMBOLIC DYSFUNCTIONS: ICD-10-CM

## 2024-01-15 DIAGNOSIS — Z86.73 CHRONIC ISCHEMIC LEFT MCA STROKE: Primary | ICD-10-CM

## 2024-01-15 PROCEDURE — 92507 TX SP LANG VOICE COMM INDIV: CPT

## 2024-01-15 NOTE — PROGRESS NOTES
Daily Speech Treatment Note    Today's date: 1/15/2024  Patient’s name: Yolis Flores  : 1950  MRN: [id number]    Referring provider: Sahdy Metcalf MD    Encounter Diagnosis     ICD-10-CM    1. Chronic ischemic left MCA stroke  Z86.73       2. Word finding difficulty  R47.89       3. Other symbolic dysfunctions  R48.8                                             POC expires Auth Status Total   Visits  Start date  Expiration date PT/OT + Visit Limit? Co-Insurance   2024 Authorized bomn 23   No                                                                            Visit/Unit Tracking  AUTH Status: Authorized  #[id number] Date 11/1 11/6 11/8 11/13 11/15 11-22 11-29 12-11 12-13 12/18 1-3 1-8   Visits  Authed: bomn Used 13 14 15 16 17 18 19 20 21 22 23 24    Remaining                  AUTH Status: Authorized  #[id number] Date 1/10 1/15             Visits  Authed: bomn Used 25 26              Remaining                       Visit tracking:  Visit #26    Subjective: Yolis arrived on time to the session. She was unaccompanied and attended to all cognitive linguistic tasks.                                                                                                                                    Goals  Short-term goals:    Goal 1. Patient will completed mid-high level word finding tasks with 80% accuracy, to be completed within the next 4-6 weeks.--DNT    Goal 2. Yolis will complete executive functioning tasks with 80% accuracy, to be completed within the next 4-6 weeks  Yolis completed an executive function based task that relied on attention, working memory, and organization (Positioning Letters from Directions) with 70% accuracy. She benefited from intermittent clueing for organization and to re-read directions.    Goal 4. Yolis with follow verbally spoken and written 2-4 step commands with 80% accuracy, to be completed within the next 4-6 weeks.--DNT    Goal 5. Yolis  will complete attention dependent structured tasks with 80% accuracy, to be completed within the next 4-6 weeks.  Yolis displayed (2) executive function errors during sorting task that combined attention and categorization. She displayed (1) attention/sorting error that was self corrected. During this task, she was required to sort cards into three piles and to name an item in a category when two specific and predetermined number cards were displayed, and name an item in a separate category when a specific and pre determined face card was displayed. Partial visual support provided. (She was shown the specific and pre-determined cards, but had to remember the categories, and which cards corresponded to the correct category).     Goal 6. Yolis will complete immediate, delayed, and working memory structured tasks, to be completed within the next 4-6 weeks.--DNT    -Continue with current plan of care.

## 2024-01-17 ENCOUNTER — OFFICE VISIT (OUTPATIENT)
Facility: CLINIC | Age: 74
End: 2024-01-17
Payer: COMMERCIAL

## 2024-01-17 DIAGNOSIS — R47.89 WORD FINDING DIFFICULTY: ICD-10-CM

## 2024-01-17 DIAGNOSIS — R48.8 OTHER SYMBOLIC DYSFUNCTIONS: ICD-10-CM

## 2024-01-17 DIAGNOSIS — Z86.73 CHRONIC ISCHEMIC LEFT MCA STROKE: Primary | ICD-10-CM

## 2024-01-17 PROCEDURE — 92507 TX SP LANG VOICE COMM INDIV: CPT

## 2024-01-17 NOTE — PROGRESS NOTES
Daily Speech Treatment Note    Today's date: 2024  Patient’s name: Yolis Flores  : 1950  MRN: [id number]    Referring provider: Shady Metcalf MD    Encounter Diagnosis     ICD-10-CM    1. Chronic ischemic left MCA stroke  Z86.73       2. Word finding difficulty  R47.89       3. Other symbolic dysfunctions  R48.8                                               POC expires Auth Status Total   Visits  Start date  Expiration date PT/OT + Visit Limit? Co-Insurance   2024 Authorized bomn 23   No                                                                            Visit/Unit Tracking  AUTH Status: Authorized  #[id number] Date 11/1 11/6 11/8 11/13 11/15 11-22 11-29 12-11 12-13 12/18 1-3 -   Visits  Authed: bomn Used 13 14 15 16 17 18 19 20 21 22 23 24    Remaining                  AUTH Status: Authorized  #[id number] Date 1/10 1/15 1/17            Visits  Authed: bomn Used 25 26 27             Remaining                       Visit tracking:  Visit #27    Subjective: Yolis arrived on time to the session. She was unaccompanied and attended to all cognitive linguistic tasks. Yolis had multiple questions related to her current medications. A list of medications was provided with proper dosage instructions. Yolis reports her  does medication management, but she is interested in trying to take over. Discussed doing medication management activities during sessions, and Yolis was receptive to the idea.                                                                                                                                    Goals  Short-term goals:    Goal 1. Patient will completed mid-high level word finding tasks with 80% accuracy, to be completed within the next 4-6 weeks.--DNT    Goal 2. Yolis will complete executive functioning tasks with 80% accuracy, to be completed within the next 4-6 weeks--DNT    Goal 4. Yolis with follow verbally spoken and written  2-4 step commands with 80% accuracy, to be completed within the next 4-6 weeks.--DNT    Goal 5. Yolis will complete attention dependent structured tasks with 80% accuracy, to be completed within the next 4-6 weeks.  Yolis completed an executive function based task that relied on divided attention, auditory attention, and working memory. Initial accuracy at 33% She benefited from additional time and repetition of task directions. Accuracy increaed as task progressed.      Goal 6. Yolis will complete immediate, delayed, and working memory structured tasks, to be completed within the next 4-6 weeks.--DNT    -Continue with current plan of care.

## 2024-01-22 ENCOUNTER — OFFICE VISIT (OUTPATIENT)
Facility: CLINIC | Age: 74
End: 2024-01-22
Payer: COMMERCIAL

## 2024-01-22 DIAGNOSIS — R47.89 WORD FINDING DIFFICULTY: ICD-10-CM

## 2024-01-22 DIAGNOSIS — Z86.73 CHRONIC ISCHEMIC LEFT MCA STROKE: Primary | ICD-10-CM

## 2024-01-22 DIAGNOSIS — R48.8 OTHER SYMBOLIC DYSFUNCTIONS: ICD-10-CM

## 2024-01-22 PROCEDURE — 92507 TX SP LANG VOICE COMM INDIV: CPT

## 2024-01-22 NOTE — PROGRESS NOTES
Daily Speech Treatment Note    Today's date: 2024  Patient’s name: Ylois Flores  : 1950  MRN: [id number]    Referring provider: Shady Metcalf MD    Encounter Diagnosis     ICD-10-CM    1. Chronic ischemic left MCA stroke  Z86.73       2. Word finding difficulty  R47.89       3. Other symbolic dysfunctions  R48.8                                                 POC expires Auth Status Total   Visits  Start date  Expiration date PT/OT + Visit Limit? Co-Insurance   2024 Authorized bomn 23   No                                                                            Visit/Unit Tracking  AUTH Status: Authorized  #[id number] Date 11/1 11/6 11/8 11/13 11/15 11-22 11-29 12-11 12-13 12/18 1-3 -8   Visits  Authed: bomn Used 13 14 15 16 17 18 19 20 21 22 23 24    Remaining                  AUTH Status: Authorized  #[id number] Date 1/10 1/15 1/17            Visits  Authed: bomn Used  26 27             Remaining                       Visit tracking:  Visit #27    Subjective: Yolis arrived on time to the session. She was unaccompanied and attended to all cognitive linguistic tasks.                                                                                                                                   Goals  Short-term goals:    Goal 1. Patient will completed mid-high level word finding tasks with 80% accuracy, to be completed within the next 4-6 weeks.  Yolis completed a simple category matrix with 40% accuracy. Long and overt pauses for word finding present. She required mod-max clueing to complete task.     Goal 2. Yolis will complete executive functioning tasks with 80% accuracy, to be completed within the next 4-6 weeks--DNT    Goal 4. Yolis with follow verbally spoken and written 2-4 step commands with 80% accuracy, to be completed within the next 4-6 weeks.--DNT    Goal 5. Yolis will complete attention dependent structured tasks with 80% accuracy, to be  completed within the next 4-6 weeks.  Yolis completed an executive function based task that relied on attention, working memory, and organization (finding errors in pill boxes).  Initial accuracy at 75% She benefited from additional time and repetition of task directions. Accuracy increaed as task progressed.      Goal 6. Yolis will complete immediate, delayed, and working memory structured tasks, to be completed within the next 4-6 weeks.--DNT    -Continue with current plan of care.

## 2024-01-24 ENCOUNTER — OFFICE VISIT (OUTPATIENT)
Facility: CLINIC | Age: 74
End: 2024-01-24
Payer: COMMERCIAL

## 2024-01-24 DIAGNOSIS — Z86.73 CHRONIC ISCHEMIC LEFT MCA STROKE: Primary | ICD-10-CM

## 2024-01-24 DIAGNOSIS — R48.8 OTHER SYMBOLIC DYSFUNCTIONS: ICD-10-CM

## 2024-01-24 DIAGNOSIS — R47.89 WORD FINDING DIFFICULTY: ICD-10-CM

## 2024-01-24 PROCEDURE — 92507 TX SP LANG VOICE COMM INDIV: CPT

## 2024-01-24 NOTE — PROGRESS NOTES
Daily Speech Treatment Note    Today's date: 2024  Patient’s name: Yolis Flores  : 1950  MRN: [id number]    Referring provider: Shady Metcalf MD    Encounter Diagnosis     ICD-10-CM    1. Chronic ischemic left MCA stroke  Z86.73       2. Word finding difficulty  R47.89       3. Other symbolic dysfunctions  R48.8                                                   POC expires Auth Status Total   Visits  Start date  Expiration date PT/OT + Visit Limit? Co-Insurance   2024 Authorized bomn 23   No                                                                            Visit/Unit Tracking  AUTH Status: Authorized  #[id number] Date 11/1 11/6 11/8 11/13 11/15 11-22 11-29 12-11 12-13 12/18 1-3 -   Visits  Authed: bomn Used 13 14 15 16 17 18 19 20 21 22 23 24    Remaining                  AUTH Status: Authorized  #[id number] Date 1/10 1/15 1/17 1/24           Visits  Authed: bomn Used 25 26 27 28            Remaining                       Visit tracking:  Visit #28    Subjective: Yolis 15 mins late to the session. She was unaccompanied and attended to all cognitive linguistic tasks.                                                                                                                                   Goals  Short-term goals:    Goal 1. Patient will completed mid-high level word finding tasks with 80% accuracy, to be completed within the next 4-6 weeks.--DNT    Goal 2. Yolis will complete executive functioning tasks with 80% accuracy, to be completed within the next 4-6 weeks--DNT    Goal 4. Yolis with follow verbally spoken and written 2-4 step commands with 80% accuracy, to be completed within the next 4-6 weeks.--DNT    Goal 5. Yolis will complete attention dependent structured tasks with 80% accuracy, to be completed within the next 4-6 weeks.  Yolis displayed (2) executive function errors during sorting task that combined attention and categorization. She  displayed (1) attention/sorting error that was self corrected. During this task, she was required to sort cards into three piles and to name an item in a category when two specific and predetermined number cards were displayed, and name an item in a separate category when a specific and pre determined face card was displayed. Visual support was not provided. Internal memory strategy introduced prior to task. Accuracy increased from the last session.     Goal 6. Yolis will complete immediate, delayed, and working memory structured tasks, to be completed within the next 4-6 weeks.  Yolis was tasked with answering questions (based on attribute) about a 4-item word list that was read aloud. Accuracy at 91%    -Continue with current plan of care.

## 2024-01-29 ENCOUNTER — OFFICE VISIT (OUTPATIENT)
Facility: CLINIC | Age: 74
End: 2024-01-29
Payer: COMMERCIAL

## 2024-01-29 DIAGNOSIS — Z86.73 CHRONIC ISCHEMIC LEFT MCA STROKE: Primary | ICD-10-CM

## 2024-01-29 DIAGNOSIS — R48.8 OTHER SYMBOLIC DYSFUNCTIONS: ICD-10-CM

## 2024-01-29 DIAGNOSIS — R47.89 WORD FINDING DIFFICULTY: ICD-10-CM

## 2024-01-29 PROCEDURE — 92507 TX SP LANG VOICE COMM INDIV: CPT

## 2024-01-29 NOTE — PROGRESS NOTES
Daily Speech Treatment Note    Today's date: 2024  Patient’s name: Yolis Flores  : 1950  MRN: [id number]    Referring provider: Shady Metcalf MD    Encounter Diagnosis     ICD-10-CM    1. Chronic ischemic left MCA stroke  Z86.73       2. Word finding difficulty  R47.89       3. Other symbolic dysfunctions  R48.8               POC expires Auth Status Total   Visits  Start date  Expiration date PT/OT + Visit Limit? Co-Insurance   2024 Authorized bomn 23   No                                                                            Visit/Unit Tracking  AUTH Status: Authorized  #[id number] Date 11/1 11/6 11/8 11/13 11/15 11-22 11-29 12-11 12-13 12/18 1-3 -   Visits  Authed: bomn Used 13 14 15 16 17 18 19 20 21 22 23 24    Remaining                  AUTH Status: Authorized  #[id number] Date 1/10 1/15 1/17 1/24 1/29          Visits  Authed: bomn Used 25 26 27 28 29           Remaining                       Visit tracking:  Visit #29    Subjective: Yolis 15 mins late to the session. She was unaccompanied and attended to all cognitive linguistic tasks.                                                                                                                                   Goals  Short-term goals:    Goal 1. Patient will completed mid-high level word finding tasks with 80% accuracy, to be completed within the next 4-6 weeks.--DNT    Goal 2. Yolis will complete executive functioning tasks with 80% accuracy, to be completed within the next 4-6 week  Yolis completed deduction puzzle (1) with 25% initial accuracy. She required mod-max clues for organization and process of elimination.    Goal 4. Yolis with follow verbally spoken and written 2-4 step commands with 80% accuracy, to be completed within the next 4-6 weeks.--DNT    Goal 5. Yolis will complete attention dependent structured tasks with 80% accuracy, to be completed within the next 4-6 weeks.    Goal 6.  Yolis will complete immediate, delayed, and working memory structured tasks, to be completed within the next 4-6 weeks.  Patient was tasked with repeating a 3-item word list in a particular order (order of occurrence). Accuracy at 80%. When word list was increased to 4 words, accuracy decreased to 60%.     -Continue with current plan of care.

## 2024-01-31 ENCOUNTER — APPOINTMENT (OUTPATIENT)
Facility: CLINIC | Age: 74
End: 2024-01-31
Payer: COMMERCIAL

## 2024-02-07 ENCOUNTER — OFFICE VISIT (OUTPATIENT)
Facility: CLINIC | Age: 74
End: 2024-02-07
Payer: COMMERCIAL

## 2024-02-07 DIAGNOSIS — R48.8 OTHER SYMBOLIC DYSFUNCTIONS: ICD-10-CM

## 2024-02-07 DIAGNOSIS — Z86.73 CHRONIC ISCHEMIC LEFT MCA STROKE: Primary | ICD-10-CM

## 2024-02-07 DIAGNOSIS — R47.89 WORD FINDING DIFFICULTY: ICD-10-CM

## 2024-02-07 PROCEDURE — 92507 TX SP LANG VOICE COMM INDIV: CPT

## 2024-02-07 NOTE — PROGRESS NOTES
Daily Speech Treatment Note/Progress Note    Today's date: 2024  Patient’s name: Yolis Flores  : 1950  MRN: [id number]    Referring provider: Shady Metcalf MD    Encounter Diagnosis     ICD-10-CM    1. Chronic ischemic left MCA stroke  Z86.73       2. Word finding difficulty  R47.89       3. Other symbolic dysfunctions  R48.8                 POC expires Auth Status Total   Visits  Start date  Expiration date PT/OT + Visit Limit? Co-Insurance   2024 Authorized bomn 23   No                                                                            Visit/Unit Tracking  AUTH Status: Authorized  #[id number] Date 11/1 11/6 11/8 11/13 11/15 11-22 11-29 12-11 12-13 12/18 1-3 -   Visits  Authed: bomn Used 13 14 15 16 17 18 19 20 21 22 23 24    Remaining                  AUTH Status: Authorized  #[id number] Date 1/10 1/15 1/17 1/24 1/29 2/7         Visits  Authed: bomn Used 25 26 27 28 29 30          Remaining                       Visit tracking:  Visit #30    Subjective: Yolis 15 mins late to the session. She was unaccompanied and attended to all cognitive linguistic tasks.                                                                                                                                   Goals  Short-term goals:    Goal 1. Patient will completed mid-high level word finding tasks with 80% accuracy, to be completed within the next 4-6 weeks.--DNT    Goal 2. Yolis will complete executive functioning tasks with 80% accuracy, to be completed within the next 4-6 week  Yolis completed simple deduction puzzle ACE (pg. 89) with 50% initial accuracy. She required mod-max clues for organization and process of elimination.    Goal 4. Yolis with follow verbally spoken and written 2-4 step commands with 80% accuracy, to be completed within the next 4-6 weeks.--DNT    Goal 5. Yolis will complete attention dependent structured tasks with 80% accuracy, to be completed within  the next 4-6 weeks.  Yolis displayed (0) errors during a sorting task that combined attention and categorization.  During this task, she was required to sort cards into three piles and to name an item in a category when two specific and predetermined number cards were displayed, and name an item in a separate category when a specific and pre determined face card was displayed. Visual support was not provided. Internal memory strategy introduced prior to task. Accuracy increased from the last session.     Goal 6. Yolis will complete immediate, delayed, and working memory structured tasks, to be completed within the next 4-6 weeks.--DNT     -Continue with current plan of care.

## 2024-02-12 ENCOUNTER — OFFICE VISIT (OUTPATIENT)
Facility: CLINIC | Age: 74
End: 2024-02-12
Payer: COMMERCIAL

## 2024-02-12 DIAGNOSIS — R48.8 OTHER SYMBOLIC DYSFUNCTIONS: ICD-10-CM

## 2024-02-12 DIAGNOSIS — R47.89 WORD FINDING DIFFICULTY: ICD-10-CM

## 2024-02-12 DIAGNOSIS — Z86.73 CHRONIC ISCHEMIC LEFT MCA STROKE: Primary | ICD-10-CM

## 2024-02-12 PROCEDURE — 92507 TX SP LANG VOICE COMM INDIV: CPT

## 2024-02-12 NOTE — PROGRESS NOTES
Daily Speech Treatment Note    Today's date: 2024  Patient’s name: Yolis Flores  : 1950  MRN: [id number]    Referring provider: Shady Metcalf MD    Encounter Diagnosis     ICD-10-CM    1. Chronic ischemic left MCA stroke  Z86.73       2. Word finding difficulty  R47.89       3. Other symbolic dysfunctions  R48.8                   POC expires Auth Status Total   Visits  Start date  Expiration date PT/OT + Visit Limit? Co-Insurance   2024 Authorized bomn 23   No                                                                            Visit/Unit Tracking  AUTH Status: Authorized  #[id number] Date 11/1 11/6 11/8 11/13 11/15 11-22 11-29 12-11 12-13 12/18 1-3 -   Visits  Authed: bomn Used 13 14 15 16 17 18 19 20 21 22 23 24    Remaining                  AUTH Status: Authorized  #[id number] Date 1/10 1/15 1/17 1/24 1/29 2/7 2/12        Visits  Authed: bomn Used 25 26 27 28 29 30 31         Remaining                       Visit tracking:  Visit #31    Subjective: Yolis arrived unaccompanied and attended to all cognitive linguistic tasks.                                                                                                                                   Goals  Short-term goals:    Goal 1. Patient will completed mid-high level word finding tasks with 80% accuracy, to be completed within the next 4-6 weeks.--DNT  Yolis completed a mid level category matrix with 68% accuracy. Pauses for word finding were present; she benefited from intermittent semantic clues.    Goal 2. Yolis will complete executive functioning tasks with 80% accuracy, to be completed within the next 4-6 week  Yolis completed simple deduction based puzzle that involved organizing groceries on a shelf with 66% initial accuracy. She required min-mod clues for organization and process of elimination.    Goal 4. Yolis with follow verbally spoken and written 2-4 step commands with 80% accuracy, to  be completed within the next 4-6 weeks.--DNT    Goal 5. Yolis will complete attention dependent structured tasks with 80% accuracy, to be completed within the next 4-6 weeks.--DNT  -Continue with current plan of care.

## 2024-02-14 ENCOUNTER — OFFICE VISIT (OUTPATIENT)
Facility: CLINIC | Age: 74
End: 2024-02-14
Payer: COMMERCIAL

## 2024-02-14 DIAGNOSIS — Z86.73 CHRONIC ISCHEMIC LEFT MCA STROKE: Primary | ICD-10-CM

## 2024-02-14 DIAGNOSIS — R47.89 WORD FINDING DIFFICULTY: ICD-10-CM

## 2024-02-14 DIAGNOSIS — R48.8 OTHER SYMBOLIC DYSFUNCTIONS: ICD-10-CM

## 2024-02-14 PROCEDURE — 92507 TX SP LANG VOICE COMM INDIV: CPT

## 2024-02-14 NOTE — PROGRESS NOTES
Daily Speech Treatment Note    Today's date: 2024  Patient’s name: Yolis Flores  : 1950  MRN: [id number]    Referring provider: Shady Metcalf MD    Encounter Diagnosis     ICD-10-CM    1. Chronic ischemic left MCA stroke  Z86.73       2. Other symbolic dysfunctions  R48.8       3. Word finding difficulty  R47.89                     POC expires Auth Status Total   Visits  Start date  Expiration date PT/OT + Visit Limit? Co-Insurance   2024 Authorized bomn 1/3/24 12/31/24   No                                                                            Visit/Unit Tracking  AUTH Status: Authorized  #[id number] Date 11/1 11/6 11/8 11/13 11/15 11-22 11-29 12-11 12-13 12/18 1-3 1-   Visits  Authed: bomn Used 13 14 15 16 17 18 19 20 21 22 23 24    Remaining                  AUTH Status: Authorized  #[id number] Date 1/10 1/15 1/17 1/24 1/29 2/7 2/12 2/14       Visits  Authed: bomn Used 25 26 27 28 29 30 31 32        Remaining                       Visit tracking:  Visit #32    Subjective: Yolis arrived unaccompanied and attended to all cognitive linguistic tasks.                                                                                                                                   Goals  Short-term goals:    Goal 1. Patient will completed mid-high level word finding tasks with 80% accuracy, to be completed within the next 4-6 weeks.--DNT    Goal 2. Yolis will complete executive functioning tasks with 80% accuracy, to be completed within the next 4-6 week  Yolis completed simple deduction based puzzle that involved organizing items in a closet with  90% initial accuracy given additional time for completion. She required min-mod clues for organization and process of elimination. This is improved accuracy from the previous sessions    Goal 4. Yolis with follow verbally spoken and written 2-4 step commands with 80% accuracy, to be completed within the next 4-6 weeks.--DNT    Goal 5.  Yolis will complete attention dependent structured tasks with 80% accuracy, to be completed within the next 4-6 weeks.  Yolis displayed (5) errors during a sorting task that combined attention and categorization.  During this task, she was required to sort cards into three piles and to name an item in a category when two specific and predetermined number cards were displayed, and name an item in a separate category when two specific and pre determined face card were displayed. Partial visual support was provided. Accuracy decreased from the previous session, but complexity increased.  -Continue with current plan of care.

## 2024-02-21 ENCOUNTER — OFFICE VISIT (OUTPATIENT)
Facility: CLINIC | Age: 74
End: 2024-02-21
Payer: COMMERCIAL

## 2024-02-21 DIAGNOSIS — Z86.73 CHRONIC ISCHEMIC LEFT MCA STROKE: Primary | ICD-10-CM

## 2024-02-21 DIAGNOSIS — R48.8 OTHER SYMBOLIC DYSFUNCTIONS: ICD-10-CM

## 2024-02-21 DIAGNOSIS — R47.89 WORD FINDING DIFFICULTY: ICD-10-CM

## 2024-02-21 PROCEDURE — 92507 TX SP LANG VOICE COMM INDIV: CPT

## 2024-02-21 NOTE — PROGRESS NOTES
Speech Re-Evaluation/Update Plan of Care    Today's date: 2024  Patient’s name: Yolis Flores  : 1950  MRN: [id number]    Referring provider: Shady Metcalf MD    Encounter Diagnosis     ICD-10-CM    1. Chronic ischemic left MCA stroke  Z86.73       2. Other symbolic dysfunctions  R48.8       3. Word finding difficulty  R47.89                       POC expires Auth Status Total   Visits  Start date  Expiration date PT/OT + Visit Limit? Co-Insurance   24 Authorized bomn 1/3/24 12/31/24   No                                                                            Visit/Unit Tracking  AUTH Status: Authorized  #[id number] Date 11/1 11/6 11/8 11/13 11/15 11-22 11-29 12-11 12-13 12/18 1-3 1-8   Visits  Authed: bomn Used 13 14 15 16 17 18 19 20 21 22 23 24    Remaining                  AUTH Status: Authorized  #[id number] Date 1/10 1/15 1/17 1/24 1/29 2/7 2/12 2/14 2/21      Visits  Authed: bomn Used 25 26 27 28 29 30 31 32 33       Remaining                       Visit tracking:  Visit #33    Subjective: Yolis arrived unaccompanied and attended well to re- evaluation.     Re-Evaluation    The Repeatable Battery for the Assessment of Neuropsychological Status (RBANS) is a brief, individually-administered assessment which measures attention, language, visuospatial/constructional abilities, and immediate & delayed memory. The RBANS is intended for use with adolescents to adults, ages 12 to 89 years. The following results were obtained during the administration of the assessment.    Form: A    Cognitive Domain/Subtest: Index Score: Percentile Rank: Classification: RE Index Score: Status:   IMMEDIATE MEMORY 83 13%ile Low Average 81 IMPROVEMENT        1. List Learning ()          2. Story Memory ()           VISUOSPATIAL/  CONSTRUCTIONAL 72 3%ile Borderline 87 DECLINE        3. Figure Copy ()          4. Line Orientation ()           LANGUAGE 85 16%ile Low Average 85 NO CHANGE         "5. Picture Naming (9/10)          6. Semantic Fluency (11/40)           ATTENTION 75 5%ile Borderline 60 IMPROVEMENT        7. Digit Span (11/16)          8. Coding (22/89)           DELAYED MEMORY 87 19%ile Low Average 68 IMPROVEMENT        9. List Recall (0/10)          10. List Recognition (19/20)          11. Story Recall (4/12)          12. Figure Recall (8/20)           Sum of Index Scores:  402  381 IMPROVEMENT   Total Score:  75      Percentile: 5%ile      Classification: Borderline          “RE” indicates the scores from the re-evaluation (11/29/23). Form: B      *Patient named 11 concrete category members (fruits and vegetables) in 60 sec (norm=15+). -- BELOW AVERAGE                                                                                                                                     Goals  Short-term goals:    Goal 1. Patient will completed mid-high level word finding tasks with 80% accuracy, to be completed within the next 4-6 weeks.--PARTIALLY MET    Goal 2. Yolis will complete executive functioning tasks with 80% accuracy, to be completed within the next 4-6 week--PARTIALLY MET    Goal 4. Yolis with follow verbally spoken and written 2-4 step commands with 80% accuracy, to be completed within the next 4-6 weeks.--PARTIALLY MET    Goal 5. 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  She has made gains across domains. The most significant gains were in the areas of attention and delayed memory.  Regarding attention, her raw score on the digit span subtest increased from 7 to 11, and her raw score on the visually-based divided attention coding subtest improved from 17 to 22. The increase in her index score brought her from \"extremely low\" to " "\"borderline\". For delayed memory, Yolis's index score increased from 68 to 87, which brought her from \"extremely low\" to \"borderline.\" Yolis continues to present with mild-moderate deficits in language, although it has remained relatively stable. Regarding expressive language, fluency naming (also known as generative naming) remains below average. Generative naming involves rapid naming within specific semantic or phonemic boundaries, specifically zoo animals  Generative naming is a means of assessing executive function within the setting of expressive language as the task combines naming, working memory, self monitoring and sustained attention. Her improvement have brought her overall index score from the \"extremely low\" percentile to \"borderline.\"  Clinically, Yolis continues to present with impairments in executive function, attention, and word finding. Continued therapy targeting the aforementioned areas remains warranted as maximum gains have not been achieved.        Recommendations:       Patients would benefit from: Cognitive-Linguistic therapy       Frequency:2x weekly       Duration:Other 3 months     Intervention certification from: 2/21/24  Intervention certification to:5/21/24  Intervention Comments:Yolis attended well to today's re-evaluation.    -Continue with current plan of care.  "

## 2024-02-21 NOTE — PROGRESS NOTES
Daily Speech Treatment Note    Today's date: 2024  Patient’s name: Yolis Flores  : 1950  MRN: [id number]    Referring provider: Shady Metcalf MD    No diagnosis found.                POC expires Auth Status Total   Visits  Start date  Expiration date PT/OT + Visit Limit? Co-Insurance   2024 Authorized bomn 1/3/24 12/31/24   No                                                                            Visit/Unit Tracking  AUTH Status: Authorized  #[id number] Date 11/1 11/6 11/8 11/13 11/15 11-22 11-29 12-11 12-13 12/18 1-3 1-8   Visits  Authed: bomn Used 13 14 15 16 17 18 19 20 21 22 23 24    Remaining                  AUTH Status: Authorized  #[id number] Date 1/10 1/15 1/17 1/24 1/29 2/7 2/12 2/14       Visits  Authed: bomn Used 25 26 27 28 29 30 31 32        Remaining                       Visit tracking:  Visit #32    Subjective: Yolsi arrived unaccompanied and attended to all cognitive linguistic tasks.                                                                                                                                   Goals  Short-term goals:    Goal 1. Patient will completed mid-high level word finding tasks with 80% accuracy, to be completed within the next 4-6 weeks.--DNT    Goal 2. Yolis will complete executive functioning tasks with 80% accuracy, to be completed within the next 4-6 week  Yolis completed simple deduction based puzzle that involved organizing items in a closet with  90% initial accuracy given additional time for completion. She required min-mod clues for organization and process of elimination. This is improved accuracy from the previous sessions    Goal 4. Yolis with follow verbally spoken and written 2-4 step commands with 80% accuracy, to be completed within the next 4-6 weeks.--DNT    Goal 5. Yolis will complete attention dependent structured tasks with 80% accuracy, to be completed within the next 4-6 weeks.  Yolis displayed (5) errors  during a sorting task that combined attention and categorization.  During this task, she was required to sort cards into three piles and to name an item in a category when two specific and predetermined number cards were displayed, and name an item in a separate category when two specific and pre determined face card were displayed. Partial visual support was provided. Accuracy decreased from the previous session, but complexity increased.  -Continue with current plan of care.

## 2024-02-26 ENCOUNTER — APPOINTMENT (OUTPATIENT)
Facility: CLINIC | Age: 74
End: 2024-02-26
Payer: COMMERCIAL

## 2024-02-28 ENCOUNTER — APPOINTMENT (OUTPATIENT)
Facility: CLINIC | Age: 74
End: 2024-02-28
Payer: COMMERCIAL

## 2024-02-29 ENCOUNTER — OFFICE VISIT (OUTPATIENT)
Facility: CLINIC | Age: 74
End: 2024-02-29
Payer: COMMERCIAL

## 2024-02-29 DIAGNOSIS — R48.8 OTHER SYMBOLIC DYSFUNCTIONS: ICD-10-CM

## 2024-02-29 DIAGNOSIS — Z86.73 CHRONIC ISCHEMIC LEFT MCA STROKE: Primary | ICD-10-CM

## 2024-02-29 DIAGNOSIS — R47.89 WORD FINDING DIFFICULTY: ICD-10-CM

## 2024-02-29 PROCEDURE — 92507 TX SP LANG VOICE COMM INDIV: CPT

## 2024-02-29 NOTE — PROGRESS NOTES
Daily Speech Treatment Note    Today's date: 2024  Patient’s name: Yolis Flores  : 1950  MRN: [id number]    Referring provider: Shady Metcalf MD    Encounter Diagnosis     ICD-10-CM    1. Chronic ischemic left MCA stroke  Z86.73       2. Other symbolic dysfunctions  R48.8       3. Word finding difficulty  R47.89                       POC expires Auth Status Total   Visits  Start date  Expiration date PT/OT + Visit Limit? Co-Insurance   2024 Authorized bomn 1/3/24 12/31/24   No                                                                            Visit/Unit Tracking  AUTH Status: Authorized  #[id number] Date 11/1 11/6 11/8 11/13 11/15 11-22 11-29 12-11 12-13 12/18 1-3 1-8   Visits  Authed: bomn Used 13 14 15 16 17 18 19 20 21 22 23 24    Remaining                  AUTH Status: Authorized  #[id number] Date 1/10 1/15 1/17 1/24 1/29 2/7 2/12 2/14 2/21 2/29     Visits  Authed: bomn Used 25 26 27 28 29 30 31 32 33 34      Remaining                       Visit tracking:  Visit #34    Subjective: Yolis arrived unaccompanied and attended to all cognitive linguistic tasks.                                                                                                                                   Goals  Short-term goals:    Goal 1. Patient will completed mid-high level word finding tasks with 80% accuracy, to be completed within the next 4-6 weeks.  Yolis completed mid level category matrix with 62% accuracy. She required min cues to attain an accuracy of 100%    Goal 2. Yolis will complete executive functioning tasks with 80% accuracy, to be completed within the next 4-6 week--DNT    Goal 4. Yolis with follow verbally spoken and written 2-4 step commands with 80% accuracy, to be completed within the next 4-6 weeks.--DNT    Goal 5. Yolis will complete attention dependent structured tasks with 80% accuracy, to be completed within the next 4-6 weeks.--DNT    Goal 6. Yolis will  complete immediate, delayed, and working memory structured tasks, to be completed within the next 4-6 weeks.  Immediate recall of (16) units of information from category matrix in goal 1 was at 68% accuracy. During this activity, category headings and phonemes were displayed.      -Continue with current plan of care.

## 2024-03-04 ENCOUNTER — OFFICE VISIT (OUTPATIENT)
Facility: CLINIC | Age: 74
End: 2024-03-04
Payer: COMMERCIAL

## 2024-03-04 DIAGNOSIS — R47.89 WORD FINDING DIFFICULTY: ICD-10-CM

## 2024-03-04 DIAGNOSIS — R48.8 OTHER SYMBOLIC DYSFUNCTIONS: ICD-10-CM

## 2024-03-04 DIAGNOSIS — Z86.73 CHRONIC ISCHEMIC LEFT MCA STROKE: Primary | ICD-10-CM

## 2024-03-04 PROCEDURE — 92507 TX SP LANG VOICE COMM INDIV: CPT

## 2024-03-04 NOTE — PROGRESS NOTES
Daily Speech Treatment Note    Today's date: 3/4/2024  Patient’s name: Yolis Flores  : 1950  MRN: [id number]    Referring provider: Shady Metcalf MD    Encounter Diagnosis     ICD-10-CM    1. Chronic ischemic left MCA stroke  Z86.73       2. Other symbolic dysfunctions  R48.8       3. Word finding difficulty  R47.89                         POC expires Auth Status Total   Visits  Start date  Expiration date PT/OT + Visit Limit? Co-Insurance   2024 Authorized bomn 1/3/24 12/31/24   No                                                                            Visit/Unit Tracking  AUTH Status: Authorized  #[id number] Date 11/1 11/6 11/8 11/13 11/15 11-22 11-29 12-11 12-13 12/18 1-3 1-   Visits  Authed: bomn Used 13 14 15 16 17 18 19 20 21 22 23 24    Remaining                  AUTH Status: Authorized  #[id number] Date 1/10 1/15 1/17 1/24 1/29 2/7 2/12 2/14 2/21 2/29 3/4    Visits  Authed: bomn Used 25 26 27 28 29 30 31 32 33 34 35     Remaining                       Visit tracking:  Visit #36    Subjective: Yolis arrived unaccompanied and attended to all cognitive linguistic tasks.                                                                                                                                   Goals  Short-term goals:    Goal 1. Patient will completed mid-high level word finding tasks with 80% accuracy, to be completed within the next 4-6 weeks.  Yolis completed executive function based word finding task (Speedy Words) with 70% accuracy.    Goal 2. Yolis will complete executive functioning tasks with 80% accuracy, to be completed within the next 4-6 week--DNT  Yolis completed simple deduction puzzle ACE (pg. 89) with 50% initial accuracy. She required mod-max clues for organization and process of elimination.    Goal 3. Yolis with follow verbally spoken and written 2-4 step commands with 80% accuracy, to be completed within the next 4-6 weeks.  Did not target.    Goal 4.  Yolis will complete attention dependent structured tasks with 80% accuracy, to be completed within the next 4-6 weeks.  Did not target.    Goal 5. Yolis will complete immediate, delayed, and working memory structured tasks, to be completed within the next 4-6 weeks.  Did not target.      -Continue with current plan of care.

## 2024-03-06 ENCOUNTER — TELEPHONE (OUTPATIENT)
Dept: NEUROLOGY | Facility: CLINIC | Age: 74
End: 2024-03-06

## 2024-03-06 ENCOUNTER — OFFICE VISIT (OUTPATIENT)
Facility: CLINIC | Age: 74
End: 2024-03-06
Payer: COMMERCIAL

## 2024-03-06 DIAGNOSIS — R47.89 WORD FINDING DIFFICULTY: ICD-10-CM

## 2024-03-06 DIAGNOSIS — Z86.73 CHRONIC ISCHEMIC LEFT MCA STROKE: Primary | ICD-10-CM

## 2024-03-06 DIAGNOSIS — R48.8 OTHER SYMBOLIC DYSFUNCTIONS: ICD-10-CM

## 2024-03-06 PROCEDURE — 92507 TX SP LANG VOICE COMM INDIV: CPT

## 2024-03-06 NOTE — PROGRESS NOTES
Daily Speech Treatment Note    Today's date: 3/6/2024  Patient’s name: Yolis Flores  : 1950  MRN: [id number]    Referring provider: Shady Metcalf MD    Encounter Diagnosis     ICD-10-CM    1. Chronic ischemic left MCA stroke  Z86.73       2. Other symbolic dysfunctions  R48.8       3. Word finding difficulty  R47.89                           POC expires Auth Status Total   Visits  Start date  Expiration date PT/OT + Visit Limit? Co-Insurance   2024 Authorized bomn 1/3/24 12/31/24   No                                                                            Visit/Unit Tracking  AUTH Status: Authorized  #[id number] Date 11/1 11/6 11/8 11/13 11/15 11-22 11-29 12-11 12-13 12/18 1-3 1-   Visits  Authed: bomn Used 13 14 15 16 17 18 19 20 21 22 23 24    Remaining                  AUTH Status: Authorized  #[id number] Date 1/10 1/15 1/17 1/24 1/29 2/7 2/12 2/14 2/21 2/29 3/4 3/6   Visits  Authed: bomn Used 25 26 27 28 29 30 31 32 33 34 35 36    Remaining                       Visit tracking:  Visit #37    Subjective: Yolis arrived unaccompanied and attended to all cognitive linguistic tasks.                                                                                                                                   Goals  Short-term goals:    Goal 1. Patient will completed mid-high level word finding tasks with 80% accuracy, to be completed within the next 4-6 weeks.  Yolis generated opposites for a list of ((15) words. Accuracy at 73% She benefited from hearing the words presented in a sentence with increased accuracy.    Goal 2. Yolis will complete executive functioning tasks with 80% accuracy, to be completed within the next 4-6 week--DNT  Yolis completed an executive function based activity that relied on attention attention, working memory, organization, and language processing. Accuracy at 30% She required mid-mod cues for organization, attention, and information breakdown. Accuracy  increased as task progressed.     Goal 3. Yolis with follow verbally spoken and written 2-4 step commands with 80% accuracy, to be completed within the next 4-6 weeks.  Did not target.    Goal 4. Yolis will complete attention dependent structured tasks with 80% accuracy, to be completed within the next 4-6 weeks.  See goal (2)    Goal 5. Yolis will complete immediate, delayed, and working memory structured tasks, to be completed within the next 4-6 weeks.  Did not target.      -Continue with current plan of care.

## 2024-03-07 ENCOUNTER — REMOTE DEVICE CLINIC VISIT (OUTPATIENT)
Dept: CARDIOLOGY CLINIC | Facility: CLINIC | Age: 74
End: 2024-03-07
Payer: COMMERCIAL

## 2024-03-07 DIAGNOSIS — Z95.818 PRESENCE OF CARDIAC DEVICE: Primary | ICD-10-CM

## 2024-03-07 PROCEDURE — 93298 REM INTERROG DEV EVAL SCRMS: CPT | Performed by: INTERNAL MEDICINE

## 2024-03-07 NOTE — PROGRESS NOTES
"Results for orders placed or performed in visit on 03/07/24   Cardiac EP device report    Narrative    MDT LNQ22/ ACTIVE SYSTEM IS MRI CONDITIONAL  CARELINK TRANSMISSION: BATTERY STATUS \"OK.\" NO PATIENT OR DEVICE ACTIVATED EPISODES.--REYES        "

## 2024-03-11 ENCOUNTER — TELEPHONE (OUTPATIENT)
Dept: CARDIOLOGY CLINIC | Facility: CLINIC | Age: 74
End: 2024-03-11

## 2024-03-11 ENCOUNTER — HOSPITAL ENCOUNTER (INPATIENT)
Facility: HOSPITAL | Age: 74
LOS: 3 days | Discharge: HOME | DRG: 812 | End: 2024-03-14
Attending: EMERGENCY MEDICINE | Admitting: STUDENT IN AN ORGANIZED HEALTH CARE EDUCATION/TRAINING PROGRAM
Payer: COMMERCIAL

## 2024-03-11 DIAGNOSIS — D64.9 ANEMIA: Primary | ICD-10-CM

## 2024-03-11 DIAGNOSIS — Z86.73 CHRONIC ISCHEMIC LEFT MCA STROKE: ICD-10-CM

## 2024-03-11 PROBLEM — R71.0 HEMOGLOBIN DROP: Status: ACTIVE | Noted: 2024-03-11

## 2024-03-11 PROBLEM — R89.9 ABNORMAL LABORATORY TEST: Status: ACTIVE | Noted: 2024-03-11

## 2024-03-11 LAB
ABO GROUP BLD: NORMAL
ABO GROUP BLD: NORMAL
ALBUMIN SERPL BCP-MCNC: 4.4 G/DL (ref 3.5–5)
ALP SERPL-CCNC: 64 U/L (ref 34–104)
ALT SERPL W P-5'-P-CCNC: 18 U/L (ref 7–52)
ANION GAP SERPL CALCULATED.3IONS-SCNC: 7 MMOL/L
APTT PPP: 34 SECONDS (ref 23–37)
AST SERPL W P-5'-P-CCNC: 14 U/L (ref 13–39)
ATRIAL RATE: 71 BPM
BASOPHILS # BLD AUTO: 0.07 THOUSANDS/ÂΜL (ref 0–0.1)
BASOPHILS NFR BLD AUTO: 1 % (ref 0–1)
BILIRUB SERPL-MCNC: 0.31 MG/DL (ref 0.2–1)
BLD GP AB SCN SERPL QL: NEGATIVE
BUN SERPL-MCNC: 21 MG/DL (ref 5–25)
CALCIUM SERPL-MCNC: 9.3 MG/DL (ref 8.4–10.2)
CHLORIDE SERPL-SCNC: 107 MMOL/L (ref 96–108)
CO2 SERPL-SCNC: 26 MMOL/L (ref 21–32)
CREAT SERPL-MCNC: 0.74 MG/DL (ref 0.6–1.3)
EOSINOPHIL # BLD AUTO: 0.17 THOUSAND/ÂΜL (ref 0–0.61)
EOSINOPHIL NFR BLD AUTO: 2 % (ref 0–6)
ERYTHROCYTE [DISTWIDTH] IN BLOOD BY AUTOMATED COUNT: 15.9 % (ref 11.6–15.1)
FERRITIN SERPL-MCNC: 5 NG/ML (ref 11–307)
FOLATE SERPL-MCNC: 9.9 NG/ML
GFR SERPL CREATININE-BSD FRML MDRD: 81 ML/MIN/1.73SQ M
GLUCOSE SERPL-MCNC: 106 MG/DL (ref 65–140)
GLUCOSE SERPL-MCNC: 98 MG/DL (ref 65–140)
HCT VFR BLD AUTO: 24 % (ref 34.8–46.1)
HGB BLD-MCNC: 7 G/DL (ref 11.5–15.4)
IMM GRANULOCYTES # BLD AUTO: 0.02 THOUSAND/UL (ref 0–0.2)
IMM GRANULOCYTES NFR BLD AUTO: 0 % (ref 0–2)
INR PPP: 0.92 (ref 0.84–1.19)
IRON SATN MFR SERPL: 3 % (ref 15–50)
IRON SERPL-MCNC: 25 UG/DL (ref 50–212)
LYMPHOCYTES # BLD AUTO: 2.11 THOUSANDS/ÂΜL (ref 0.6–4.47)
LYMPHOCYTES NFR BLD AUTO: 25 % (ref 14–44)
MCH RBC QN AUTO: 22.7 PG (ref 26.8–34.3)
MCHC RBC AUTO-ENTMCNC: 29.2 G/DL (ref 31.4–37.4)
MCV RBC AUTO: 78 FL (ref 82–98)
MONOCYTES # BLD AUTO: 0.63 THOUSAND/ÂΜL (ref 0.17–1.22)
MONOCYTES NFR BLD AUTO: 7 % (ref 4–12)
NEUTROPHILS # BLD AUTO: 5.57 THOUSANDS/ÂΜL (ref 1.85–7.62)
NEUTS SEG NFR BLD AUTO: 65 % (ref 43–75)
NRBC BLD AUTO-RTO: 0 /100 WBCS
P AXIS: 65 DEGREES
PLATELET # BLD AUTO: 353 THOUSANDS/UL (ref 149–390)
PMV BLD AUTO: 9.1 FL (ref 8.9–12.7)
POTASSIUM SERPL-SCNC: 4.4 MMOL/L (ref 3.5–5.3)
PR INTERVAL: 128 MS
PROT SERPL-MCNC: 7 G/DL (ref 6.4–8.4)
PROTHROMBIN TIME: 12.6 SECONDS (ref 11.6–14.5)
QRS AXIS: 42 DEGREES
QRSD INTERVAL: 86 MS
QT INTERVAL: 370 MS
QTC INTERVAL: 402 MS
RBC # BLD AUTO: 3.08 MILLION/UL (ref 3.81–5.12)
RH BLD: POSITIVE
RH BLD: POSITIVE
SODIUM SERPL-SCNC: 140 MMOL/L (ref 135–147)
SPECIMEN EXPIRATION DATE: NORMAL
T WAVE AXIS: 57 DEGREES
TIBC SERPL-MCNC: 788 UG/DL (ref 250–450)
UIBC SERPL-MCNC: 763 UG/DL (ref 155–355)
VENTRICULAR RATE: 71 BPM
VIT B12 SERPL-MCNC: 348 PG/ML (ref 180–914)
WBC # BLD AUTO: 8.57 THOUSAND/UL (ref 4.31–10.16)

## 2024-03-11 PROCEDURE — 82607 VITAMIN B-12: CPT | Performed by: STUDENT IN AN ORGANIZED HEALTH CARE EDUCATION/TRAINING PROGRAM

## 2024-03-11 PROCEDURE — 83540 ASSAY OF IRON: CPT | Performed by: STUDENT IN AN ORGANIZED HEALTH CARE EDUCATION/TRAINING PROGRAM

## 2024-03-11 PROCEDURE — 99284 EMERGENCY DEPT VISIT MOD MDM: CPT

## 2024-03-11 PROCEDURE — 99222 1ST HOSP IP/OBS MODERATE 55: CPT | Performed by: STUDENT IN AN ORGANIZED HEALTH CARE EDUCATION/TRAINING PROGRAM

## 2024-03-11 PROCEDURE — P9016 RBC LEUKOCYTES REDUCED: HCPCS

## 2024-03-11 PROCEDURE — 83550 IRON BINDING TEST: CPT | Performed by: STUDENT IN AN ORGANIZED HEALTH CARE EDUCATION/TRAINING PROGRAM

## 2024-03-11 PROCEDURE — 80053 COMPREHEN METABOLIC PANEL: CPT | Performed by: EMERGENCY MEDICINE

## 2024-03-11 PROCEDURE — 99222 1ST HOSP IP/OBS MODERATE 55: CPT

## 2024-03-11 PROCEDURE — 85610 PROTHROMBIN TIME: CPT | Performed by: EMERGENCY MEDICINE

## 2024-03-11 PROCEDURE — 82948 REAGENT STRIP/BLOOD GLUCOSE: CPT

## 2024-03-11 PROCEDURE — 85730 THROMBOPLASTIN TIME PARTIAL: CPT | Performed by: EMERGENCY MEDICINE

## 2024-03-11 PROCEDURE — 82728 ASSAY OF FERRITIN: CPT | Performed by: STUDENT IN AN ORGANIZED HEALTH CARE EDUCATION/TRAINING PROGRAM

## 2024-03-11 PROCEDURE — 93010 ELECTROCARDIOGRAM REPORT: CPT | Performed by: INTERNAL MEDICINE

## 2024-03-11 PROCEDURE — C9113 INJ PANTOPRAZOLE SODIUM, VIA: HCPCS | Performed by: EMERGENCY MEDICINE

## 2024-03-11 PROCEDURE — 93005 ELECTROCARDIOGRAM TRACING: CPT

## 2024-03-11 PROCEDURE — 86901 BLOOD TYPING SEROLOGIC RH(D): CPT | Performed by: EMERGENCY MEDICINE

## 2024-03-11 PROCEDURE — 96361 HYDRATE IV INFUSION ADD-ON: CPT

## 2024-03-11 PROCEDURE — 82746 ASSAY OF FOLIC ACID SERUM: CPT | Performed by: STUDENT IN AN ORGANIZED HEALTH CARE EDUCATION/TRAINING PROGRAM

## 2024-03-11 PROCEDURE — 85025 COMPLETE CBC W/AUTO DIFF WBC: CPT | Performed by: EMERGENCY MEDICINE

## 2024-03-11 PROCEDURE — 36415 COLL VENOUS BLD VENIPUNCTURE: CPT | Performed by: EMERGENCY MEDICINE

## 2024-03-11 PROCEDURE — 86900 BLOOD TYPING SEROLOGIC ABO: CPT | Performed by: EMERGENCY MEDICINE

## 2024-03-11 PROCEDURE — 99285 EMERGENCY DEPT VISIT HI MDM: CPT | Performed by: EMERGENCY MEDICINE

## 2024-03-11 PROCEDURE — 30233N1 TRANSFUSION OF NONAUTOLOGOUS RED BLOOD CELLS INTO PERIPHERAL VEIN, PERCUTANEOUS APPROACH: ICD-10-PCS | Performed by: EMERGENCY MEDICINE

## 2024-03-11 PROCEDURE — 96360 HYDRATION IV INFUSION INIT: CPT

## 2024-03-11 PROCEDURE — 86920 COMPATIBILITY TEST SPIN: CPT

## 2024-03-11 PROCEDURE — 86850 RBC ANTIBODY SCREEN: CPT | Performed by: EMERGENCY MEDICINE

## 2024-03-11 PROCEDURE — 36430 TRANSFUSION BLD/BLD COMPNT: CPT

## 2024-03-11 RX ORDER — HYDRALAZINE HYDROCHLORIDE 20 MG/ML
5 INJECTION INTRAMUSCULAR; INTRAVENOUS EVERY 6 HOURS PRN
Status: DISCONTINUED | OUTPATIENT
Start: 2024-03-11 | End: 2024-03-14 | Stop reason: HOSPADM

## 2024-03-11 RX ORDER — INSULIN LISPRO 100 [IU]/ML
1-5 INJECTION, SOLUTION INTRAVENOUS; SUBCUTANEOUS
Status: DISCONTINUED | OUTPATIENT
Start: 2024-03-11 | End: 2024-03-14 | Stop reason: HOSPADM

## 2024-03-11 RX ORDER — ACETAMINOPHEN 325 MG/1
650 TABLET ORAL EVERY 6 HOURS PRN
Status: DISCONTINUED | OUTPATIENT
Start: 2024-03-11 | End: 2024-03-14 | Stop reason: HOSPADM

## 2024-03-11 RX ORDER — LISINOPRIL 10 MG/1
10 TABLET ORAL DAILY
Status: DISCONTINUED | OUTPATIENT
Start: 2024-03-12 | End: 2024-03-14 | Stop reason: HOSPADM

## 2024-03-11 RX ORDER — ALPRAZOLAM 0.5 MG/1
0.5 TABLET ORAL 4 TIMES DAILY PRN
Status: DISCONTINUED | OUTPATIENT
Start: 2024-03-11 | End: 2024-03-14 | Stop reason: HOSPADM

## 2024-03-11 RX ORDER — ONDANSETRON 2 MG/ML
4 INJECTION INTRAMUSCULAR; INTRAVENOUS EVERY 6 HOURS PRN
Status: DISCONTINUED | OUTPATIENT
Start: 2024-03-11 | End: 2024-03-14 | Stop reason: HOSPADM

## 2024-03-11 RX ORDER — SERTRALINE HYDROCHLORIDE 100 MG/1
100 TABLET, FILM COATED ORAL
Status: DISCONTINUED | OUTPATIENT
Start: 2024-03-11 | End: 2024-03-14 | Stop reason: HOSPADM

## 2024-03-11 RX ORDER — PANTOPRAZOLE SODIUM 40 MG/1
40 TABLET, DELAYED RELEASE ORAL
Status: DISCONTINUED | OUTPATIENT
Start: 2024-03-12 | End: 2024-03-12

## 2024-03-11 RX ORDER — ATORVASTATIN CALCIUM 80 MG/1
80 TABLET, FILM COATED ORAL
Status: DISCONTINUED | OUTPATIENT
Start: 2024-03-11 | End: 2024-03-14 | Stop reason: HOSPADM

## 2024-03-11 RX ORDER — SODIUM CHLORIDE 9 MG/ML
100 INJECTION, SOLUTION INTRAVENOUS CONTINUOUS
Status: DISCONTINUED | OUTPATIENT
Start: 2024-03-11 | End: 2024-03-13

## 2024-03-11 RX ORDER — PANTOPRAZOLE SODIUM 40 MG/10ML
40 INJECTION, POWDER, LYOPHILIZED, FOR SOLUTION INTRAVENOUS ONCE
Status: COMPLETED | OUTPATIENT
Start: 2024-03-11 | End: 2024-03-11

## 2024-03-11 RX ORDER — LANOLIN ALCOHOL/MO/W.PET/CERES
100 CREAM (GRAM) TOPICAL DAILY
Status: DISCONTINUED | OUTPATIENT
Start: 2024-03-12 | End: 2024-03-14 | Stop reason: HOSPADM

## 2024-03-11 RX ORDER — FAMOTIDINE 20 MG/1
10 TABLET, FILM COATED ORAL 2 TIMES DAILY
Status: DISCONTINUED | OUTPATIENT
Start: 2024-03-11 | End: 2024-03-12

## 2024-03-11 RX ADMIN — FAMOTIDINE 10 MG: 20 TABLET, FILM COATED ORAL at 19:47

## 2024-03-11 RX ADMIN — SERTRALINE 100 MG: 100 TABLET, FILM COATED ORAL at 19:47

## 2024-03-11 RX ADMIN — ALPRAZOLAM 0.5 MG: 0.5 TABLET ORAL at 19:47

## 2024-03-11 RX ADMIN — PANTOPRAZOLE SODIUM 40 MG: 40 INJECTION, POWDER, FOR SOLUTION INTRAVENOUS at 15:56

## 2024-03-11 RX ADMIN — SODIUM CHLORIDE 150 ML/HR: 0.9 INJECTION, SOLUTION INTRAVENOUS at 11:47

## 2024-03-11 RX ADMIN — ATORVASTATIN CALCIUM 80 MG: 80 TABLET, FILM COATED ORAL at 19:47

## 2024-03-11 NOTE — ED PROVIDER NOTES
History  Chief Complaint   Patient presents with    Abnormal Lab     Patient arrives ambulatory to ED, AO x 4 sent by PCP for low hemoglobin, labs drawn Thursday.  Patient reports only increased tiredness.     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  Patient saw  Last week complaining of weakness lightheadedness and easy bruisability for months.  She had routine labs done on Thursday and was called today and patient was told her hemoglobin was 7 although we do not have access to these labs.  She denies any chest pain.  Patient states she has not seen any area of blood loss or bowel changes.  She has never received a blood transfusion        Prior to Admission Medications   Prescriptions Last Dose Informant Patient Reported? Taking?   ALPRAZolam (XANAX) 0.5 mg tablet  Self Yes No   Sig: Take 0.5 mg by mouth 4 (four) times a day as needed for anxiety Pt takes one 0.5 mg tablet as needed for anxiety up to four times daily   Alcohol Swabs 70 % PADS  Self No No   Sig: May substitute brand based on insurance coverage. Check glucose BID.   Blood Glucose Monitoring Suppl (OneTouch Verio Reflect) w/Device KIT  Self No No   Sig: May substitute brand based on insurance coverage. Check glucose BID.   Patient not taking: Reported on 8/21/2023   OneTouch Delica Lancets 33G MISC  Self No No   Sig: May substitute brand based on insurance coverage. Check glucose BID.   Patient not taking: Reported on 8/21/2023   aspirin 81 mg chewable tablet   No No   Sig: Chew 1 tablet (81 mg total) daily Do not start before July 30, 2023.   atorvastatin (LIPITOR) 80 mg tablet  Self No No   Sig: Take 1 tablet (80 mg total) by mouth daily with dinner   clopidogrel (PLAVIX) 75 mg tablet  Self No No   Sig: take 1 tablet by mouth once daily   famotidine (PEPCID) 20 mg tablet  Self No No   Sig: Take 0.5 tablets (10 mg total) by mouth 2 (two) times a day   glucose blood (OneTouch Verio) test strip  Self  "No No   Sig: May substitute brand based on insurance coverage. Check glucose BID.   Patient not taking: Reported on 2023   lisinopril (ZESTRIL) 10 mg tablet  Self No No   Sig: Take 1 tablet (10 mg total) by mouth daily Do not start before 2023.   metFORMIN (GLUCOPHAGE) 500 mg tablet  Self No No   Sig: take 1 tablet by mouth EVERY MORNING WITH BREAKFAST   omeprazole (PriLOSEC) 20 mg delayed release capsule  Self No No   Sig: Take it as you normally do at home   omeprazole (PriLOSEC) 40 MG capsule  Self Yes No   Sig: Take 40 mg by mouth daily   sertraline (ZOLOFT) 100 mg tablet  Self Yes No   Sig: Take 100 mg by mouth daily with dinner 2 tablets daily   thiamine (VITAMIN B1) 100 mg tablet  Self Yes No   Sig: Take 100 mg by mouth daily      Facility-Administered Medications: None       Past Medical History:   Diagnosis Date    Anxiety     CVA (cerebral vascular accident) (HCC)     Depression     Diabetes (HCC)     GERD (gastroesophageal reflux disease)     Hypertension        Past Surgical History:   Procedure Laterality Date    APPENDECTOMY      CARDIAC ELECTROPHYSIOLOGY PROCEDURE N/A 2023    Procedure: Cardiac loop recorder implant;  Surgeon: Charla Anthony DO;  Location: [facility];  Service: Cardiology    TONSILLECTOMY         History reviewed. No pertinent family history.  I have reviewed and agree with the history as documented.    E-Cigarette/Vaping    E-Cigarette Use Never User      E-Cigarette/Vaping Substances     Social History     Tobacco Use    Smoking status: Former     Current packs/day: 0.00     Types: Cigarettes     Quit date: 2023     Years since quittin.6    Smokeless tobacco: Never   Vaping Use    Vaping status: Never Used   Substance Use Topics    Alcohol use: Not Currently     Alcohol/week: 28.0 standard drinks of alcohol     Types: 28 Cans of beer per week     Comment: every night has \"a few\" beers    Drug use: No       Review of Systems   Constitutional:  " Negative for chills and fever.   HENT:  Negative for congestion and sore throat.    Eyes:  Negative for visual disturbance.   Respiratory:  Negative for cough and shortness of breath.    Cardiovascular:  Negative for chest pain and leg swelling.   Gastrointestinal:  Negative for abdominal pain, anal bleeding, blood in stool, constipation, diarrhea and vomiting.   Genitourinary:  Negative for decreased urine volume, dysuria, hematuria and vaginal bleeding.   Musculoskeletal:  Negative for arthralgias and back pain.   Skin:  Negative for color change and rash.   Neurological:  Positive for weakness and light-headedness.   Hematological:  Bruises/bleeds easily.   Psychiatric/Behavioral:  Negative for confusion.    All other systems reviewed and are negative.      Physical Exam  Physical Exam  Vitals and nursing note reviewed.   Constitutional:       Appearance: Normal appearance.   HENT:      Head: Normocephalic.      Right Ear: External ear normal.      Left Ear: External ear normal.      Nose: Nose normal.      Mouth/Throat:      Mouth: Mucous membranes are moist.   Eyes:      Conjunctiva/sclera: Conjunctivae normal.   Cardiovascular:      Rate and Rhythm: Normal rate and regular rhythm.      Pulses: Normal pulses.   Pulmonary:      Effort: Pulmonary effort is normal.      Breath sounds: Normal breath sounds.   Abdominal:      General: Bowel sounds are normal.      Palpations: Abdomen is soft.      Tenderness: There is no abdominal tenderness. There is no guarding.   Genitourinary:     Rectum: Normal. Guaiac result negative.   Musculoskeletal:         General: Normal range of motion.      Cervical back: Normal range of motion.   Skin:     General: Skin is warm and dry.      Capillary Refill: Capillary refill takes less than 2 seconds.   Neurological:      General: No focal deficit present.      Mental Status: She is alert and oriented to person, place, and time.   Psychiatric:         Mood and Affect: Mood normal.          Vital Signs  ED Triage Vitals   Temperature Pulse Respirations Blood Pressure SpO2   03/11/24 1125 03/11/24 1125 03/11/24 1125 03/11/24 1125 03/11/24 1125   97.8 °F (36.6 °C) 75 18 (!) 176/108 99 %      Temp Source Heart Rate Source Patient Position - Orthostatic VS BP Location FiO2 (%)   03/11/24 1125 03/11/24 1125 03/11/24 1125 03/11/24 1125 --   Oral Monitor;Right;Brachial Lying Right arm       Pain Score       03/11/24 1124       No Pain           Vitals:    03/11/24 1125 03/11/24 1300 03/11/24 1330 03/11/24 1500   BP: (!) 176/108 143/65 159/78 (!) 171/90   Pulse: 75 76 72    Patient Position - Orthostatic VS: Lying            Visual Acuity  Visual Acuity      Flowsheet Row Most Recent Value   L Pupil Size (mm) 4   R Pupil Size (mm) 4            ED Medications  Medications   sodium chloride 0.9 % infusion (150 mL/hr Intravenous New Bag 3/11/24 1147)   pantoprazole (PROTONIX) injection 40 mg (has no administration in time range)       Diagnostic Studies  Results Reviewed       Procedure Component Value Units Date/Time    Comprehensive metabolic panel [[id number]] Collected: 03/11/24 1146    Lab Status: Final result Specimen: Blood from Arm, Right Updated: 03/11/24 1212     Sodium 140 mmol/L      Potassium 4.4 mmol/L      Chloride 107 mmol/L      CO2 26 mmol/L      ANION GAP 7 mmol/L      BUN 21 mg/dL      Creatinine 0.74 mg/dL      Glucose 106 mg/dL      Calcium 9.3 mg/dL      AST 14 U/L      ALT 18 U/L      Alkaline Phosphatase 64 U/L      Total Protein 7.0 g/dL      Albumin 4.4 g/dL      Total Bilirubin 0.31 mg/dL      eGFR 81 ml/min/1.73sq m     Narrative:      National Kid[employer] Foundation guidelines for Chronic Kidney Disease (CKD):     Stage 1 with normal or high GFR (GFR > 90 mL/min/1.73 square meters)    Stage 2 Mild CKD (GFR = 60-89 mL/min/1.73 square meters)    Stage 3A Moderate CKD (GFR = 45-59 mL/min/1.73 square meters)    Stage 3B Moderate CKD (GFR = 30-44 mL/min/1.73 square meters)     Stage 4 Severe CKD (GFR = 15-29 mL/min/1.73 square meters)    Stage 5 End Stage CKD (GFR <15 mL/min/1.73 square meters)  Note: GFR calculation is accurate only with a steady state creatinine    Protime-INR [[id number]]  (Normal) Collected: 03/11/24 1146    Lab Status: Final result Specimen: Blood from Arm, Right Updated: 03/11/24 1208     Protime 12.6 seconds      INR 0.92    APTT [[id number]]  (Normal) Collected: 03/11/24 1146    Lab Status: Final result Specimen: Blood from Arm, Right Updated: 03/11/24 1208     PTT 34 seconds     CBC and differential [[id number]]  (Abnormal) Collected: 03/11/24 1146    Lab Status: Final result Specimen: Blood from Arm, Right Updated: 03/11/24 1156     WBC 8.57 Thousand/uL      RBC 3.08 Million/uL      Hemoglobin 7.0 g/dL      Hematocrit 24.0 %      MCV 78 fL      MCH 22.7 pg      MCHC 29.2 g/dL      RDW 15.9 %      MPV 9.1 fL      Platelets 353 Thousands/uL      nRBC 0 /100 WBCs      Neutrophils Relative 65 %      Immat GRANS % 0 %      Lymphocytes Relative 25 %      Monocytes Relative 7 %      Eosinophils Relative 2 %      Basophils Relative 1 %      Neutrophils Absolute 5.57 Thousands/µL      Immature Grans Absolute 0.02 Thousand/uL      Lymphocytes Absolute 2.11 Thousands/µL      Monocytes Absolute 0.63 Thousand/µL      Eosinophils Absolute 0.17 Thousand/µL      Basophils Absolute 0.07 Thousands/µL                    No orders to display              Procedures  ECG 12 Lead Documentation Only    Date/Time: 3/11/2024 12:05 PM    Performed by: Onesimo Sanchez MD  Authorized by: Onesimo Sanchez MD    Indications / Diagnosis:  Anemia  ECG reviewed by me, the ED Provider: yes    Patient location:  ED  Interpretation:     Interpretation: normal    Rate:     ECG rate:  71    ECG rate assessment: normal    Rhythm:     Rhythm: sinus rhythm    Ectopy:     Ectopy: none    QRS:     QRS axis:  Normal    QRS intervals:  Normal  Conduction:     Conduction: normal    ST segments:     ST  segments:  Normal  T waves:     T waves: normal             ED Course                               SBIRT 22yo+      Flowsheet Row Most Recent Value   Initial Alcohol Screen: US AUDIT-C     1. How often do you have a drink containing alcohol? 0 Filed at: 03/11/2024 1125   2. How many drinks containing alcohol do you have on a typical day you are drinking?  0 Filed at: 03/11/2024 1125   3b. FEMALE Any Age, or MALE 65+: How often do you have 4 or more drinks on one occassion? 0 Filed at: 03/11/2024 1125   Audit-C Score 0 Filed at: 03/11/2024 1125   TERRANCE: How many times in the past year have you...    Used an illegal drug or used a prescription medication for non-medical reasons? Never Filed at: 03/11/2024 1125                      Medical Decision Making  Last hemoglobin on record was 12.3 in July of last year.  Will repeat labs and check for possible occult sources of bleeding    Amount and/or Complexity of Data Reviewed  Labs: ordered.    Risk  Prescription drug management.  Decision regarding hospitalization.             Disposition  Final diagnoses:   Anemia     Time reflects when diagnosis was documented in both MDM as applicable and the Disposition within this note       Time User Action Codes Description Comment    3/11/2024  3:35 PM Onesimo Sanchez Add [D64.9] Anemia           ED Disposition       ED Disposition   Admit    Condition   Stable    Date/Time   Mon Mar 11, 2024 1019    Comment   Case was discussed with hospitalist and the patient's admission status was agreed to be Admission Status: inpatient status to the service of Dr. Roger.               Follow-up Information    None         Patient's Medications   Discharge Prescriptions    No medications on file       No discharge procedures on file.    PDMP Review       None            ED Provider  Electronically Signed by             Onesimo Sanchez MD  03/11/24 1948

## 2024-03-11 NOTE — ASSESSMENT & PLAN NOTE
Patient was referred to the ED by PCP for abnormal lab. Patient stated that the outpatient Hemoglobin was 7. ED lab work revealed hemoglobin of 7. Patient reports weakness, fatigue and lightheadedness which has been ongoing for a few days which necessitated her going to her PCP. Denies any bowel changes and DARLING done in ED was negative for blood.  Baseline hemoglobin in 2023 noted to be 12.3 - 13.4  ED transfused 1 unit PRBC  GI consulted  EGD for tomorrow  NPO after midnight  Trend Hemoglobin

## 2024-03-11 NOTE — ASSESSMENT & PLAN NOTE
"Lab Results   Component Value Date    HGBA1C 6.5 (H) 07/28/2023       No results for input(s): \"POCGLU\" in the last 72 hours.    Blood Sugar Average: Last 72 hrs:    Update HG A1c  Hold metformin during hospitalization  Accu-Cheks ACHS  SSI  ADA diet  Hypoglycemia protocol  "

## 2024-03-11 NOTE — TELEPHONE ENCOUNTER
----- Message from Charla Anthony DO sent at 3/8/2024  5:36 PM EST -----  Normal device function  No events

## 2024-03-11 NOTE — H&P
"[facility]  H&P  Name: Yolis Flores 73 y.o. female I MRN: [id number]  Unit/Bed#: ED 07 I Date of Admission: 3/11/2024   Date of Service: 3/11/2024 I Hospital Day: 0      Assessment/Plan   * Abnormal laboratory test  Assessment & Plan  Patient was referred to the ED by PCP for abnormal lab. Patient stated that the outpatient Hemoglobin was 7. ED lab work revealed hemoglobin of 7. Patient reports weakness, fatigue and lightheadedness which has been ongoing for a few days which necessitated her going to her PCP. Denies any bowel changes and DARLING done in ED was negative for blood.  ED transfused 1 unit PRBC  GI consulted  EGD for tomorrow  NPO after midnight  Trend Hemoglobin           Type 2 diabetes mellitus, without long-term current use of insulin (HCC)  Assessment & Plan  Lab Results   Component Value Date    HGBA1C 6.5 (H) 07/28/2023       No results for input(s): \"POCGLU\" in the last 72 hours.    Blood Sugar Average: Last 72 hrs:    Update HG A1c  Hold metformin during hospitalization  Accu-Cheks ACHS  SSI  ADA diet  Hypoglycemia protocol    Smoker  Assessment & Plan  Nicotine patch  Smoking cessation    GERD (gastroesophageal reflux disease)  Assessment & Plan  Continue Protonix    Essential hypertension  Assessment & Plan  Continue PTA lisinopril  Added hydralazine for SBP > 160    Anxiety and depression  Assessment & Plan  Continue Zoloft, Xanax as needed    CVA (cerebral vascular accident) ([facility])  Assessment & Plan  Noted in history  Holding ASA and Plavix for questionable GI bleed             VTE Pharmacologic Prophylaxis:   Moderate Risk (Score 3-4) - Pharmacological DVT Prophylaxis Contraindicated. Sequential Compression Devices Ordered.  Code Status: Level 1 - Full Code   Discussion with family: Patient declined call to .     Anticipated Length of Stay: Patient will be admitted on an inpatient basis with an anticipated length of stay of greater than 2 midnights " secondary to EGD for tomorrow.    Total Time Spent on Date of Encounter in care of patient:  mins. This time was spent on one or more of the following: performing physical exam; counseling and coordination of care; obtaining or reviewing history; documenting in the medical record; reviewing/ordering tests, medications or procedures; communicating with other healthcare professionals and discussing with patient's family/caregivers.    Chief Complaint: Abnormal lab    History of Present Illness:  Yolis Flores is a 73 y.o. female with a PMH of CVA, hypertension, type 2 diabetes, GERD, hyperlipidemia,depression with anxiety who presents with referral from PCP for abnormal lab. Patient with complaints of fatigue, weakness and lightheadeness went to PCP last week and was referred to the ED for hemoglobin of 7.  Transfused 1 unit PRBC  in ED. GI consulted. EGD  tomorrow.    Review of Systems:  Review of Systems   Constitutional:  Positive for fatigue.   Neurological:  Positive for weakness and light-headedness.       Past Medical and Surgical History:   Past Medical History:   Diagnosis Date    Anxiety     CVA (cerebral vascular accident) (HCC)     Depression     Diabetes (HCC)     GERD (gastroesophageal reflux disease)     Hypertension        Past Surgical History:   Procedure Laterality Date    APPENDECTOMY      CARDIAC ELECTROPHYSIOLOGY PROCEDURE N/A 8/2/2023    Procedure: Cardiac loop recorder implant;  Surgeon: Charla Anthony DO;  Location: [facility];  Service: Cardiology    TONSILLECTOMY         Meds/Allergies:  Prior to Admission medications    Medication Sig Start Date End Date Taking? Authorizing Provider   Alcohol Swabs 70 % PADS May substitute brand based on insurance coverage. Check glucose BID. 7/29/23   Kyara Olivera DO   ALPRAZolam (XANAX) 0.5 mg tablet Take 0.5 mg by mouth 4 (four) times a day as needed for anxiety Pt takes one 0.5 mg tablet as needed for anxiety up to four times daily     "Historical Provider, MD   aspirin 81 mg chewable tablet Chew 1 tablet (81 mg total) daily Do not start before July 30, 2023. 7/30/23 1/4/24  Kyara Olivera DO   atorvastatin (LIPITOR) 80 mg tablet Take 1 tablet (80 mg total) by mouth daily with dinner 7/29/23   Kyara Olivera DO   Blood Glucose Monitoring Suppl (OneTouch Verio Reflect) w/Device KIT May substitute brand based on insurance coverage. Check glucose BID.  Patient not taking: Reported on 8/21/2023 7/29/23   Kyara Olivera DO   clopidogrel (PLAVIX) 75 mg tablet take 1 tablet by mouth once daily 12/2/23   Shady Metcalf MD   famotidine (PEPCID) 20 mg tablet Take 0.5 tablets (10 mg total) by mouth 2 (two) times a day 7/29/23   Kyara Olivera,    glucose blood (OneTouch Verio) test strip May substitute brand based on insurance coverage. Check glucose BID.  Patient not taking: Reported on 8/21/2023 7/29/23   Kyara Olivera DO   lisinopril (ZESTRIL) 10 mg tablet Take 1 tablet (10 mg total) by mouth daily Do not start before July 30, 2023. 7/30/23   Kyara Olivera DO   metFORMIN (GLUCOPHAGE) 500 mg tablet take 1 tablet by mouth EVERY MORNING WITH BREAKFAST 11/17/23   Shady Metcalf MD   omeprazole (PriLOSEC) 20 mg delayed release capsule Take it as you normally do at home 7/29/23   Kyara Olivera DO   omeprazole (PriLOSEC) 40 MG capsule Take 40 mg by mouth daily 12/5/23   Historical Provider, MD Cheng Delica Lancets 33G MISC May substitute brand based on insurance coverage. Check glucose BID.  Patient not taking: Reported on 8/21/2023 7/29/23   Kyara Olivera DO   sertraline (ZOLOFT) 100 mg tablet Take 100 mg by mouth daily with dinner 2 tablets daily    Historical Provider, MD   thiamine (VITAMIN B1) 100 mg tablet Take 100 mg by mouth daily 8/14/23   Historical Provider, MD     I have reviewed home medications with patient personally.    Allergies:   Allergies   Allergen Reactions    Amoxil [Amoxicillin] Other (See Comments)     \"mouth felt " "on fire\"       Social History:  Marital Status: /Civil Union   Occupation:   Patient Pre-hospital Living Situation:   Patient Pre-hospital Level of Mobility:   Patient Pre-hospital Diet Restrictions:   Substance Use History:   Social History     Substance and Sexual Activity   Alcohol Use Not Currently    Alcohol/week: 28.0 standard drinks of alcohol    Types: 28 Cans of beer per week    Comment: every night has \"a few\" beers     Social History     Tobacco Use   Smoking Status Former    Current packs/day: 0.00    Types: Cigarettes    Quit date: 2023    Years since quittin.6   Smokeless Tobacco Never     Social History     Substance and Sexual Activity   Drug Use No       Family History:  History reviewed. No pertinent family history.    Physical Exam:     Vitals:   Blood Pressure: (!) 180/75 (24 1700)  Pulse: 77 (24 1700)  Temperature: 98.3 °F (36.8 °C) (24 1700)  Temp Source: Tympanic (24 1700)  Respirations: 18 (24 1700)  Height: 5' 4\" (162.6 cm) (24 1124)  Weight - Scale: 71.2 kg (157 lb) (24 1124)  SpO2: 98 % (24 1700)      Physical Exam  Vitals and nursing note reviewed.   Constitutional:       General: She is not in acute distress.     Appearance: She is well-developed.   HENT:      Head: Normocephalic and atraumatic.      Mouth/Throat:      Mouth: Mucous membranes are moist.   Eyes:      Conjunctiva/sclera: Conjunctivae normal.   Cardiovascular:      Rate and Rhythm: Normal rate and regular rhythm.      Heart sounds: No murmur heard.  Pulmonary:      Effort: Pulmonary effort is normal. No respiratory distress.      Breath sounds: Normal breath sounds.   Abdominal:      Palpations: Abdomen is soft.      Tenderness: There is no abdominal tenderness.   Musculoskeletal:         General: No swelling.      Cervical back: Normal range of motion.   Skin:     General: Skin is warm and dry.      Capillary Refill: Capillary refill takes less than 2 seconds. "   Neurological:      Mental Status: She is alert and oriented to person, place, and time.   Psychiatric:         Mood and Affect: Mood normal.          Additional Data:     Lab Results:  Results from last 7 days   Lab Units 03/11/24  1146   WBC Thousand/uL 8.57   HEMOGLOBIN g/dL 7.0*   HEMATOCRIT % 24.0*   PLATELETS Thousands/uL 353   NEUTROS PCT % 65   LYMPHS PCT % 25   MONOS PCT % 7   EOS PCT % 2     Results from last 7 days   Lab Units 03/11/24  1146   SODIUM mmol/L 140   POTASSIUM mmol/L 4.4   CHLORIDE mmol/L 107   CO2 mmol/L 26   BUN mg/dL 21   CREATININE mg/dL 0.74   ANION GAP mmol/L 7   CALCIUM mg/dL 9.3   ALBUMIN g/dL 4.4   TOTAL BILIRUBIN mg/dL 0.31   ALK PHOS U/L 64   ALT U/L 18   AST U/L 14   GLUCOSE RANDOM mg/dL 106     Results from last 7 days   Lab Units 03/11/24  1146   INR  0.92                   Lines/Drains:  Invasive Devices       Peripheral Intravenous Line  Duration             Peripheral IV 03/11/24 Right Antecubital <1 day                        Imaging: No pertinent imaging reviewed.  No orders to display       EKG and Other Studies Reviewed on Admission:   EKG: NSR. HR 70's.    ** Please Note: This note has been constructed using a voice recognition system. **

## 2024-03-12 ENCOUNTER — ANESTHESIA (INPATIENT)
Dept: PERIOP | Facility: HOSPITAL | Age: 74
DRG: 812 | End: 2024-03-12
Payer: COMMERCIAL

## 2024-03-12 ENCOUNTER — ANESTHESIA EVENT (INPATIENT)
Dept: PERIOP | Facility: HOSPITAL | Age: 74
DRG: 812 | End: 2024-03-12
Payer: COMMERCIAL

## 2024-03-12 ENCOUNTER — APPOINTMENT (OUTPATIENT)
Dept: PERIOP | Facility: HOSPITAL | Age: 74
DRG: 812 | End: 2024-03-12
Payer: COMMERCIAL

## 2024-03-12 LAB
ABO GROUP BLD BPU: NORMAL
ALBUMIN SERPL BCP-MCNC: 3.6 G/DL (ref 3.5–5)
ANION GAP SERPL CALCULATED.3IONS-SCNC: 5 MMOL/L
BPU ID: NORMAL
BUN SERPL-MCNC: 12 MG/DL (ref 5–25)
CALCIUM SERPL-MCNC: 8.3 MG/DL (ref 8.4–10.2)
CHLORIDE SERPL-SCNC: 112 MMOL/L (ref 96–108)
CO2 SERPL-SCNC: 24 MMOL/L (ref 21–32)
CREAT SERPL-MCNC: 0.66 MG/DL (ref 0.6–1.3)
CROSSMATCH: NORMAL
ERYTHROCYTE [DISTWIDTH] IN BLOOD BY AUTOMATED COUNT: 15.9 % (ref 11.6–15.1)
GFR SERPL CREATININE-BSD FRML MDRD: 88 ML/MIN/1.73SQ M
GLUCOSE SERPL-MCNC: 103 MG/DL (ref 65–140)
GLUCOSE SERPL-MCNC: 118 MG/DL (ref 65–140)
GLUCOSE SERPL-MCNC: 130 MG/DL (ref 65–140)
GLUCOSE SERPL-MCNC: 90 MG/DL (ref 65–140)
GLUCOSE SERPL-MCNC: 90 MG/DL (ref 65–140)
HCT VFR BLD AUTO: 24.6 % (ref 34.8–46.1)
HGB BLD-MCNC: 7.4 G/DL (ref 11.5–15.4)
MAGNESIUM SERPL-MCNC: 1.7 MG/DL (ref 1.9–2.7)
MCH RBC QN AUTO: 23.6 PG (ref 26.8–34.3)
MCHC RBC AUTO-ENTMCNC: 30.1 G/DL (ref 31.4–37.4)
MCV RBC AUTO: 79 FL (ref 82–98)
PHOSPHATE SERPL-MCNC: 3.2 MG/DL (ref 2.3–4.1)
PLATELET # BLD AUTO: 272 THOUSANDS/UL (ref 149–390)
PMV BLD AUTO: 8.9 FL (ref 8.9–12.7)
POTASSIUM SERPL-SCNC: 4 MMOL/L (ref 3.5–5.3)
RBC # BLD AUTO: 3.13 MILLION/UL (ref 3.81–5.12)
SODIUM SERPL-SCNC: 141 MMOL/L (ref 135–147)
UNIT DISPENSE STATUS: NORMAL
UNIT PRODUCT CODE: NORMAL
UNIT PRODUCT VOLUME: 350 ML
UNIT RH: NORMAL
WBC # BLD AUTO: 8.32 THOUSAND/UL (ref 4.31–10.16)

## 2024-03-12 PROCEDURE — 82948 REAGENT STRIP/BLOOD GLUCOSE: CPT

## 2024-03-12 PROCEDURE — 0DJD8ZZ INSPECTION OF LOWER INTESTINAL TRACT, VIA NATURAL OR ARTIFICIAL OPENING ENDOSCOPIC: ICD-10-PCS | Performed by: INTERNAL MEDICINE

## 2024-03-12 PROCEDURE — 85027 COMPLETE CBC AUTOMATED: CPT | Performed by: STUDENT IN AN ORGANIZED HEALTH CARE EDUCATION/TRAINING PROGRAM

## 2024-03-12 PROCEDURE — C9113 INJ PANTOPRAZOLE SODIUM, VIA: HCPCS | Performed by: INTERNAL MEDICINE

## 2024-03-12 PROCEDURE — 80069 RENAL FUNCTION PANEL: CPT | Performed by: STUDENT IN AN ORGANIZED HEALTH CARE EDUCATION/TRAINING PROGRAM

## 2024-03-12 PROCEDURE — 97165 OT EVAL LOW COMPLEX 30 MIN: CPT

## 2024-03-12 PROCEDURE — C9113 INJ PANTOPRAZOLE SODIUM, VIA: HCPCS | Performed by: NURSE PRACTITIONER

## 2024-03-12 PROCEDURE — 99232 SBSQ HOSP IP/OBS MODERATE 35: CPT

## 2024-03-12 PROCEDURE — 83735 ASSAY OF MAGNESIUM: CPT | Performed by: STUDENT IN AN ORGANIZED HEALTH CARE EDUCATION/TRAINING PROGRAM

## 2024-03-12 RX ORDER — MAGNESIUM SULFATE HEPTAHYDRATE 40 MG/ML
2 INJECTION, SOLUTION INTRAVENOUS ONCE
Status: COMPLETED | OUTPATIENT
Start: 2024-03-12 | End: 2024-03-12

## 2024-03-12 RX ORDER — SODIUM CHLORIDE, SODIUM LACTATE, POTASSIUM CHLORIDE, CALCIUM CHLORIDE 600; 310; 30; 20 MG/100ML; MG/100ML; MG/100ML; MG/100ML
INJECTION, SOLUTION INTRAVENOUS CONTINUOUS PRN
Status: DISCONTINUED | OUTPATIENT
Start: 2024-03-12 | End: 2024-03-12

## 2024-03-12 RX ORDER — LIDOCAINE HYDROCHLORIDE 10 MG/ML
INJECTION, SOLUTION EPIDURAL; INFILTRATION; INTRACAUDAL; PERINEURAL AS NEEDED
Status: DISCONTINUED | OUTPATIENT
Start: 2024-03-12 | End: 2024-03-12

## 2024-03-12 RX ORDER — PANTOPRAZOLE SODIUM 40 MG/10ML
40 INJECTION, POWDER, LYOPHILIZED, FOR SOLUTION INTRAVENOUS EVERY 12 HOURS SCHEDULED
Status: DISCONTINUED | OUTPATIENT
Start: 2024-03-12 | End: 2024-03-13

## 2024-03-12 RX ORDER — PROPOFOL 10 MG/ML
INJECTION, EMULSION INTRAVENOUS AS NEEDED
Status: DISCONTINUED | OUTPATIENT
Start: 2024-03-12 | End: 2024-03-12

## 2024-03-12 RX ORDER — BISACODYL 5 MG/1
10 TABLET, DELAYED RELEASE ORAL ONCE
Status: COMPLETED | OUTPATIENT
Start: 2024-03-12 | End: 2024-03-12

## 2024-03-12 RX ADMIN — THIAMINE HCL TAB 100 MG 100 MG: 100 TAB at 10:00

## 2024-03-12 RX ADMIN — PROPOFOL 30 MG: 10 INJECTION, EMULSION INTRAVENOUS at 13:13

## 2024-03-12 RX ADMIN — PANTOPRAZOLE SODIUM 40 MG: 40 INJECTION, POWDER, FOR SOLUTION INTRAVENOUS at 20:36

## 2024-03-12 RX ADMIN — POLYETHYLENE GLYCOL 3350, SODIUM SULFATE ANHYDROUS, SODIUM BICARBONATE, SODIUM CHLORIDE, POTASSIUM CHLORIDE 4000 ML: 236; 22.74; 6.74; 5.86; 2.97 POWDER, FOR SOLUTION ORAL at 15:36

## 2024-03-12 RX ADMIN — BISACODYL 10 MG: 5 TABLET, COATED ORAL at 20:35

## 2024-03-12 RX ADMIN — PROPOFOL 40 MG: 10 INJECTION, EMULSION INTRAVENOUS at 13:07

## 2024-03-12 RX ADMIN — MAGNESIUM SULFATE HEPTAHYDRATE 2 G: 40 INJECTION, SOLUTION INTRAVENOUS at 16:33

## 2024-03-12 RX ADMIN — SODIUM CHLORIDE, SODIUM LACTATE, POTASSIUM CHLORIDE, AND CALCIUM CHLORIDE: .6; .31; .03; .02 INJECTION, SOLUTION INTRAVENOUS at 12:59

## 2024-03-12 RX ADMIN — PANTOPRAZOLE SODIUM 40 MG: 40 TABLET, DELAYED RELEASE ORAL at 05:18

## 2024-03-12 RX ADMIN — BISACODYL 10 MG: 5 TABLET, COATED ORAL at 15:36

## 2024-03-12 RX ADMIN — PROPOFOL 100 MG: 10 INJECTION, EMULSION INTRAVENOUS at 13:05

## 2024-03-12 RX ADMIN — PANTOPRAZOLE SODIUM 40 MG: 40 INJECTION, POWDER, FOR SOLUTION INTRAVENOUS at 10:00

## 2024-03-12 RX ADMIN — SODIUM CHLORIDE 150 ML/HR: 0.9 INJECTION, SOLUTION INTRAVENOUS at 02:47

## 2024-03-12 RX ADMIN — ALPRAZOLAM 0.5 MG: 0.5 TABLET ORAL at 10:03

## 2024-03-12 RX ADMIN — LISINOPRIL 10 MG: 10 TABLET ORAL at 10:00

## 2024-03-12 RX ADMIN — SODIUM CHLORIDE 100 ML/HR: 0.9 INJECTION, SOLUTION INTRAVENOUS at 14:33

## 2024-03-12 RX ADMIN — ATORVASTATIN CALCIUM 80 MG: 80 TABLET, FILM COATED ORAL at 15:37

## 2024-03-12 RX ADMIN — LIDOCAINE HYDROCHLORIDE 50 MG: 10 INJECTION, SOLUTION EPIDURAL; INFILTRATION; INTRACAUDAL; PERINEURAL at 13:05

## 2024-03-12 RX ADMIN — SERTRALINE 100 MG: 100 TABLET, FILM COATED ORAL at 15:37

## 2024-03-12 RX ADMIN — PROPOFOL 40 MG: 10 INJECTION, EMULSION INTRAVENOUS at 13:10

## 2024-03-12 NOTE — UTILIZATION REVIEW
Initial Clinical Review    Admission: Date/Time/Statement:   Admission Orders (From admission, onward)       Ordered        03/11/24 1538  INPATIENT ADMISSION  Once                          Orders Placed This Encounter   Procedures    INPATIENT ADMISSION     Standing Status:   Standing     Number of Occurrences:   1     Order Specific Question:   Level of Care     Answer:   Med Surg [16]     Order Specific Question:   Estimated length of stay     Answer:   More than 2 Midnights     Order Specific Question:   Certification     Answer:   I certify that inpatient services are medically necessary for this patient for a duration of greater than two midnights. See H&P and MD Progress Notes for additional information about the patient's course of treatment.     ED Arrival Information       Expected   -    Arrival   3/11/2024 11:10    Acuity   Urgent              Means of arrival   Walk-In    Escorted by   Spouse    Service   Hospitalist    Admission type   Emergency              Arrival complaint   abnormal lab             Chief Complaint   Patient presents with    Abnormal Lab     Patient arrives ambulatory to ED, AO x 4 sent by PCP for low hemoglobin, labs drawn Thursday.  Patient reports only increased tiredness.       Initial Presentation:   73 yof to ER from home c/o weakness lightheadedness and easy bruisability for months. 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 She had routine labs done on Thursday and was called today and patient was told her hemoglobin was 7. Hx anxiety, CVA, depression, GERD, HTN. Presents weak, lightheaded, hypertensive. Admission Hgb 7, HCT 24, ferritin 5, iron sat 3, iron 25, TIBC 788.  Admitted to inpatient status for anemia. Started on IV PPI, transfusion ordered, GI consulted.    Date: 3/12/24   Day 2:   Dx: anemia. Hgb s/p transfusion @ 7.4. Remains on IV PPI. Plan EGD today    Per GI: Symptomatic anemia-rule out chronic occult GI blood  loss.  Schedule for upper endoscopy today.  Consider colonoscopy if no source on EGD. Monitor hemoglobin closely      3/13/24- Day 3: Has surpassed a 2nd midnight with active treatments and services.  Dx: anemia. S/p EGD yesterday. Hgb 7.7. Remains NPO with IVF maintained, IV PPI continued per GI. Plan colonoscopy today    ED Triage Vitals   Temperature Pulse Respirations Blood Pressure SpO2   03/11/24 1125 03/11/24 1125 03/11/24 1125 03/11/24 1125 03/11/24 1125   97.8 °F (36.6 °C) 75 18 (!) 176/108 99 %      Temp Source Heart Rate Source Patient Position - Orthostatic VS BP Location FiO2 (%)   03/11/24 1125 03/11/24 1125 03/11/24 1125 03/11/24 1125 --   Oral Monitor;Right;Brachial Lying Right arm       Pain Score       03/11/24 1124       No Pain          Wt Readings from Last 1 Encounters:   03/11/24 72.9 kg (160 lb 12.8 oz)     Additional Vital Signs:   Date/Time Temp Pulse Resp BP MAP (mmHg) SpO2 O2 Device Patient Position - Orthostatic VS   03/12/24 07:49:25 97.7 °F (36.5 °C) 82 17 164/72 103 95 % None (Room air) Lying   03/12/24 03:24:59 -- 73 16 144/67 93 94 % -- --   03/11/24 22:10:30 97.6 °F (36.4 °C) 83 16 144/67 93 94 % -- --   03/11/24 1900 -- -- -- -- -- -- None (Room air) --   03/11/24 18:36:16 97.8 °F (36.6 °C) 75 17 175/83 Abnormal  114 97 % -- --   03/11/24 1800 98.4 °F (36.9 °C) 74 18 175/72 Abnormal  -- 98 % None (Room air) --   03/11/24 1700 98.3 °F (36.8 °C) 77 18 180/75 Abnormal  -- 98 % None (Room air) --   03/11/24 1635 98.2 °F (36.8 °C) 76 18 160/74 -- 98 % None (Room air) --   03/11/24 1620 98 °F (36.7 °C) 78 18 161/108 Abnormal  -- 97 % None (Room air) --   03/11/24 1605 97.9 °F (36.6 °C) 78 18 175/74 Abnormal  -- 98 % None (Room air) --   03/11/24 1559 97.9 °F (36.6 °C) 76 18 169/74 -- -- -- --   03/11/24 1545 -- 76 18 170/70 100 97 % -- --   03/11/24 1500 -- -- -- 171/90 Abnormal  124 -- -- --   03/11/24 1330 -- 72 -- 159/78 112 100 % -- --   03/11/24 1300 -- 76 -- 143/65 93 98 % -- --    03/11/24 1227 -- -- -- -- -- -- None (Room air) --   03/11/24 1125 97.8 °F (36.6 °C) 75 18 176/108 Abnormal  -- 99 % None (Room air) Lying     Pertinent Labs/Diagnostic Test Results:   3/13 Colonoscopy=  FINDINGS:  One 30 mm flat, adenomatous-appearing polyp in the ascending colon; lift performed with normal saline injected into the submucosa; incompletely removed target lesion in piecemeal fashion by EMR. EMR was performed with a hot snare; placed 3 clips successfully (clips are MRI conditional); tattooed 3 cm distal to the finding with 2 mL  Diverticula of moderate severity in the sigmoid colon  Internal hemorrhoids  IMPRESSION:  One 30 mm polyp in the ascending colon; lift performed; incompletely removed in piecemeal fashion by EMR; placed 3 clips successfully; tattooed 3 cm distal to the finding  Diverticulosis of moderate severity in the sigmoid colon  Hemorrhoids    3/12 EGD=  FINDINGS:  The esophagus appeared normal.  Stomach-just below the GE junction noted less than 1 cm smooth inflamed mucosa in the gastric cardia.  Not biopsied because of aspirin and Plavix on board  The duodenum appeared normal.  IMPRESSION:  The esophagus appeared normal.  Stomach-just below the GE junction noted less than 1 cm smooth inflamed mucosa in the gastric cardia.  Not biopsied because of aspirin and Plavix on board  The duodenum appeared normal.    Results from last 7 days   Lab Units 03/14/24  0537 03/13/24 0445 03/12/24 0516 03/11/24  1146   WBC Thousand/uL 10.45* 10.05 8.32 8.57   HEMOGLOBIN g/dL 7.7* 7.7* 7.4* 7.0*   HEMATOCRIT % 24.8* 25.2* 24.6* 24.0*   PLATELETS Thousands/uL 268 321 272 353   NEUTROS ABS Thousands/µL  --   --   --  5.57     Results from last 7 days   Lab Units 03/13/24 0445 03/12/24  0516 03/11/24  1146   SODIUM mmol/L 139 141 140   POTASSIUM mmol/L 3.7 4.0 4.4   CHLORIDE mmol/L 108 112* 107   CO2 mmol/L 24 24 26   ANION GAP mmol/L 7 5 7   BUN mg/dL 6 12 21   CREATININE mg/dL 0.56* 0.66 0.74   EGFR  ml/min/1.73sq m 93 88 81   CALCIUM mg/dL 9.2 8.3* 9.3   MAGNESIUM mg/dL 2.0 1.7*  --    PHOSPHORUS mg/dL 3.0 3.2  --      Results from last 7 days   Lab Units 03/13/24  0445 03/12/24  0516 03/11/24  1146   AST U/L  --   --  14   ALT U/L  --   --  18   ALK PHOS U/L  --   --  64   TOTAL PROTEIN g/dL  --   --  7.0   ALBUMIN g/dL 3.8 3.6 4.4   TOTAL BILIRUBIN mg/dL  --   --  0.31     Results from last 7 days   Lab Units 03/14/24  0725 03/13/24 2051 03/13/24  1634 03/13/24  1138 03/13/24  0713 03/12/24  2102 03/12/24  1629 03/12/24  1420 03/12/24  0712 03/11/24  1959   POC GLUCOSE mg/dl 113 111 100 113 110 103 90 90 118 98     Results from last 7 days   Lab Units 03/13/24  0445 03/12/24  0516 03/11/24  1146   GLUCOSE RANDOM mg/dL 134 130 106     Results from last 7 days   Lab Units 03/11/24  1146   PROTIME seconds 12.6   INR  0.92   PTT seconds 34     Results from last 7 days   Lab Units 03/11/24  1146   FERRITIN ng/mL 5*   IRON SATURATION % 3*   IRON ug/dL 25*   TIBC ug/dL 788*     Results from last 7 days   Lab Units 03/12/24  0615   UNIT PRODUCT CODE  [id number]   UNIT NUMBER  [id number]   UNITABO  A   UNITRH  NEG   CROSSMATCH  Compatible   UNIT DISPENSE STATUS  Presumed Trans   UNIT PRODUCT VOL ml 350     ED Treatment:   Medication Administration from 03/11/2024 1109 to 03/11/2024 1826         Date/Time Order Dose Route Action     03/11/2024 1815 EDT sodium chloride 0.9 % infusion 150 mL/hr Intravenous Restarted     03/11/2024 1147 EDT sodium chloride 0.9 % infusion 150 mL/hr Intravenous New Bag     03/11/2024 1556 EDT pantoprazole (PROTONIX) injection 40 mg 40 mg Intravenous Given          Past Medical History:   Diagnosis Date    Anxiety     CVA (cerebral vascular accident) (HCC)     Depression     Diabetes (HCC)     Diabetes mellitus (HCC)     GERD (gastroesophageal reflux disease)     Hypertension     Stroke (HCC)     Stroke (HCC)      Present on Admission:   Anxiety and depression   Essential hypertension    Smoker   Type 2 diabetes mellitus, without long-term current use of insulin (HCC)   GERD (gastroesophageal reflux disease)   Anemia   CVA (cerebral vascular accident) ([facility])      Admitting Diagnosis: Anemia [D64.9]  Abnormal laboratory test [R89.9]  Age/Sex: 73 y.o. female  Admission Orders:  Stool record at bedside  Scd/foot pumps  Consult GI  Pt/ot eval & tx  Accuchecks  Transfuse 1uprbc's, 3/11    Scheduled Medications:  atorvastatin, 80 mg, Oral, Daily With Dinner  insulin lispro, 1-5 Units, Subcutaneous, TID AC  insulin lispro, 1-5 Units, Subcutaneous, HS  lisinopril, 10 mg, Oral, Daily  pantoprazole, 40 mg, Intravenous, Q12H WILFREDO  sertraline, 100 mg, Oral, Daily With Dinner  thiamine, 100 mg, Oral, Daily    Continuous IV Infusions:  sodium chloride, 150 mL/hr, Intravenous, Continuous    PRN Meds:  acetaminophen, 650 mg, Oral, Q6H PRN  ALPRAZolam, 0.5 mg, Oral, 4x Daily PRN  hydrALAZINE, 5 mg, Intravenous, Q6H PRN  ondansetron, 4 mg, Intravenous, Q6H PRN    Network Utilization Review Department  ATTENTION: Please call with any questions or concerns to [phone] and carefully listen to the prompts so that you are directed to the right person. All voicemails are confidential.   For Discharge needs, contact Care Management DC Support Team at [phone] opt. [phone]  Send all requests for admission clinical reviews, approved or denied determinations and any other requests to dedicated fax number below belonging to the [facility] where the patient is receiving treatment. List of dedicated fax numbers for the Facilities:  FACILITY NAME UR FAX NUMBER   ADMISSION DENIALS (Administrative/Medical Necessity) [phone]   DISCHARGE SUPPORT TEAM (NETWORK) [phone]   PARENT CHILD HEALTH (Maternity/NICU/Pediatrics) [phone]   [facility] [phone]   [facility] [phone]   [facility] [phone]   [facility]  [phone]   [facility] [phone]   [facility] [phone]   [facility] [phone]   [facility] [phone]   [facility] [phone]   [facility] [phone]   [facility] [phone]   [facility] [phone]

## 2024-03-12 NOTE — PLAN OF CARE
Problem: PAIN - ADULT  Goal: Verbalizes/displays adequate comfort level or baseline comfort level  Description: Interventions:  - Encourage patient to monitor pain and request assistance  - Assess pain using appropriate pain scale  - Administer analgesics based on type and severity of pain and evaluate response  - Implement non-pharmacological measures as appropriate and evaluate response  - Consider cultural and social influences on pain and pain management  - Notify physician/advanced practitioner if interventions unsuccessful or patient reports new pain  Outcome: Progressing     Problem: SAFETY ADULT  Goal: Patient will remain free of falls  Description: INTERVENTIONS:  - Educate patient/family on patient safety including physical limitations  - Instruct patient to call for assistance with activity   - Consult OT/PT to assist with strengthening/mobility   - Keep Call bell within reach  - Keep bed low and locked with side rails adjusted as appropriate  - Keep care items and personal belongings within reach  - Initiate and maintain comfort rounds  - Make Fall Risk Sign visible to staff  - Offer Toileting every 2 Hours, in advance of need  - Initiate/Maintain alarms  - Obtain necessary fall risk management equipment: yes  - Apply yellow socks and bracelet for high fall risk patients  - Consider moving patient to room near nurses station  Outcome: Progressing  Goal: Maintain or return to baseline ADL function  Description: INTERVENTIONS:  -  Assess patient's ability to carry out ADLs; assess patient's baseline for ADL function and identify physical deficits which impact ability to perform ADLs (bathing, care of mouth/teeth, toileting, grooming, dressing, etc.)  - Assess/evaluate cause of self-care deficits   - Assess range of motion  - Assess patient's mobility; develop plan if impaired  - Assess patient's need for assistive devices and provide as appropriate  - Encourage maximum independence but intervene and  supervise when necessary  - Involve family in performance of ADLs  - Assess for home care needs following discharge   - Consider OT consult to assist with ADL evaluation and planning for discharge  - Provide patient education as appropriate  Outcome: Progressing  Goal: Maintains/Returns to pre admission functional level  Description: INTERVENTIONS:  - Perform AM-PAC 6 Click Basic Mobility/ Daily Activity assessment daily.  - Set and communicate daily mobility goal to care team and patient/family/caregiver.   - Collaborate with rehabilitation services on mobility goals if consulted  - Perform Range of Motion 2 times a day.  - Reposition patient every 2 hours.  - Dangle patient 2 times a day  - Stand patient 2 times a day  - Ambulate patient 2 times a day  - Out of bed to chair 2 times a day   - Out of bed for meals 2 times a day  - Out of bed for toileting  - Record patient progress and toleration of activity level   Outcome: Progressing     Problem: DISCHARGE PLANNING  Goal: Discharge to home or other facility with appropriate resources  Description: INTERVENTIONS:  - Identify barriers to discharge w/patient and caregiver  - Arrange for needed discharge resources and transportation as appropriate  - Identify discharge learning needs (meds, wound care, etc.)  - Arrange for interpretive services to assist at discharge as needed  - Refer to Case Management Department for coordinating discharge planning if the patient needs post-hospital services based on physician/advanced practitioner order or complex needs related to functional status, cognitive ability, or social support system  Outcome: Progressing     Problem: Knowledge Deficit  Goal: Patient/family/caregiver demonstrates understanding of disease process, treatment plan, medications, and discharge instructions  Description: Complete learning assessment and assess knowledge base.  Interventions:  - Provide teaching at level of understanding  - Provide teaching via  preferred learning methods  Outcome: Progressing     Problem: HEMATOLOGIC - ADULT  Goal: Maintains hematologic stability  Description: INTERVENTIONS  - Assess for signs and symptoms of bleeding or hemorrhage  - Monitor labs  - Administer supportive blood products/factors as ordered and appropriate  Outcome: Progressing

## 2024-03-12 NOTE — PLAN OF CARE
Problem: PAIN - ADULT  Goal: Verbalizes/displays adequate comfort level or baseline comfort level  Description: Interventions:  - Encourage patient to monitor pain and request assistance  - Assess pain using appropriate pain scale  - Administer analgesics based on type and severity of pain and evaluate response  - Implement non-pharmacological measures as appropriate and evaluate response  - Consider cultural and social influences on pain and pain management  - Notify physician/advanced practitioner if interventions unsuccessful or patient reports new pain  Outcome: Progressing     Problem: SAFETY ADULT  Goal: Patient will remain free of falls  Description: INTERVENTIONS:  - Educate patient/family on patient safety including physical limitations  - Instruct patient to call for assistance with activity   - Consult OT/PT to assist with strengthening/mobility   - Keep Call bell within reach  - Keep bed low and locked with side rails adjusted as appropriate  - Keep care items and personal belongings within reach  - Initiate and maintain comfort rounds  - Make Fall Risk Sign visible to staff  - Offer Toileting every 2 Hours, in advance of need  - Initiate/Maintain alarms  - Obtain necessary fall risk management equipment: yes  - Apply yellow socks and bracelet for high fall risk patients  - Consider moving patient to room near nurses station  Outcome: Progressing  Goal: Maintain or return to baseline ADL function  Description: INTERVENTIONS:  -  Assess patient's ability to carry out ADLs; assess patient's baseline for ADL function and identify physical deficits which impact ability to perform ADLs (bathing, care of mouth/teeth, toileting, grooming, dressing, etc.)  - Assess/evaluate cause of self-care deficits   - Assess range of motion  - Assess patient's mobility; develop plan if impaired  - Assess patient's need for assistive devices and provide as appropriate  - Encourage maximum independence but intervene and  supervise when necessary  - Involve family in performance of ADLs  - Assess for home care needs following discharge   - Consider OT consult to assist with ADL evaluation and planning for discharge  - Provide patient education as appropriate  Outcome: Progressing  Goal: Maintains/Returns to pre admission functional level  Description: INTERVENTIONS:  - Perform AM-PAC 6 Click Basic Mobility/ Daily Activity assessment daily.  - Set and communicate daily mobility goal to care team and patient/family/caregiver.   - Collaborate with rehabilitation services on mobility goals if consulted  - Perform Range of Motion 3 times a day.  - Reposition patient every 3 hours.  - Dangle patient 3 times a day  - Stand patient 3 times a day  - Ambulate patient 3 times a day  - Out of bed to chair 3 times a day   - Out of bed for meals 3 times a day  - Out of bed for toileting  - Record patient progress and toleration of activity level   Outcome: Progressing     Problem: DISCHARGE PLANNING  Goal: Discharge to home or other facility with appropriate resources  Description: INTERVENTIONS:  - Identify barriers to discharge w/patient and caregiver  - Arrange for needed discharge resources and transportation as appropriate  - Identify discharge learning needs (meds, wound care, etc.)  - Arrange for interpretive services to assist at discharge as needed  - Refer to Case Management Department for coordinating discharge planning if the patient needs post-hospital services based on physician/advanced practitioner order or complex needs related to functional status, cognitive ability, or social support system  Outcome: Progressing     Problem: Knowledge Deficit  Goal: Patient/family/caregiver demonstrates understanding of disease process, treatment plan, medications, and discharge instructions  Description: Complete learning assessment and assess knowledge base.  Interventions:  - Provide teaching at level of understanding  - Provide teaching via  preferred learning methods  Outcome: Progressing     Problem: HEMATOLOGIC - ADULT  Goal: Maintains hematologic stability  Description: INTERVENTIONS  - Assess for signs and symptoms of bleeding or hemorrhage  - Monitor labs  - Administer supportive blood products/factors as ordered and appropriate  Outcome: Progressing

## 2024-03-12 NOTE — ASSESSMENT & PLAN NOTE
Lab Results   Component Value Date    HGBA1C 6.5 (H) 07/28/2023       Recent Labs     03/11/24 1959 03/12/24  0712 03/12/24  1420   POCGLU 98 118 90       Blood Sugar Average: Last 72 hrs:    Update HG A1c  (P) 102Hold metformin during hospitalization  Accu-Cheks ACHS  SSI  ADA diet  Hypoglycemia protocol

## 2024-03-12 NOTE — ANESTHESIA PREPROCEDURE EVALUATION
Procedure:  EGD    Relevant Problems   CARDIO   (+) Essential hypertension      ENDO   (+) Type 2 diabetes mellitus, without long-term current use of insulin (HCC)      GI/HEPATIC   (+) GERD (gastroesophageal reflux disease)      NEURO/PSYCH   (+) Acute ischemic stroke (HCC)   (+) Anxiety and depression   (+) CVA (cerebral vascular accident) (HCC)      PULMONARY   (+) Smoker        Physical Exam    Airway    Mallampati score: II  TM Distance: >3 FB  Neck ROM: full     Dental   No notable dental hx     Cardiovascular  Cardiovascular exam normal    Pulmonary  Pulmonary exam normal     Other Findings  post-pubertal.      Anesthesia Plan  ASA Score- 2     Anesthesia Type- IV sedation with anesthesia with ASA Monitors.         Additional Monitors:     Airway Plan:            Plan Factors-Exercise tolerance (METS): >4 METS.    Chart reviewed.   Existing labs reviewed. Patient summary reviewed.    Patient is not a current smoker.              Induction-     Postoperative Plan-     Informed Consent- Anesthetic plan and risks discussed with patient.  I personally reviewed this patient with the CRNA. Discussed and agreed on the Anesthesia Plan with the CRNA..

## 2024-03-12 NOTE — ASSESSMENT & PLAN NOTE
Patient was referred to the ED by PCP for abnormal lab. Patient stated that the outpatient Hemoglobin was 7. ED lab work revealed hemoglobin of 7. Patient reports weakness, fatigue and lightheadedness which has been ongoing for a few days which necessitated her going to her PCP. Denies any bowel changes and DARLING done in ED was negative for blood.  Baseline hemoglobin in 2023 noted to be 12.3 - 13.4  ED transfused 1 unit PRBC  GI consulted, recommendations appreciated  Hemoglobin 7.4 status post 1 unit PRBC   3/12: EGD demonstrated normal esophagus.  1 cm more than few mucus in the gastric cardia noted just below the GE junction.  Could not biopsy due to aspirin & Plavix  NPO after midnight for colonoscopy tomorrow  Repeat hemoglobin at 4 PM today  Continue Protonix 40 mg daily  Repeat EGD in 2 months

## 2024-03-12 NOTE — PROGRESS NOTES
[facility]  Progress Note  Name: Yolis Flores I  MRN: [id number]  Unit/Bed#: 4 [address] Date of Admission: 3/11/2024   Date of Service: 3/12/2024 I Hospital Day: 1    Assessment/Plan   * Abnormal laboratory test  Assessment & Plan  Patient was referred to the ED by PCP for abnormal lab. Patient stated that the outpatient Hemoglobin was 7. ED lab work revealed hemoglobin of 7. Patient reports weakness, fatigue and lightheadedness which has been ongoing for a few days which necessitated her going to her PCP. Denies any bowel changes and DARLING done in ED was negative for blood.  Baseline hemoglobin in 2023 noted to be 12.3 - 13.4  ED transfused 1 unit PRBC  GI consulted, recommendations appreciated  Hemoglobin 7.4 status post 1 unit PRBC   3/12: EGD demonstrated normal esophagus.  1 cm more than few mucus in the gastric cardia noted just below the GE junction.  Could not biopsy due to aspirin & Plavix  NPO after midnight for colonoscopy tomorrow  Repeat hemoglobin at 4 PM today  Continue Protonix 40 mg daily  Repeat EGD in 2 months        Type 2 diabetes mellitus, without long-term current use of insulin (HCC)  Assessment & Plan  Lab Results   Component Value Date    HGBA1C 6.5 (H) 07/28/2023       Recent Labs     03/11/24  1959 03/12/24  0712 03/12/24  1420   POCGLU 98 118 90       Blood Sugar Average: Last 72 hrs:    Update HG A1c  (P) 102Hold metformin during hospitalization  Accu-Cheks ACHS  SSI  ADA diet  Hypoglycemia protocol    Smoker  Assessment & Plan  Nicotine patch  Smoking cessation    GERD (gastroesophageal reflux disease)  Assessment & Plan  Continue Protonix    Essential hypertension  Assessment & Plan  Continue PTA lisinopril  Added hydralazine for SBP > 160    Anxiety and depression  Assessment & Plan  Continue Zoloft, Xanax as needed    CVA (cerebral vascular accident) (HCC)  Assessment & Plan  Noted in history  Holding ASA and Plavix for questionable GI bleed                  VTE Pharmacologic Prophylaxis:   Moderate Risk (Score 3-4) - Pharmacological DVT Prophylaxis Contraindicated. Sequential Compression Devices Ordered.    Mobility:   Basic Mobility Inpatient Raw Score: 23  JH-HLM Goal: 7: Walk 25 feet or more  JH-HLM Achieved: 7: Walk 25 feet or more  HLM Goal NOT achieved. Continue with multidisciplinary rounding and encourage appropriate mobility to improve upon HLM goals.    Patient Centered Rounds: I performed bedside rounds with nursing staff today.   Discussions with Specialists or Other Care Team Provider: GI, CM, RN    Education and Discussions with Family / Patient: Patient declined call to .     Total Time Spent on Date of Encounter in care of patient:  mins. This time was spent on one or more of the following: performing physical exam; counseling and coordination of care; obtaining or reviewing history; documenting in the medical record; reviewing/ordering tests, medications or procedures; communicating with other healthcare professionals and discussing with patient's family/caregivers.    Current Length of Stay: 1 day(s)  Current Patient Status: Inpatient   Certification Statement: The patient will continue to require additional inpatient hospital stay due to colonoscopy scheduled for tomorrow  Discharge Plan: Anticipate discharge in 24-48 hrs to home.    Code Status: Level 1 - Full Code    Subjective:  Seen and examined at bedside.  Patient denies any pain, black tarry stool, nausea or vomiting.  Encourage patient as she seems very anxious about the EGD.  Offered support and answered all questions to satisfaction.          Objective:     Vitals:   Temp (24hrs), Av.9 °F (36.6 °C), Min:97.4 °F (36.3 °C), Max:98.4 °F (36.9 °C)    Temp:  [97.4 °F (36.3 °C)-98.4 °F (36.9 °C)] 97.4 °F (36.3 °C)  HR:  [67-88] 76  Resp:  [16-18] 18  BP: (118-180)/() 149/76  SpO2:  [94 %-99 %] 96 %  Body mass index is 27.6 kg/m².     Input and Output Summary (last  24 hours):     Intake/Output Summary (Last 24 hours) at 3/12/2024 1610  Last data filed at 3/12/2024 1315  Gross per 24 hour   Intake 560 ml   Output 650 ml   Net -90 ml       Physical Exam:   Physical Exam  Vitals and nursing note reviewed.   Constitutional:       General: She is not in acute distress.     Appearance: She is well-developed.   HENT:      Head: Normocephalic and atraumatic.   Eyes:      Conjunctiva/sclera: Conjunctivae normal.   Cardiovascular:      Rate and Rhythm: Normal rate and regular rhythm.      Heart sounds: No murmur heard.  Pulmonary:      Effort: Pulmonary effort is normal. No respiratory distress.      Breath sounds: Normal breath sounds.   Abdominal:      Palpations: Abdomen is soft.      Tenderness: There is no abdominal tenderness.   Musculoskeletal:         General: No swelling.      Cervical back: Neck supple.   Skin:     General: Skin is warm and dry.      Capillary Refill: Capillary refill takes less than 2 seconds.   Neurological:      Mental Status: She is alert.   Psychiatric:         Mood and Affect: Mood normal.          Additional Data:     Labs:  Results from last 7 days   Lab Units 03/12/24  0516 03/11/24  1146   WBC Thousand/uL 8.32 8.57   HEMOGLOBIN g/dL 7.4* 7.0*   HEMATOCRIT % 24.6* 24.0*   PLATELETS Thousands/uL 272 353   NEUTROS PCT %  --  65   LYMPHS PCT %  --  25   MONOS PCT %  --  7   EOS PCT %  --  2     Results from last 7 days   Lab Units 03/12/24  0516 03/11/24  1146   SODIUM mmol/L 141 140   POTASSIUM mmol/L 4.0 4.4   CHLORIDE mmol/L 112* 107   CO2 mmol/L 24 26   BUN mg/dL 12 21   CREATININE mg/dL 0.66 0.74   ANION GAP mmol/L 5 7   CALCIUM mg/dL 8.3* 9.3   ALBUMIN g/dL 3.6 4.4   TOTAL BILIRUBIN mg/dL  --  0.31   ALK PHOS U/L  --  64   ALT U/L  --  18   AST U/L  --  14   GLUCOSE RANDOM mg/dL 130 106     Results from last 7 days   Lab Units 03/11/24  1146   INR  0.92     Results from last 7 days   Lab Units 03/12/24  1420 03/12/24  0712 03/11/24  1959   POC  GLUCOSE mg/dl 90 118 98               Lines/Drains:  Invasive Devices       Peripheral Intravenous Line  Duration             Peripheral IV 03/11/24 Right;Ventral (anterior) Forearm <1 day                          Imaging: No pertinent imaging reviewed.    Recent Cultures (last 7 days):         Last 24 Hours Medication List:   Current Facility-Administered Medications   Medication Dose Route Frequency Provider Last Rate    acetaminophen  650 mg Oral Q6H PRN Tianna Ramos MD      ALPRAZolam  0.5 mg Oral 4x Daily PRN Tianna Ramos MD      atorvastatin  80 mg Oral Daily With Dinner Tianna Ramos MD      bisacodyl  10 mg Oral Once HOUSTON Cruz      hydrALAZINE  5 mg Intravenous Q6H PRN Tianna Ramos MD      insulin lispro  1-5 Units Subcutaneous TID AC Tianna Ramos MD      insulin lispro  1-5 Units Subcutaneous HS Tianna Ramos MD      lisinopril  10 mg Oral Daily Tianna Ramos MD      magnesium sulfate  2 g Intravenous Once HOUSTON Cruz      ondansetron  4 mg Intravenous Q6H PRN Tianna Ramos MD      pantoprazole  40 mg Intravenous Q12H WILFREDO Tianna Ramos MD      sertraline  100 mg Oral Daily With Dinner Tianna Ramos MD      sodium chloride  100 mL/hr Intravenous Continuous Tianna Ramos  mL/hr (03/12/24 1433)    thiamine  100 mg Oral Daily Tianna Ramos MD          Today, Patient Was Seen By: HOUSTON Cruz    **Please Note: This note may have been constructed using a voice recognition system.**

## 2024-03-12 NOTE — PERIOPERATIVE NURSING NOTE
4N charge made aware patient back to bed, railings up , call bell in patient's hand. Patient made aware to call for help with any needs.

## 2024-03-12 NOTE — PHYSICAL THERAPY NOTE
PHYSICAL THERAPY     03/12/24 1511   Note Type   Note type Screen   Additional Comments no skilled PT needs as per OT evaluation. Patient is independent with transfers and gait. DC PT   Licensure   [place] License Number  Isabelle Adams PT [id number]

## 2024-03-12 NOTE — ANESTHESIA POSTPROCEDURE EVALUATION
Post-Op Assessment Note    CV Status:  Stable  Pain Score: 0    Pain management: adequate       Mental Status:  Sleepy and arousable   Hydration Status:  Stable   PONV Controlled:  None   Airway Patency:  Patent  Two or more mitigation strategies used for obstructive sleep apnea   Post Op Vitals Reviewed: Yes    No anethesia notable event occurred.    Staff: CRNA   Comments: spontaneously breathing, protecting airway, fully endorsed to recovery w/o AC              BP   142/65   Temp      Pulse  66   Resp   14   SpO2   100

## 2024-03-12 NOTE — OCCUPATIONAL THERAPY NOTE
OT EVALUATION       03/12/24 1020   OT Last Visit   OT Visit Date 03/12/24   Note Type   Note type Evaluation   Pain Assessment   Pain Assessment Tool 0-10   Pain Score No Pain   Home Living   Type of Home House   Home Layout Two level;Bed/bath upstairs  (1 [address]OM[address], full bath on 1st floor)   Additional Comments pt reports going to speech s/p CVA as outpatient currently   Prior Function   Level of [place] Independent with ADLs;Independent with functional mobility;Independent with IADLS   Lives With Spouse   Receives Help From Family   IADLs Independent with driving;Independent with meal prep;Independent with medication management   ADL   Eating Assistance 7  Independent   Grooming Assistance 7  Independent   UB Bathing Assistance 7  Independent   LB Bathing Assistance 7  Independent   UB Dressing Assistance 7  Independent   LB Dressing Assistance 7  Independent   Toileting Assistance  7  Independent   Bed Mobility   Supine to Sit 7  Independent   Sit to Supine 7  Independent   Transfers   Sit to Stand 7  Independent   Stand to Sit 7  Independent   Functional Mobility   Functional Mobility 7  Independent   Additional Comments functional community distance   Balance   Static Sitting Good   Dynamic Sitting Good   Static Standing Good   Dynamic Standing Good   Activity Tolerance   Activity Tolerance Patient tolerated treatment well   Nurse Made Aware yes, Angie   RUE Assessment   RUE Assessment WFL   LUE Assessment   LUE Assessment WFL   Cognition   Overall Cognitive Status WFL   Arousal/Participation Cooperative   Attention Within functional limits   Orientation Level Oriented X4   Following Commands Follows all commands and directions without difficulty   Comments pt with word finding difficulty at times appears baseline as pt with hx ofd CVA and attending speech as outpatient   Assessment   Assessment Patient evaluated by Occupational Therapy.  Patient admitted with Abnormal laboratory test.  The patients occupational  profile, medical and therapy history includes a extensive additional review of physical, cognitive, or psychosocial history related to current functional performance.  Comorbidities affecting functional mobility and ADLS include: anxiety, CVA, depression, diabetes, and hypertension.  Prior to admission, patient was independent with functional mobility without assistive device, independent with ADLS, and independent with IADLS.  The evaluation identifies the following performance deficits: no deficits, that result in activity limitations and/or participation restrictions. This evaluation requires clinical decision making of low complexity, because the patients presents with no comorbidities that affect occupational performance and required no modification of tasks or assistance with consideration of a limited number of treatment options.  The Barthel Index was used as a functional outcome tool presenting with a score of Barthel Index Score: 100, indicating no limitations of functional mobility and ADLS.  The patient's raw score on the AM-PAC Daily Activity Inpatient Short Form is 24. A raw score of greater than or equal to 19 suggests the patient may benefit from discharge to home. Please refer to the recommendation of the Occupational Therapist for safe discharge planning.  Patient is independent with ADLS and functional mobility, no further skilled OT needs at this time. PT evaluation deferred as pt is independent with mobility and transfers.   Discharge Recommendation   Rehab Resource Intensity Level, OT No post-acute rehabilitation needs   AM-PAC Daily Activity Inpatient   Lower Body Dressing 4   Bathing 4   Toileting 4   Upper Body Dressing 4   Grooming 4   Eating 4   Daily Activity Raw Score 24   Daily Activity Standardized Score (Calc for Raw Score >=11) 57.54   AM-PAC Applied Cognition Inpatient   Following a Speech/Presentation 4   Understanding Ordinary Conversation 4   Taking Medications 4   Remembering  Where Things Are Placed or Put Away 4   Remembering List of 4-5 Errands 4   Taking Care of Complicated Tasks 4   Applied Cognition Raw Score 24   Applied Cognition Standardized Score 62.21   Barthel Index   Feeding 10   Bathing 5   Grooming Score 5   Dressing Score 10   Bladder Score 10   Bowels Score 10   Toilet Use Score 10   Transfers (Bed/Chair) Score 15   Mobility (Level Surface) Score 15   Stairs Score 10   Barthel Index Score 100   Licensure   [place] License Number  Rekha Carlin MS OTR/L [id number]

## 2024-03-12 NOTE — H&P (VIEW-ONLY)
Consultation -  [facility]  Yolis Flores 73 y.o. female MRN: [id number]  Unit/Bed#: [address] [place] [address] Encounter: [id number]        Inpatient consult to gastroenterology  Consult performed by: HOUSTON Kee  Consult ordered by: HOUSTON Cruz          Reason for Consult / Principal Problem:     Anemia      ASSESSMENT AND PLAN:        This is a 73-year-old female with history of CVA July 2023 on DAPT with ASA/Plavix, T2DM, HLD, GERD, tobacco use, loop recorder admitted with symptomatic anemia on outpatient labs with hemoglobin of 7 down from 12.5 at the end of July.    #1  Symptomatic iron deficiency anemia: Patient denies any melena or hematochezia, but due to iron deficiency with drop in Hgb concern for slow occult GI bleeding from PUD, AVMs, or lesion in the setting of ASA/Plavix.  Prior CT in April 2023 noted moderate wall thickening of the gastric body, never had follow-up EGD.  Denies any GI symptoms, namely no heartburn, nausea/vomiting, dysphagia, abdominal pain, changes in bowel habit. She is maintained on Prilosec for history of GERD and last EGD was 2017 at [place]. She has never had a colonoscopy.  Hemoglobin improved only to 7.4 after 1 unit of blood was transfused.  She remains hemodynamically stable.    -Hold ASA/Plavix  -Monitor serial H&H, transfuse blood products as needed to maintain hemoglobin greater than 7  -Keep n.p.o.  -Continue IV Protonix twice daily  -EGD scheduled today for further evaluation.  Prep and procedure explained  -Pending course may need inpatient versus outpatient colonoscopy if EGD unremarkable  -Recommend discussion with neurology to see if patient may be able to come off DAPT since it has been more than 3 months since her stroke.    Thank you for the consultation.  Case will be discussed with Dr. Brar.    ______________________________________________________________________    HPI:  Yolis Flores is a 73 y.o. female with  history of CVA (July 2023) on ASA/Plavix, T2DM, HLD, GERD, tobacco use, and loop recorder who presented due to anemia on outpatient labs.  She recently saw PCP due to fatigue, weakness, lightheadedness and labs revealed a hemoglobin of 7 down from 12.5 at the end of July 2023 with low microcytic indices. In the ER, she was hemodynamically stable and denied any melena or hematochezia at home.  B12 and folate were normal, however she was found to have low iron stores.     She received 1 unit of blood yesterday with improvement to only 7.4 today. Per neuro's last note in August, pt was only to continue DAPT for 3 months until November 2023.  She reports her only history of bleeding is when she had kidney stones causing hematuria in April and on chart review CAT scan showed gastric wall thickening at that time.  Denies any heartburn, dysphagia, nausea/vomiting, abdominal pain, changes in bowel habit, unintended weight loss, nosebleeds, abnormal vaginal bleeding, trauma.    Her last EGD was performed by [facility] in 2017 at [facility] showing reflux esophagitis in the GE junction and a hyperplastic polyp was removed from the cardia.  Gastric biopsy was benign and negative for H. pylori.   She has never had a colonoscopy  Denies any family history of colon cancer.      REVIEW OF SYSTEMS:    CONSTITUTIONAL: Denies any fever, chills, rigors, and weight loss.  HEENT: No earache or tinnitus. Denies hearing loss or visual disturbances.  CARDIOVASCULAR: No chest pain or palpitations.   RESPIRATORY: Denies any cough, hemoptysis, shortness of breath or dyspnea on exertion.  GASTROINTESTINAL: As noted in the History of Present Illness.   GENITOURINARY: No problems with urination. Denies any hematuria or dysuria.  NEUROLOGIC: No dizziness or vertigo, denies headaches.   MUSCULOSKELETAL: Denies any muscle or joint pain.   SKIN: Denies skin rashes or itching.   ENDOCRINE: Denies excessive thirst. Denies  "intolerance to heat or cold.  PSYCHOSOCIAL: Denies depression or anxiety. Denies any recent memory loss.       Historical Information   Past Medical History:   Diagnosis Date    Anxiety     CVA (cerebral vascular accident) (HCC)     Depression     Diabetes (HCC)     Diabetes mellitus (HCC)     GERD (gastroesophageal reflux disease)     Hypertension     Stroke (HCC)     Stroke (HCC)      Past Surgical History:   Procedure Laterality Date    APPENDECTOMY      CARDIAC ELECTROPHYSIOLOGY PROCEDURE N/A 2023    Procedure: Cardiac loop recorder implant;  Surgeon: Charla Anthony DO;  Location: [facility];  Service: Cardiology    TONSILLECTOMY       Social History   Social History     Substance and Sexual Activity   Alcohol Use Not Currently    Alcohol/week: 28.0 standard drinks of alcohol    Types: 28 Cans of beer per week    Comment: every night has \"a few\" beers     Social History     Substance and Sexual Activity   Drug Use No     Social History     Tobacco Use   Smoking Status Former    Current packs/day: 0.00    Types: Cigarettes    Quit date: 2023    Years since quittin.6   Smokeless Tobacco Former     History reviewed. No pertinent family history.    Meds/Allergies     Medications Prior to Admission   Medication    Alcohol Swabs 70 % PADS    ALPRAZolam (XANAX) 0.5 mg tablet    aspirin 81 mg chewable tablet    atorvastatin (LIPITOR) 80 mg tablet    Blood Glucose Monitoring Suppl (OneTouch Verio Reflect) w/Device KIT    clopidogrel (PLAVIX) 75 mg tablet    famotidine (PEPCID) 20 mg tablet    glucose blood (OneTouch Verio) test strip    lisinopril (ZESTRIL) 10 mg tablet    metFORMIN (GLUCOPHAGE) 500 mg tablet    omeprazole (PriLOSEC) 20 mg delayed release capsule    omeprazole (PriLOSEC) 40 MG capsule    OneTouch Delica Lancets 33G MISC    sertraline (ZOLOFT) 100 mg tablet    thiamine (VITAMIN B1) 100 mg tablet     Current Facility-Administered Medications   Medication Dose Route Frequency    " "acetaminophen (TYLENOL) tablet 650 mg  650 mg Oral Q6H PRN    ALPRAZolam (XANAX) tablet 0.5 mg  0.5 mg Oral 4x Daily PRN    atorvastatin (LIPITOR) tablet 80 mg  80 mg Oral Daily With Dinner    famotidine (PEPCID) tablet 10 mg  10 mg Oral BID    hydrALAZINE (APRESOLINE) injection 5 mg  5 mg Intravenous Q6H PRN    insulin lispro (HumALOG/ADMELOG) 100 units/mL subcutaneous injection 1-5 Units  1-5 Units Subcutaneous TID AC    insulin lispro (HumALOG/ADMELOG) 100 units/mL subcutaneous injection 1-5 Units  1-5 Units Subcutaneous HS    lisinopril (ZESTRIL) tablet 10 mg  10 mg Oral Daily    ondansetron (ZOFRAN) injection 4 mg  4 mg Intravenous Q6H PRN    pantoprazole (PROTONIX) EC tablet 40 mg  40 mg Oral Early Morning    sertraline (ZOLOFT) tablet 100 mg  100 mg Oral Daily With Dinner    sodium chloride 0.9 % infusion  150 mL/hr Intravenous Continuous    thiamine tablet 100 mg  100 mg Oral Daily       Allergies   Allergen Reactions    Amoxil [Amoxicillin] Other (See Comments)     \"mouth felt on fire\"           Objective     Blood pressure 164/72, pulse 82, temperature 97.7 °F (36.5 °C), temperature source Oral, resp. rate 17, height 5' 4\" (1.626 m), weight 72.9 kg (160 lb 12.8 oz), SpO2 95%. Body mass index is 27.6 kg/m².      Intake/Output Summary (Last 24 hours) at 3/12/2024 0830  Last data filed at 3/12/2024 0516  Gross per 24 hour   Intake 350 ml   Output 650 ml   Net -300 ml         PHYSICAL EXAM:      General Appearance:   Alert, cooperative, no distress   HEENT:   Normocephalic, atraumatic, anicteric.     Neck:  Supple, symmetrical, trachea midline   Lungs:   Clear to auscultation bilaterally; no rales, rhonchi or wheezing; respirations unlabored    Heart::   Regular rate and rhythm; no murmur, rub, or gallop.   Abdomen:   Soft, non-tender, non-distended; normal bowel sounds; no masses, no organomegaly    Genitalia:   Deferred    Rectal:   Deferred    Extremities:  No cyanosis, clubbing or edema    Pulses:  2+ and " symmetric all extremities    Skin:  No jaundice, rashes, or lesions    Lymph nodes:  No palpable cervical lymphadenopathy        Lab Results:   Admission on 03/11/2024   Component Date Value    WBC 03/11/2024 8.57     RBC 03/11/2024 3.08 (L)     Hemoglobin 03/11/2024 7.0 (L)     Hematocrit 03/11/2024 24.0 (L)     MCV 03/11/2024 78 (L)     MCH 03/11/2024 22.7 (L)     MCHC 03/11/2024 29.2 (L)     RDW 03/11/2024 15.9 (H)     MPV 03/11/2024 9.1     Platelets 03/11/2024 353     nRBC 03/11/2024 0     Neutrophils Relative 03/11/2024 65     Immat GRANS % 03/11/2024 0     Lymphocytes Relative 03/11/2024 25     Monocytes Relative 03/11/2024 7     Eosinophils Relative 03/11/2024 2     Basophils Relative 03/11/2024 1     Neutrophils Absolute 03/11/2024 5.57     Immature Grans Absolute 03/11/2024 0.02     Lymphocytes Absolute 03/11/2024 2.11     Monocytes Absolute 03/11/2024 0.63     Eosinophils Absolute 03/11/2024 0.17     Basophils Absolute 03/11/2024 0.07     Protime 03/11/2024 12.6     INR 03/11/2024 0.92     PTT 03/11/2024 34     Sodium 03/11/2024 140     Potassium 03/11/2024 4.4     Chloride 03/11/2024 107     CO2 03/11/2024 26     ANION GAP 03/11/2024 7     BUN 03/11/2024 21     Creatinine 03/11/2024 0.74     Glucose 03/11/2024 106     Calcium 03/11/2024 9.3     AST 03/11/2024 14     ALT 03/11/2024 18     Alkaline Phosphatase 03/11/2024 64     Total Protein 03/11/2024 7.0     Albumin 03/11/2024 4.4     Total Bilirubin 03/11/2024 0.31     eGFR 03/11/2024 81     ABO Grouping 03/11/2024 A     Rh Factor 03/11/2024 Positive     Antibody Screen 03/11/2024 Negative     Specimen Expiration Date 03/11/2024 [id number]     ABO Grouping 03/11/2024 A     Rh Factor 03/11/2024 Positive     Ventricular Rate 03/11/2024 71     Atrial Rate 03/11/2024 71     WA Interval 03/11/2024 128     QRSD Interval 03/11/2024 86     QT Interval 03/11/2024 370     QTC Interval 03/11/2024 402     P Axis 03/11/2024 65     QRS Axis 03/11/2024 42     T Wave  [place]sey 03/11/2024 57     Unit Product Code 03/12/2024 [id number]     Unit Number 03/12/2024 [id number]     Unit ABO 03/12/2024 A     Unit RH 03/12/2024 NEG     Crossmatch 03/12/2024 Compatible     Unit Dispense Status 03/12/2024 Presumed Trans     Unit Product Volume 03/12/2024 350     Vitamin B-12 03/11/2024 348     Folate 03/11/2024 9.9     Iron Saturation 03/11/2024 3 (L)     TIBC 03/11/2024 788 (H)     Iron 03/11/2024 25 (L)     UIBC 03/11/2024 763 (H)     Ferritin 03/11/2024 5 (L)     POC Glucose 03/11/2024 98     WBC 03/12/2024 8.32     RBC 03/12/2024 3.13 (L)     Hemoglobin 03/12/2024 7.4 (L)     Hematocrit 03/12/2024 24.6 (L)     MCV 03/12/2024 79 (L)     MCH 03/12/2024 23.6 (L)     MCHC 03/12/2024 30.1 (L)     RDW 03/12/2024 15.9 (H)     Platelets 03/12/2024 272     MPV 03/12/2024 8.9     Albumin 03/12/2024 3.6     Calcium 03/12/2024 8.3 (L)     Phosphorus 03/12/2024 3.2     Glucose 03/12/2024 130     BUN 03/12/2024 12     Creatinine 03/12/2024 0.66     Sodium 03/12/2024 141     Potassium 03/12/2024 4.0     Chloride 03/12/2024 112 (H)     CO2 03/12/2024 24     ANION GAP 03/12/2024 5     eGFR 03/12/2024 88     Magnesium 03/12/2024 1.7 (L)     POC Glucose 03/12/2024 118        Imaging Studies: I have personally reviewed pertinent imaging studies.

## 2024-03-12 NOTE — CONSULTS
Consultation -  [facility]  Yolis Flores 73 y.o. female MRN: [id number]  Unit/Bed#: [address] [place] [address] Encounter: [id number]        Inpatient consult to gastroenterology  Consult performed by: HOUSTON Kee  Consult ordered by: HOUSTON Cruz          Reason for Consult / Principal Problem:     Anemia      ASSESSMENT AND PLAN:        This is a 73-year-old female with history of CVA July 2023 on DAPT with ASA/Plavix, T2DM, HLD, GERD, tobacco use, loop recorder admitted with symptomatic anemia on outpatient labs with hemoglobin of 7 down from 12.5 at the end of July.    #1  Symptomatic iron deficiency anemia: Patient denies any melena or hematochezia, but due to iron deficiency with drop in Hgb concern for slow occult GI bleeding from PUD, AVMs, or lesion in the setting of ASA/Plavix.  Prior CT in April 2023 noted moderate wall thickening of the gastric body, never had follow-up EGD.  Denies any GI symptoms, namely no heartburn, nausea/vomiting, dysphagia, abdominal pain, changes in bowel habit. She is maintained on Prilosec for history of GERD and last EGD was 2017 at [place]. She has never had a colonoscopy.  Hemoglobin improved only to 7.4 after 1 unit of blood was transfused.  She remains hemodynamically stable.    -Hold ASA/Plavix  -Monitor serial H&H, transfuse blood products as needed to maintain hemoglobin greater than 7  -Keep n.p.o.  -Continue IV Protonix twice daily  -EGD scheduled today for further evaluation.  Prep and procedure explained  -Pending course may need inpatient versus outpatient colonoscopy if EGD unremarkable  -Recommend discussion with neurology to see if patient may be able to come off DAPT since it has been more than 3 months since her stroke.    Thank you for the consultation.  Case will be discussed with Dr. Brar.    ______________________________________________________________________    HPI:  Yolis Flores is a 73 y.o. female with  history of CVA (July 2023) on ASA/Plavix, T2DM, HLD, GERD, tobacco use, and loop recorder who presented due to anemia on outpatient labs.  She recently saw PCP due to fatigue, weakness, lightheadedness and labs revealed a hemoglobin of 7 down from 12.5 at the end of July 2023 with low microcytic indices. In the ER, she was hemodynamically stable and denied any melena or hematochezia at home.  B12 and folate were normal, however she was found to have low iron stores.     She received 1 unit of blood yesterday with improvement to only 7.4 today. Per neuro's last note in August, pt was only to continue DAPT for 3 months until November 2023.  She reports her only history of bleeding is when she had kidney stones causing hematuria in April and on chart review CAT scan showed gastric wall thickening at that time.  Denies any heartburn, dysphagia, nausea/vomiting, abdominal pain, changes in bowel habit, unintended weight loss, nosebleeds, abnormal vaginal bleeding, trauma.    Her last EGD was performed by [facility] in 2017 at [facility] showing reflux esophagitis in the GE junction and a hyperplastic polyp was removed from the cardia.  Gastric biopsy was benign and negative for H. pylori.   She has never had a colonoscopy  Denies any family history of colon cancer.      REVIEW OF SYSTEMS:    CONSTITUTIONAL: Denies any fever, chills, rigors, and weight loss.  HEENT: No earache or tinnitus. Denies hearing loss or visual disturbances.  CARDIOVASCULAR: No chest pain or palpitations.   RESPIRATORY: Denies any cough, hemoptysis, shortness of breath or dyspnea on exertion.  GASTROINTESTINAL: As noted in the History of Present Illness.   GENITOURINARY: No problems with urination. Denies any hematuria or dysuria.  NEUROLOGIC: No dizziness or vertigo, denies headaches.   MUSCULOSKELETAL: Denies any muscle or joint pain.   SKIN: Denies skin rashes or itching.   ENDOCRINE: Denies excessive thirst. Denies  "intolerance to heat or cold.  PSYCHOSOCIAL: Denies depression or anxiety. Denies any recent memory loss.       Historical Information   Past Medical History:   Diagnosis Date    Anxiety     CVA (cerebral vascular accident) (HCC)     Depression     Diabetes (HCC)     Diabetes mellitus (HCC)     GERD (gastroesophageal reflux disease)     Hypertension     Stroke (HCC)     Stroke (HCC)      Past Surgical History:   Procedure Laterality Date    APPENDECTOMY      CARDIAC ELECTROPHYSIOLOGY PROCEDURE N/A 2023    Procedure: Cardiac loop recorder implant;  Surgeon: Charla Anthony DO;  Location: [facility];  Service: Cardiology    TONSILLECTOMY       Social History   Social History     Substance and Sexual Activity   Alcohol Use Not Currently    Alcohol/week: 28.0 standard drinks of alcohol    Types: 28 Cans of beer per week    Comment: every night has \"a few\" beers     Social History     Substance and Sexual Activity   Drug Use No     Social History     Tobacco Use   Smoking Status Former    Current packs/day: 0.00    Types: Cigarettes    Quit date: 2023    Years since quittin.6   Smokeless Tobacco Former     History reviewed. No pertinent family history.    Meds/Allergies     Medications Prior to Admission   Medication    Alcohol Swabs 70 % PADS    ALPRAZolam (XANAX) 0.5 mg tablet    aspirin 81 mg chewable tablet    atorvastatin (LIPITOR) 80 mg tablet    Blood Glucose Monitoring Suppl (OneTouch Verio Reflect) w/Device KIT    clopidogrel (PLAVIX) 75 mg tablet    famotidine (PEPCID) 20 mg tablet    glucose blood (OneTouch Verio) test strip    lisinopril (ZESTRIL) 10 mg tablet    metFORMIN (GLUCOPHAGE) 500 mg tablet    omeprazole (PriLOSEC) 20 mg delayed release capsule    omeprazole (PriLOSEC) 40 MG capsule    OneTouch Delica Lancets 33G MISC    sertraline (ZOLOFT) 100 mg tablet    thiamine (VITAMIN B1) 100 mg tablet     Current Facility-Administered Medications   Medication Dose Route Frequency    " "acetaminophen (TYLENOL) tablet 650 mg  650 mg Oral Q6H PRN    ALPRAZolam (XANAX) tablet 0.5 mg  0.5 mg Oral 4x Daily PRN    atorvastatin (LIPITOR) tablet 80 mg  80 mg Oral Daily With Dinner    famotidine (PEPCID) tablet 10 mg  10 mg Oral BID    hydrALAZINE (APRESOLINE) injection 5 mg  5 mg Intravenous Q6H PRN    insulin lispro (HumALOG/ADMELOG) 100 units/mL subcutaneous injection 1-5 Units  1-5 Units Subcutaneous TID AC    insulin lispro (HumALOG/ADMELOG) 100 units/mL subcutaneous injection 1-5 Units  1-5 Units Subcutaneous HS    lisinopril (ZESTRIL) tablet 10 mg  10 mg Oral Daily    ondansetron (ZOFRAN) injection 4 mg  4 mg Intravenous Q6H PRN    pantoprazole (PROTONIX) EC tablet 40 mg  40 mg Oral Early Morning    sertraline (ZOLOFT) tablet 100 mg  100 mg Oral Daily With Dinner    sodium chloride 0.9 % infusion  150 mL/hr Intravenous Continuous    thiamine tablet 100 mg  100 mg Oral Daily       Allergies   Allergen Reactions    Amoxil [Amoxicillin] Other (See Comments)     \"mouth felt on fire\"           Objective     Blood pressure 164/72, pulse 82, temperature 97.7 °F (36.5 °C), temperature source Oral, resp. rate 17, height 5' 4\" (1.626 m), weight 72.9 kg (160 lb 12.8 oz), SpO2 95%. Body mass index is 27.6 kg/m².      Intake/Output Summary (Last 24 hours) at 3/12/2024 0830  Last data filed at 3/12/2024 0516  Gross per 24 hour   Intake 350 ml   Output 650 ml   Net -300 ml         PHYSICAL EXAM:      General Appearance:   Alert, cooperative, no distress   HEENT:   Normocephalic, atraumatic, anicteric.     Neck:  Supple, symmetrical, trachea midline   Lungs:   Clear to auscultation bilaterally; no rales, rhonchi or wheezing; respirations unlabored    Heart::   Regular rate and rhythm; no murmur, rub, or gallop.   Abdomen:   Soft, non-tender, non-distended; normal bowel sounds; no masses, no organomegaly    Genitalia:   Deferred    Rectal:   Deferred    Extremities:  No cyanosis, clubbing or edema    Pulses:  2+ and " symmetric all extremities    Skin:  No jaundice, rashes, or lesions    Lymph nodes:  No palpable cervical lymphadenopathy        Lab Results:   Admission on 03/11/2024   Component Date Value    WBC 03/11/2024 8.57     RBC 03/11/2024 3.08 (L)     Hemoglobin 03/11/2024 7.0 (L)     Hematocrit 03/11/2024 24.0 (L)     MCV 03/11/2024 78 (L)     MCH 03/11/2024 22.7 (L)     MCHC 03/11/2024 29.2 (L)     RDW 03/11/2024 15.9 (H)     MPV 03/11/2024 9.1     Platelets 03/11/2024 353     nRBC 03/11/2024 0     Neutrophils Relative 03/11/2024 65     Immat GRANS % 03/11/2024 0     Lymphocytes Relative 03/11/2024 25     Monocytes Relative 03/11/2024 7     Eosinophils Relative 03/11/2024 2     Basophils Relative 03/11/2024 1     Neutrophils Absolute 03/11/2024 5.57     Immature Grans Absolute 03/11/2024 0.02     Lymphocytes Absolute 03/11/2024 2.11     Monocytes Absolute 03/11/2024 0.63     Eosinophils Absolute 03/11/2024 0.17     Basophils Absolute 03/11/2024 0.07     Protime 03/11/2024 12.6     INR 03/11/2024 0.92     PTT 03/11/2024 34     Sodium 03/11/2024 140     Potassium 03/11/2024 4.4     Chloride 03/11/2024 107     CO2 03/11/2024 26     ANION GAP 03/11/2024 7     BUN 03/11/2024 21     Creatinine 03/11/2024 0.74     Glucose 03/11/2024 106     Calcium 03/11/2024 9.3     AST 03/11/2024 14     ALT 03/11/2024 18     Alkaline Phosphatase 03/11/2024 64     Total Protein 03/11/2024 7.0     Albumin 03/11/2024 4.4     Total Bilirubin 03/11/2024 0.31     eGFR 03/11/2024 81     ABO Grouping 03/11/2024 A     Rh Factor 03/11/2024 Positive     Antibody Screen 03/11/2024 Negative     Specimen Expiration Date 03/11/2024 [id number]     ABO Grouping 03/11/2024 A     Rh Factor 03/11/2024 Positive     Ventricular Rate 03/11/2024 71     Atrial Rate 03/11/2024 71     RI Interval 03/11/2024 128     QRSD Interval 03/11/2024 86     QT Interval 03/11/2024 370     QTC Interval 03/11/2024 402     P Axis 03/11/2024 65     QRS Axis 03/11/2024 42     T Wave  [place] 03/11/2024 57     Unit Product Code 03/12/2024 [id number]     Unit Number 03/12/2024 [id number]     Unit ABO 03/12/2024 A     Unit RH 03/12/2024 NEG     Crossmatch 03/12/2024 Compatible     Unit Dispense Status 03/12/2024 Presumed Trans     Unit Product Volume 03/12/2024 350     Vitamin B-12 03/11/2024 348     Folate 03/11/2024 9.9     Iron Saturation 03/11/2024 3 (L)     TIBC 03/11/2024 788 (H)     Iron 03/11/2024 25 (L)     UIBC 03/11/2024 763 (H)     Ferritin 03/11/2024 5 (L)     POC Glucose 03/11/2024 98     WBC 03/12/2024 8.32     RBC 03/12/2024 3.13 (L)     Hemoglobin 03/12/2024 7.4 (L)     Hematocrit 03/12/2024 24.6 (L)     MCV 03/12/2024 79 (L)     MCH 03/12/2024 23.6 (L)     MCHC 03/12/2024 30.1 (L)     RDW 03/12/2024 15.9 (H)     Platelets 03/12/2024 272     MPV 03/12/2024 8.9     Albumin 03/12/2024 3.6     Calcium 03/12/2024 8.3 (L)     Phosphorus 03/12/2024 3.2     Glucose 03/12/2024 130     BUN 03/12/2024 12     Creatinine 03/12/2024 0.66     Sodium 03/12/2024 141     Potassium 03/12/2024 4.0     Chloride 03/12/2024 112 (H)     CO2 03/12/2024 24     ANION GAP 03/12/2024 5     eGFR 03/12/2024 88     Magnesium 03/12/2024 1.7 (L)     POC Glucose 03/12/2024 118        Imaging Studies: I have personally reviewed pertinent imaging studies.

## 2024-03-13 ENCOUNTER — APPOINTMENT (OUTPATIENT)
Dept: PERIOP | Facility: HOSPITAL | Age: 74
DRG: 812 | End: 2024-03-13
Payer: COMMERCIAL

## 2024-03-13 ENCOUNTER — APPOINTMENT (OUTPATIENT)
Facility: CLINIC | Age: 74
End: 2024-03-13
Payer: COMMERCIAL

## 2024-03-13 ENCOUNTER — ANESTHESIA EVENT (INPATIENT)
Dept: PERIOP | Facility: HOSPITAL | Age: 74
DRG: 812 | End: 2024-03-13
Payer: COMMERCIAL

## 2024-03-13 ENCOUNTER — PREP FOR PROCEDURE (OUTPATIENT)
Dept: GASTROENTEROLOGY | Facility: CLINIC | Age: 74
End: 2024-03-13

## 2024-03-13 ENCOUNTER — ANESTHESIA (INPATIENT)
Dept: PERIOP | Facility: HOSPITAL | Age: 74
DRG: 812 | End: 2024-03-13
Payer: COMMERCIAL

## 2024-03-13 DIAGNOSIS — D50.9 IRON DEFICIENCY ANEMIA, UNSPECIFIED IRON DEFICIENCY ANEMIA TYPE: ICD-10-CM

## 2024-03-13 DIAGNOSIS — D64.9 ANEMIA, UNSPECIFIED TYPE: Primary | ICD-10-CM

## 2024-03-13 LAB
ALBUMIN SERPL BCP-MCNC: 3.8 G/DL (ref 3.5–5)
ANION GAP SERPL CALCULATED.3IONS-SCNC: 7 MMOL/L (ref 4–13)
BUN SERPL-MCNC: 6 MG/DL (ref 5–25)
CALCIUM SERPL-MCNC: 9.2 MG/DL (ref 8.4–10.2)
CHLORIDE SERPL-SCNC: 108 MMOL/L (ref 96–108)
CO2 SERPL-SCNC: 24 MMOL/L (ref 21–32)
CREAT SERPL-MCNC: 0.56 MG/DL (ref 0.6–1.3)
ERYTHROCYTE [DISTWIDTH] IN BLOOD BY AUTOMATED COUNT: 16.3 % (ref 11.6–15.1)
GFR SERPL CREATININE-BSD FRML MDRD: 93 ML/MIN/1.73SQ M
GLUCOSE SERPL-MCNC: 100 MG/DL (ref 65–140)
GLUCOSE SERPL-MCNC: 110 MG/DL (ref 65–140)
GLUCOSE SERPL-MCNC: 111 MG/DL (ref 65–140)
GLUCOSE SERPL-MCNC: 113 MG/DL (ref 65–140)
GLUCOSE SERPL-MCNC: 134 MG/DL (ref 65–140)
HCT VFR BLD AUTO: 25.2 % (ref 34.8–46.1)
HGB BLD-MCNC: 7.7 G/DL (ref 11.5–15.4)
MAGNESIUM SERPL-MCNC: 2 MG/DL (ref 1.9–2.7)
MCH RBC QN AUTO: 23.8 PG (ref 26.8–34.3)
MCHC RBC AUTO-ENTMCNC: 30.6 G/DL (ref 31.4–37.4)
MCV RBC AUTO: 78 FL (ref 82–98)
PHOSPHATE SERPL-MCNC: 3 MG/DL (ref 2.3–4.1)
PLATELET # BLD AUTO: 321 THOUSANDS/UL (ref 149–390)
PMV BLD AUTO: 9.6 FL (ref 8.9–12.7)
POTASSIUM SERPL-SCNC: 3.7 MMOL/L (ref 3.5–5.3)
RBC # BLD AUTO: 3.24 MILLION/UL (ref 3.81–5.12)
SODIUM SERPL-SCNC: 139 MMOL/L (ref 135–147)
WBC # BLD AUTO: 10.05 THOUSAND/UL (ref 4.31–10.16)

## 2024-03-13 PROCEDURE — 88305 TISSUE EXAM BY PATHOLOGIST: CPT | Performed by: PATHOLOGY

## 2024-03-13 PROCEDURE — C9113 INJ PANTOPRAZOLE SODIUM, VIA: HCPCS | Performed by: INTERNAL MEDICINE

## 2024-03-13 PROCEDURE — 83735 ASSAY OF MAGNESIUM: CPT | Performed by: INTERNAL MEDICINE

## 2024-03-13 PROCEDURE — 0DBK8ZZ EXCISION OF ASCENDING COLON, VIA NATURAL OR ARTIFICIAL OPENING ENDOSCOPIC: ICD-10-PCS | Performed by: INTERNAL MEDICINE

## 2024-03-13 PROCEDURE — 99232 SBSQ HOSP IP/OBS MODERATE 35: CPT | Performed by: INTERNAL MEDICINE

## 2024-03-13 PROCEDURE — 0DJ08ZZ INSPECTION OF UPPER INTESTINAL TRACT, VIA NATURAL OR ARTIFICIAL OPENING ENDOSCOPIC: ICD-10-PCS | Performed by: INTERNAL MEDICINE

## 2024-03-13 PROCEDURE — 80069 RENAL FUNCTION PANEL: CPT | Performed by: INTERNAL MEDICINE

## 2024-03-13 PROCEDURE — 82948 REAGENT STRIP/BLOOD GLUCOSE: CPT

## 2024-03-13 PROCEDURE — 85027 COMPLETE CBC AUTOMATED: CPT | Performed by: INTERNAL MEDICINE

## 2024-03-13 RX ORDER — SODIUM CHLORIDE, SODIUM LACTATE, POTASSIUM CHLORIDE, CALCIUM CHLORIDE 600; 310; 30; 20 MG/100ML; MG/100ML; MG/100ML; MG/100ML
100 INJECTION, SOLUTION INTRAVENOUS CONTINUOUS
Status: CANCELLED | OUTPATIENT
Start: 2024-03-13

## 2024-03-13 RX ORDER — LIDOCAINE HYDROCHLORIDE 10 MG/ML
INJECTION, SOLUTION EPIDURAL; INFILTRATION; INTRACAUDAL; PERINEURAL AS NEEDED
Status: DISCONTINUED | OUTPATIENT
Start: 2024-03-13 | End: 2024-03-13

## 2024-03-13 RX ORDER — PROPOFOL 10 MG/ML
INJECTION, EMULSION INTRAVENOUS AS NEEDED
Status: DISCONTINUED | OUTPATIENT
Start: 2024-03-13 | End: 2024-03-13

## 2024-03-13 RX ORDER — SODIUM CHLORIDE, SODIUM LACTATE, POTASSIUM CHLORIDE, CALCIUM CHLORIDE 600; 310; 30; 20 MG/100ML; MG/100ML; MG/100ML; MG/100ML
INJECTION, SOLUTION INTRAVENOUS CONTINUOUS PRN
Status: DISCONTINUED | OUTPATIENT
Start: 2024-03-13 | End: 2024-03-13

## 2024-03-13 RX ORDER — PROPOFOL 10 MG/ML
INJECTION, EMULSION INTRAVENOUS CONTINUOUS PRN
Status: DISCONTINUED | OUTPATIENT
Start: 2024-03-13 | End: 2024-03-13

## 2024-03-13 RX ORDER — PANTOPRAZOLE SODIUM 40 MG/1
40 TABLET, DELAYED RELEASE ORAL
Status: DISCONTINUED | OUTPATIENT
Start: 2024-03-14 | End: 2024-03-14 | Stop reason: HOSPADM

## 2024-03-13 RX ADMIN — SODIUM CHLORIDE 100 ML/HR: 0.9 INJECTION, SOLUTION INTRAVENOUS at 03:03

## 2024-03-13 RX ADMIN — PROPOFOL 50 MG: 10 INJECTION, EMULSION INTRAVENOUS at 14:31

## 2024-03-13 RX ADMIN — PROPOFOL 130 MCG/KG/MIN: 10 INJECTION, EMULSION INTRAVENOUS at 14:24

## 2024-03-13 RX ADMIN — PANTOPRAZOLE SODIUM 40 MG: 40 INJECTION, POWDER, FOR SOLUTION INTRAVENOUS at 08:39

## 2024-03-13 RX ADMIN — SERTRALINE 100 MG: 100 TABLET, FILM COATED ORAL at 16:36

## 2024-03-13 RX ADMIN — SODIUM CHLORIDE, SODIUM LACTATE, POTASSIUM CHLORIDE, AND CALCIUM CHLORIDE: .6; .31; .03; .02 INJECTION, SOLUTION INTRAVENOUS at 14:18

## 2024-03-13 RX ADMIN — THIAMINE HCL TAB 100 MG 100 MG: 100 TAB at 08:39

## 2024-03-13 RX ADMIN — LIDOCAINE HYDROCHLORIDE 30 MG: 10 INJECTION, SOLUTION EPIDURAL; INFILTRATION; INTRACAUDAL at 14:24

## 2024-03-13 RX ADMIN — ATORVASTATIN CALCIUM 80 MG: 80 TABLET, FILM COATED ORAL at 16:36

## 2024-03-13 RX ADMIN — ALPRAZOLAM 0.5 MG: 0.5 TABLET ORAL at 19:10

## 2024-03-13 RX ADMIN — LISINOPRIL 10 MG: 10 TABLET ORAL at 08:39

## 2024-03-13 RX ADMIN — PROPOFOL 150 MG: 10 INJECTION, EMULSION INTRAVENOUS at 14:24

## 2024-03-13 NOTE — ANESTHESIA POSTPROCEDURE EVALUATION
Post-Op Assessment Note    CV Status:  Stable  Pain Score: 0    Pain management: adequate       Mental Status:  Sleepy   Hydration Status:  Euvolemic   PONV Controlled:  Controlled   Airway Patency:  Patent     Post Op Vitals Reviewed: Yes    No anethesia notable event occurred.    Staff: CRNA               BP   143/66   Temp      Pulse  70   Resp 14   SpO2 99

## 2024-03-13 NOTE — ANESTHESIA PREPROCEDURE EVALUATION
Procedure:  COLONOSCOPY    Relevant Problems   CARDIO   (+) Essential hypertension   (+) Occlusion and stenosis of left middle cerebral artery      ENDO   (+) Type 2 diabetes mellitus, without long-term current use of insulin (HCC)      GI/HEPATIC   (+) GERD (gastroesophageal reflux disease)      HEMATOLOGY   (+) Anemia      NEURO/PSYCH   (+) Acute ischemic stroke (HCC)   (+) Anxiety and depression   (+) CVA (cerebral vascular accident) (HCC)      PULMONARY   (+) Smoker    On plavix  GI bleed    Transfused yesterday 1u PRBC   Latest Reference Range & Units 03/13/24 04:45   Hemoglobin 11.5 - 15.4 g/dL 7.7 (L)   (L): Data is abnormally low     Physical Exam    Airway    Mallampati score: II  TM Distance: >3 FB  Neck ROM: full     Dental   Comment: Denies loose teeth     Cardiovascular  Cardiovascular exam normal    Pulmonary  Pulmonary exam normal     Other Findings  Portions of exam deferred due to low yield and/or unknown COVID statuspost-pubertal.      Anesthesia Plan  ASA Score- 3     Anesthesia Type- IV sedation with anesthesia with ASA Monitors.         Additional Monitors:     Airway Plan:            Plan Factors-Exercise tolerance (METS): >4 METS.    Chart reviewed.   Existing labs reviewed. Patient summary reviewed.    Patient is not a current smoker.              Induction- intravenous.    Postoperative Plan-     Informed Consent- Anesthetic plan and risks discussed with patient.  I personally reviewed this patient with the CRNA. Discussed and agreed on the Anesthesia Plan with the CRNA..

## 2024-03-13 NOTE — PLAN OF CARE
Problem: PAIN - ADULT  Goal: Verbalizes/displays adequate comfort level or baseline comfort level  Description: Interventions:  - Encourage patient to monitor pain and request assistance  - Assess pain using appropriate pain scale  - Administer analgesics based on type and severity of pain and evaluate response  - Implement non-pharmacological measures as appropriate and evaluate response  - Consider cultural and social influences on pain and pain management  - Notify physician/advanced practitioner if interventions unsuccessful or patient reports new pain  Outcome: Progressing     Problem: SAFETY ADULT  Goal: Patient will remain free of falls  Description: INTERVENTIONS:  - Educate patient/family on patient safety including physical limitations  - Instruct patient to call for assistance with activity   - Consult OT/PT to assist with strengthening/mobility   - Keep Call bell within reach  - Keep bed low and locked with side rails adjusted as appropriate  - Keep care items and personal belongings within reach  - Initiate and maintain comfort rounds  - Make Fall Risk Sign visible to staff  - Offer Toileting every 2 Hours, in advance of need  - Initiate/Maintain alarms  - Obtain necessary fall risk management equipment: yes  - Apply yellow socks and bracelet for high fall risk patients  - Consider moving patient to room near nurses station  Outcome: Progressing  Goal: Maintain or return to baseline ADL function  Description: INTERVENTIONS:  -  Assess patient's ability to carry out ADLs; assess patient's baseline for ADL function and identify physical deficits which impact ability to perform ADLs (bathing, care of mouth/teeth, toileting, grooming, dressing, etc.)  - Assess/evaluate cause of self-care deficits   - Assess range of motion  - Assess patient's mobility; develop plan if impaired  - Assess patient's need for assistive devices and provide as appropriate  - Encourage maximum independence but intervene and  supervise when necessary  - Involve family in performance of ADLs  - Assess for home care needs following discharge   - Consider OT consult to assist with ADL evaluation and planning for discharge  - Provide patient education as appropriate  Outcome: Progressing  Goal: Maintains/Returns to pre admission functional level  Description: INTERVENTIONS:  - Perform AM-PAC 6 Click Basic Mobility/ Daily Activity assessment daily.  - Set and communicate daily mobility goal to care team and patient/family/caregiver.   - Collaborate with rehabilitation services on mobility goals if consulted  - Out of bed for toileting  - Record patient progress and toleration of activity level   Outcome: Progressing     Problem: DISCHARGE PLANNING  Goal: Discharge to home or other facility with appropriate resources  Description: INTERVENTIONS:  - Identify barriers to discharge w/patient and caregiver  - Arrange for needed discharge resources and transportation as appropriate  - Identify discharge learning needs (meds, wound care, etc.)  - Arrange for interpretive services to assist at discharge as needed  - Refer to Case Management Department for coordinating discharge planning if the patient needs post-hospital services based on physician/advanced practitioner order or complex needs related to functional status, cognitive ability, or social support system  Outcome: Progressing     Problem: Knowledge Deficit  Goal: Patient/family/caregiver demonstrates understanding of disease process, treatment plan, medications, and discharge instructions  Description: Complete learning assessment and assess knowledge base.  Interventions:  - Provide teaching at level of understanding  - Provide teaching via preferred learning methods  Outcome: Progressing     Problem: HEMATOLOGIC - ADULT  Goal: Maintains hematologic stability  Description: INTERVENTIONS  - Assess for signs and symptoms of bleeding or hemorrhage  - Monitor labs  - Administer supportive  blood products/factors as ordered and appropriate  Outcome: Progressing

## 2024-03-13 NOTE — PLAN OF CARE
Problem: PAIN - ADULT  Goal: Verbalizes/displays adequate comfort level or baseline comfort level  Description: Interventions:  - Encourage patient to monitor pain and request assistance  - Assess pain using appropriate pain scale  - Administer analgesics based on type and severity of pain and evaluate response  - Implement non-pharmacological measures as appropriate and evaluate response  - Consider cultural and social influences on pain and pain management  - Notify physician/advanced practitioner if interventions unsuccessful or patient reports new pain  Outcome: Progressing     Problem: SAFETY ADULT  Goal: Patient will remain free of falls  Description: INTERVENTIONS:  - Educate patient/family on patient safety including physical limitations  - Instruct patient to call for assistance with activity   - Consult OT/PT to assist with strengthening/mobility   - Keep Call bell within reach  - Keep bed low and locked with side rails adjusted as appropriate  - Keep care items and personal belongings within reach  - Initiate and maintain comfort rounds  - Make Fall Risk Sign visible to staff  - Offer Toileting every 2 Hours, in advance of need  - Initiate/Maintain alarms  - Obtain necessary fall risk management equipment: yes  - Apply yellow socks and bracelet for high fall risk patients  - Consider moving patient to room near nurses station  Outcome: Progressing  Goal: Maintain or return to baseline ADL function  Description: INTERVENTIONS:  -  Assess patient's ability to carry out ADLs; assess patient's baseline for ADL function and identify physical deficits which impact ability to perform ADLs (bathing, care of mouth/teeth, toileting, grooming, dressing, etc.)  - Assess/evaluate cause of self-care deficits   - Assess range of motion  - Assess patient's mobility; develop plan if impaired  - Assess patient's need for assistive devices and provide as appropriate  - Encourage maximum independence but intervene and  supervise when necessary  - Involve family in performance of ADLs  - Assess for home care needs following discharge   - Consider OT consult to assist with ADL evaluation and planning for discharge  - Provide patient education as appropriate  Outcome: Progressing  Goal: Maintains/Returns to pre admission functional level  Description: INTERVENTIONS:  - Perform AM-PAC 6 Click Basic Mobility/ Daily Activity assessment daily.  - Set and communicate daily mobility goal to care team and patient/family/caregiver.   - Collaborate with rehabilitation services on mobility goals if consulted  - Perform Range of Motion 3 times a day.  - Reposition patient every 3 hours.  - Record patient progress and toleration of activity level   Outcome: Progressing     Problem: DISCHARGE PLANNING  Goal: Discharge to home or other facility with appropriate resources  Description: INTERVENTIONS:  - Identify barriers to discharge w/patient and caregiver  - Arrange for needed discharge resources and transportation as appropriate  - Identify discharge learning needs (meds, wound care, etc.)  - Arrange for interpretive services to assist at discharge as needed  - Refer to Case Management Department for coordinating discharge planning if the patient needs post-hospital services based on physician/advanced practitioner order or complex needs related to functional status, cognitive ability, or social support system  Outcome: Progressing     Problem: Knowledge Deficit  Goal: Patient/family/caregiver demonstrates understanding of disease process, treatment plan, medications, and discharge instructions  Description: Complete learning assessment and assess knowledge base.  Interventions:  - Provide teaching at level of understanding  - Provide teaching via preferred learning methods  Outcome: Progressing     Problem: HEMATOLOGIC - ADULT  Goal: Maintains hematologic stability  Description: INTERVENTIONS  - Assess for signs and symptoms of bleeding or  hemorrhage  - Monitor labs  - Administer supportive blood products/factors as ordered and appropriate  Outcome: Progressing

## 2024-03-13 NOTE — ASSESSMENT & PLAN NOTE
Lab Results   Component Value Date    HGBA1C 6.5 (H) 07/28/2023       Recent Labs     03/12/24  1629 03/12/24  2102 03/13/24  0713 03/13/24  1138   POCGLU 90 103 110 113         Blood Sugar Average: Last 72 hrs:    (P) [id number]  Hold metformin during hospitalization  SSI with accu checks  ADA diet  Hypoglycemia protocol

## 2024-03-13 NOTE — PROGRESS NOTES
Counts include 234 beds at the [facility]  Progress Note  Name: Yolis Flores I  MRN: [id number]  Unit/Bed#: 4 [address] Date of Admission: 3/11/2024   Date of Service: 3/13/2024 I Hospital Day: 2    Assessment/Plan   * Anemia  Assessment & Plan  Patient was referred to the ED by PCP for abnormal lab. Patient stated that the outpatient Hemoglobin was 7. ED lab work revealed hemoglobin of 7. Patient reports weakness, fatigue and lightheadedness which has been ongoing for a few days which necessitated her going to her PCP. Denies any bowel changes and DARLING done in ED was negative for blood.  Baseline hemoglobin in 2023 noted to be 12.3 - 13.4  ED transfused 1 unit PRBC  3/12: EGD demonstrated normal esophagus.  1 cm more than few mucus in the gastric cardia noted just below the GE junction. Could not biopsy due to aspirin & Plavix  3/13 plan for colonoscopy  Iron panel consistent with iron deficiency, consider iron tablets at discharge  ASA and plavix on hold  Spoke to Neurology- okay to continue Plavix 75mg daily and stop ASA  Per GI, may resume Plavix 3/16/24  Continue Protonix 40 mg daily  Hemoglobin 7.7 today  GI consulted, recommendations are appreciated    Type 2 diabetes mellitus, without long-term current use of insulin (HCC)  Assessment & Plan  Lab Results   Component Value Date    HGBA1C 6.5 (H) 07/28/2023       Recent Labs     03/12/24  1629 03/12/24  2102 03/13/24  0713 03/13/24  1138   POCGLU 90 103 110 113         Blood Sugar Average: Last 72 hrs:    (P) [id number]  Hold metformin during hospitalization  SSI with accu checks  ADA diet  Hypoglycemia protocol    Smoker  Assessment & Plan  Nicotine patch  Smoking cessation    GERD (gastroesophageal reflux disease)  Assessment & Plan  Continue Protonix    Essential hypertension  Assessment & Plan  Continue PTA lisinopril  Added hydralazine for SBP > 160    Anxiety and depression  Assessment & Plan  Continue Zoloft, Xanax as needed    CVA (cerebral  vascular accident) (HCC)  Assessment & Plan  H/o CVA in 2023  Holding ASA and Plavix   Outpatient follow up with Neurology             VTE Pharmacologic Prophylaxis:   Moderate Risk (Score 3-4) - Pharmacological DVT Prophylaxis Contraindicated. Sequential Compression Devices Ordered.    Mobility:   Basic Mobility Inpatient Raw Score: 24  JH-HLM Goal: 8: Walk 250 feet or more  JH-HLM Achieved: 7: Walk 25 feet or more  HLM Goal achieved. Continue to encourage appropriate mobility.    Patient Centered Rounds: I performed bedside rounds with nursing staff today.   Discussions with Specialists or Other Care Team Provider: Nursing, GI, Neurology    Education and Discussions with Family / Patient: Updated  () via phone.    Total Time Spent on Date of Encounter in care of patient: 45 mins. This time was spent on one or more of the following: performing physical exam; counseling and coordination of care; obtaining or reviewing history; documenting in the medical record; reviewing/ordering tests, medications or procedures; communicating with other healthcare professionals and discussing with patient's family/caregivers.    Current Length of Stay: 2 day(s)  Current Patient Status: Inpatient   Certification Statement: The patient will continue to require additional inpatient hospital stay due to Anemia, colonoscopy   Discharge Plan: Anticipate discharge in 24-48 hrs to home.    Code Status: Level 1 - Full Code    Subjective:   Patient resting comfortably in bed. Denies any complaints at this time.     Objective:     Vitals:   Temp (24hrs), Av.7 °F (36.5 °C), Min:97.2 °F (36.2 °C), Max:98 °F (36.7 °C)    Temp:  [97.2 °F (36.2 °C)-98 °F (36.7 °C)] 98 °F (36.7 °C)  HR:  [68-86] 73  Resp:  [16-18] 18  BP: (143-187)/(66-86) 153/67  SpO2:  [94 %-100 %] 98 %  Body mass index is 27.6 kg/m².     Input and Output Summary (last 24 hours):     Intake/Output Summary (Last 24 hours) at 3/13/2024 1600  Last data  filed at 3/13/2024 1517  Gross per 24 hour   Intake 750 ml   Output --   Net 750 ml         Physical Exam:   Physical Exam  Vitals and nursing note reviewed.   Constitutional:       General: She is not in acute distress.     Appearance: She is well-developed.   HENT:      Head: Normocephalic and atraumatic.   Eyes:      Conjunctiva/sclera: Conjunctivae normal.   Cardiovascular:      Rate and Rhythm: Normal rate and regular rhythm.      Heart sounds: No murmur heard.  Pulmonary:      Effort: Pulmonary effort is normal. No respiratory distress.      Breath sounds: Normal breath sounds.   Abdominal:      Palpations: Abdomen is soft.      Tenderness: There is no abdominal tenderness.   Musculoskeletal:         General: No swelling.      Cervical back: Neck supple.   Skin:     General: Skin is warm and dry.      Capillary Refill: Capillary refill takes less than 2 seconds.   Neurological:      Mental Status: She is alert.   Psychiatric:         Mood and Affect: Mood normal.          Additional Data:     Labs:  Results from last 7 days   Lab Units 03/13/24 0445 03/12/24  0516 03/11/24  1146   WBC Thousand/uL 10.05   < > 8.57   HEMOGLOBIN g/dL 7.7*   < > 7.0*   HEMATOCRIT % 25.2*   < > 24.0*   PLATELETS Thousands/uL 321   < > 353   NEUTROS PCT %  --   --  65   LYMPHS PCT %  --   --  25   MONOS PCT %  --   --  7   EOS PCT %  --   --  2    < > = values in this interval not displayed.     Results from last 7 days   Lab Units 03/13/24 0445 03/12/24  0516 03/11/24  1146   SODIUM mmol/L 139   < > 140   POTASSIUM mmol/L 3.7   < > 4.4   CHLORIDE mmol/L 108   < > 107   CO2 mmol/L 24   < > 26   BUN mg/dL 6   < > 21   CREATININE mg/dL 0.56*   < > 0.74   ANION GAP mmol/L 7   < > 7   CALCIUM mg/dL 9.2   < > 9.3   ALBUMIN g/dL 3.8   < > 4.4   TOTAL BILIRUBIN mg/dL  --   --  0.31   ALK PHOS U/L  --   --  64   ALT U/L  --   --  18   AST U/L  --   --  14   GLUCOSE RANDOM mg/dL 134   < > 106    < > = values in this interval not displayed.      Results from last 7 days   Lab Units 03/11/24  1146   INR  0.92     Results from last 7 days   Lab Units 03/13/24  1138 03/13/24  0713 03/12/24  2102 03/12/24  1629 03/12/24  1420 03/12/24  0712 03/11/24  1959   POC GLUCOSE mg/dl 113 110 103 90 90 118 98               Lines/Drains:  Invasive Devices       Peripheral Intravenous Line  Duration             Peripheral IV 03/11/24 Right;Ventral (anterior) Forearm 1 day                          Imaging: No pertinent imaging reviewed.    Recent Cultures (last 7 days):         Last 24 Hours Medication List:   Current Facility-Administered Medications   Medication Dose Route Frequency Provider Last Rate    acetaminophen  650 mg Oral Q6H PRN Tianna Ramos MD      ALPRAZolam  0.5 mg Oral 4x Daily PRN Tianna Ramos MD      atorvastatin  80 mg Oral Daily With Dinner Tianna Ramos MD      hydrALAZINE  5 mg Intravenous Q6H PRN Tianna Ramos MD      insulin lispro  1-5 Units Subcutaneous TID AC Tianna Ramos MD      insulin lispro  1-5 Units Subcutaneous HS Tianna Ramos MD      lisinopril  10 mg Oral Daily Tianna Ramos MD      ondansetron  4 mg Intravenous Q6H PRN Tianna Ramos MD      [START ON 3/14/2024] pantoprazole  40 mg Oral Early Morning [place] Kee      sertraline  100 mg Oral Daily With Dinner Tianna Ramos MD      thiamine  100 mg Oral Daily Tianna Ramos MD          Today, Patient Was Seen By: Melissa Andersen PA-C    **Please Note: This note may have been constructed using a voice recognition system.**

## 2024-03-13 NOTE — INTERVAL H&P NOTE
H&P reviewed. After examining the patient I find no changes in the patients condition since the H&P had been written.    Vitals:    03/13/24 1258   BP: (!) 187/86   Pulse: 86   Resp: 16   Temp: 98 °F (36.7 °C)   SpO2: 96%

## 2024-03-13 NOTE — ASSESSMENT & PLAN NOTE
Patient was referred to the ED by PCP for abnormal lab. Patient stated that the outpatient Hemoglobin was 7. ED lab work revealed hemoglobin of 7. Patient reports weakness, fatigue and lightheadedness which has been ongoing for a few days which necessitated her going to her PCP. Denies any bowel changes and DARLING done in ED was negative for blood.  Baseline hemoglobin in 2023 noted to be 12.3 - 13.4  ED transfused 1 unit PRBC  3/12: EGD demonstrated normal esophagus.  1 cm more than few mucus in the gastric cardia noted just below the GE junction. Could not biopsy due to aspirin & Plavix  3/13 plan for colonoscopy  Iron panel consistent with iron deficiency, consider iron tablets at discharge  ASA and plavix on hold  Spoke to Neurology- okay to continue Plavix 75mg daily and stop ASA  Per GI, may resume Plavix 3/16/24  Continue Protonix 40 mg daily  Hemoglobin 7.7 today  GI consulted, recommendations are appreciated

## 2024-03-14 VITALS
HEART RATE: 84 BPM | WEIGHT: 160.8 LBS | BODY MASS INDEX: 27.45 KG/M2 | RESPIRATION RATE: 18 BRPM | TEMPERATURE: 98.2 F | SYSTOLIC BLOOD PRESSURE: 161 MMHG | HEIGHT: 64 IN | OXYGEN SATURATION: 93 % | DIASTOLIC BLOOD PRESSURE: 81 MMHG

## 2024-03-14 LAB
ERYTHROCYTE [DISTWIDTH] IN BLOOD BY AUTOMATED COUNT: 17.1 % (ref 11.6–15.1)
GLUCOSE SERPL-MCNC: 107 MG/DL (ref 65–140)
GLUCOSE SERPL-MCNC: 113 MG/DL (ref 65–140)
HCT VFR BLD AUTO: 24.8 % (ref 34.8–46.1)
HGB BLD-MCNC: 7.7 G/DL (ref 11.5–15.4)
MCH RBC QN AUTO: 24.3 PG (ref 26.8–34.3)
MCHC RBC AUTO-ENTMCNC: 31 G/DL (ref 31.4–37.4)
MCV RBC AUTO: 78 FL (ref 82–98)
PLATELET # BLD AUTO: 268 THOUSANDS/UL (ref 149–390)
PMV BLD AUTO: 9.1 FL (ref 8.9–12.7)
RBC # BLD AUTO: 3.17 MILLION/UL (ref 3.81–5.12)
WBC # BLD AUTO: 10.45 THOUSAND/UL (ref 4.31–10.16)

## 2024-03-14 PROCEDURE — 85027 COMPLETE CBC AUTOMATED: CPT | Performed by: INTERNAL MEDICINE

## 2024-03-14 PROCEDURE — 99239 HOSP IP/OBS DSCHRG MGMT >30: CPT | Performed by: INTERNAL MEDICINE

## 2024-03-14 PROCEDURE — 82948 REAGENT STRIP/BLOOD GLUCOSE: CPT

## 2024-03-14 RX ORDER — PANTOPRAZOLE SODIUM 40 MG/1
40 TABLET, DELAYED RELEASE ORAL
Qty: 30 TABLET | Refills: 0 | Status: SHIPPED | OUTPATIENT
Start: 2024-03-15 | End: 2024-04-14

## 2024-03-14 RX ORDER — CLOPIDOGREL BISULFATE 75 MG/1
75 TABLET ORAL DAILY
Qty: 30 TABLET | Refills: 0 | Status: SHIPPED | OUTPATIENT
Start: 2024-03-16

## 2024-03-14 RX ORDER — FERROUS SULFATE 325(65) MG
325 TABLET ORAL
Status: DISCONTINUED | OUTPATIENT
Start: 2024-03-15 | End: 2024-03-14 | Stop reason: HOSPADM

## 2024-03-14 RX ORDER — FERROUS SULFATE 325(65) MG
325 TABLET ORAL
Qty: 30 TABLET | Refills: 0 | Status: SHIPPED | OUTPATIENT
Start: 2024-03-15 | End: 2024-04-14

## 2024-03-14 RX ADMIN — THIAMINE HCL TAB 100 MG 100 MG: 100 TAB at 08:53

## 2024-03-14 RX ADMIN — PANTOPRAZOLE SODIUM 40 MG: 40 TABLET, DELAYED RELEASE ORAL at 05:32

## 2024-03-14 RX ADMIN — LISINOPRIL 10 MG: 10 TABLET ORAL at 08:53

## 2024-03-14 NOTE — PLAN OF CARE
Problem: PAIN - ADULT  Goal: Verbalizes/displays adequate comfort level or baseline comfort level  Description: Interventions:  - Encourage patient to monitor pain and request assistance  - Assess pain using appropriate pain scale  - Administer analgesics based on type and severity of pain and evaluate response  - Implement non-pharmacological measures as appropriate and evaluate response  - Consider cultural and social influences on pain and pain management  - Notify physician/advanced practitioner if interventions unsuccessful or patient reports new pain  Outcome: Progressing     Problem: SAFETY ADULT  Goal: Patient will remain free of falls  Description: INTERVENTIONS:  - Educate patient/family on patient safety including physical limitations  - Instruct patient to call for assistance with activity   - Consult OT/PT to assist with strengthening/mobility   - Keep Call bell within reach  - Keep bed low and locked with side rails adjusted as appropriate  - Keep care items and personal belongings within reach  - Initiate and maintain comfort rounds  - Make Fall Risk Sign visible to staff  - Offer Toileting every 2 Hours, in advance of need  - Initiate/Maintain alarms  - Obtain necessary fall risk management equipment: yes  - Apply yellow socks and bracelet for high fall risk patients  - Consider moving patient to room near nurses station  Outcome: Progressing  Goal: Maintain or return to baseline ADL function  Description: INTERVENTIONS:  -  Assess patient's ability to carry out ADLs; assess patient's baseline for ADL function and identify physical deficits which impact ability to perform ADLs (bathing, care of mouth/teeth, toileting, grooming, dressing, etc.)  - Assess/evaluate cause of self-care deficits   - Assess range of motion  - Assess patient's mobility; develop plan if impaired  - Assess patient's need for assistive devices and provide as appropriate  - Encourage maximum independence but intervene and  supervise when necessary  - Involve family in performance of ADLs  - Assess for home care needs following discharge   - Consider OT consult to assist with ADL evaluation and planning for discharge  - Provide patient education as appropriate  Outcome: Progressing  Goal: Maintains/Returns to pre admission functional level  Description: INTERVENTIONS:  - Perform AM-PAC 6 Click Basic Mobility/ Daily Activity assessment daily.  - Set and communicate daily mobility goal to care team and patient/family/caregiver.   - Collaborate with rehabilitation services on mobility goals if consulted  - Perform Range of Motion 4 times a day.  - Reposition patient every 2 hours.  - Dangle patient 3 times a day  - Stand patient 3 times a day  - Ambulate patient 3 times a day  - Out of bed to chair 3 times a day   - Out of bed for meals 3 times a day  - Out of bed for toileting  - Record patient progress and toleration of activity level   Outcome: Progressing     Problem: DISCHARGE PLANNING  Goal: Discharge to home or other facility with appropriate resources  Description: INTERVENTIONS:  - Identify barriers to discharge w/patient and caregiver  - Arrange for needed discharge resources and transportation as appropriate  - Identify discharge learning needs (meds, wound care, etc.)  - Arrange for interpretive services to assist at discharge as needed  - Refer to Case Management Department for coordinating discharge planning if the patient needs post-hospital services based on physician/advanced practitioner order or complex needs related to functional status, cognitive ability, or social support system  Outcome: Progressing     Problem: Knowledge Deficit  Goal: Patient/family/caregiver demonstrates understanding of disease process, treatment plan, medications, and discharge instructions  Description: Complete learning assessment and assess knowledge base.  Interventions:  - Provide teaching at level of understanding  - Provide teaching via  preferred learning methods  Outcome: Progressing     Problem: HEMATOLOGIC - ADULT  Goal: Maintains hematologic stability  Description: INTERVENTIONS  - Assess for signs and symptoms of bleeding or hemorrhage  - Monitor labs  - Administer supportive blood products/factors as ordered and appropriate  Outcome: Progressing

## 2024-03-14 NOTE — ASSESSMENT & PLAN NOTE
H/o CVA in July 2023  No longer need ASA, may resume Plavix 75mg daily  Outpatient follow up with Neurology

## 2024-03-14 NOTE — ASSESSMENT & PLAN NOTE
Lab Results   Component Value Date    HGBA1C 6.5 (H) 07/28/2023       Recent Labs     03/13/24  1138 03/13/24  1634 03/13/24 2051 03/14/24  0725   POCGLU 113 100 111 113         Blood Sugar Average: Last 72 hrs:    (P) 104.6  Resume home metformin  Diabetic diet

## 2024-03-14 NOTE — CASE MANAGEMENT
Case Management Discharge Planning Note    Patient name Yolis Flores  Location [address] [place] [address] MRN [id number]  : 1950 Date 3/14/2024       Current Admission Date: 3/11/2024  Current Admission Diagnosis:Anemia   Patient Active Problem List    Diagnosis Date Noted    Anemia 2024    Acute ischemic stroke (HCC) 2023    Type 2 diabetes mellitus, without long-term current use of insulin (HCC) 2023    CVA (cerebral vascular accident) (HCC) 2023    Occlusion and stenosis of left middle cerebral artery 2023    Smoker 2023    Anxiety and depression     Essential hypertension     GERD (gastroesophageal reflux disease)       LOS (days): 3  Geometric Mean LOS (GMLOS) (days): 2.8  Days to GMLOS:0     OBJECTIVE:  Risk of Unplanned Readmission Score: 12.16      Current admission status: Inpatient   Preferred Pharmacy:   [id number] - [place], [place] - [address] [address]  Phone: [phone] Fax: [phone]    Primary Care Provider: Terrance Garcia DO    Primary Insurance: [employer] REP  Secondary Insurance:     DISCHARGE DETAILS:    Discharge planning discussed with:: Patient     Treatment Team Recommendation: Home  Discharge Destination Plan:: Home  Transport at Discharge : Family     IMM Given (Date):: 24  IMM Given to:: Patient (IMM#2 reviewed with patient. Patient gave verbal understanding, signed, and was provided with original. Copy placed in scan bin.)

## 2024-03-14 NOTE — DISCHARGE INSTR - AVS FIRST PAGE
Follow ups:  -PCP  -GI    New prescriptions:  -Ferrous sulfate 325mg twice daily with meals  -Protonix 40mg daily      Other instructions:  -Resume Plavix 75mg on 3/16/24 (Saturday)  -Stop aspirin  -Stop omeprazole and start protonix  -Repeat CBC in 1-2 weeks  -Follow up with GI for capsule endoscopy study

## 2024-03-14 NOTE — DISCHARGE SUMMARY
[facility]  Discharge- Yolis Flores 1950, 73 y.o. female MRN: [id number]  Unit/Bed#: [address] [place] [address] Encounter: [id number]  Primary Care Provider: Terrance Garcia DO   Date and time admitted to hospital: 3/11/2024 11:20 AM    * Anemia  Assessment & Plan  Patient was referred to the ED by PCP for abnormal lab. Patient stated that the outpatient Hemoglobin was 7. ED lab work revealed hemoglobin of 7. Patient reports weakness, fatigue and lightheadedness which has been ongoing for a few days which necessitated her going to her PCP. Denies any bowel changes and DARLING done in ED was negative for blood.  Baseline hemoglobin in 2023 noted to be 12.3 - 13.4  ED transfused 1 unit PRBC  3/12: EGD demonstrated normal esophagus.  1 cm more than few mucus in the gastric cardia noted just below the GE junction. Could not biopsy due to aspirin & Plavix  3/13 plan for colonoscopy  Iron panel consistent with iron deficiency, consider iron tablets at discharge  ASA and plavix on hold  Spoke to Neurology- okay to continue Plavix 75mg daily and stop ASA  Per GI, may resume Plavix 3/16/24  Continue Protonix 40 mg daily  Hemoglobin 7.7 today  GI consulted, recommendations are appreciated    Type 2 diabetes mellitus, without long-term current use of insulin (HCC)  Assessment & Plan  Lab Results   Component Value Date    HGBA1C 6.5 (H) 07/28/2023       Recent Labs     03/13/24  1138 03/13/24  1634 03/13/24 2051 03/14/24  0725   POCGLU 113 100 111 113         Blood Sugar Average: Last 72 hrs:    (P) 104.6  Resume home metformin  Diabetic diet    Smoker  Assessment & Plan  Nicotine patch  Smoking cessation    GERD (gastroesophageal reflux disease)  Assessment & Plan  Continue Protonix    Essential hypertension  Assessment & Plan  Continue PTA lisinopril    Anxiety and depression  Assessment & Plan  Continue Zoloft, Xanax as needed    CVA (cerebral vascular accident) (HCC)  Assessment & Plan  H/o CVA in July  2023  No longer need ASA, may resume Plavix 75mg daily  Outpatient follow up with Neurology        Medical Problems       Resolved Problems  Date Reviewed: 3/14/2024   None       Discharging Physician / Practitioner: Melissa Andersen PA-C  PCP: Terrance Garcia DO  Admission Date:   Admission Orders (From admission, onward)       Ordered        03/11/24 1538  INPATIENT ADMISSION  Once                          Discharge Date: 03/14/24    Consultations During Hospital Stay:  GI    Procedures Performed:   EGD (3/12/24): The esophagus appeared normal. Stomach-just below the GE junction noted less than 1 cm smooth inflamed mucosa in the gastric cardia.  Not biopsied because of aspirin and Plavix on board. The duodenum appeared normal.  Colonoscopy (3/12/24): One 30 mm polyp in the ascending colon; lift performed; incompletely removed in piecemeal fashion by EMR; placed 3 clips successfully; tattooed 3 cm distal to the finding. Diverticulosis of moderate severity in the sigmoid colon. Hemorrhoids    Significant Findings / Test Results:   See results above under procedure    Incidental Findings:   None   None    Test Results Pending at Discharge (will require follow up):   None     Outpatient Tests Requested:  Capsule endoscopy    Complications:  None    Reason for Admission: Anemia    Hospital Course:   Yolis Flores is a 73 y.o. female patient who originally presented to the hospital on 3/11/2024 due to abnormal laboratory test showing low hemoglobin levels.  Additionally presented with fatigue, weakness, lightheadedness.  Patient was found to have a hemoglobin level of 7 on admission.  She received 1 unit packed red blood cells on admission.  Gastroenterology was consulted and patient underwent EGD and colonoscopy.  EGD unremarkable for gastric ulcer.  Colonoscopy positive for 30mm polyp in the ascending colon which was removed.  Hemoglobin has remained stable between 7 and 8 since admission.  Iron panels were done which  "were suggestive of iron deficiency anemia patient will be started on iron tablet twice daily.  Spoke with patient's neurologist, acceptable to stop aspirin and continue only Plavix.  Per GI okay to resume Plavix on Saturday, 316/24.  Patient will need outpatient follow-up with gastroenterology for capsule study.  All patient questions answered to the best of my ability.      Please see above list of diagnoses and related plan for additional information.     Condition at Discharge: stable    Discharge Day Visit / Exam:   Subjective:  Patient denies any bleeding overnight or this AM. Has no acute complaints at this time.    Vitals: Blood Pressure: 161/81 (03/14/24 0854)  Pulse: 84 (03/14/24 0854)  Temperature: 98.2 °F (36.8 °C) (03/14/24 0854)  Temp Source: Oral (03/13/24 1907)  Respirations: 18 (03/13/24 2216)  Height: 5' 4\" (162.6 cm) (03/11/24 2100)  Weight - Scale: 72.9 kg (160 lb 12.8 oz) (03/11/24 2100)  SpO2: 93 % (03/14/24 0854)  Exam:   Physical Exam  Vitals and nursing note reviewed.   Constitutional:       General: She is not in acute distress.     Appearance: She is well-developed.   HENT:      Head: Normocephalic and atraumatic.   Eyes:      Conjunctiva/sclera: Conjunctivae normal.   Cardiovascular:      Rate and Rhythm: Normal rate and regular rhythm.      Heart sounds: No murmur heard.  Pulmonary:      Effort: Pulmonary effort is normal. No respiratory distress.      Breath sounds: Normal breath sounds.   Abdominal:      Palpations: Abdomen is soft.      Tenderness: There is no abdominal tenderness.   Musculoskeletal:         General: No swelling.      Cervical back: Neck supple.   Skin:     General: Skin is warm and dry.      Capillary Refill: Capillary refill takes less than 2 seconds.   Neurological:      Mental Status: She is alert.   Psychiatric:         Mood and Affect: Mood normal.          Discussion with Family: Updated  () via phone.    Discharge instructions/Information " to patient and family:   See after visit summary for information provided to patient and family.      Provisions for Follow-Up Care:  See after visit summary for information related to follow-up care and any pertinent home health orders.      Mobility at time of Discharge:   Basic Mobility Inpatient Raw Score: 24  JH-HLM Goal: 8: Walk 250 feet or more  JH-HLM Achieved: 7: Walk 25 feet or more  HLM Goal achieved. Continue to encourage appropriate mobility.     Disposition:   Home    Planned Readmission: None     Discharge Statement:  I spent 55 minutes discharging the patient. This time was spent on the day of discharge. I had direct contact with the patient on the day of discharge. Greater than 50% of the total time was spent examining patient, answering all patient questions, arranging and discussing plan of care with patient as well as directly providing post-discharge instructions.  Additional time then spent on discharge activities.    Discharge Medications:  See after visit summary for reconciled discharge medications provided to patient and/or family.      **Please Note: This note may have been constructed using a voice recognition system**

## 2024-03-15 NOTE — UTILIZATION REVIEW
NOTIFICATION OF ADMISSION DISCHARGE   This is a Notification of Discharge from [facility]. Please be advised that this patient has been discharge from our facility. Below you will find the admission and discharge date and time including the patient’s disposition.   UTILIZATION REVIEW CONTACT:  Mer Ceja  Utilization   Network Utilization Review Department  Phone: [phone] x carefully listen to the prompts. All voicemails are confidential.  Email: [email or web address]     ADMISSION INFORMATION  PRESENTATION DATE: 3/11/2024 11:20 AM  OBERVATION ADMISSION DATE:   INPATIENT ADMISSION DATE: 3/11/24  3:38 PM   DISCHARGE DATE: 3/14/2024 12:32 PM   DISPOSITION:Home/Self Care    Network Utilization Review Department  ATTENTION: Please call with any questions or concerns to [phone] and carefully listen to the prompts so that you are directed to the right person. All voicemails are confidential.   For Discharge needs, contact Care Management DC Support Team at [phone] opt. 2  Send all requests for admission clinical reviews, approved or denied determinations and any other requests to dedicated fax number below belonging to the campus where the patient is receiving treatment. List of dedicated fax numbers for the Facilities:  FACILITY NAME UR FAX NUMBER   ADMISSION DENIALS (Administrative/Medical Necessity) [phone]   DISCHARGE SUPPORT TEAM ([facility]) [phone]   PARENT CHILD HEALTH (Maternity/NICU/Pediatrics) [phone]   [facility] [phone]   [facility] [phone]   [facility] [phone]   [facility] [phone]   [facility] [phone]   [facility] [phone]   [facility] [phone]   [facility] [phone]    [facility] [phone]   [facility] [phone]   [facility] [phone]   [facility] [phone]

## 2024-03-18 ENCOUNTER — OFFICE VISIT (OUTPATIENT)
Facility: CLINIC | Age: 74
End: 2024-03-18
Payer: COMMERCIAL

## 2024-03-18 DIAGNOSIS — R47.89 WORD FINDING DIFFICULTY: ICD-10-CM

## 2024-03-18 DIAGNOSIS — Z86.73 CHRONIC ISCHEMIC LEFT MCA STROKE: Primary | ICD-10-CM

## 2024-03-18 DIAGNOSIS — R48.8 OTHER SYMBOLIC DYSFUNCTIONS: ICD-10-CM

## 2024-03-18 PROCEDURE — 88305 TISSUE EXAM BY PATHOLOGIST: CPT | Performed by: PATHOLOGY

## 2024-03-18 PROCEDURE — 92507 TX SP LANG VOICE COMM INDIV: CPT

## 2024-03-18 NOTE — PROGRESS NOTES
Daily Speech Treatment Note    Today's date: 3/18/2024  Patient’s name: Yolis Flores  : 1950  MRN: [id number]    Referring provider: Shady Metcalf MD    Encounter Diagnosis     ICD-10-CM    1. Chronic ischemic left MCA stroke  Z86.73       2. Other symbolic dysfunctions  R48.8       3. Word finding difficulty  R47.89               POC expires Auth Status Total   Visits  Start date  Expiration date PT/OT + Visit Limit? Co-Insurance   24 Authorized bomn 1/3/24 12/31/24   No                                                                            Visit/Unit Tracking  AUTH Status: Authorized  #[id number] Date 3/18              Visits  Authed: bomn Used 1               Remaining                       Visit tracking:  Visit #37    Subjective: Yolis arrived unaccompanied and attended to all cognitive linguistic tasks.                                                                                                                                   Goals  Short-term goals:    Goal 1. Patient will completed mid-high level word finding tasks with 80% accuracy, to be completed within the next 4-6 weeks.--Did not target    Goal 2. Yolis will complete executive functioning tasks with 80% accuracy, to be completed within the next 4-6 week  Yolis completed an executive function based activity that relied on attention attention, working memory, organization, and language processing (Logic Links). Accuracy at 50%. She benefited from additional time for task completion and min-mod clues for information breakdown.    Goal 3. Yolis with follow verbally spoken and written 2-4 step commands with 80% accuracy, to be completed within the next 4-6 weeks.  Did not target.    Goal 4. Yolis will complete attention dependent structured tasks with 80% accuracy, to be completed within the next 4-6 weeks.  See goal (2)    Goal 5. Yolis will complete immediate, delayed, and working memory structured tasks, to be  completed within the next 4-6 weeks.  Did not target.      -Continue with current plan of care.

## 2024-03-20 ENCOUNTER — OFFICE VISIT (OUTPATIENT)
Dept: NEUROLOGY | Facility: CLINIC | Age: 74
End: 2024-03-20
Payer: COMMERCIAL

## 2024-03-20 ENCOUNTER — APPOINTMENT (OUTPATIENT)
Facility: CLINIC | Age: 74
End: 2024-03-20
Payer: COMMERCIAL

## 2024-03-20 VITALS
HEART RATE: 73 BPM | OXYGEN SATURATION: 96 % | DIASTOLIC BLOOD PRESSURE: 88 MMHG | HEIGHT: 64 IN | SYSTOLIC BLOOD PRESSURE: 148 MMHG | WEIGHT: 161 LBS | TEMPERATURE: 96.2 F | BODY MASS INDEX: 27.49 KG/M2

## 2024-03-20 DIAGNOSIS — D50.9 IRON DEFICIENCY ANEMIA: ICD-10-CM

## 2024-03-20 DIAGNOSIS — Z86.73 CHRONIC ISCHEMIC LEFT MCA STROKE: Primary | ICD-10-CM

## 2024-03-20 DIAGNOSIS — I10 HTN (HYPERTENSION): ICD-10-CM

## 2024-03-20 DIAGNOSIS — E78.5 HLD (HYPERLIPIDEMIA): ICD-10-CM

## 2024-03-20 PROCEDURE — 99214 OFFICE O/P EST MOD 30 MIN: CPT | Performed by: PSYCHIATRY & NEUROLOGY

## 2024-03-20 NOTE — PROGRESS NOTES
Return NeuroOutpatient Note        Yolis Flores  [id number]  73 y.o.  1950       Type 2 diabetes mellitus , Chronic ischemic left MCA stroke , and Word finding difficulty         History obtained from:  Patient and      HPI/Subjective:  Yolis Flores is a 72 yo F with PMH of DM II, chronic left mca stroke presents as f/u. Patient was seen by us at [facility] on 7/28/23 and had suffered an acute left BG stroke. After discharge, she couldn't get supply of DAPT and there were some misses dosages and she returned with new infarcts within same region 2-3 days later. Patient had also started smoking that weekend. She has now stopped smoking and has been asked to take DAPT for 3 months. She had REBEKAH and had loop placed to look for A fib.   Her CTA had revealed left M1/M2 segment near occlusion which was later shown to be recanalized. From stroke, patient has persistent right facial droop, word finding difficulty. She has no extremity weakness.   Her A1c was 6.5 and she is started on metformin.   Her LDL was 126. She was started on lipitor 80mg daily.      She does have pulmonary nodules that need repeat study in future which is being addressed by her PCP.     Recently, patient was diagnosed with low H/H. She had endoscopy and colonoscopy done and no source of bleeding was found. Her iron was very low at 25 and TIBC high.   In aug visit, patient was asked to stop aspirin in Nov 2023 but somehow patient didn't know and had continued it until recently.     She has completely stopped smoking.         Past Medical History:   Diagnosis Date   • Anxiety    • CVA (cerebral vascular accident) (HCC)    • Depression    • Diabetes (HCC)    • Diabetes mellitus (HCC)    • GERD (gastroesophageal reflux disease)    • Hypertension    • Stroke (HCC)    • Stroke (HCC)      Social History     Socioeconomic History   • Marital status: /Civil Union     Spouse name: Not on file   • Number of children: Not on file   • Years  "of education: Not on file   • Highest education level: Not on file   Occupational History   • Not on file   Tobacco Use   • Smoking status: Former     Current packs/day: 0.00     Types: Cigarettes     Quit date: 2023     Years since quittin.6   • Smokeless tobacco: Former   Vaping Use   • Vaping status: Former   • Quit date: 2023   Substance and Sexual Activity   • Alcohol use: Not Currently     Alcohol/week: 28.0 standard drinks of alcohol     Types: 28 Cans of beer per week     Comment: every night has \"a few\" beers   • Drug use: No   • Sexual activity: Not Currently   Other Topics Concern   • Not on file   Social History Narrative   • Not on file     Social Determinants of Health     Financial Resource Strain: Not on file   Food Insecurity: No Food Insecurity (3/11/2024)    Hunger Vital Sign    • Worried About Running Out of Food in the Last Year: Never true    • Ran Out of Food in the Last Year: Never true   Transportation Needs: No Transportation Needs (3/11/2024)    PRAPARE - Transportation    • Lack of Transportation (Medical): No    • Lack of Transportation (Non-Medical): No   Physical Activity: Not on file   Stress: Not on file   Social Connections: Not on file   Intimate Partner Violence: Not on file   Housing Stability: Low Risk  (3/11/2024)    Housing Stability Vital Sign    • Unable to Pay for Housing in the Last Year: No    • Number of Places Lived in the Last Year: 1    • Unstable Housing in the Last Year: No     History reviewed. No pertinent family history.  Allergies   Allergen Reactions   • Amoxil [Amoxicillin] Other (See Comments)     \"mouth felt on fire\"     Current Outpatient Medications on File Prior to Visit   Medication Sig Dispense Refill   • Alcohol Swabs 70 % PADS May substitute brand based on insurance coverage. Check glucose BID. 100 each 0   • ALPRAZolam (XANAX) 0.5 mg tablet Take 0.5 mg by mouth 4 (four) times a day as needed for anxiety Pt takes one 0.5 mg tablet as " needed for anxiety up to four times daily     • atorvastatin (LIPITOR) 80 mg tablet Take 1 tablet (80 mg total) by mouth daily with dinner 30 tablet 0   • clopidogrel (PLAVIX) 75 mg tablet Take 1 tablet (75 mg total) by mouth daily Do not start before March 16, 2024. 30 tablet 0   • famotidine (PEPCID) 20 mg tablet Take 0.5 tablets (10 mg total) by mouth 2 (two) times a day  0   • ferrous sulfate 325 (65 Fe) mg tablet Take 1 tablet (325 mg total) by mouth daily with breakfast Do not start before March 15, 2024. 30 tablet 0   • lisinopril (ZESTRIL) 10 mg tablet Take 1 tablet (10 mg total) by mouth daily Do not start before July 30, 2023.  0   • metFORMIN (GLUCOPHAGE) 500 mg tablet take 1 tablet by mouth EVERY MORNING WITH BREAKFAST 90 tablet 2   • pantoprazole (PROTONIX) 40 mg tablet Take 1 tablet (40 mg total) by mouth daily in the early morning 30 tablet 0   • sertraline (ZOLOFT) 100 mg tablet Take 100 mg by mouth daily with dinner 2 tablets daily     • thiamine (VITAMIN B1) 100 mg tablet Take 100 mg by mouth daily     • Blood Glucose Monitoring Suppl (OneTouch Verio Reflect) w/Device KIT May substitute brand based on insurance coverage. Check glucose BID. (Patient not taking: Reported on 8/21/2023) 1 kit 0   • glucose blood (OneTouch Verio) test strip May substitute brand based on insurance coverage. Check glucose BID. (Patient not taking: Reported on 8/21/2023) 100 each 0   • OneTouch Delica Lancets 33G MISC May substitute brand based on insurance coverage. Check glucose BID. (Patient not taking: Reported on 8/21/2023) 100 each 0     No current facility-administered medications on file prior to visit.         Review of Systems   Refer to positive review of systems in HPI.   Review of Systems    Constitutional- No fever  Eyes- No visual change  ENT- Hearing normal  CV- No chest pain  Resp- No Shortness of breath  GI- No diarrhea  - Bladder normal  MS- No Arthritis   Skin- No rash  Psych- No depression  Endo- No  "DM  Heme- No nodes    Vitals:    03/20/24 1636   BP: 148/88   BP Location: Left arm   Patient Position: Sitting   Cuff Size: Standard   Pulse: 73   Temp: (!) 96.2 °F (35.7 °C)   TempSrc: Tympanic   SpO2: 96%   Weight: 73 kg (161 lb)   Height: 5' 4\" (1.626 m)     Repeat BP: 148/77    PHYSICAL EXAM:  Appearance: No Acute Distress  Ophthalmoscopic: Disc Flat, Normal fundus  Mental status:  Orientation: Awake, Alert, and Orientedx3  Memory: Registation 3/3 Recall 3/3  Attention: normal  Knowledge: good  Language: No aphasia  Speech: No dysarthria but mild word finding difficulty   Cranial Nerves:  2 No Visual Defect on Confrontation, Pupils round, equal, reactive to light  3,4,6 Extraocular Movements Intact, no nystagmus  5 Facial Sensation Intact  7 subtle right facial asymmetry  8 Intact hearing  9,10 Palate symmetric, normal gag  11 Good shoulder shrug  12 Tongue Midline  Gait: Stable  Coordination: No ataxia with finger to nose testing, and heel to shin  Sensory: Intact, Symmetric to pinprick, light touch, vibration, and joint position  Muscle Tone: Normal              Muscle exam:  Arm Right Left Leg Right Left   Deltoid 5/5 5/5 Iliopsoas 5/5 5/5   Biceps 5/5 5/5 Quads 5/5 5/5   Triceps 5/5 5/5 Hamstrings 5/5 5/5   Wrist Extension 5/5 5/5 Ankle Dorsi Flexion 5/5 5/5   Wrist Flexion 5/5 5/5 Ankle Plantar Flexion 5/5 5/5   Interossei 5/5 5/5 Ankle Eversion 5/5 5/5   APB 5/5 5/5 Ankle Inversion 5/5 5/5       Reflexes   RJ BJ TJ KJ AJ Plantars [place]'s   Right 2+ 2+ 2+ 2+  Downgoing Not present   Left 2+ 2+ 2+ 2+  Downgoing Not present     Personal review of  Labs:                  Diagnoses and all orders for this visit:      1. Chronic ischemic left MCA stroke        2. Iron deficiency anemia        3. HLD (hyperlipidemia)        4. HTN (hypertension)              Patient remains stable from stroke stand point.   She is eating healthier and has quit smoking.  She is to resume plavix 75mg daily and lipitor 80mg " daily.  She is to continue iron supplements and f/u with pcp for that.   Her repeat bp here was still mildly elevated. Asked her to mention this to her PCP mera to make adjustment to her antihypertensives.  He's outside of network.   She tries to be as physically active as possible.             Total time of encounter:  30 min  More than 50% of the time was used in counseling and/or coordination of care  Extent of counseling and/or coordination of care        Hejaswinder Metcalf MD  [facility]  [address] [place] [id number] [phone]

## 2024-03-25 ENCOUNTER — OFFICE VISIT (OUTPATIENT)
Facility: CLINIC | Age: 74
End: 2024-03-25
Payer: COMMERCIAL

## 2024-03-25 DIAGNOSIS — R47.89 WORD FINDING DIFFICULTY: ICD-10-CM

## 2024-03-25 DIAGNOSIS — Z86.73 CHRONIC ISCHEMIC LEFT MCA STROKE: Primary | ICD-10-CM

## 2024-03-25 DIAGNOSIS — R48.8 OTHER SYMBOLIC DYSFUNCTIONS: ICD-10-CM

## 2024-03-25 PROCEDURE — 92507 TX SP LANG VOICE COMM INDIV: CPT

## 2024-03-25 NOTE — PROGRESS NOTES
Daily Speech Treatment Note    Today's date: 3/25/2024  Patient’s name: Yolis Flores  : 1950  MRN: [id number]    Referring provider: Shady Metcalf MD    Encounter Diagnosis     ICD-10-CM    1. Chronic ischemic left MCA stroke  Z86.73       2. Other symbolic dysfunctions  R48.8       3. Word finding difficulty  R47.89                 POC expires Auth Status Total   Visits  Start date  Expiration date PT/OT + Visit Limit? Co-Insurance   24 Authorized bomn 1/3/24 12/31/24   No                                                                            Visit/Unit Tracking  AUTH Status: Authorized  #[id number] Date 3/18 3/25             Visits  Authed: bomn Used 1 2              Remaining                       Visit tracking:  Visit #37    Subjective: Yolis arrived unaccompanied and attended to all cognitive linguistic tasks.                                                                                                                                   Goals  Short-term goals:    Goal 1. Patient will completed mid-high level word finding tasks with 80% accuracy, to be completed within the next 4-6 weeks.  Yolis completed mid level category matrix with 50% accuracy. Benefited from mid-mod semantic clues.    Goal 2. Yolis will complete executive functioning tasks with 80% accuracy, to be completed within the next 4-6 week  Yolis completed an executive function based activity that relied on attention, organization, language processing, and problem solving (During this task, she had to find various locations on a map based on directional clues). Accuracy at 60%. She benefited from additional time for task completion and min-mod clues for information breakdown.    Goal 3. Yolis with follow verbally spoken and written 2-4 step commands with 80% accuracy, to be completed within the next 4-6 weeks.  Did not target.    Goal 4. Yolis will complete attention dependent structured tasks with 80%  accuracy, to be completed within the next 4-6 weeks.  See goal (2)    Goal 5. Yolis will complete immediate, delayed, and working memory structured tasks, to be completed within the next 4-6 weeks.  Did not target.      -Continue with current plan of care.

## 2024-03-26 ENCOUNTER — TELEPHONE (OUTPATIENT)
Dept: GASTROENTEROLOGY | Facility: CLINIC | Age: 74
End: 2024-03-26

## 2024-03-26 NOTE — TELEPHONE ENCOUNTER
----- Message from Rohan Brar MD sent at 3/25/2024  3:40 PM EDT -----  Please call the patient regarding his result.  Colon polyp is a benign adenoma.  Repeat colonoscopy in 3 months to ensure complete removal

## 2024-03-26 NOTE — TELEPHONE ENCOUNTER
I called and spoke to patient and went over results from colonoscopy. She verbalized understanding.

## 2024-03-26 NOTE — TELEPHONE ENCOUNTER
Pt called back, advised colon was recommended in 3 months. Pt would like a call back to discuss results. [phone]

## 2024-03-27 ENCOUNTER — OFFICE VISIT (OUTPATIENT)
Facility: CLINIC | Age: 74
End: 2024-03-27
Payer: COMMERCIAL

## 2024-03-27 DIAGNOSIS — R47.89 WORD FINDING DIFFICULTY: ICD-10-CM

## 2024-03-27 DIAGNOSIS — Z86.73 CHRONIC ISCHEMIC LEFT MCA STROKE: Primary | ICD-10-CM

## 2024-03-27 DIAGNOSIS — R48.8 OTHER SYMBOLIC DYSFUNCTIONS: ICD-10-CM

## 2024-03-27 PROCEDURE — 92507 TX SP LANG VOICE COMM INDIV: CPT

## 2024-03-27 NOTE — PROGRESS NOTES
Daily Speech Treatment Note    Today's date: 3/27/2024  Patient’s name: Yolis Flores  : 1950  MRN: [id number]    Referring provider: Shady Metcalf MD    Encounter Diagnosis     ICD-10-CM    1. Chronic ischemic left MCA stroke  Z86.73       2. Other symbolic dysfunctions  R48.8       3. Word finding difficulty  R47.89                   POC expires Auth Status Total   Visits  Start date  Expiration date PT/OT + Visit Limit? Co-Insurance   24 Authorized bomn 1/3/24 12/31/24   No                                                                            Visit/Unit Tracking  AUTH Status: Authorized  #[id number] Date 3/18 3/25 3/27            Visits  Authed: bomn Used 1 2 3             Remaining                       Visit tracking:  Visit #38    Subjective: Yolis  was 15 minutes late to the session. She arrived unaccompanied and remained motivated throughout today's tx.                                                                                                                                   Goals  Short-term goals:    Goal 1. Patient will completed mid-high level word finding tasks with 80% accuracy, to be completed within the next 4-6 weeks.--Did not target.    Goal 2. Yolis will complete executive functioning tasks with 80% accuracy, to be completed within the next 4-6 week  Yolis completed executive function based reasoning task to complete a categorization grid. During this activity she had to generate items from most broad to most specific within a particular category. Initial accuracy at 30%. She required mod-max clues for reasoning+ specificity.    Goal 3. Yolis with follow verbally spoken and written 2-4 step commands with 80% accuracy, to be completed within the next 4-6 weeks.  Did not target.    Goal 4. Yolis will complete attention dependent structured tasks with 80% accuracy, to be completed within the next 4-6 weeks.  See goal (2).    Goal 5. Yolis will complete  immediate, delayed, and working memory structured tasks, to be completed within the next 4-6 weeks.  Did not target.      -Continue with current plan of care.

## 2024-03-28 ENCOUNTER — TELEPHONE (OUTPATIENT)
Dept: GASTROENTEROLOGY | Facility: AMBULARY SURGERY CENTER | Age: 74
End: 2024-03-28

## 2024-03-28 NOTE — TELEPHONE ENCOUNTER
Our mutual patient is scheduled for procedure: EGD    On: May 31, 2024     With: Dr. Tianna Ramos MD    He/She is taking the following blood thinner: Plavix (Clopidogrel)    Can this be stopped  5 days prior to the procedure    Physician Approving clearance:

## 2024-04-01 ENCOUNTER — OFFICE VISIT (OUTPATIENT)
Facility: CLINIC | Age: 74
End: 2024-04-01
Payer: COMMERCIAL

## 2024-04-01 DIAGNOSIS — R47.89 WORD FINDING DIFFICULTY: ICD-10-CM

## 2024-04-01 DIAGNOSIS — Z86.73 CHRONIC ISCHEMIC LEFT MCA STROKE: Primary | ICD-10-CM

## 2024-04-01 DIAGNOSIS — R48.8 OTHER SYMBOLIC DYSFUNCTIONS: ICD-10-CM

## 2024-04-01 PROCEDURE — 92507 TX SP LANG VOICE COMM INDIV: CPT

## 2024-04-01 NOTE — PROGRESS NOTES
Daily Speech Treatment Note    Today's date: 2024  Patient’s name: Yolis Flores  : 1950  MRN: [id number]    Referring provider: Shady Metcalf MD    Encounter Diagnosis     ICD-10-CM    1. Chronic ischemic left MCA stroke  Z86.73       2. Other symbolic dysfunctions  R48.8       3. Word finding difficulty  R47.89               POC expires Auth Status Total   Visits  Start date  Expiration date PT/OT + Visit Limit? Co-Insurance   24 Authorized bomn 1/3/24 12/31/24   No                                                                            Visit/Unit Tracking  AUTH Status: Authorized  #[id number] Date 3/18 3/25 3/27 4/1           Visits  Authed: bomn Used 1 2 3 4            Remaining                       Visit tracking:  Visit #39    Subjective: Yolis  was 15 minutes late to the session. She arrived unaccompanied and remained motivated throughout today's tx.                                                                                                                                   Goals  Short-term goals:    Goal 1. Patient will completed mid-high level word finding tasks with 80% accuracy, to be completed within the next 4-6 weeks.--Did not target.    Goal 2. Yolis will complete executive functioning tasks with 80% accuracy, to be completed within the next 4-6 week  Yolis completed executive function based reasoning task to complete a categorization grid. During this activity she had to generate items from most broad to most specific within a particular category. Initial accuracy at 30%. She required mod-max clues for reasoning+ specificity.    Goal 3. Yolis with follow verbally spoken and written 2-4 step commands with 80% accuracy, to be completed within the next 4-6 weeks.  Did not target.    Goal 4. Yolis will complete attention dependent structured tasks with 80% accuracy, to be completed within the next 4-6 weeks.  Yolis completed attention based task that  combine card sorting and categorization. During this activity, she had to track one set of face cards, one set of number cards, and two unrelated categories. Visual support was not provided. She displayed (4) attention/memory errors during the task. Long and overt pauses present for word finding. Accuracy similar to the previous session.     Goal 5. Yolis will complete immediate, delayed, and working memory structured tasks, to be completed within the next 4-6 weeks.  Did not target.      -Continue with current plan of care.

## 2024-04-02 NOTE — TELEPHONE ENCOUNTER
Spoke to the patients  and informed him os the provider directives:    If she needs to be off of plavix for procedure then she has to take aspirin 81mg on those days as replacement. We don't want her completely off of antiplatelet therapy.    I also made the patient  aware that the patient is to stop the plavix 5 days prior to the procedure but to consult the cardiologist if the patient is to take the aspirin on the day of the procedure just in case the aspirin will interfere with the procedure.

## 2024-04-03 ENCOUNTER — APPOINTMENT (OUTPATIENT)
Facility: CLINIC | Age: 74
End: 2024-04-03
Payer: COMMERCIAL

## 2024-04-04 ENCOUNTER — APPOINTMENT (OUTPATIENT)
Facility: CLINIC | Age: 74
End: 2024-04-04
Payer: COMMERCIAL

## 2024-04-08 ENCOUNTER — OFFICE VISIT (OUTPATIENT)
Facility: CLINIC | Age: 74
End: 2024-04-08
Payer: COMMERCIAL

## 2024-04-08 DIAGNOSIS — R47.89 WORD FINDING DIFFICULTY: ICD-10-CM

## 2024-04-08 DIAGNOSIS — Z86.73 CHRONIC ISCHEMIC LEFT MCA STROKE: Primary | ICD-10-CM

## 2024-04-08 DIAGNOSIS — R48.8 OTHER SYMBOLIC DYSFUNCTIONS: ICD-10-CM

## 2024-04-08 PROCEDURE — 92507 TX SP LANG VOICE COMM INDIV: CPT

## 2024-04-08 NOTE — PROGRESS NOTES
Daily Speech Treatment Note/ Progress Note    Today's date: 2024  Patient’s name: Yolis Flores  : 1950  MRN: [id number]    Referring provider: Shady Metcalf MD    Encounter Diagnosis     ICD-10-CM    1. Chronic ischemic left MCA stroke  Z86.73       2. Other symbolic dysfunctions  R48.8       3. Word finding difficulty  R47.89                 POC expires Auth Status Total   Visits  Start date  Expiration date PT/OT + Visit Limit? Co-Insurance   24 Authorized bomn 1/3/24 12/31/24   No                                                                            Visit/Unit Tracking  AUTH Status: Authorized  #[id number] Date 3/18 3/25 3/27 4/1 4/8          Visits  Authed: bomn Used 1 2 3 4 5           Remaining                       Visit tracking:  Visit #40    Subjective: Yolis  was 15 minutes late to the session. She arrived unaccompanied and remained motivated throughout today's tx.                                                                                                                                   Goals  Short-term goals:    Goal 1. Patient will completed mid-high level word finding tasks with 80% accuracy, to be completed within the next 4-6 weeks.  Yolis completed mid-high level category matrix with 20% initial accuracy. Long pauses for word finding present. She required mod-max semantic clues to complete task.    Goal 2. Yolis will complete executive functioning tasks with 80% accuracy, to be completed within the next 4-6 week  Did not target.    Goal 3. Yolis with follow verbally spoken and written 2-4 step commands with 80% accuracy, to be completed within the next 4-6 weeks.  Did not target.    Goal 4. Yolis will complete attention dependent structured tasks with 80% accuracy, to be completed within the next 4-6 weeks.  Yolis completed attention based task that combine card sorting and categorization. During this activity, she had to track one set of face  cards, one set of number cards, and two unrelated categories. Visual support was not provided. She displayed (4) attention/memory errors during the task. Long and overt pauses present for word finding. Accuracy similar to the previous session.     Goal 5. Yolis will complete immediate, delayed, and working memory structured tasks, to be completed within the next 4-6 weeks.  Did not target.      Progress Note  Yolis has demonstrated improvements across domains. Yolis continues to present with mild-moderate deficits in language, although it has remained relatively stable. Regarding expressive language, fluency naming (also known as generative naming) remains below average. Generative naming involves rapid naming within specific semantic or phonemic boundaries, specifically zoo animals  Generative naming is a means of assessing executive function within the setting of expressive language as the task combines naming, working memory, self monitoring and sustained attention. Clinically, Yolis continues to present with impairments in executive function, attention, and word finding. Continued therapy targeting the aforementioned areas remains warranted as maximum gains have not been achieved.      -Continue with current plan of care.

## 2024-04-11 ENCOUNTER — TELEPHONE (OUTPATIENT)
Age: 74
End: 2024-04-11

## 2024-04-11 NOTE — TELEPHONE ENCOUNTER
OA Questions for EGD    Screened by: Charissa Salas MA    Referring Provider recall    Pre- Screening:     There is no height or weight on file to calculate BMI.    Previous EGD. yes   If yes:    Date: 3/12/2024    Facility:     Reason:       Does the patient want to see a Gastroenterologist prior to their procedure OR are they having any GI symptoms? no    Has the patient been hospitalized or had abdominal surgery in the past 6 months? Yes - hospitalized for 3 days 3/11/2024    Does the patient use supplemental oxygen? no    Does the patient take Coumadin, Lovenox, Plavix, Elliquis, Xarelto, or other blood thinning medication? no    Has the patient had a stroke, cardiac event, or stent placed in the past year? no    EGD consult scheudled     If patient is between 45yrs - 49yrs, please advise patient that we will have to confirm benefits & coverage with their insurance company for a routine screening colonoscopy.      OA COLON     Screened by: Charissa Salas MA    Referring Provider recall    Pre- Screening:     There is no height or weight on file to calculate BMI.  Has patient been referred for a routine screening Colonoscopy? no  Is the patient between 45-75 years old? yes      Previous Colonoscopy yes   If yes:    Date: 03/21/2024    Facility:     Reason:       Does the patient want to see a Gastroenterologist prior to their procedure OR are they having any GI symptoms? no    Has the patient been hospitalized or had abdominal surgery in the past 6 months? Yes - hospitalized for 3 days 3/11/2024    Does the patient use supplemental oxygen? no    Does the patient take Coumadin, Lovenox, Plavix, Elliquis, Xarelto, or other blood thinning medication? no    Has the patient had a stroke, cardiac event, or stent placed in the past year? no    Colon consult scheudled   If patient is between 45yrs - 49yrs, please advise patient that we will have to confirm benefits & coverage with their insurance company for a routine  screening colonoscopy.

## 2024-04-15 ENCOUNTER — OFFICE VISIT (OUTPATIENT)
Facility: CLINIC | Age: 74
End: 2024-04-15
Payer: COMMERCIAL

## 2024-04-15 DIAGNOSIS — R48.8 OTHER SYMBOLIC DYSFUNCTIONS: ICD-10-CM

## 2024-04-15 DIAGNOSIS — R47.89 WORD FINDING DIFFICULTY: ICD-10-CM

## 2024-04-15 DIAGNOSIS — Z86.73 CHRONIC ISCHEMIC LEFT MCA STROKE: Primary | ICD-10-CM

## 2024-04-15 PROCEDURE — 92507 TX SP LANG VOICE COMM INDIV: CPT

## 2024-04-15 NOTE — PROGRESS NOTES
Daily Speech Treatment Note/ Progress Note    Today's date: 4/15/2024  Patient’s name: Yolis Flores  : 1950  MRN: [id number]    Referring provider: Shady Metcalf MD    Encounter Diagnosis     ICD-10-CM    1. Chronic ischemic left MCA stroke  Z86.73       2. Other symbolic dysfunctions  R48.8       3. Word finding difficulty  R47.89               POC expires Auth Status Total   Visits  Start date  Expiration date PT/OT + Visit Limit? Co-Insurance   24 Authorized bomn 1/3/24 12/31/24   No                                                                       Visit/Unit Tracking  AUTH Status: Authorized  #[id number] Date 3/18 3/25 3/27 4/1 4/8 4/15         Visits  Authed: bomn Used 1 2 3 4 5 6          Remaining                     Visit tracking:  Visit #41    Subjective: Yolis arrived time to the session. She attended unaccompanied and remained motivated throughout today's tx.                                                                                                                                   Goals  Short-term goals:    Goal 1. Patient will completed mid-high level word finding tasks with 80% accuracy, to be completed within the next 4-6 weeks.  Yolis completed mid-high level category matrix with 20% initial accuracy. Long pauses for word finding present. She required mod-max semantic clues to complete task.    Goal 2. Yolis will complete executive functioning tasks with 80% accuracy, to be completed within the next 4-6 week  Did not target.    Goal 3. Yolis with follow verbally spoken and written 2-4 step commands with 80% accuracy, to be completed within the next 4-6 weeks.  To target immediate memory , attention, and auditory processing, Petra was tasked with following 2-step directions for completion of simple drawing task. Initial accuracy at 40%. She required 1 additional repetition of directions to achieve an accuracy of 100%    Goal 4. Yolis will complete  attention dependent structured tasks with 80% accuracy, to be completed within the next 4-6 weeks.  Yolis completed attention based task that combine card sorting and categorization. During this activity, she had to track one set of face cards, one set of number cards, and two unrelated categories. Visual support was not provided. She displayed (3) attention/memory errors during the task. Long and overt pauses present for word finding. Accuracy similar to the previous session.     Goal 5. Yolis will complete immediate, delayed, and working memory structured tasks, to be completed within the next 4-6 weeks.  Immediate recall for 8-item word list generated during activity in (goal 4) at % given additional time. Sub categorization+verbal rehearsal was used for improved organization and recall. During this activity, a written outline with category headings was provided. Accuracy at 100%. Delayed recall remained stable at 100%          -Continue with current plan of care.

## 2024-04-17 ENCOUNTER — OFFICE VISIT (OUTPATIENT)
Facility: CLINIC | Age: 74
End: 2024-04-17
Payer: COMMERCIAL

## 2024-04-17 DIAGNOSIS — R48.8 OTHER SYMBOLIC DYSFUNCTIONS: ICD-10-CM

## 2024-04-17 DIAGNOSIS — R47.89 WORD FINDING DIFFICULTY: ICD-10-CM

## 2024-04-17 DIAGNOSIS — Z86.73 CHRONIC ISCHEMIC LEFT MCA STROKE: Primary | ICD-10-CM

## 2024-04-17 PROCEDURE — 92507 TX SP LANG VOICE COMM INDIV: CPT

## 2024-04-17 NOTE — PROGRESS NOTES
Daily Speech Treatment Note/ Progress Note    Today's date: 2024  Patient’s name: Yolis Flores  : 1950  MRN: [id number]    Referring provider: Shady Metcalf MD    Encounter Diagnosis     ICD-10-CM    1. Chronic ischemic left MCA stroke  Z86.73       2. Other symbolic dysfunctions  R48.8       3. Word finding difficulty  R47.89                 POC expires Auth Status Total   Visits  Start date  Expiration date PT/OT + Visit Limit? Co-Insurance   24 Authorized bomn 1/3/24 12/31/24   No                                                                       Visit/Unit Tracking  AUTH Status: Authorized  #[id number] Date 3/18 3/25 3/27 4/1 4/8 4/15 4/17        Visits  Authed: bomn Used 1 2 3 4 5 6 7         Remaining                     Visit tracking:  Visit #42    Subjective: Yolis arrived time to the session. She attended unaccompanied and remained motivated throughout today's tx.                                                                                                                                   Goals  Short-term goals:    Goal 1. Patient will completed mid-high level word finding tasks with 80% accuracy, to be completed within the next 4-6 weeks.  Did not target.    Goal 2. Yolis will complete executive functioning tasks with 80% accuracy, to be completed within the next 4-6 week  Yolis completed mid level executive function based activity that relied on attention, organization, deduction, and simple calculations with a calculator. Accuracy at 50% given additional time for task completion. Yolis benefited from cues for attention+process of elimination.     Goal 3. Yolis with follow verbally spoken and written 2-4 step commands with 80% accuracy, to be completed within the next 4-6 weeks.  Did not target.    Goal 4. Yolis will complete attention dependent structured tasks with 80% accuracy, to be completed within the next 4-6 weeks.  Did not target.    Goal 5.  Yolis will complete immediate, delayed, and working memory structured tasks, to be completed within the next 4-6 weeks.  Did not target.           -Continue with current plan of care.

## 2024-04-22 ENCOUNTER — TELEPHONE (OUTPATIENT)
Dept: GASTROENTEROLOGY | Facility: CLINIC | Age: 74
End: 2024-04-22

## 2024-04-22 ENCOUNTER — OFFICE VISIT (OUTPATIENT)
Facility: CLINIC | Age: 74
End: 2024-04-22
Payer: COMMERCIAL

## 2024-04-22 DIAGNOSIS — R48.8 OTHER SYMBOLIC DYSFUNCTIONS: ICD-10-CM

## 2024-04-22 DIAGNOSIS — Z86.010 HX OF ADENOMATOUS COLONIC POLYPS: ICD-10-CM

## 2024-04-22 DIAGNOSIS — R47.89 WORD FINDING DIFFICULTY: ICD-10-CM

## 2024-04-22 DIAGNOSIS — Z86.73 CHRONIC ISCHEMIC LEFT MCA STROKE: Primary | ICD-10-CM

## 2024-04-22 DIAGNOSIS — D36.9: Primary | ICD-10-CM

## 2024-04-22 PROCEDURE — 92507 TX SP LANG VOICE COMM INDIV: CPT

## 2024-04-22 NOTE — TELEPHONE ENCOUNTER
Lmm that pt's instructions are on my  chart and that I am awaiting a call back regarding her blood thinners. Sb         Awaiting a call back from  regarding the name of her blood thinner. Sb

## 2024-04-22 NOTE — PROGRESS NOTES
Daily Speech Treatment Note    Today's date: 2024  Patient’s name: Yolis Flores  : 1950  MRN: [id number]    Referring provider: Shady Metcalf MD    Encounter Diagnosis     ICD-10-CM    1. Chronic ischemic left MCA stroke  Z86.73       2. Other symbolic dysfunctions  R48.8       3. Word finding difficulty  R47.89                   POC expires Auth Status Total   Visits  Start date  Expiration date PT/OT + Visit Limit? Co-Insurance   24 Authorized bomn 1/3/24 12/31/24   No                                                                       Visit/Unit Tracking  AUTH Status: Authorized  #[id number] Date 3/18 3/25 3/27 4/1 4/8 4/15 4/17 4/22       Visits  Authed: bomn Used 1 2 3 4 5 6 7 8        Remaining                     Visit tracking:  Visit #43    Subjective: Yolis arrived time to the session. She attended unaccompanied and remained motivated throughout today's tx.                                                                                                                                   Goals  Short-term goals:    Goal 1. Patient will completed mid-high level word finding tasks with 80% accuracy, to be completed within the next 4-6 weeks.  Yolis completed executive function based word finding task (Speedy Words) with 85% accuracy given additional time for task completion.    Goal 2. Yolis will complete executive functioning tasks with 80% accuracy, to be completed within the next 4-6 week  Yolis completed simple deduction puzzle (Deduction Puzzles #1) with 40% initial accuracy. She benefited from removal of excess visual stimuli and moderate cues for information breakdown+process of elimination.     Goal 3. Yolis with follow verbally spoken and written 2-4 step commands with 80% accuracy, to be completed within the next 4-6 weeks.  Did not target.    Goal 4. Yolis will complete attention dependent structured tasks with 80% accuracy, to be completed within the next  4-6 weeks.  Activity in (goal 1) had a divided attention component. Yolis required mod-max cues for attention to complete task accurately.    Goal 5. Yolis will complete immediate, delayed, and working memory structured tasks, to be completed within the next 4-6 weeks.  Did not target.           -Continue with current plan of care.

## 2024-04-24 NOTE — TELEPHONE ENCOUNTER
Pt's  called back to advise that pt is only on the Plavix.  requesting that Sadaf call back to confirm receipt of message. If not able to get via phone,  also confirmed ok to contact via [employer].

## 2024-04-25 NOTE — TELEPHONE ENCOUNTER
I spoke to pt and will send clearance in June for plavix to Dr Shady Metcalf,Neurology and to Dr Anthony for procedure  and med clearance . Sb

## 2024-04-29 ENCOUNTER — OFFICE VISIT (OUTPATIENT)
Facility: CLINIC | Age: 74
End: 2024-04-29
Payer: COMMERCIAL

## 2024-04-29 DIAGNOSIS — Z86.73 CHRONIC ISCHEMIC LEFT MCA STROKE: Primary | ICD-10-CM

## 2024-04-29 DIAGNOSIS — R48.8 OTHER SYMBOLIC DYSFUNCTIONS: ICD-10-CM

## 2024-04-29 DIAGNOSIS — R47.89 WORD FINDING DIFFICULTY: ICD-10-CM

## 2024-04-29 PROCEDURE — 92507 TX SP LANG VOICE COMM INDIV: CPT

## 2024-04-29 NOTE — PROGRESS NOTES
Daily Speech Treatment Note    Today's date: 2024  Patient’s name: Yolis Flores  : 1950  MRN: [id number]    Referring provider: Shady Metcalf MD    Encounter Diagnosis     ICD-10-CM    1. Chronic ischemic left MCA stroke  Z86.73       2. Other symbolic dysfunctions  R48.8       3. Word finding difficulty  R47.89                     POC expires Auth Status Total   Visits  Start date  Expiration date PT/OT + Visit Limit? Co-Insurance   24 Authorized bomn 1/3/24 12/31/24   No                                                                       Visit/Unit Tracking  AUTH Status: Authorized  #[id number] Date 3/18 3/25 3/27 4/1 4/8 4/15 4/17 4/22 4/29      Visits  Authed: bomn Used 1 2 3 4 5 6 7 8 9       Remaining                     Visit tracking:  Visit #44    Subjective: Yolis arrived 20 mins late to session. She attended unaccompanied and remained motivated throughout today's tx.                                                                                                                                   Goals  Short-term goals:    Goal 1. Patient will completed mid-high level word finding tasks with 80% accuracy, to be completed within the next 4-6 weeks.  Yolis completed mid level category matrix with 20% accuracy. She benefited from min-mod semantic clues+additional time for task completion.    Goal 2. Yolis will complete executive functioning tasks with 80% accuracy, to be completed within the next 4-6 week  Did not target.    Goal 3. Yolis with follow verbally spoken and written 2-4 step commands with 80% accuracy, to be completed within the next 4-6 weeks.  Did not target.    Goal 4. Yolis will complete attention dependent structured tasks with 80% accuracy, to be completed within the next 4-6 weeks.  Did not target.    Goal 5. Yolis will complete immediate, delayed, and working memory structured tasks, to be completed within the next 4-6 weeks.  Did not target.            -Continue with current plan of care.

## 2024-05-01 ENCOUNTER — APPOINTMENT (OUTPATIENT)
Facility: CLINIC | Age: 74
End: 2024-05-01
Payer: COMMERCIAL

## 2024-05-08 ENCOUNTER — OFFICE VISIT (OUTPATIENT)
Facility: CLINIC | Age: 74
End: 2024-05-08
Payer: COMMERCIAL

## 2024-05-08 DIAGNOSIS — R48.8 OTHER SYMBOLIC DYSFUNCTIONS: ICD-10-CM

## 2024-05-08 DIAGNOSIS — R47.89 WORD FINDING DIFFICULTY: ICD-10-CM

## 2024-05-08 DIAGNOSIS — Z86.73 CHRONIC ISCHEMIC LEFT MCA STROKE: Primary | ICD-10-CM

## 2024-05-08 PROCEDURE — 92507 TX SP LANG VOICE COMM INDIV: CPT

## 2024-05-08 NOTE — PROGRESS NOTES
Daily Speech Treatment Note/Progress Summary    Today's date: 2024  Patient’s name: Yolis Flores  : 1950  MRN: [id number]    Referring provider: Shady Metcalf MD    Encounter Diagnosis     ICD-10-CM    1. Chronic ischemic left MCA stroke  Z86.73       2. Other symbolic dysfunctions  R48.8       3. Word finding difficulty  R47.89                       POC expires Auth Status Total   Visits  Start date  Expiration date PT/OT + Visit Limit? Co-Insurance   24 Authorized bomn 1/3/24 12/31/24   No                                                                       Visit/Unit Tracking  AUTH Status: Authorized  #[id number] Date 3/18 3/25 3/27 4/1 4/8 4/15 4/17 4/22 4/29 5/8     Visits  Authed: bomn Used 1 2 3 4 5 6 7 8 9 10      Remaining                     Visit tracking:  Visit #45    Subjective: Yolis arrived on time.  She attended unaccompanied and remained motivated throughout today's tx.                                                                                                                                   Goals  Short-term goals:    Goal 1. Patient will completed mid-high level word finding tasks with 80% accuracy, to be completed within the next 4-6 weeks.  Mid level category matrix started- not completed due to time constraints. Will complete during next session.     Goal 2. Yolis will complete executive functioning tasks with 80% accuracy, to be completed within the next 4-6 week  Yolis completed mid level scheduling task that relied on organization, language processing, and deduction. Accuracy at 70% independently. Benefited from min clues for attention+organization.    Goal 3. Yolis with follow verbally spoken and written 2-4 step commands with 80% accuracy, to be completed within the next 4-6 weeks.  Did not target.    Goal 4. Yolis will complete attention dependent structured tasks with 80% accuracy, to be completed within the next 4-6 weeks.  Yolis was  tasked with answering questions about a 4-item word lis that was read aloud (Relating to attribute). Initial accuracy at 60x. She required 1 additional repetition to attain an accuracy of 100%    Goal 5. Yolis will complete immediate, delayed, and working memory structured tasks, to be completed within the next 4-6 weeks.  Did not target.     Progress Summary:          -Continue with current plan of care.

## 2024-05-13 ENCOUNTER — OFFICE VISIT (OUTPATIENT)
Facility: CLINIC | Age: 74
End: 2024-05-13
Payer: COMMERCIAL

## 2024-05-13 DIAGNOSIS — Z86.73 CHRONIC ISCHEMIC LEFT MCA STROKE: Primary | ICD-10-CM

## 2024-05-13 DIAGNOSIS — R48.8 OTHER SYMBOLIC DYSFUNCTIONS: ICD-10-CM

## 2024-05-13 DIAGNOSIS — R47.89 WORD FINDING DIFFICULTY: ICD-10-CM

## 2024-05-13 PROCEDURE — 92507 TX SP LANG VOICE COMM INDIV: CPT

## 2024-05-13 NOTE — PROGRESS NOTES
Daily Speech Treatment Note    Today's date: 2024  Patient’s name: Yolis Flores  : 1950  MRN: [id number]    Referring provider: Shady Metcalf MD    Encounter Diagnosis     ICD-10-CM    1. Chronic ischemic left MCA stroke  Z86.73       2. Other symbolic dysfunctions  R48.8       3. Word finding difficulty  R47.89               POC expires Auth Status Total   Visits  Start date  Expiration date PT/OT + Visit Limit? Co-Insurance   24 Authorized bomn 1/3/24 12/31/24   No                                                                       Visit/Unit Tracking  AUTH Status: Authorized  #[id number] Date               Visits  Authed: bomn Used 1 2 3 4 5 6 7 8 9 10      Remaining                     Visit tracking:  Visit #46    Subjective: Yolis arrived on time.  She attended unaccompanied and remained motivated throughout today's tx.                                                                                                                                   Goals  Short-term goals:    Goal 1. Patient will completed mid-high level word finding tasks with 80% accuracy, to be completed within the next 4-6 weeks.  Mid level category matrix completed from the previous session. Initial accuracy at 50%. She benefited from min-mod semantic cues with increased accuracy.     Goal 2. Yolis will complete executive functioning tasks with 80% accuracy, to be completed within the next 4-6 week  Yolis completed simple executive function based task that relied on attention, working memory, and organization. During this activity, she had to follow multi step directions to re arrange various letters in words to form new words. Accuracy at 50% She required min clues for task breakdown + attention.     Goal 3. Yolis with follow verbally spoken and written 2-4 step commands with 80% accuracy, to be completed within the next 4-6 weeks.  Did not target.    Goal 4. Yolis will complete attention  dependent structured tasks with 80% accuracy, to be completed within the next 4-6 weeks.  Did not target.    Goal 5. Yolis will complete immediate, delayed, and working memory structured tasks, to be completed within the next 4-6 weeks.  Immediate recall for 16 units of information (goal 1) was at 50%.            -Continue with current plan of care.

## 2024-05-15 ENCOUNTER — OFFICE VISIT (OUTPATIENT)
Facility: CLINIC | Age: 74
End: 2024-05-15
Payer: COMMERCIAL

## 2024-05-15 DIAGNOSIS — R47.89 WORD FINDING DIFFICULTY: ICD-10-CM

## 2024-05-15 DIAGNOSIS — R48.8 OTHER SYMBOLIC DYSFUNCTIONS: ICD-10-CM

## 2024-05-15 DIAGNOSIS — Z86.73 CHRONIC ISCHEMIC LEFT MCA STROKE: Primary | ICD-10-CM

## 2024-05-15 PROCEDURE — 92507 TX SP LANG VOICE COMM INDIV: CPT

## 2024-05-15 NOTE — PROGRESS NOTES
Daily Speech Treatment Note    Today's date: 5/15/2024  Patient’s name: Yolis Flores  : 1950  MRN: [id number]    Referring provider: Shady Metcalf MD    Encounter Diagnosis     ICD-10-CM    1. Chronic ischemic left MCA stroke  Z86.73       2. Other symbolic dysfunctions  R48.8       3. Word finding difficulty  R47.89                 POC expires Auth Status Total   Visits  Start date  Expiration date PT/OT + Visit Limit? Co-Insurance   24 Authorized bomn 1/3/24 12/31/24   No                                                                       Visit/Unit Tracking  AUTH Status: Authorized  #[id number] Date               Visits  Authed: bomn Used 1 2 3 4 5 6 7 8 9 10      Remaining                     Visit tracking:  Visit #46    Subjective: Yolis arrived on time.  She attended unaccompanied and remained motivated throughout today's tx.                                                                                                                                   Goals  Short-term goals:    Goal 1. Patient will completed mid-high level word finding tasks with 80% accuracy, to be completed within the next 4-6 weeks.  Patient completed mid level word finding task that involved guessing a word based in brief description+first letter phonemic constraint. Accuracy at 80% given additional time for task completion. Long errors present for word finding.     Goal 2. Yolis will complete executive functioning tasks with 80% accuracy, to be completed within the next 4-6 week  Did not target.    Goal 3. Yolis with follow verbally spoken and written 2-4 step commands with 80% accuracy, to be completed within the next 4-6 weeks.  Did not target.    Goal 4. Yolis will complete attention dependent structured tasks with 80% accuracy, to be completed within the next 4-6 weeks.  To target attention/immediate recall, Yolis was tasked with carrying out moderately complex 2-step directions for  completion of simple drawing task. Initial accuracy at 20%. (For one-step, accuracy at 80). She required 1 additional repetition of directions to achieve an accuracy of 80%.    Goal 5. Yolis will complete immediate, delayed, and working memory structured tasks, to be completed within the next 4-6 weeks.  Did not target.  -Continue with current plan of care.

## 2024-05-20 ENCOUNTER — EVALUATION (OUTPATIENT)
Facility: CLINIC | Age: 74
End: 2024-05-20
Payer: COMMERCIAL

## 2024-05-20 DIAGNOSIS — R48.8 OTHER SYMBOLIC DYSFUNCTIONS: ICD-10-CM

## 2024-05-20 DIAGNOSIS — R47.89 WORD FINDING DIFFICULTY: ICD-10-CM

## 2024-05-20 DIAGNOSIS — Z86.73 CHRONIC ISCHEMIC LEFT MCA STROKE: Primary | ICD-10-CM

## 2024-05-20 PROCEDURE — 92508 TX SP LANG VOICE COMM GROUP: CPT

## 2024-05-20 NOTE — PROGRESS NOTES
Speech-Language Pathology Re-Evaluation    Today's date: 2024   Patient’s name: Yolis Flores  : 1950  MRN: [id number]  Safety measures:   Referring provider: Shady Metcalf MD    Encounter Diagnosis     ICD-10-CM    1. Chronic ischemic left MCA stroke  Z86.73       2. Other symbolic dysfunctions  R48.8       3. 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 Regarding immediate memory, raw score for list learning declined from 24 to 14, but story memory increased from 11 to 16.  Language has remained relatively stable. Regarding attention, her raw score for the digit span subtest decreased from 11 to 7. This suggests poor attention for fleeting information presented verbally. Yolis's performance on the the visual-based divided attention subtest (coding) has remained consistent. Regarding delayed memory, Yolis's raw score on the list recognition subtest declined from 19 to 15, which was the reason for the decrease in the overall index score. Although testing reveals an overall decline, clinically, Yolis had demonstrated improvement across domains. She continues to display variability in performance across days. Due to significant progress made in previous re-evaluations, recommending to continue therapy for 3 more months since patients is not yet 1 year s/p CVA.       Goal 1. Patient will completed mid-high level word finding tasks with 80% accuracy, to be completed within the next 4-6 weeks.--PARTIALLY MET     Goal 2. Yolis will complete executive functioning tasks with 80% accuracy, to be completed within the next 4-6 week--PARTIALLY MET      Goal 3. Yolis with follow verbally spoken and written 2-4 step commands with 80% accuracy, to be completed within the next 4-6 weeks.--PARTIALLY MET     Goal 4. Yolis will complete attention dependent structured tasks with 80% accuracy, to be completed within  the next 4-6 weeks.--PARTIALLY MET      Goal 5. Yolis will complete immediate, delayed, and working memory structured tasks, to be completed within the next 4-6 weeks.--PARTIALLY MET     Plan:  Patient would benefit from outpatient skilled Speech Therapy services: Cognitive-linguistic therapy    Frequency: 1-2x weekly  Duration: 3 months    Intervention certification from: 5/20/2024  Intervention certification to: 8/20/2024      Subjective:  Arrived on time; remained well motivated.        Objective (testing):  The Repeatable Battery for the Assessment of Neuropsychological Status (RBANS) is a brief, individually-administered assessment which measures attention, language, visuospatial/constructional abilities, and immediate & delayed memory. The RBANS is intended for use with adolescents to adults, ages 12 to 89 years. The following results were obtained during the administration of the assessment.    Form: B    Cognitive Domain/Subtest: Index Score: Percentile Rank: Classification: RE Index Score: Status:   IMMEDIATE MEMORY 76 5%ile Low Average 83 DECLINE        1. List Learning (11/40)          2. Story Memory (16/24)           VISUOSPATIAL/  CONSTRUCTIONAL 69 2%ile Extremely Low 72 DECLINE        3. Figure Copy (14/20)          4. Line Orientation (12/20)           LANGUAGE 82 12%ile Low Average 85 DECLINE        5. Picture Naming (9/10)          6. Semantic Fluency (9/40)           ATTENTION 64 1%ile Extremely Low 75 DECLINE        7. Digit Span (7/16)          8. Coding (21/89)           DELAYED MEMORY 64 1%ile Extremely Low 87 DECLINE        9. List Recall (2/10)          10. List Recognition (15/20)          11. Story Recall (8/12)          12. Figure Recall (4/20)           Sum of Index Scores:  355  381 DECLINE   Total Score:  63      Percentile: 1%ile      Classification: Extremely Low          “RE” indicates the scores from the re-evaluation 2/21/2024 (Form: A)      *Patient named 9 concrete category  members (zoo animals) in 60 sec (norm=15+). -- BELOW AVERAGE          Visit Tracking:  Visit #28    Intervention comments:  Yolis participated well to today's re-evaluation.

## 2024-05-22 ENCOUNTER — OFFICE VISIT (OUTPATIENT)
Facility: CLINIC | Age: 74
End: 2024-05-22
Payer: COMMERCIAL

## 2024-05-22 DIAGNOSIS — R47.89 WORD FINDING DIFFICULTY: ICD-10-CM

## 2024-05-22 DIAGNOSIS — R48.8 OTHER SYMBOLIC DYSFUNCTIONS: ICD-10-CM

## 2024-05-22 DIAGNOSIS — Z86.73 CHRONIC ISCHEMIC LEFT MCA STROKE: Primary | ICD-10-CM

## 2024-05-22 PROCEDURE — 92508 TX SP LANG VOICE COMM GROUP: CPT

## 2024-05-22 NOTE — PROGRESS NOTES
Daily Speech Treatment Note    Today's date: 2024  Patient’s name: Yolis Flores  : 1950  MRN: [id number]    Referring provider: Shady Metcalf MD    Encounter Diagnosis     ICD-10-CM    1. Chronic ischemic left MCA stroke  Z86.73       2. Other symbolic dysfunctions  R48.8       3. Word finding difficulty  R47.89                   POC expires Auth Status Total   Visits  Start date  Expiration date PT/OT + Visit Limit? Co-Insurance   24 Authorized bomn 1/3/24 12/31/24   No                                                                       Visit/Unit Tracking  AUTH Status: Authorized  #[id number] Date             Visits  Authed: bomn Used 1 2 3 4 5 6 7 8 9 10      Remaining                     Visit tracking:  Visit #49    Subjective: Yolis arrived on time.  She attended unaccompanied and remained motivated throughout today's tx.                                                                                                                                   Goals  Short-term goals:    Goal 1. Patient will completed mid-high level word finding tasks with 80% accuracy, to be completed within the next 4-6 weeks.  Patient completed mid level word finding task that involved guessing a word based in brief description+three letters. . Accuracy at 60% given additional time for task completion. Long errors present for word finding. She benefited from min-mod clues for word finding.    Goal 2. Yolis will complete executive functioning tasks with 80% accuracy, to be completed within the next 4-6 week  Yolis completed simple time management scenarios (relating to time and dates). Accuracy at 60%. She benefited from min cues for information breakdown.    Goal 3. Yolis with follow verbally spoken and written 2-4 step commands with 80% accuracy, to be completed within the next 4-6 weeks.  Did not target.    Goal 4. Yolis will complete attention dependent structured tasks with  80% accuracy, to be completed within the next 4-6 weeks.  Did not target.    Goal 5. Yolis will complete immediate, delayed, and working memory structured tasks, to be completed within the next 4-6 weeks.  Did not target.  -Continue with current plan of care.

## 2024-05-29 ENCOUNTER — OFFICE VISIT (OUTPATIENT)
Facility: CLINIC | Age: 74
End: 2024-05-29
Payer: COMMERCIAL

## 2024-05-29 ENCOUNTER — TELEPHONE (OUTPATIENT)
Dept: GASTROENTEROLOGY | Facility: CLINIC | Age: 74
End: 2024-05-29

## 2024-05-29 DIAGNOSIS — R47.89 WORD FINDING DIFFICULTY: ICD-10-CM

## 2024-05-29 DIAGNOSIS — Z86.73 CHRONIC ISCHEMIC LEFT MCA STROKE: Primary | ICD-10-CM

## 2024-05-29 DIAGNOSIS — R48.8 OTHER SYMBOLIC DYSFUNCTIONS: ICD-10-CM

## 2024-05-29 PROCEDURE — 92508 TX SP LANG VOICE COMM GROUP: CPT

## 2024-05-29 NOTE — TELEPHONE ENCOUNTER
Our mutual patient is scheduled for procedure: colon and egd     On: 7/17/24     With: Dr. Rice ________    He/She is taking the following blood thinner:                                    Can this be stopped   days prior to the procedure?      Is this pt cleared for colon and egd ?      Physician Approving clearance: ________________________

## 2024-05-29 NOTE — PROGRESS NOTES
Daily Speech Treatment Note    Today's date: 2024  Patient’s name: Yolis Flores  : 1950  MRN: [id number]    Referring provider: Shady Metcalf MD    Encounter Diagnosis     ICD-10-CM    1. Chronic ischemic left MCA stroke  Z86.73       2. Other symbolic dysfunctions  R48.8       3. Word finding difficulty  R47.89                     POC expires Auth Status Total   Visits  Start date  Expiration date PT/OT + Visit Limit? Co-Insurance   24 Authorized bomn 1/3/24 12/31/24   No                                                                       Visit/Unit Tracking  AUTH Status: Authorized  #[id number] Date            Visits  Authed: bomn Used 1 2 3 4 5 6 7 8 9 10      Remaining                     Visit tracking:  Visit #49    Subjective: Yolis arrived on time.  She attended unaccompanied and remained motivated throughout today's tx.                                                                                                                                   Goals  Short-term goals:    Goal 1. Patient will completed mid-high level word finding tasks with 80% accuracy, to be completed within the next 4-6 weeks.  Did not target.     Goal 2. Yolis will complete executive functioning tasks with 80% accuracy, to be completed within the next 4-6 week  Yolis completed basic executive function based task that relied on categorization, processing, and mental flexibility. During this task she was given a list of two words; a general category and a a subcategory within that category (I.e. Colors--> Bright colors). She was then asked to generate a specific item within the subcategory to complete the list (I.e. Red). Accuracy at 100% given additional time for task completion.     She was then tasked to generate a general category for a provided subcategory, and specific item within the category (I.e. Uncomfortable weather--> High humidity). Accuracy for this portion of the task  at 20%. She required maximum cueing for task breakdown and reasoning.       Goal 3. Yolis with follow verbally spoken and written 2-4 step commands with 80% accuracy, to be completed within the next 4-6 weeks.  Did not target.    Goal 4. Yolis will complete attention dependent structured tasks with 80% accuracy, to be completed within the next 4-6 weeks.  Did not target.    Goal 5. Yolis will complete immediate, delayed, and working memory structured tasks, to be completed within the next 4-6 weeks.  Did not target.    -Continue with current plan of care.

## 2024-05-30 NOTE — TELEPHONE ENCOUNTER
I spoke to pt who can hold her plavix starting 7/12/24 and she should continue with her aspirin until 7/17/24. She should stop her aspirin on the day of procedure and restart plavix and aspirin after procedure if this is ok with Dr. Carter

## 2024-06-03 ENCOUNTER — APPOINTMENT (OUTPATIENT)
Facility: CLINIC | Age: 74
End: 2024-06-03
Payer: COMMERCIAL

## 2024-06-05 ENCOUNTER — APPOINTMENT (OUTPATIENT)
Facility: CLINIC | Age: 74
End: 2024-06-05
Payer: COMMERCIAL

## 2024-06-06 ENCOUNTER — REMOTE DEVICE CLINIC VISIT (OUTPATIENT)
Dept: CARDIOLOGY CLINIC | Facility: CLINIC | Age: 74
End: 2024-06-06
Payer: COMMERCIAL

## 2024-06-06 DIAGNOSIS — Z95.818 PRESENCE OF OTHER CARDIAC IMPLANTS AND GRAFTS: Primary | ICD-10-CM

## 2024-06-06 PROCEDURE — 93298 REM INTERROG DEV EVAL SCRMS: CPT | Performed by: INTERNAL MEDICINE

## 2024-06-06 NOTE — PROGRESS NOTES
"Results for orders placed or performed in visit on 06/06/24   Cardiac EP device report    Narrative    MDT LNQ22/ ACTIVE SYSTEM IS MRI CONDITIONAL  CARELINK TRANSMISSION: BATTERY STATUS \"OK.\" NO PATIENT OR DEVICE ACTIVATED EPISODES.--REYES         "

## 2024-06-10 ENCOUNTER — OFFICE VISIT (OUTPATIENT)
Facility: CLINIC | Age: 74
End: 2024-06-10
Payer: COMMERCIAL

## 2024-06-10 DIAGNOSIS — Z86.73 CHRONIC ISCHEMIC LEFT MCA STROKE: Primary | ICD-10-CM

## 2024-06-10 DIAGNOSIS — R47.89 WORD FINDING DIFFICULTY: ICD-10-CM

## 2024-06-10 DIAGNOSIS — R48.8 OTHER SYMBOLIC DYSFUNCTIONS: ICD-10-CM

## 2024-06-10 PROCEDURE — 92507 TX SP LANG VOICE COMM INDIV: CPT

## 2024-06-10 NOTE — PROGRESS NOTES
Daily Speech Treatment Note    Today's date: 6/10/2024  Patient’s name: Yolis Flores  : 1950  MRN: [id number]    Referring provider: Shady Metcalf MD    Encounter Diagnosis     ICD-10-CM    1. Chronic ischemic left MCA stroke  Z86.73       2. Other symbolic dysfunctions  R48.8       3. Word finding difficulty  R47.89             POC expires Auth Status Total   Visits  Start date  Expiration date PT/OT + Visit Limit? Co-Insurance   24 Authorized bomn 1/3/24 12/31/24   No                                                                       Visit/Unit Tracking  AUTH Status: Authorized  #[id number] Date 5/13 5/20 5/22 5/29 6/10          Visits  Authed: bomn Used 1 2 3 4 5 6 7 8 9 10      Remaining                     Visit tracking:  Visit #51    Subjective: Yolis arrived on time.  She attended unaccompanied and remained motivated throughout today's tx. Reports extreme fatigue; been going on for several months. She is following up with her PCP tomorrow.                                                                                                                                   Goals  Short-term goals:    Goal 1. Patient will completed mid-high level word finding tasks with 80% accuracy, to be completed within the next 4-6 weeks.  Did not target.     Goal 2. Yolis will complete executive functioning tasks with 80% accuracy, to be completed within the next 4-6 week    Goal 3. Yolis with follow verbally spoken and written 2-4 step commands with 80% accuracy, to be completed within the next 4-6 weeks.  Did not target.    Goal 4. Yolis will complete attention dependent structured tasks with 80% accuracy, to be completed within the next 4-6 weeks.  Yolis completed attention based task that combine card sorting and categorization. During this activity, she had to track one set of face cards, one set of number cards, and two unrelated categories. Visual support was not provided. She  displayed (7)  errors during the task. Long and overt pauses present for word finding. Accuracy decreased from previous session.     Goal 5. Yolis will complete immediate, delayed, and working memory structured tasks, to be completed within the next 4-6 weeks.  Immediate recall for 8 -item word list generated in goal 4 was at 75%. She benefited from verbal rehearsal and categorization system.     -Continue with current plan of care.

## 2024-06-12 ENCOUNTER — OFFICE VISIT (OUTPATIENT)
Facility: CLINIC | Age: 74
End: 2024-06-12
Payer: COMMERCIAL

## 2024-06-12 DIAGNOSIS — R47.89 WORD FINDING DIFFICULTY: ICD-10-CM

## 2024-06-12 DIAGNOSIS — Z86.73 CHRONIC ISCHEMIC LEFT MCA STROKE: Primary | ICD-10-CM

## 2024-06-12 DIAGNOSIS — R48.8 OTHER SYMBOLIC DYSFUNCTIONS: ICD-10-CM

## 2024-06-12 PROCEDURE — 92507 TX SP LANG VOICE COMM INDIV: CPT

## 2024-06-12 NOTE — PROGRESS NOTES
Daily Speech Treatment Note    Today's date: 2024  Patient’s name: Yolis Flores  : 1950  MRN: [id number]    Referring provider: Shady Metcalf MD    Encounter Diagnosis     ICD-10-CM    1. Chronic ischemic left MCA stroke  Z86.73       2. Other symbolic dysfunctions  R48.8       3. Word finding difficulty  R47.89               POC expires Auth Status Total   Visits  Start date  Expiration date PT/OT + Visit Limit? Co-Insurance   24 Authorized bomn 1/3/24 12/31/24   No                                                                       Visit/Unit Tracking  AUTH Status: Authorized  #[id number] Date 5/13 5/20 5/22 5/29 6/10 6/12         Visits  Authed: bomn Used 1 2 3 4 5 6 7 8 9 10      Remaining                     Visit tracking:  Visit #52    Subjective: Yolis arrived on time.  She attended unaccompanied and remained motivated throughout today's tx. She went to her PCP yesterday re: extreme fatigue. Waiting for blood work results.                                                                                                                                    Goals  Short-term goals:    Goal 1. Patient will completed mid-high level word finding tasks with 80% accuracy, to be completed within the next 4-6 weeks.  Mid level category matrix completed with 63% accuracy. Long and overt pauses were present for word finding. She benefited from min-mod semantic cueing to achieve an accuracy of 100%    Goal 2. Yolis will complete executive functioning tasks with 80% accuracy, to be completed within the next 4-6 week    Goal 3. Yolis with follow verbally spoken and written 2-4 step commands with 80% accuracy, to be completed within the next 4-6 weeks.  Did not target.    Goal 4. Yolis will complete attention dependent structured tasks with 80% accuracy, to be completed within the next 4-6 weeks.  Yolis completed attention based task that combine card sorting and categorization. During  this activity, she had to track one set of face cards, one set of number cards, and two unrelated categories. Visual support was not provided. She displayed (3) errors during the task. Long and overt pauses present for word finding. Accuracy increased from the previous session.     Goal 5. Yolis will complete immediate, delayed, and working memory structured tasks, to be completed within the next 4-6 weeks.  Did not target.    -Continue with current plan of care.

## 2024-06-17 ENCOUNTER — OFFICE VISIT (OUTPATIENT)
Facility: CLINIC | Age: 74
End: 2024-06-17
Payer: COMMERCIAL

## 2024-06-17 DIAGNOSIS — R47.89 WORD FINDING DIFFICULTY: ICD-10-CM

## 2024-06-17 DIAGNOSIS — Z86.73 CHRONIC ISCHEMIC LEFT MCA STROKE: Primary | ICD-10-CM

## 2024-06-17 DIAGNOSIS — R48.8 OTHER SYMBOLIC DYSFUNCTIONS: ICD-10-CM

## 2024-06-17 PROCEDURE — 92507 TX SP LANG VOICE COMM INDIV: CPT

## 2024-06-17 NOTE — PROGRESS NOTES
Daily Speech Treatment Note    Today's date: 2024  Patient’s name: Yolis Flores  : 1950  MRN: [id number]    Referring provider: Shady Metcalf MD    Encounter Diagnosis     ICD-10-CM    1. Chronic ischemic left MCA stroke  Z86.73       2. Other symbolic dysfunctions  R48.8       3. Word finding difficulty  R47.89                 POC expires Auth Status Total   Visits  Start date  Expiration date PT/OT + Visit Limit? Co-Insurance   24 Authorized bomn 1/3/24 12/31/24   No                                                                       Visit/Unit Tracking  AUTH Status: Authorized  #[id number] Date 5/13 5/20 5/22 5/29 6/10 6/12 6/17        Visits  Authed: bomn Used 1 2 3 4 5 6 7 8 9 10      Remaining                     Visit tracking:  Visit #53    Subjective: Yolis arrived on time.  She attended unaccompanied and remained motivated throughout today's tx. Reports she got blood work results back and she has Lyme. She started antibiotics this week. Reports extreme fatigue and brain fog.                                                                                                                                   Goals  Short-term goals:    Goal 1. Patient will completed mid-high level word finding tasks with 80% accuracy, to be completed within the next 4-6 weeks.  Did not target.    Goal 2. Yolis will complete executive functioning tasks with 80% accuracy, to be completed within the next 4-6 week  Did not target.     Goal 3. Yolis with follow verbally spoken and written 2-4 step commands with 80% accuracy, to be completed within the next 4-6 weeks.  Did not target.    Goal 4. Yolis will complete attention dependent structured tasks with 80% accuracy, to be completed within the next 4-6 weeks.  Yolis completed attention based task that combine card sorting and categorization. During this activity, she had to track one set of face cards, one set of number cards, and two  unrelated categories. Visual support was not provided. She displayed (2) sorting errors during the task. Long and overt pauses present for word finding. Accuracy increased from the previous session.     Goal 5. Yolis will complete immediate, delayed, and working memory structured tasks, to be completed within the next 4-6 weeks.  Yolis was tasked with answering questions about a 4-item word list that was read aloud. (Relating to attribute inclusion-abstract). Initial accuracy at 60%. When word were repeated 1 additional time, accuracy of 100% achieved.     -Continue with current plan of care.

## 2024-06-19 ENCOUNTER — OFFICE VISIT (OUTPATIENT)
Facility: CLINIC | Age: 74
End: 2024-06-19
Payer: COMMERCIAL

## 2024-06-19 DIAGNOSIS — Z86.73 CHRONIC ISCHEMIC LEFT MCA STROKE: Primary | ICD-10-CM

## 2024-06-19 DIAGNOSIS — R47.89 WORD FINDING DIFFICULTY: ICD-10-CM

## 2024-06-19 DIAGNOSIS — R48.8 OTHER SYMBOLIC DYSFUNCTIONS: ICD-10-CM

## 2024-06-19 PROCEDURE — 92507 TX SP LANG VOICE COMM INDIV: CPT

## 2024-06-19 NOTE — PROGRESS NOTES
Daily Speech Treatment Note    Today's date: 2024  Patient’s name: Yolis Flores  : 1950  MRN: [id number]    Referring provider: Shady Metcalf MD    Encounter Diagnosis     ICD-10-CM    1. Chronic ischemic left MCA stroke  Z86.73       2. Other symbolic dysfunctions  R48.8       3. Word finding difficulty  R47.89                   POC expires Auth Status Total   Visits  Start date  Expiration date PT/OT + Visit Limit? Co-Insurance   24 Authorized bomn 1/3/24 12/31/24   No                                                                       Visit/Unit Tracking  AUTH Status: Authorized  #[id number] Date 5/13 5/20 5/22 5/29 6/10 6/12 6/17 6/19       Visits  Authed: bomn Used 1 2 3 4 5 6 7 8 9 10      Remaining                     Visit tracking:  Visit #54    Subjective: oYlis arrived 15 mins late. She attended unaccompanied and remained motivated throughout today's tx.                                                                                                                                    Goals  Short-term goals:    Goal 1. Patient will completed mid-high level word finding tasks with 80% accuracy, to be completed within the next 4-6 weeks.  Did not target.    Goal 2. Yolis will complete executive functioning tasks with 80% accuracy, to be completed within the next 4-6 week  Mid level executive function based task that relied on attention, organization, and word finding was completed with 60% accuracy. Yolis benefited from min-mod clues for attention and word finding to achieve an accuracy of 100%    Goal 3. Yolis with follow verbally spoken and written 2-4 step commands with 80% accuracy, to be completed within the next 4-6 weeks.  Did not target.    Goal 4. Yolis will complete attention dependent structured tasks with 80% accuracy, to be completed within the next 4-6 weeks.  Did not target.    Goal 5. Yolis will complete immediate, delayed, and working memory  structured tasks, to be completed within the next 4-6 weeks.  Did not target.    -Continue with current plan of care.

## 2024-06-24 ENCOUNTER — OFFICE VISIT (OUTPATIENT)
Facility: CLINIC | Age: 74
End: 2024-06-24
Payer: COMMERCIAL

## 2024-06-24 DIAGNOSIS — R48.8 OTHER SYMBOLIC DYSFUNCTIONS: ICD-10-CM

## 2024-06-24 DIAGNOSIS — R47.89 WORD FINDING DIFFICULTY: ICD-10-CM

## 2024-06-24 DIAGNOSIS — Z86.73 CHRONIC ISCHEMIC LEFT MCA STROKE: Primary | ICD-10-CM

## 2024-06-24 PROCEDURE — 92507 TX SP LANG VOICE COMM INDIV: CPT

## 2024-06-24 NOTE — PROGRESS NOTES
Daily Speech Treatment Note    Today's date: 2024  Patient’s name: Yolis Flores  : 1950  MRN: [id number]    Referring provider: Shady Metcalf MD    Encounter Diagnosis     ICD-10-CM    1. Chronic ischemic left MCA stroke  Z86.73       2. Other symbolic dysfunctions  R48.8       3. Word finding difficulty  R47.89                     POC expires Auth Status Total   Visits  Start date  Expiration date PT/OT + Visit Limit? Co-Insurance   24 Authorized bomn 1/3/24 12/31/24   No                                                                       Visit/Unit Tracking  AUTH Status: Authorized  #[id number] Date 5/13 5/20 5/22 5/29 6/10 6/12 6/17 6/19 6/24      Visits  Authed: bomn Used 1 2 3 4 5 6 7 8 9 10      Remaining                     Visit tracking:  Visit #55    Subjective: Yolis arrived 15 mins late. She attended unaccompanied and remained motivated throughout today's tx.                                                                                                                                    Goals  Short-term goals:    Goal 1. Patient will completed mid-high level word finding tasks with 80% accuracy, to be completed within the next 4-6 weeks.  Mid level category matrix completed with 50% initial accuracy. Long and overt pauses present for word finding. She benefited from min-mod semantic clues for specificity and word finding.    Goal 2. Yolis will complete executive functioning tasks with 80% accuracy, to be completed within the next 4-6 week.  Did not target.    Goal 3. Yolis with follow verbally spoken and written 2-4 step commands with 80% accuracy, to be completed within the next 4-6 weeks.  Did not target.    Goal 4. Yolis will complete attention dependent structured tasks with 80% accuracy, to be completed within the next 4-6 weeks.  Did not target.    Goal 5. Yolis will complete immediate, delayed, and working memory structured tasks, to be completed within  the next 4-6 weeks.  Immediate recall for 12 units of information from category matrix in (goal 1) was at 60% accuracy. Increased to 80% given min semantic clues.    -Continue with current plan of care.

## 2024-06-26 ENCOUNTER — OFFICE VISIT (OUTPATIENT)
Facility: CLINIC | Age: 74
End: 2024-06-26
Payer: COMMERCIAL

## 2024-06-26 DIAGNOSIS — Z86.73 CHRONIC ISCHEMIC LEFT MCA STROKE: Primary | ICD-10-CM

## 2024-06-26 DIAGNOSIS — R47.89 WORD FINDING DIFFICULTY: ICD-10-CM

## 2024-06-26 DIAGNOSIS — R48.8 OTHER SYMBOLIC DYSFUNCTIONS: ICD-10-CM

## 2024-06-26 PROCEDURE — 92507 TX SP LANG VOICE COMM INDIV: CPT

## 2024-06-26 NOTE — PROGRESS NOTES
Daily Speech Treatment Note/ Progress Summary     Today's date: 2024  Patient’s name: Yolis Flores  : 1950  MRN: [id number]    Referring provider: Shady Metcalf MD    Encounter Diagnosis     ICD-10-CM    1. Chronic ischemic left MCA stroke  Z86.73       2. Other symbolic dysfunctions  R48.8       3. Word finding difficulty  R47.89                       POC expires Auth Status Total   Visits  Start date  Expiration date PT/OT + Visit Limit? Co-Insurance   24 Authorized bomn 1/3/24 12/31/24   No                                                                       Visit/Unit Tracking  AUTH Status: Authorized  #[id number] Date 5/13 5/20 5/22 5/29 6/10 6/12 6/17 6/19 6/24 6/26     Visits  Authed: bomn Used 1 2 3 4 5 6 7 8 9 10      Remaining                     Visit tracking:  Visit #55    Subjective: Yolis arrived on time. She attended unaccompanied and remained motivated throughout today's tx.                                                                                                                                    Goals  Short-term goals:    Goal 1. Patient will completed mid-high level word finding tasks with 80% accuracy, to be completed within the next 4-6 weeks.  Did not targer.    Goal 2. Yolis will complete executive functioning tasks with 80% accuracy, to be completed within the next 4-6 week.  Did not target.    Goal 3. Yolis with follow verbally spoken and written 2-4 step commands with 80% accuracy, to be completed within the next 4-6 weeks.  Did not target.    Goal 4. Yolis will complete attention dependent structured tasks with 80% accuracy, to be completed within the next 4-6 weeks.  Did not target.    Goal 5. Yolis will complete immediate, delayed, and working memory structured tasks, to be completed within the next 4-6 weeks.  Simple deduction puzzle that involved using written clues and organizing the information into a grid was completed at 30% initial  accuracy. Yolis had difficulty analyzing clues and determining which ones were solvable. She benefited simplification and breakdown of clues.    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 She has demonstrated improvements in structured attention, language, and executive function based tasks. She is an active participant in therapy and is motivated to improve. Her impairments continue to impact her ability to work; recommending to continue skilled therapy to maximize independence and participation in ADLS.    -Continue with current plan of care.

## 2024-07-01 ENCOUNTER — APPOINTMENT (OUTPATIENT)
Facility: CLINIC | Age: 74
End: 2024-07-01
Payer: COMMERCIAL

## 2024-07-03 ENCOUNTER — ANESTHESIA (OUTPATIENT)
Dept: ANESTHESIOLOGY | Facility: HOSPITAL | Age: 74
End: 2024-07-03

## 2024-07-03 ENCOUNTER — ANESTHESIA EVENT (OUTPATIENT)
Dept: ANESTHESIOLOGY | Facility: HOSPITAL | Age: 74
End: 2024-07-03

## 2024-07-08 ENCOUNTER — OFFICE VISIT (OUTPATIENT)
Facility: CLINIC | Age: 74
End: 2024-07-08
Payer: COMMERCIAL

## 2024-07-08 DIAGNOSIS — Z86.73 CHRONIC ISCHEMIC LEFT MCA STROKE: Primary | ICD-10-CM

## 2024-07-08 DIAGNOSIS — R48.8 OTHER SYMBOLIC DYSFUNCTIONS: ICD-10-CM

## 2024-07-08 DIAGNOSIS — R47.89 WORD FINDING DIFFICULTY: ICD-10-CM

## 2024-07-08 PROCEDURE — 92507 TX SP LANG VOICE COMM INDIV: CPT

## 2024-07-08 NOTE — PROGRESS NOTES
Daily Speech Treatment Note/ Progress Summary     Today's date: 2024  Patient’s name: Yolis Flores  : 1950  MRN: [id number]    Referring provider: Shady Metcalf MD    Encounter Diagnosis     ICD-10-CM    1. Chronic ischemic left MCA stroke  Z86.73       2. Other symbolic dysfunctions  R48.8       3. Word finding difficulty  R47.89               POC expires Auth Status Total   Visits  Start date  Expiration date PT/OT + Visit Limit? Co-Insurance   24 Authorized bomn 1/3/24 12/31/24   No                                                                       Visit/Unit Tracking  AUTH Status: Authorized  #[id number] Date             Visits  Authed: bomn Used 1 2 3 4 5 6 7 8 9 10    Remaining                   Visit tracking:  Visit #57    Subjective: Yolis arrived on time. She attended unaccompanied and remained motivated throughout today's tx.                                                                                                                                    Goals  Short-term goals:    Goal 1. Patient will completed mid-high level word finding tasks with 80% accuracy, to be completed within the next 4-6 weeks.  Yolis was tasked with generating synonyms for a list of (19) words. Accuracy at 60%. She benefited from hearing words presented in a sentence with increased accuracy.    Goal 2. Yolis will complete executive functioning tasks with 80% accuracy, to be completed within the next 4-6 week.  Did not target.    Goal 3. Yolis with follow verbally spoken and written 2-4 step commands with 80% accuracy, to be completed within the next 4-6 weeks.  Did not target.    Goal 4. Yolis will complete attention dependent structured tasks with 80% accuracy, to be completed within the next 4-6 weeks.  Did not target.    Goal 5. Yolis will complete immediate, delayed, and working memory structured tasks, to be completed within the next 4-6 weeks.  Did not  target.      -Continue with current plan of care.

## 2024-07-10 ENCOUNTER — OFFICE VISIT (OUTPATIENT)
Facility: CLINIC | Age: 74
End: 2024-07-10
Payer: COMMERCIAL

## 2024-07-10 DIAGNOSIS — R47.89 WORD FINDING DIFFICULTY: ICD-10-CM

## 2024-07-10 DIAGNOSIS — Z86.73 CHRONIC ISCHEMIC LEFT MCA STROKE: Primary | ICD-10-CM

## 2024-07-10 DIAGNOSIS — R48.8 OTHER SYMBOLIC DYSFUNCTIONS: ICD-10-CM

## 2024-07-10 PROCEDURE — 92507 TX SP LANG VOICE COMM INDIV: CPT

## 2024-07-10 NOTE — PROGRESS NOTES
Daily Speech Treatment Note/ Progress Summary     Today's date: 7/10/2024  Patient’s name: Yolis Flores  : 1950  MRN: [id number]    Referring provider: Shady Metcalf MD    Encounter Diagnosis     ICD-10-CM    1. Chronic ischemic left MCA stroke  Z86.73       2. Other symbolic dysfunctions  R48.8       3. Word finding difficulty  R47.89                 POC expires Auth Status Total   Visits  Start date  Expiration date PT/OT + Visit Limit? Co-Insurance   24 Authorized bomn 1/3/24 12/31/24   No                                                                       Visit/Unit Tracking  AUTH Status: Authorized  #[id number] Date 7/8 7/10           Visits  Authed: bomn Used 1 2 3 4 5 6 7 8 9 10    Remaining                   Visit tracking:  Visit #58    Subjective: Yolis arrived on time. She attended unaccompanied and remained motivated throughout today's tx.                                                                                                                                    Goals  Short-term goals:    Goal 1. Patient will completed mid-high level word finding tasks with 80% accuracy, to be completed within the next 4-6 weeks.  Basic category matrix was attempted; not completed due to time constraints.     Goal 2. Yolis will complete executive functioning tasks with 80% accuracy, to be completed within the next 4-6 week.  Basic executive function based task that relied on language processing, organization, attention, and deduction was completed with 75% initial accuracy. Benefited from breakdown of information and cueing for process of elimination.    Goal 3. Yolis with follow verbally spoken and written 2-4 step commands with 80% accuracy, to be completed within the next 4-6 weeks.  Did not target.    Goal 4. Yolis will complete attention dependent structured tasks with 80% accuracy, to be completed within the next 4-6 weeks.  Did not target.    Goal 5. Yolis will complete  immediate, delayed, and working memory structured tasks, to be completed within the next 4-6 weeks.  Yolis was tasked with answering questions about a 4-item word list that was read aloud. (Relating to category inclusion). Accuracy at 80%.  For questions relating to category exclusion, accuracy at 100%.      -Continue with current plan of care.

## 2024-07-15 ENCOUNTER — TELEPHONE (OUTPATIENT)
Age: 74
End: 2024-07-15

## 2024-07-15 ENCOUNTER — OFFICE VISIT (OUTPATIENT)
Facility: CLINIC | Age: 74
End: 2024-07-15
Payer: COMMERCIAL

## 2024-07-15 DIAGNOSIS — R48.8 OTHER SYMBOLIC DYSFUNCTIONS: ICD-10-CM

## 2024-07-15 DIAGNOSIS — Z86.73 CHRONIC ISCHEMIC LEFT MCA STROKE: Primary | ICD-10-CM

## 2024-07-15 DIAGNOSIS — R47.89 WORD FINDING DIFFICULTY: ICD-10-CM

## 2024-07-15 PROCEDURE — 92507 TX SP LANG VOICE COMM INDIV: CPT

## 2024-07-15 NOTE — TELEPHONE ENCOUNTER
Pt rescheduled colon/EGD with Dr Brar on 07/17/24 to 09/09/24 due to scheduling conflicts. I spoke with Rylee at [facility] and let her know.

## 2024-07-15 NOTE — PROGRESS NOTES
Daily Speech Treatment Note/ Progress Summary     Today's date: 7/15/2024  Patient’s name: Yolis Flores  : 1950  MRN: [id number]    Referring provider: Shady Metcalf MD    Encounter Diagnosis     ICD-10-CM    1. Chronic ischemic left MCA stroke  Z86.73       2. Other symbolic dysfunctions  R48.8       3. Word finding difficulty  R47.89                   POC expires Auth Status Total   Visits  Start date  Expiration date PT/OT + Visit Limit? Co-Insurance   24 Authorized bomn 1/3/24 12/31/24   No                                                                       Visit/Unit Tracking  AUTH Status: Authorized  #[id number] Date 7/8 7/10 7/15          Visits  Authed: bomn Used 1 2 3 4 5 6 7 8 9 10    Remaining                   Visit tracking:  Visit #58    Subjective: Yolis arrived on time. She attended unaccompanied and remained motivated throughout today's tx.                                                                                                                                    Goals  Short-term goals:    Goal 1. Patient will completed mid-high level word finding tasks with 80% accuracy, to be completed within the next 4-6 weeks.  Basic category matrix was attempted; not completed due to time constraints.     Goal 2. Yolis will complete executive functioning tasks with 80% accuracy, to be completed within the next 4-6 week.  Basic deduction puzzle (number 1 from [facility]) was attempted; initial accuracy at 60%. The puzzle was modified by re-sequencing the clues so that they appear in the order in which they need to be solved. Yolsi was advised not to skip clues. This setup establishes clue viability and thus reduces cognitive load.     Goal 3. Yolis with follow verbally spoken and written 2-4 step commands with 80% accuracy, to be completed within the next 4-6 weeks.  Did not target.    Goal 4. Yolis will complete attention dependent structured tasks with 80% accuracy, to be  completed within the next 4-6 weeks.  Did not target.    Goal 5. Yolis will complete immediate, delayed, and working memory structured tasks, to be completed within the next 4-6 weeks.  Did not target.       -Continue with current plan of care.

## 2024-07-22 ENCOUNTER — OFFICE VISIT (OUTPATIENT)
Facility: CLINIC | Age: 74
End: 2024-07-22
Payer: COMMERCIAL

## 2024-07-22 DIAGNOSIS — R47.89 WORD FINDING DIFFICULTY: ICD-10-CM

## 2024-07-22 DIAGNOSIS — Z86.73 CHRONIC ISCHEMIC LEFT MCA STROKE: Primary | ICD-10-CM

## 2024-07-22 DIAGNOSIS — R48.8 OTHER SYMBOLIC DYSFUNCTIONS: ICD-10-CM

## 2024-07-22 PROCEDURE — 92507 TX SP LANG VOICE COMM INDIV: CPT

## 2024-07-22 NOTE — PROGRESS NOTES
Daily Speech Treatment Note     Today's date: 2024  Patient’s name: Yolis Flores  : 1950  MRN: [id number]    Referring provider: Shady Metcalf MD    Encounter Diagnosis     ICD-10-CM    1. Chronic ischemic left MCA stroke  Z86.73       2. Other symbolic dysfunctions  R48.8       3. Word finding difficulty  R47.89             POC expires Auth Status Total   Visits  Start date  Expiration date PT/OT + Visit Limit? Co-Insurance   24 Authorized bomn 1/3/24 12/31/24   No                                                                       Visit/Unit Tracking  AUTH Status: Authorized  #[id number] Date 7/8 7/10 7/15 7/22         Visits  Authed: bomn Used 1 2 3 4 5 6 7 8 9 10    Remaining                   Visit tracking:  Visit #59    Subjective: Yolis arrived on time. She attended unaccompanied and remained motivated throughout today's tx.                                                                                                                                    Goals  Short-term goals:    Goal 1. Patient will completed mid-high level word finding tasks with 80% accuracy, to be completed within the next 4-6 weeks.  Generative naming for abstract category ([place]) was at 85% accuracy. Long and over pauses present for word finding.   Goal 2. Yolis will complete executive functioning tasks with 80% accuracy, to be completed within the next 4-6 week.    Goal 3. Yolis with follow verbally spoken and written 2-4 step commands with 80% accuracy, to be completed within the next 4-6 weeks.  Did not target.    Goal 4. Yolis will complete attention dependent structured tasks with 80% accuracy, to be completed within the next 4-6 weeks.  Yolis completed attention based task that combined card sorting and categorization. During this activity, she had to track one set of face cards, one set of number cards, and two unrelated categories. Visual support was provided. She displayed  (5) errors during the task. Long and overt pauses present for word finding. Accuracy decreased from the previous session.       Goal 5. Yolis will complete immediate, delayed, and working memory structured tasks, to be completed within the next 4-6 weeks.  Yolis was tasked with answering questions about a 4-item word list that was read aloud. (Relating to category exclusion. Accuracy at 50%. She benefited from repetition (x1) to achieve an accuracy of 100%          -Continue with current plan of care.

## 2024-07-24 ENCOUNTER — OFFICE VISIT (OUTPATIENT)
Facility: CLINIC | Age: 74
End: 2024-07-24
Payer: COMMERCIAL

## 2024-07-24 DIAGNOSIS — Z86.73 CHRONIC ISCHEMIC LEFT MCA STROKE: Primary | ICD-10-CM

## 2024-07-24 DIAGNOSIS — R47.89 WORD FINDING DIFFICULTY: ICD-10-CM

## 2024-07-24 DIAGNOSIS — R48.8 OTHER SYMBOLIC DYSFUNCTIONS: ICD-10-CM

## 2024-07-24 PROCEDURE — 92507 TX SP LANG VOICE COMM INDIV: CPT

## 2024-07-24 NOTE — PROGRESS NOTES
Daily Speech Treatment Note     Today's date: 2024  Patient’s name: Yolis Flores  : 1950  MRN: [id number]    Referring provider: Shady Metcalf MD    Encounter Diagnosis     ICD-10-CM    1. Chronic ischemic left MCA stroke  Z86.73       2. Other symbolic dysfunctions  R48.8       3. Word finding difficulty  R47.89               POC expires Auth Status Total   Visits  Start date  Expiration date PT/OT + Visit Limit? Co-Insurance   24 Authorized bomn 1/3/24 12/31/24   No                                                                       Visit/Unit Tracking  AUTH Status: Authorized  #[id number] Date 7/8 7/10 7/15 7/22 7/24        Visits  Authed: bomn Used 1 2 3 4 5 6 7 8 9 10    Remaining                   Visit tracking:  Visit #60    Subjective: Yolis arrived on time. She attended unaccompanied and remained motivated throughout today's tx.                                                                                                                                    Goals  Short-term goals:    Goal 1. Patient will completed mid-high level word finding tasks with 80% accuracy, to be completed within the next 4-6 weeks.  Basic category matrix puzzle was completed with 68% accuracy. Long pauses were present for word finding. She benefited from min-mod semantic cueing for word finding and specificity.    Goal 2. Yolis will complete executive functioning tasks with 80% accuracy, to be completed within the next 4-6 week.    Goal 3. Yolis with follow verbally spoken and written 2-4 step commands with 80% accuracy, to be completed within the next 4-6 weeks.  Did not target.    Goal 4. Yolis will complete attention dependent structured tasks with 80% accuracy, to be completed within the next 4-6 weeks.  Yolis completed attention based task that combined card sorting and categorization. During this activity, she had to track two sets of number cards, and two unrelated categories.  Visual support was provided. She displayed (3) errors during the task. Long and overt pauses present for word finding. Accuracy increased from the previous session.       Goal 5. Yolis will complete immediate, delayed, and working memory structured tasks, to be completed within the next 4-6 weeks.  Immediate recall for 8 item word list generated during goal 1 was at 87% She benefited from verbal rehearsal and a written outline with self generated subcategories. This helped with organization and recall. Delayed recall remained stable at 87%          -Continue with current plan of care.

## 2024-07-29 ENCOUNTER — OFFICE VISIT (OUTPATIENT)
Facility: CLINIC | Age: 74
End: 2024-07-29
Payer: COMMERCIAL

## 2024-07-29 DIAGNOSIS — R48.8 OTHER SYMBOLIC DYSFUNCTIONS: ICD-10-CM

## 2024-07-29 DIAGNOSIS — R47.89 WORD FINDING DIFFICULTY: ICD-10-CM

## 2024-07-29 DIAGNOSIS — Z86.73 CHRONIC ISCHEMIC LEFT MCA STROKE: Primary | ICD-10-CM

## 2024-07-29 PROCEDURE — 92507 TX SP LANG VOICE COMM INDIV: CPT

## 2024-07-29 NOTE — PROGRESS NOTES
Daily Speech Treatment Note     Today's date: 2024  Patient’s name: Yolis Flores  : 1950  MRN: [id number]    Referring provider: Shady Metcalf MD    Encounter Diagnosis     ICD-10-CM    1. Chronic ischemic left MCA stroke  Z86.73       2. Other symbolic dysfunctions  R48.8       3. Word finding difficulty  R47.89                 POC expires Auth Status Total   Visits  Start date  Expiration date PT/OT + Visit Limit? Co-Insurance   24 Authorized bomn 1/3/24 12/31/24   No                                                                       Visit/Unit Tracking  AUTH Status: Authorized  #[id number] Date 7/8 7/10 7/15 7/22 7/24 7/29       Visits  Authed: bomn Used 1 2 3 4 5 6 7 8 9 10    Remaining                   Visit tracking:  Visit #62    Subjective: Yolis arrived 15 minutes late; she remained motivated throughout today's treatment session.                                                                                                                                   Goals  Short-term goals:    Goal 1. Patient will completed mid-high level word finding tasks with 80% accuracy, to be completed within the next 4-6 weeks.  Did not target.    Goal 2. Yolis will complete executive functioning tasks with 80% accuracy, to be completed within the next 4-6 week.  Yolis completed basic deduction task ([employer]) with 40% initial accuracy. Required min-mod cues for attention + breakdown of information to attain an accuracy of 100%    Goal 3. Yolis with follow verbally spoken and written 2-4 step commands with 80% accuracy, to be completed within the next 4-6 weeks.  Did not target.    Goal 4. Yolis will complete attention dependent structured tasks with 80% accuracy, to be completed within the next 4-6 weeks.  Did not target.      Goal 5. Yolis will complete immediate, delayed, and working memory structured tasks, to be completed within the next 4-6 weeks.  Did not  target.      -Continue with current plan of care.

## 2024-07-31 ENCOUNTER — APPOINTMENT (OUTPATIENT)
Facility: CLINIC | Age: 74
End: 2024-07-31
Payer: COMMERCIAL

## 2024-08-07 ENCOUNTER — APPOINTMENT (OUTPATIENT)
Facility: CLINIC | Age: 74
End: 2024-08-07
Payer: COMMERCIAL

## 2024-08-08 ENCOUNTER — TELEPHONE (OUTPATIENT)
Dept: GASTROENTEROLOGY | Facility: CLINIC | Age: 74
End: 2024-08-08

## 2024-08-08 NOTE — TELEPHONE ENCOUNTER
Lm with  for pt to call back. Since pt was r/s to 9/9//24 for her egd and colon I resent her plavix clearance  to . She is to hold her plavix for 5 days starting on 9/4/24 but continue taking her aspirin. Both plavix and aspirin would be held on the day of procedure until after the procedure. Sb

## 2024-08-14 ENCOUNTER — OFFICE VISIT (OUTPATIENT)
Facility: CLINIC | Age: 74
End: 2024-08-14
Payer: COMMERCIAL

## 2024-08-14 DIAGNOSIS — R47.89 WORD FINDING DIFFICULTY: ICD-10-CM

## 2024-08-14 DIAGNOSIS — R48.8 OTHER SYMBOLIC DYSFUNCTIONS: ICD-10-CM

## 2024-08-14 DIAGNOSIS — Z86.73 CHRONIC ISCHEMIC LEFT MCA STROKE: Primary | ICD-10-CM

## 2024-08-14 PROCEDURE — 92507 TX SP LANG VOICE COMM INDIV: CPT

## 2024-08-14 NOTE — PROGRESS NOTES
Daily Speech Treatment Note     Today's date: 2024  Patient’s name: Yolis Flores  : 1950  MRN: [id number]    Referring provider: Shady Metcalf MD    Encounter Diagnosis     ICD-10-CM    1. Chronic ischemic left MCA stroke  Z86.73       2. Other symbolic dysfunctions  R48.8       3. Word finding difficulty  R47.89                   POC expires Auth Status Total   Visits  Start date  Expiration date PT/OT + Visit Limit? Co-Insurance   24 Authorized bomn 1/3/24 12/31/24   No                                                                       Visit/Unit Tracking  AUTH Status: Authorized  #[id number] Date 7/8 7/10 7/15 7/22 7/24 7/29 8/14      Visits  Authed: bomn Used 1 2 3 4 5 6 7 8 9 10    Remaining                   Visit tracking:  Visit #62    Subjective: Yolis arrived on time; she remained motivated throughout today's treatment session.                                                                                                                                   Goals  Short-term goals:    Goal 1. Patient will completed mid-high level word finding tasks with 80% accuracy, to be completed within the next 4-6 weeks.  Basic level category matrix was completed with 76% accuracy. Pauses for word finding were present. Benefited from min-mod cueing for specificity and word finding.     Goal 2. Yolis will complete executive functioning tasks with 80% accuracy, to be completed within the next 4-6 week.  Basic executive function task that relied on organization and language processing (Tour of [facility]) was completed with 20% initial accuracy. Yolis required breakdown of information to complete task.     Goal 3. Yolis with follow verbally spoken and written 2-4 step commands with 80% accuracy, to be completed within the next 4-6 weeks.  Did not target.    Goal 4. Yolis will complete attention dependent structured tasks with 80% accuracy, to be completed within the next 4-6 weeks.  Did  not target.      Goal 5. Yolis will complete immediate, delayed, and working memory structured tasks, to be completed within the next 4-6 weeks.  Immediate recall for 16-item word list generated in (goal 1) was at 80%. During this task, category headings and phonemes were displayed.       -Continue with current plan of care.

## 2024-08-19 ENCOUNTER — APPOINTMENT (OUTPATIENT)
Facility: CLINIC | Age: 74
End: 2024-08-19
Payer: COMMERCIAL

## 2024-08-21 ENCOUNTER — APPOINTMENT (OUTPATIENT)
Facility: CLINIC | Age: 74
End: 2024-08-21
Payer: COMMERCIAL

## 2024-08-25 ENCOUNTER — ANESTHESIA (OUTPATIENT)
Dept: ANESTHESIOLOGY | Facility: HOSPITAL | Age: 74
End: 2024-08-25

## 2024-08-25 ENCOUNTER — ANESTHESIA EVENT (OUTPATIENT)
Dept: ANESTHESIOLOGY | Facility: HOSPITAL | Age: 74
End: 2024-08-25

## 2024-08-26 ENCOUNTER — EVALUATION (OUTPATIENT)
Facility: CLINIC | Age: 74
End: 2024-08-26
Payer: COMMERCIAL

## 2024-08-26 DIAGNOSIS — R48.8 OTHER SYMBOLIC DYSFUNCTIONS: ICD-10-CM

## 2024-08-26 DIAGNOSIS — Z86.73 CHRONIC ISCHEMIC LEFT MCA STROKE: Primary | ICD-10-CM

## 2024-08-26 DIAGNOSIS — R47.89 WORD FINDING DIFFICULTY: ICD-10-CM

## 2024-08-26 PROCEDURE — 96125 COGNITIVE TEST BY HC PRO: CPT

## 2024-08-26 NOTE — PROGRESS NOTES
Speech-Language Pathology Re-Evaluation    Today's date: 2024   Patient’s name: Yolis Flores  : 1950  MRN: [id number]  Safety measures:   Referring provider: Shady Metcalf MD    Encounter Diagnosis     ICD-10-CM    1. Chronic ischemic left MCA stroke  Z86.73       2. Other symbolic dysfunctions  R48.8       3. 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 She has made moderate gains across domains. Regarding immediate memory, her raw score on the list learning subtest increased from 11 to 22, which suggests improvements in attention, language processing, and immediate recall. Regarding expressive language, fluency naming (also known as generative naming) remains below average. Generative naming involves rapid naming within specific semantic or phonemic boundaries, specifically fruits and vegetables.  Generative naming is a means of assessing executive function within the setting of expressive language as the task combines naming, working memory, self monitoring and sustained attention.Yolis demonstrated improvements on digit span subtest which suggests improvements in attention for fleeting information presented verbally. Regarding delayed memory, Yolis's raw score on the list recognition subtest increased from (15) to (20). Since her initial evaluation (2023), Yolis has shown significant improvements across domains. Her sum of index scores has increased from 361 to 425. This increase has brought her from the extremely low to low average percentile rank. Recommending to consider discharge in 6-8 weeks and establish home exercise program.     Goal 1. Patient will completed mid-high level word finding tasks with 80% accuracy, to be completed within the next 4-6 weeks.--PARTIALLY MET      Goal 2. Yolis will complete executive functioning tasks with 80% accuracy, to be completed  within the next 4-6 week--PARTIALLY MET      Goal 3. Yolis with follow verbally spoken and written 2-4 step commands with 80% accuracy, to be completed within the next 4-6 weeks.--PARTIALLY MET      Goal 4. Yolis will complete attention dependent structured tasks with 80% accuracy, to be completed within the next 4-6 weeks.--PARTIALLY MET      Goal 5. Yolis will complete immediate, delayed, and working memory structured tasks, to be completed within the next 4-6 weeks.--PARTIALLY MET       Plan:  Patient would benefit from outpatient skilled Speech Therapy services: Cognitive-linguistic therapy    Frequency: 1-2x weekly  Duration: 6-8 weeks    Intervention certification from: 8/26/2024  Intervention certification to: 11/26/2024      Subjective:  Arrived on time; remained well motivated.      Objective (testing):The Repeatable Battery for the Assessment of Neuropsychological Status (RBANS) is a brief, individually-administered assessment which measures attention, language, visuospatial/constructional abilities, and immediate & delayed memory. The RBANS is intended for use with adolescents to adults, ages 12 to 89 years. The following results were obtained during the administration of the assessment.    Form: A    Cognitive Domain/Subtest: Index Score: Percentile Rank: Classification: RE Index Score: Status:   IMMEDIATE MEMORY 87 19%ile Low Average 76 IMPROVEMENT        1. List Learning (22/40)          2. Story Memory (15/24)           VISUOSPATIAL/  CONSTRUCTIONAL 84 14%ile Low Average 69 IMPROVEMENT        3. Figure Copy (11/20)          4. Line Orientation (19/20)           LANGUAGE 88 21%ile Low Average 82 IMPROVEMENT        5. Picture Naming (10/10)          6. Semantic Fluency (13/40)           ATTENTION 79 8%ile Borderline 64 IMPROVEMENT        7. Digit Span (10/16)          8. Coding (19/89)           DELAYED MEMORY 87 19%ile Low Average 64 IMPROVEMENT        9. List Recall (0/10)          10. List  Recognition (20/20)          11. Story Recall (7/12)          12. Figure Recall (6/20)           Sum of Index Scores:  425  355 IMPROVEMENT   Total Score:  80      Percentile: 9%ile      Classification: Low Average          “RE” indicates the scores from the re-evaluation (5/20/2024). Form: B      *Patient named 12 concrete category members (fruits and vegetables) in 60 sec (norm=15+). -- BELOW AVERAGE       Plan:  - Consider d/c in 6-8 weeks    Treatment:  - Establish HEP      Visit Tracking:  #44    Intervention comments:  Remained well motivated for today's re-evaluation.

## 2024-08-28 ENCOUNTER — OFFICE VISIT (OUTPATIENT)
Facility: CLINIC | Age: 74
End: 2024-08-28
Payer: COMMERCIAL

## 2024-08-28 DIAGNOSIS — R48.8 OTHER SYMBOLIC DYSFUNCTIONS: ICD-10-CM

## 2024-08-28 DIAGNOSIS — R47.89 WORD FINDING DIFFICULTY: ICD-10-CM

## 2024-08-28 DIAGNOSIS — Z86.73 CHRONIC ISCHEMIC LEFT MCA STROKE: Primary | ICD-10-CM

## 2024-08-28 PROCEDURE — 92507 TX SP LANG VOICE COMM INDIV: CPT

## 2024-08-28 NOTE — PROGRESS NOTES
Daily Speech Treatment Note     Today's date: 2024  Patient’s name: Yolis Flores  : 1950  MRN: [id number]    Referring provider: Shady Metcalf MD    Encounter Diagnosis     ICD-10-CM    1. Chronic ischemic left MCA stroke  Z86.73       2. Other symbolic dysfunctions  R48.8       3. Word finding difficulty  R47.89                     POC expires Auth Status Total   Visits  Start date  Expiration date PT/OT + Visit Limit? Co-Insurance   24 Authorized bomn 1/3/24 12/31/24   No                                                                       Visit/Unit Tracking  AUTH Status: Authorized  #[id number] Date 7/8 7/10 7/15 7/22 7/24 7/29 8/14 8/28     Visits  Authed: bomn Used 1 2 3 4 5 6 7 8 9 10    Remaining                   Visit tracking:  Visit #63    Subjective: Yolis arrived on time; she remained motivated throughout today's treatment session. Time spent at beginning of session going over re-evaluation and plan of care. Discussed plan for discharge at the end of September. She will discuss with spouse.                                                                                                                                   Goals  Short-term goals:    Goal 1. Patient will completed mid-high level word finding tasks with 80% accuracy, to be completed within the next 4-6 weeks.    Goal 2. Yolis will complete executive functioning tasks with 80% accuracy, to be completed within the next 4-6 week.  Basic executive function based deduction task ([employer]) was completed with 80% accuracy independently. Benefited from min-mod cues for organization and language processing.    Goal 3. Yolsi with follow verbally spoken and written 2-4 step commands with 80% accuracy, to be completed within the next 4-6 weeks.  Did not target.    Goal 4. Yolis will complete attention dependent structured tasks with 80% accuracy, to be completed within the next 4-6 weeks.  Yolis completed  attention based task that combined card sorting and categorization. During this activity, she had to one set of number cards, one face card, and two unrelated categories. Visual support was not provided. She displayed (1) attention errors during the task.  Accuracy increased from the previous session. Complexity of task was decreased.       Goal 5. Yolis will complete immediate, delayed, and working memory structured tasks, to be completed within the next 4-6 weeks.  Immediate recall for 8 item word list generated during goal 1 was at 87% She benefited from verbal rehearsal and a written outline with self generated subcategories. This helped with organization and recall.           -Continue with current plan of care.

## 2024-09-03 ENCOUNTER — TELEPHONE (OUTPATIENT)
Dept: GASTROENTEROLOGY | Facility: CLINIC | Age: 74
End: 2024-09-03

## 2024-09-03 NOTE — TELEPHONE ENCOUNTER
Lmm for pt to call back with pt's contact. Please see note below. Thanks sb         Lmm for pt to call back with pt's contact.   lmom confirming pt's colonoscopy  and egd scheduled on 9/9/24 at [facility] with Dr Brar.  Informed [facility] would be calling this pt with the arrival time.  Informed of clear liquid diet day prior as well as the bowel cleansing preparation.  Informed would need a  the day of the procedure due to being under sedation. I asked pt to please call back if has not received instructions or if has any questions.        Please ensure that pt is aware to hold her plavix for 5 days starting 9/4/24 but keep taking her aspirin until the day of her procedure. She should have her prep and diabetic instructions and should be holding her iron a week before her procedure. Raul

## 2024-09-05 ENCOUNTER — REMOTE DEVICE CLINIC VISIT (OUTPATIENT)
Dept: CARDIOLOGY CLINIC | Facility: CLINIC | Age: 74
End: 2024-09-05
Payer: COMMERCIAL

## 2024-09-05 DIAGNOSIS — I63.9 CRYPTOGENIC STROKE (HCC): Primary | ICD-10-CM

## 2024-09-05 PROCEDURE — 93298 REM INTERROG DEV EVAL SCRMS: CPT | Performed by: INTERNAL MEDICINE

## 2024-09-05 NOTE — PROGRESS NOTES
"Results for orders placed or performed in visit on 09/05/24   Cardiac EP device report    Narrative    MDT LNQ22/ ACTIVE SYSTEM IS MRI CONDITIONAL  CARELINK TRANSMISSION: BATTERY STATUS \"OK.\" NO PATIENT OR DEVICE ACTIVATED EPISODES.--REYES        "

## 2024-09-09 ENCOUNTER — TELEPHONE (OUTPATIENT)
Dept: NEUROLOGY | Facility: CLINIC | Age: 74
End: 2024-09-09

## 2024-09-12 ENCOUNTER — APPOINTMENT (EMERGENCY)
Dept: RADIOLOGY | Facility: HOSPITAL | Age: 74
End: 2024-09-12
Payer: COMMERCIAL

## 2024-09-12 ENCOUNTER — HOSPITAL ENCOUNTER (EMERGENCY)
Facility: HOSPITAL | Age: 74
Discharge: HOME | End: 2024-09-12
Attending: EMERGENCY MEDICINE
Payer: COMMERCIAL

## 2024-09-12 ENCOUNTER — TELEPHONE (OUTPATIENT)
Dept: OBGYN CLINIC | Facility: HOSPITAL | Age: 74
End: 2024-09-12

## 2024-09-12 VITALS
WEIGHT: 165 LBS | HEART RATE: 74 BPM | HEIGHT: 64 IN | RESPIRATION RATE: 18 BRPM | OXYGEN SATURATION: 97 % | SYSTOLIC BLOOD PRESSURE: 132 MMHG | DIASTOLIC BLOOD PRESSURE: 62 MMHG | TEMPERATURE: 97.6 F | BODY MASS INDEX: 28.17 KG/M2

## 2024-09-12 DIAGNOSIS — S82.142A TIBIAL PLATEAU FRACTURE, LEFT: Primary | ICD-10-CM
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
## 2024-09-12 LAB
ALBUMIN SERPL BCG-MCNC: 3.8 G/DL (ref 3.5–5)
ALP SERPL-CCNC: 54 U/L (ref 34–104)
ALT SERPL W P-5'-P-CCNC: 20 U/L (ref 7–52)
ANION GAP SERPL CALCULATED.3IONS-SCNC: 5 MMOL/L (ref 4–13)
AST SERPL W P-5'-P-CCNC: 15 U/L (ref 13–39)
ATRIAL RATE: 72 BPM
BASOPHILS # BLD AUTO: 0.02 THOUSANDS/ΜL (ref 0–0.1)
BASOPHILS NFR BLD AUTO: 0 % (ref 0–1)
BILIRUB SERPL-MCNC: 0.39 MG/DL (ref 0.2–1)
BUN SERPL-MCNC: 28 MG/DL (ref 5–25)
CALCIUM SERPL-MCNC: 9.3 MG/DL (ref 8.4–10.2)
CHLORIDE SERPL-SCNC: 106 MMOL/L (ref 96–108)
CO2 SERPL-SCNC: 26 MMOL/L (ref 21–32)
CREAT SERPL-MCNC: 0.67 MG/DL (ref 0.6–1.3)
EOSINOPHIL # BLD AUTO: 0.06 THOUSAND/ΜL (ref 0–0.61)
EOSINOPHIL NFR BLD AUTO: 1 % (ref 0–6)
ERYTHROCYTE [DISTWIDTH] IN BLOOD BY AUTOMATED COUNT: 15.2 % (ref 11.6–15.1)
GFR SERPL CREATININE-BSD FRML MDRD: 88 ML/MIN/1.73SQ M
GLUCOSE SERPL-MCNC: 124 MG/DL (ref 65–140)
HCT VFR BLD AUTO: 33.2 % (ref 34.8–46.1)
HGB BLD-MCNC: 10.6 G/DL (ref 11.5–15.4)
IMM GRANULOCYTES # BLD AUTO: 0.02 THOUSAND/UL (ref 0–0.2)
IMM GRANULOCYTES NFR BLD AUTO: 0 % (ref 0–2)
LYMPHOCYTES # BLD AUTO: 1.33 THOUSANDS/ΜL (ref 0.6–4.47)
LYMPHOCYTES NFR BLD AUTO: 15 % (ref 14–44)
MCH RBC QN AUTO: 31.7 PG (ref 26.8–34.3)
MCHC RBC AUTO-ENTMCNC: 31.9 G/DL (ref 31.4–37.4)
MCV RBC AUTO: 99 FL (ref 82–98)
MONOCYTES # BLD AUTO: 0.7 THOUSAND/ΜL (ref 0.17–1.22)
MONOCYTES NFR BLD AUTO: 8 % (ref 4–12)
NEUTROPHILS # BLD AUTO: 6.51 THOUSANDS/ΜL (ref 1.85–7.62)
NEUTS SEG NFR BLD AUTO: 76 % (ref 43–75)
NRBC BLD AUTO-RTO: 0 /100 WBCS
P AXIS: 61 DEGREES
PLATELET # BLD AUTO: 246 THOUSANDS/UL (ref 149–390)
PMV BLD AUTO: 8.9 FL (ref 8.9–12.7)
POTASSIUM SERPL-SCNC: 4.3 MMOL/L (ref 3.5–5.3)
PR INTERVAL: 138 MS
PROT SERPL-MCNC: 6.1 G/DL (ref 6.4–8.4)
QRS AXIS: 36 DEGREES
QRSD INTERVAL: 74 MS
QT INTERVAL: 360 MS
QTC INTERVAL: 394 MS
RBC # BLD AUTO: 3.34 MILLION/UL (ref 3.81–5.12)
SODIUM SERPL-SCNC: 137 MMOL/L (ref 135–147)
T WAVE AXIS: 61 DEGREES
VENTRICULAR RATE: 72 BPM
WBC # BLD AUTO: 8.64 THOUSAND/UL (ref 4.31–10.16)

## 2024-09-12 PROCEDURE — 85025 COMPLETE CBC W/AUTO DIFF WBC: CPT | Performed by: PHYSICIAN ASSISTANT

## 2024-09-12 PROCEDURE — 93010 ELECTROCARDIOGRAM REPORT: CPT | Performed by: INTERNAL MEDICINE

## 2024-09-12 PROCEDURE — 73700 CT LOWER EXTREMITY W/O DYE: CPT

## 2024-09-12 PROCEDURE — 93005 ELECTROCARDIOGRAM TRACING: CPT

## 2024-09-12 PROCEDURE — 99284 EMERGENCY DEPT VISIT MOD MDM: CPT | Performed by: PHYSICIAN ASSISTANT

## 2024-09-12 PROCEDURE — 72170 X-RAY EXAM OF PELVIS: CPT

## 2024-09-12 PROCEDURE — 70450 CT HEAD/BRAIN W/O DYE: CPT

## 2024-09-12 PROCEDURE — 96365 THER/PROPH/DIAG IV INF INIT: CPT

## 2024-09-12 PROCEDURE — 96375 TX/PRO/DX INJ NEW DRUG ADDON: CPT

## 2024-09-12 PROCEDURE — 36415 COLL VENOUS BLD VENIPUNCTURE: CPT | Performed by: PHYSICIAN ASSISTANT

## 2024-09-12 PROCEDURE — 99284 EMERGENCY DEPT VISIT MOD MDM: CPT

## 2024-09-12 PROCEDURE — 73564 X-RAY EXAM KNEE 4 OR MORE: CPT

## 2024-09-12 PROCEDURE — 80053 COMPREHEN METABOLIC PANEL: CPT | Performed by: PHYSICIAN ASSISTANT

## 2024-09-12 RX ORDER — KETOROLAC TROMETHAMINE 30 MG/ML
15 INJECTION, SOLUTION INTRAMUSCULAR; INTRAVENOUS ONCE
Status: COMPLETED | OUTPATIENT
Start: 2024-09-12 | End: 2024-09-12

## 2024-09-12 RX ORDER — ACETAMINOPHEN 10 MG/ML
1000 INJECTION, SOLUTION INTRAVENOUS ONCE
Status: COMPLETED | OUTPATIENT
Start: 2024-09-12 | End: 2024-09-12

## 2024-09-12 RX ADMIN — ACETAMINOPHEN 1000 MG: 10 INJECTION INTRAVENOUS at 09:48

## 2024-09-12 RX ADMIN — KETOROLAC TROMETHAMINE 15 MG: 30 INJECTION, SOLUTION INTRAMUSCULAR; INTRAVENOUS at 09:47

## 2024-09-12 NOTE — ED PROVIDER NOTES
1. Tibial plateau fracture, left    2. Fall, initial encounter      ED Disposition       ED Disposition   Discharge    Condition   Stable    Date/Time   u Sep 12, 2024  3:17 PM    Comment   Yolis Flores discharge to home/self care.                   Assessment & Plan       Medical Decision Making  73-year-old female presents 1 day status post fall from step up at home.  Patient says that she had fallen from the step up and had some knee pain at the time but denied loss of consciousness.  This is a mechanical fall per history of inpatient.  Patient denies chest pain.  Patient denies shortness of breath.  Patient denies abdominal pain.  Patient states that over the evening and into the morning she developed swelling of the left knee and worsening left knee pain.  CT head obtained secondary to being on blood thinners.  Patient had no posterior cervical tenderness on exam.  Left knee exam as detailed.  X-ray and CT of the knee were obtained demonstrating tibial plateau fracture.  Contacted orthopedic provider on-call and patient is felt stable for discharge to home with knee immobilizer and to follow-up as outpatient.  This was discussed in detail with patient and .  Encourage patient to take analgesic medications as prescribed.  Educated on persistent or worsening signs symptoms or any concern to either follow-up with primary care orthopedic surgery and/or return to emergency department.  Patient and  admit understanding and agreement.    Amount and/or Complexity of Data Reviewed  Labs: ordered.  Radiology: ordered and independent interpretation performed. Decision-making details documented in ED Course.    Risk  Prescription drug management.                  ED Course as of 09/12/24 1615   Thu Sep 12, 2024   1057 CT head without contrast   1323 CT lower extremity wo contrast left       Medications   ketorolac (TORADOL) injection 15 mg (15 mg Intravenous Given 9/12/24 0999)   acetaminophen  (Ofirmev) injection 1,000 mg (0 mg Intravenous Stopped 9/12/24 1045)       History of Present Illness         Fall  Injury location:  Leg  Leg injury location:  L knee  Incident location:  Home  Time since incident:  1 day  Arrived directly from scene: no    Associated symptoms comment:  Left knee pain and swelling.  Risk factors: anticoagulation therapy            Review of Systems   Constitutional:         Patient denies loss of consciousness.  Patient denies neck pain.  Patient denies chest pain abdominal pain hip pain.  Patient denies weakness of the hands arms legs or feet.  Patient denies diplopia.   Musculoskeletal:  Positive for arthralgias (Left lateral knee pain with associated swelling.).   Skin:  Negative for wound.   All other systems reviewed and are negative.          Objective     ED Triage Vitals   Temperature Pulse Blood Pressure Respirations SpO2 Patient Position - Orthostatic VS   09/12/24 0909 09/12/24 0909 09/12/24 0909 09/12/24 0909 09/12/24 0909 09/12/24 1215   98.8 °F (37.1 °C) 72 144/74 18 98 % Lying      Temp Source Heart Rate Source BP Location FiO2 (%) Pain Score    09/12/24 1215 09/12/24 1215 09/12/24 1215 -- 09/12/24 0947    Oral Monitor Right arm  8        Physical Exam  Constitutional:       Appearance: Normal appearance. She is obese.   HENT:      Head: Normocephalic and atraumatic.      Nose: Nose normal.      Mouth/Throat:      Mouth: Mucous membranes are moist.      Pharynx: Oropharynx is clear.   Eyes:      Conjunctiva/sclera: Conjunctivae normal.      Pupils: Pupils are equal, round, and reactive to light.   Cardiovascular:      Rate and Rhythm: Normal rate.   Pulmonary:      Effort: Pulmonary effort is normal.   Abdominal:      General: There is no distension.      Tenderness: There is no abdominal tenderness.   Musculoskeletal:         General: Swelling and tenderness present.      Cervical back: Normal range of motion.        Legs:       Comments: Effusion appreciated on  exam left knee.  Significant tenderness over the left fibular head.  Mild tenderness at the sacrum.   Skin:     General: Skin is warm.      Capillary Refill: Capillary refill takes 2 to 3 seconds.   Neurological:      General: No focal deficit present.      Mental Status: She is alert and oriented to person, place, and time.   Psychiatric:         Mood and Affect: Mood normal.         Behavior: Behavior normal.         Labs Reviewed   CBC AND DIFFERENTIAL - Abnormal       Result Value    WBC 8.64      RBC 3.34 (*)     Hemoglobin 10.6 (*)     Hematocrit 33.2 (*)     MCV 99 (*)     MCH 31.7      MCHC 31.9      RDW 15.2 (*)     MPV 8.9      Platelets 246      nRBC 0      Segmented % 76 (*)     Immature Grans % 0      Lymphocytes % 15      Monocytes % 8      Eosinophils Relative 1      Basophils Relative 0      Absolute Neutrophils 6.51      Absolute Immature Grans 0.02      Absolute Lymphocytes 1.33      Absolute Monocytes 0.70      Eosinophils Absolute 0.06      Basophils Absolute 0.02     COMPREHENSIVE METABOLIC PANEL - Abnormal    Sodium 137      Potassium 4.3      Chloride 106      CO2 26      ANION GAP 5      BUN 28 (*)     Creatinine 0.67      Glucose 124      Calcium 9.3      AST 15      ALT 20      Alkaline Phosphatase 54      Total Protein 6.1 (*)     Albumin 3.8      Total Bilirubin 0.39      eGFR 88      Narrative:     National Kidney Disease Foundation guidelines for Chronic Kidney Disease (CKD):     Stage 1 with normal or high GFR (GFR > 90 mL/min/1.73 square meters)    Stage 2 Mild CKD (GFR = 60-89 mL/min/1.73 square meters)    Stage 3A Moderate CKD (GFR = 45-59 mL/min/1.73 square meters)    Stage 3B Moderate CKD (GFR = 30-44 mL/min/1.73 square meters)    Stage 4 Severe CKD (GFR = 15-29 mL/min/1.73 square meters)    Stage 5 End Stage CKD (GFR <15 mL/min/1.73 square meters)  Note: GFR calculation is accurate only with a steady state creatinine     CT lower extremity wo contrast left   Final Interpretation  by Johny Hawkins MD (09/12 1315)   Comminuted slightly impacted lateral tibial plateau fracture.            Workstation performed: [id number]         XR pelvis ap only 1 or 2 vw   ED Interpretation by William Benton PA-C (09/12 1057)   No acute process appreciated.      XR knee 4+ vw left injury   ED Interpretation by William Benton PA-C (09/12 1058)   Patient point tender at the fibular head.  No evidence of fracture or dislocation.  Mild effusion on exam.  Midshaft fibula appears to be old cortical healing no acute fracture.      Final Interpretation by Pravin Krueger DO (09/12 1144)      Nondisplaced fracture lateral tibial plateau along the tibial eminence. Orthopedic consultation advised.      Large joint effusion.      Computerized Assisted Algorithm (CAA) may have been used to analyze all applicable images.         I personally discussed this study with WILLIAM BENTON on 9/12/2024 at 11:40 AM.               Workstation performed: [id number]         CT head without contrast   Final Interpretation by Jason Murrieta MD (09/12 1041)      No acute intracranial abnormality. Encephalomalacia involving the left basal ganglia, related to prior infarct.                  Workstation performed: [id number]             ECG 12 Lead Documentation Only    Date/Time: 9/12/2024 10:13 AM    Performed by: William Benton PA-C  Authorized by: William Benton PA-C    Indications / Diagnosis:  ED w/u fall  ECG reviewed by me, the ED Provider: yes    Patient location:  ED  Interpretation:     Interpretation: non-specific    Rate:     ECG rate:  72  Rhythm:     Rhythm: sinus rhythm    Ectopy:     Ectopy: none    QRS:     QRS axis:  Normal  Comments:      Normal sinus rhythm, septal infarct age undetermined.         William Benton PA-C  09/12/24 1528

## 2024-09-12 NOTE — TELEPHONE ENCOUNTER
Caller: Loki    Doctor: ELDA Puckett     Reason for call: Patient removed brace for shower and  needed someone to explain the proper way to replace brace until seen by ortho.    Transferred patient to Dr. Carson at the [facility]     Call back#:

## 2024-09-12 NOTE — ED NOTES
Ambulated patient, RN assisting.  Noted patient dragging her right foot with her holding on to the  railings, will let Luc GARNER know.     Tamara Samuel RN  09/12/24 3153

## 2024-09-16 ENCOUNTER — OFFICE VISIT (OUTPATIENT)
Dept: OBGYN CLINIC | Facility: CLINIC | Age: 74
End: 2024-09-16
Payer: COMMERCIAL

## 2024-09-16 ENCOUNTER — APPOINTMENT (OUTPATIENT)
Dept: RADIOLOGY | Facility: CLINIC | Age: 74
End: 2024-09-16
Payer: COMMERCIAL

## 2024-09-16 VITALS
DIASTOLIC BLOOD PRESSURE: 73 MMHG | SYSTOLIC BLOOD PRESSURE: 131 MMHG | HEIGHT: 64 IN | WEIGHT: 165 LBS | BODY MASS INDEX: 28.17 KG/M2 | HEART RATE: 67 BPM

## 2024-09-16 DIAGNOSIS — M25.562 ACUTE PAIN OF LEFT KNEE: ICD-10-CM

## 2024-09-16 DIAGNOSIS — M25.562 LEFT KNEE PAIN, UNSPECIFIED CHRONICITY: ICD-10-CM

## 2024-09-16 DIAGNOSIS — Z87.891 FORMER SMOKER: ICD-10-CM

## 2024-09-16 DIAGNOSIS — S82.142A CLOSED FRACTURE OF LEFT TIBIAL PLATEAU, INITIAL ENCOUNTER: Primary | ICD-10-CM

## 2024-09-16 PROCEDURE — 99203 OFFICE O/P NEW LOW 30 MIN: CPT | Performed by: ORTHOPAEDIC SURGERY

## 2024-09-16 PROCEDURE — 73562 X-RAY EXAM OF KNEE 3: CPT

## 2024-09-16 RX ORDER — DOXYCYCLINE HYCLATE 100 MG
100 TABLET ORAL 2 TIMES DAILY
COMMUNITY
Start: 2024-06-13

## 2024-09-16 NOTE — PROGRESS NOTES
Ortho Sports Medicine New Patient Visit     Assesment:   73 y.o. female with lateral tibial plateau fracture of left knee, DOI 9/11/2024    Plan:    Yolis is a pleasant 73 y.o. female who presents for initial evaluation of the left knee. She is symptomatic of the lateral tibial plateau fracture confirmed by the ED on 9/12/2024. I explained that the fracture his displaced slightly but can still be managed non operatively at this time. She must be non weight bearing on the left leg for the next 6 weeks to ensure the fracture does not displace more. Should the fracture displace more, she may need to consider operative treatment. She was provided a TROM brace and crutches today to help support the left knee and maintain non weightbearing status. She should avoid taking NSAIDs as she is currently on Plavix. She may take Tylenol as needed for pain control. She will follow-up in 2 weeks for re-evaluation of the left knee with repeat X-rays upon arrival. All of her questions and concerns were addressed today.        Follow up:    Return in about 2 weeks (around 9/30/2024) for Recheck.        Chief Complaint   Patient presents with    Left Leg - Fracture       History of Present Illness:    The patient is a 73 y.o. female who presents for initial evaluation of the left knee. She sustained a fall on 9/11/2024, injuring the left knee. She was seen in the ED on 9/12/2024 where X-rays and a CT of the left knee were obtained demonstrating a lateral tibial plateau fracture. She was placed in a knee immobilizer at that time.She does present to the office with a cane for ambulatory assistance.      Knee Surgical History:  None    Past Medical, Social and Family History:  Past Medical History:   Diagnosis Date    Anxiety     CVA (cerebral vascular accident) (HCC)     Depression     Diabetes (HCC)     Diabetes mellitus (HCC)     GERD (gastroesophageal reflux disease)     Hypertension     Stroke (HCC)     Stroke (HCC)      Past  "Surgical History:   Procedure Laterality Date    APPENDECTOMY      CARDIAC ELECTROPHYSIOLOGY PROCEDURE N/A 8/2/2023    Procedure: Cardiac loop recorder implant;  Surgeon: Charla Anthony DO;  Location: [facility];  Service: Cardiology    TONSILLECTOMY       Allergies   Allergen Reactions    Amoxil [Amoxicillin] Other (See Comments)     \"mouth felt on fire\"     Current Outpatient Medications on File Prior to Visit   Medication Sig Dispense Refill    Alcohol Swabs 70 % PADS May substitute brand based on insurance coverage. Check glucose BID. 100 each 0    ALPRAZolam (XANAX) 0.5 mg tablet Take 0.5 mg by mouth 4 (four) times a day as needed for anxiety Pt takes one 0.5 mg tablet as needed for anxiety up to four times daily      atorvastatin (LIPITOR) 80 mg tablet Take 1 tablet (80 mg total) by mouth daily with dinner 30 tablet 0    cariprazine (Vraylar) 1.5 MG capsule Take 1.5 mg by mouth daily      clopidogrel (PLAVIX) 75 mg tablet Take 1 tablet (75 mg total) by mouth daily Do not start before March 16, 2024. 30 tablet 0    famotidine (PEPCID) 20 mg tablet Take 0.5 tablets (10 mg total) by mouth 2 (two) times a day  0    ferrous sulfate 325 (65 Fe) mg tablet Take 1 tablet (325 mg total) by mouth daily with breakfast Do not start before March 15, 2024. 30 tablet 0    lisinopril (ZESTRIL) 10 mg tablet Take 1 tablet (10 mg total) by mouth daily Do not start before July 30, 2023.  0    metFORMIN (GLUCOPHAGE) 500 mg tablet take 1 tablet by mouth EVERY MORNING WITH BREAKFAST 90 tablet 2    pantoprazole (PROTONIX) 40 mg tablet Take 1 tablet (40 mg total) by mouth daily in the early morning 30 tablet 0    sertraline (ZOLOFT) 100 mg tablet Take 100 mg by mouth daily with dinner 2 tablets daily      thiamine (VITAMIN B1) 100 mg tablet Take 100 mg by mouth daily      Blood Glucose Monitoring Suppl (OneTouch Verio Reflect) w/Device KIT May substitute brand based on insurance coverage. Check glucose BID. (Patient not taking: " "Reported on 2023) 1 kit 0    doxycycline hyclate (VIBRA-TABS) 100 mg tablet Take 100 mg by mouth 2 (two) times a day (Patient not taking: Reported on 2024)      glucose blood (OneTouch Verio) test strip May substitute brand based on insurance coverage. Check glucose BID. (Patient not taking: Reported on 2023) 100 each 0    OneTouch Delica Lancets 33G MISC May substitute brand based on insurance coverage. Check glucose BID. (Patient not taking: Reported on 2023) 100 each 0    polyethylene glycol (GOLYTELY) 4000 mL solution Take 4,000 mL by mouth once for 1 dose 4000 mL 0     No current facility-administered medications on file prior to visit.     Social History     Socioeconomic History    Marital status: /Civil Union     Spouse name: Not on file    Number of children: Not on file    Years of education: Not on file    Highest education level: Not on file   Occupational History    Not on file   Tobacco Use    Smoking status: Former     Current packs/day: 0.00     Types: Cigarettes     Quit date: 2023     Years since quittin.1    Smokeless tobacco: Former   Vaping Use    Vaping status: Former    Quit date: 2023   Substance and Sexual Activity    Alcohol use: Not Currently     Comment: every night has \"a few\" beers    Drug use: No    Sexual activity: Not Currently   Other Topics Concern    Not on file   Social History Narrative    Not on file     Social Determinants of Health     Financial Resource Strain: Not on file   Food Insecurity: No Food Insecurity (3/11/2024)    Hunger Vital Sign     Worried About Running Out of Food in the Last Year: Never true     Ran Out of Food in the Last Year: Never true   Transportation Needs: No Transportation Needs (3/11/2024)    PRAPARE - Transportation     Lack of Transportation (Medical): No     Lack of Transportation (Non-Medical): No   Physical Activity: Not on file   Stress: Not on file   Social Connections: Not on file   Intimate Partner " "Violence: Not on file   Housing Stability: Low Risk  (3/11/2024)    Housing Stability Vital Sign     Unable to Pay for Housing in the Last Year: No     Number of Times Moved in the Last Year: 1     Homeless in the Last Year: No         I have reviewed the past medical, surgical, social and family history, medications and allergies as documented in the EMR.    Review of systems: ROS is negative other than that noted in the HPI.  Constitutional: Negative for fatigue and fever.   HENT: Negative for sore throat.    Respiratory: Negative for shortness of breath.    Cardiovascular: Negative for chest pain.   Gastrointestinal: Negative for abdominal pain.   Endocrine: Negative for cold intolerance and heat intolerance.   Genitourinary: Negative for flank pain.   Musculoskeletal: Negative for back pain.   Skin: Negative for rash.   Allergic/Immunologic: Negative for immunocompromised state.   Neurological: Negative for dizziness.   Psychiatric/Behavioral: Negative for agitation.      Physical Exam:    Blood pressure 131/73, pulse 67, height 5' 4\" (1.626 m), weight 74.8 kg (165 lb).    General/Constitutional: NAD, well developed, well nourished  HENT: Normocephalic, atraumatic  CV: Intact distal pulses, regular rate  Resp: No respiratory distress or labored breathing  Abdomen: soft, nondistended   Lymphatic: No lymphadenopathy palpated  Neuro: Alert and Oriented x 3, no focal deficits  Psych: Normal mood, normal affect  Skin: Warm, dry, no rashes, no erythema      Knee Exam:   No significant skin lesions or deformity  Significant ecchymosis medially  Mild effusion  Range of motion from 0° to past 90° flexion  Lateral joint line tenderness   Able to perform SLR without extensor lag  Knee is stable to varus stress, valgus stress, Lachman, and posterior drawer.    Neurovascularly intact distally  Calf is soft and non tender  DP pulse 2+    Knee Imaging    X-rays of the left knee reviewed and interpreted today. These show " lateral tibial plateau fracture. Minimal degenerative changes    CT of the left knee reviewed and interpreted today showing slightly impacted lateral tibial plateau fracture    Scribe Attestation      I,:  Awa Lopez am acting as a scribe while in the presence of the attending physician.:       I,:  Kush Eli MD personally performed the services described in this documentation    as scribed in my presence.:

## 2024-09-21 DIAGNOSIS — E11.9 TYPE 2 DIABETES MELLITUS (HCC): ICD-10-CM

## 2024-09-30 ENCOUNTER — APPOINTMENT (OUTPATIENT)
Dept: RADIOLOGY | Facility: CLINIC | Age: 74
End: 2024-09-30
Payer: COMMERCIAL

## 2024-09-30 ENCOUNTER — OFFICE VISIT (OUTPATIENT)
Dept: OBGYN CLINIC | Facility: CLINIC | Age: 74
End: 2024-09-30
Payer: COMMERCIAL

## 2024-09-30 VITALS
HEART RATE: 80 BPM | WEIGHT: 165 LBS | DIASTOLIC BLOOD PRESSURE: 69 MMHG | SYSTOLIC BLOOD PRESSURE: 148 MMHG | HEIGHT: 64 IN | BODY MASS INDEX: 28.17 KG/M2

## 2024-09-30 DIAGNOSIS — Z01.89 ENCOUNTER FOR LOWER EXTREMITY COMPARISON IMAGING STUDY: ICD-10-CM

## 2024-09-30 DIAGNOSIS — S82.142D CLOSED FRACTURE OF LEFT TIBIAL PLATEAU WITH ROUTINE HEALING, SUBSEQUENT ENCOUNTER: Primary | ICD-10-CM

## 2024-09-30 DIAGNOSIS — S82.142A CLOSED FRACTURE OF LEFT TIBIAL PLATEAU, INITIAL ENCOUNTER: ICD-10-CM

## 2024-09-30 PROCEDURE — 73560 X-RAY EXAM OF KNEE 1 OR 2: CPT

## 2024-09-30 PROCEDURE — 99213 OFFICE O/P EST LOW 20 MIN: CPT | Performed by: ORTHOPAEDIC SURGERY

## 2024-09-30 NOTE — PROGRESS NOTES
Assessment/Plan:  1. Closed fracture of left tibial plateau with routine healing, subsequent encounter    2. Encounter for lower extremity comparison imaging study      Orders Placed This Encounter   Procedures    XR knee 3 vw right non injury     Yolis is a pleasant 73 y.o. female who presents for follow-up evaluation of a left tibial plateau fracture sustained on 9/11/2024. Her X-rays today demonstrate 1-2 mm of displacement of the fracture compared to her previous radiographs from last visit. I feel it is still amenable to nonoperative treatment. I stressed the importance of remaining non weightbearing to allow the fracture to heal. She will remain non weightbearing with use of the TROM brace and use of the walker. She may continue using Tylenol as needed for pain control. She will follow-up in 2 weeks for re-evaluation of the left knee with repeat X-rays upon arrival.    Return in about 2 weeks (around 10/14/2024) for Recheck.      Subjective   Chief Complaint:   Chief Complaint   Patient presents with    Left Leg - Fracture, Follow-up       Yolis Flores is a 73 y.o. female who presents for follow up for left tibial plateau fracture sustained on 9/11/2024. She presents wearing a TROM brace and using a walker for ambulatory assistance. She has not been fully compliant with her non weight bearing status; however, she is trying to stay off her feet as much as she can at home. Her pain is nonspecific in terms of location on the knee. She describes the pain as sharp and aching. She rates her pain 4/10 on the pain scale. She has been taking ibuprofen and tylenol for pain control, which helps some.    Review of Systems  ROS:    See HPI for musculoskeletal review.   All other systems reviewed are negative     History:  Past Medical History:   Diagnosis Date    Anxiety     CVA (cerebral vascular accident) (HCC)     Depression     Diabetes (HCC)     Diabetes mellitus (HCC)     GERD (gastroesophageal reflux  "disease)     Hypertension     Stroke (HCC)     Stroke (HCC)      Past Surgical History:   Procedure Laterality Date    APPENDECTOMY      CARDIAC ELECTROPHYSIOLOGY PROCEDURE N/A 2023    Procedure: Cardiac loop recorder implant;  Surgeon: Charla Anthony DO;  Location: [facility];  Service: Cardiology    TONSILLECTOMY       Social History   Social History     Substance and Sexual Activity   Alcohol Use Not Currently    Comment: every night has \"a few\" beers     Social History     Substance and Sexual Activity   Drug Use No     Social History     Tobacco Use   Smoking Status Former    Current packs/day: 0.00    Types: Cigarettes    Quit date: 2023    Years since quittin.1   Smokeless Tobacco Former     Family History: History reviewed. No pertinent family history.    Meds/Allergies   Current Outpatient Medications   Medication Instructions    Alcohol Swabs 70 % PADS May substitute brand based on insurance coverage. Check glucose BID.    ALPRAZolam (XANAX) 0.5 mg, Oral, 4 times daily PRN, Pt takes one 0.5 mg tablet as needed for anxiety up to four times daily    atorvastatin (LIPITOR) 80 mg, Oral, Daily with dinner    Blood Glucose Monitoring Suppl (OneTouch Verio Reflect) w/Device KIT May substitute brand based on insurance coverage. Check glucose BID.    cariprazine (VRAYLAR) 1.5 mg, Oral, Daily    clopidogrel (PLAVIX) 75 mg, Oral, Daily    doxycycline hyclate (VIBRA-TABS) 100 mg, 2 times daily    famotidine (PEPCID) 10 mg, Oral, 2 times daily    ferrous sulfate 325 mg, Oral, Daily with breakfast    glucose blood (OneTouch Verio) test strip May substitute brand based on insurance coverage. Check glucose BID.    lisinopril (ZESTRIL) 10 mg, Oral, Daily    metFORMIN (GLUCOPHAGE) 500 mg tablet take 1 tablet by mouth EVERY MORNING WITH BREAKFAST    OneTouch Delica Lancets 33G MISC May substitute brand based on insurance coverage. Check glucose BID.    pantoprazole (PROTONIX) 40 mg, Oral, Daily (early " "morning)    polyethylene glycol (GOLYTELY) 4000 mL solution 4,000 mL, Oral, Once    sertraline (ZOLOFT) 100 mg, Oral, Daily with dinner, 2 tablets daily    thiamine (VITAMIN B1) 100 mg, Oral, Daily      Allergies   Allergen Reactions    Amoxil [Amoxicillin] Other (See Comments)     \"mouth felt on fire\"          Objective     /69   Pulse 80   Ht 5' 4\" (1.626 m)   Wt 74.8 kg (165 lb)   BMI 28.32 kg/m²      PE:  AAOx 3  Well nourished   no audible wheezing  no abdominal distension  LE compartments soft, skin intact    Knee Exam:   No significant skin lesions or deformity  Range of motion from 0° to 100°  Lateral joint line tenderness   Knee is stable to varus stress, valgus stress, Lachman, and posterior drawer.    Neurovascularly intact distally      Imaging Studies:     X-rays of the left knee reviewed and interpreted. These show minimal displacement of the fracture site compared to previous radiographs. No significant degenerative changes.    Scribe Attestation      I,:  Awa Lopez am acting as a scribe while in the presence of the attending physician.:       I,:  Kush Eli MD personally performed the services described in this documentation    as scribed in my presence.:            "

## 2024-10-07 ENCOUNTER — OFFICE VISIT (OUTPATIENT)
Facility: CLINIC | Age: 74
End: 2024-10-07
Payer: COMMERCIAL

## 2024-10-07 DIAGNOSIS — I63.9 CEREBROVASCULAR ACCIDENT (CVA), UNSPECIFIED MECHANISM (HCC): Primary | ICD-10-CM

## 2024-10-07 DIAGNOSIS — R48.8 OTHER SYMBOLIC DYSFUNCTIONS: ICD-10-CM

## 2024-10-07 DIAGNOSIS — I66.02 OCCLUSION AND STENOSIS OF LEFT MIDDLE CEREBRAL ARTERY: ICD-10-CM

## 2024-10-07 PROCEDURE — 92507 TX SP LANG VOICE COMM INDIV: CPT

## 2024-10-07 NOTE — PROGRESS NOTES
Daily Speech Treatment Note     Today's date: 10/7/2024  Patient’s name: Yolis Flores  : 1950  MRN: [id number]    Referring provider: Shady Metcalf MD    Encounter Diagnosis     ICD-10-CM    1. Cerebrovascular accident (CVA), unspecified mechanism (HCC)  I63.9       2. Occlusion and stenosis of left middle cerebral artery  I66.02       3. Other symbolic dysfunctions  R48.8                       POC expires Auth Status Total   Visits  Start date  Expiration date PT/OT + Visit Limit? Co-Insurance   24 Authorized bomn 1/3/24 12/31/24   No                                                                       Visit/Unit Tracking  AUTH Status: Authorized  #[id number] Date 7/8 7/10 7/15 7/22 7/24 7/29 8/14 8/28 10/7    Visits  Authed: bomn Used 1 2 3 4 5 6 7 8 9 10    Remaining                   Visit tracking:  Visit #64    Subjective: Yolis arrived on time; she remained motivated throughout today's treatment session. Arrived with leg in TROM brace, however, not using walker/crutches and noted to be be weight bearing.  Reviewed notes from orthopedic Dr; discussed with patient. Emphasized importance of complying with MD recommendation to ensure fracture heals. She verbalized reciprocal understanding.     Goal 1. Patient will completed mid-high level word finding tasks with 80% accuracy, to be completed within the next 4-6 weeks.  Mid level category matrix was completed with 68% accuracy independently. Long and overt pauses present for word finding. She benefited from min-mod semantic cues for word finding.     Goal 2. Yolis will complete executive functioning tasks with 80% accuracy, to be completed within the next 4-6 week.  Did not target.    Goal 3. Yolis with follow verbally spoken and written 2-4 step commands with 80% accuracy, to be completed within the next 4-6 weeks.  Did not target.    Goal 4. Yolis will complete attention dependent structured tasks with 80% accuracy, to be  completed within the next 4-6 weeks.  Did not target.    Goal 5. Yolis will complete immediate, delayed, and working memory structured tasks, to be completed within the next 4-6 weeks.  Immediate recall for 16 units of information from category matrix in goal 1 was at 75%. She required min semantic cues to achieve an accuracy of 100%    -Continue with current plan of care.

## 2024-10-09 ENCOUNTER — OFFICE VISIT (OUTPATIENT)
Facility: CLINIC | Age: 74
End: 2024-10-09
Payer: COMMERCIAL

## 2024-10-09 DIAGNOSIS — I66.02 OCCLUSION AND STENOSIS OF LEFT MIDDLE CEREBRAL ARTERY: ICD-10-CM

## 2024-10-09 DIAGNOSIS — R48.8 OTHER SYMBOLIC DYSFUNCTIONS: ICD-10-CM

## 2024-10-09 DIAGNOSIS — I63.9 CEREBROVASCULAR ACCIDENT (CVA), UNSPECIFIED MECHANISM (HCC): Primary | ICD-10-CM

## 2024-10-09 PROCEDURE — 92507 TX SP LANG VOICE COMM INDIV: CPT

## 2024-10-09 NOTE — PROGRESS NOTES
Daily Speech Treatment Note     Today's date: 10/9/2024  Patient’s name: Yolis Flores  : 1950  MRN: [id number]    Referring provider: Shady Metcalf MD    Encounter Diagnosis     ICD-10-CM    1. Cerebrovascular accident (CVA), unspecified mechanism (HCC)  I63.9       2. Occlusion and stenosis of left middle cerebral artery  I66.02       3. Other symbolic dysfunctions  R48.8               POC expires Auth Status Total   Visits  Start date  Expiration date PT/OT + Visit Limit? Co-Insurance   24 Authorized bomn 1/3/24 12/31/24   No                                                                       Visit/Unit Tracking  AUTH Status: Authorized  #[id number] Date 7/8 7/10 7/15 7/22 7/24 7/29 8/14 8/28 10/7 10/9   Visits  Authed: bomn Used 1 2 3 4 5 6 7 8 9 10    Remaining                   Visit tracking:  Visit #65    Subjective: Yolis arrived on time; she remained motivated throughout today's treatment session. Arrived with leg in TROM brace, however, not using walker/crutches and noted to be be weight bearing.  Emphasized importance of complying with MD recommendation (remain non weight bearing) to ensure fracture heals. She verbalized reciprocal understanding.     Goal 1. Patient will completed mid-high level word finding tasks with 80% accuracy, to be completed within the next 4-6 weeks.  Basic word deduction task (guessing a word based on three written clues) was completed with 88% accuracy independently given additional time for task completion. Long pauses for word finding were present as Yolis had difficulty establishing semantic relationships between words.     Goal 2. Yolis will complete executive functioning tasks with 80% accuracy, to be completed within the next 4-6 week.  To target attention and working memory, Gale was tasked with sorting 26 photo cards into alphabetical order (there was one photo for each letter of the alphabet). Accuracy was at 65% independently. Partial  visual support was provided (list of the alphabet).     Goal 3. Yolis with follow verbally spoken and written 2-4 step commands with 80% accuracy, to be completed within the next 4-6 weeks.  Did not target.    Goal 4. Yolis will complete attention dependent structured tasks with 80% accuracy, to be completed within the next 4-6 weeks.  Did not target.    Goal 5. Yolis will complete immediate, delayed, and working memory structured tasks, to be completed within the next 4-6 weeks.  Did not target.     -Continue with current plan of care.

## 2024-10-14 ENCOUNTER — APPOINTMENT (OUTPATIENT)
Facility: CLINIC | Age: 74
End: 2024-10-14
Payer: COMMERCIAL

## 2024-10-21 ENCOUNTER — OFFICE VISIT (OUTPATIENT)
Facility: CLINIC | Age: 74
End: 2024-10-21
Payer: COMMERCIAL

## 2024-10-21 DIAGNOSIS — R48.8 OTHER SYMBOLIC DYSFUNCTIONS: ICD-10-CM

## 2024-10-21 DIAGNOSIS — I66.02 OCCLUSION AND STENOSIS OF LEFT MIDDLE CEREBRAL ARTERY: ICD-10-CM

## 2024-10-21 DIAGNOSIS — I63.9 CEREBROVASCULAR ACCIDENT (CVA), UNSPECIFIED MECHANISM (HCC): Primary | ICD-10-CM

## 2024-10-21 PROCEDURE — 92507 TX SP LANG VOICE COMM INDIV: CPT

## 2024-10-21 NOTE — PROGRESS NOTES
Daily Speech Treatment Note     Today's date: 10/21/2024  Patient’s name: Yolis Flores  : 1950  MRN: [id number]    Referring provider: Shady Metcalf MD    Encounter Diagnosis     ICD-10-CM    1. Cerebrovascular accident (CVA), unspecified mechanism (HCC)  I63.9       2. Occlusion and stenosis of left middle cerebral artery  I66.02       3. Other symbolic dysfunctions  R48.8                 POC expires Auth Status Total   Visits  Start date  Expiration date PT/OT + Visit Limit? Co-Insurance   24 Authorized bomn 1/3/24 12/31/24   No                                                                       Visit/Unit Tracking  AUTH Status: Authorized  #[id number] Date 7/8 7/10 7/15 7/22 7/24 7/29 8/14 8/28 10/7 10/9   Visits  Authed: bomn Used 1 2 3 4 5 6 7 8 9 10    Remaining                   Visit tracking:  Visit #65    Subjective: Yolis arrived on time; she remained motivated throughout today's treatment session. Arrived with leg in TROM brace, however, not using walker/crutches and noted to be be weight bearing.  Reports she missed her follow up with orthopedic dr.     Goal 1. Patient will completed mid-high level word finding tasks with 80% accuracy, to be completed within the next 4-6 weeks.  Yolis was asked to generate (2) alternative meanings for a list of 20 word. Independent accuracy was at 55%.    Goal 2. Yolis will complete executive functioning tasks with 80% accuracy, to be completed within the next 4-6 week.  To target sustained attention, working memory, and executive function, Yolis was tasked with generating items in a specific category given a phonemic constraint (speedy words). Independent accuracy was at     Goal 3. Yolis with follow verbally spoken and written 2-4 step commands with 80% accuracy, to be completed within the next 4-6 weeks.  Did not target.    Goal 4. Yolis will complete attention dependent structured tasks with 80% accuracy, to be completed  within the next 4-6 weeks.  Did not target.    Goal 5. Yolis will complete immediate, delayed, and working memory structured tasks, to be completed within the next 4-6 weeks.  Did not target.     -Continue with current plan of care.

## 2024-10-23 ENCOUNTER — OFFICE VISIT (OUTPATIENT)
Facility: CLINIC | Age: 74
End: 2024-10-23
Payer: COMMERCIAL

## 2024-10-23 DIAGNOSIS — R48.8 OTHER SYMBOLIC DYSFUNCTIONS: ICD-10-CM

## 2024-10-23 DIAGNOSIS — I66.02 OCCLUSION AND STENOSIS OF LEFT MIDDLE CEREBRAL ARTERY: ICD-10-CM

## 2024-10-23 DIAGNOSIS — I63.9 CEREBROVASCULAR ACCIDENT (CVA), UNSPECIFIED MECHANISM (HCC): Primary | ICD-10-CM

## 2024-10-23 PROCEDURE — 92507 TX SP LANG VOICE COMM INDIV: CPT

## 2024-10-23 NOTE — PROGRESS NOTES
Daily Speech Treatment Note     Today's date: 10/23/2024  Patient’s name: Yolis Flores  : 1950  MRN: [id number]    Referring provider: Shady Metcalf MD    Encounter Diagnosis     ICD-10-CM    1. Cerebrovascular accident (CVA), unspecified mechanism (HCC)  I63.9       2. Occlusion and stenosis of left middle cerebral artery  I66.02       3. Other symbolic dysfunctions  R48.8                   POC expires Auth Status Total   Visits  Start date  Expiration date PT/OT + Visit Limit? Co-Insurance   24 Authorized bomn 1/3/24 12/31/24   No                                                                       Visit/Unit Tracking  AUTH Status: Authorized Date 10/21 10/23           Visits  Authed: bomn Used 1 2 3 4 5 6 7 8 9 10    Remaining                   Visit tracking:  Visit #65    Subjective: Yolis arrived on time; she remained motivated throughout today's treatment session. Noted to not be wearing leg brace; fully weight bearing. She reports she has appointment with     Goal 1. Patient will completed mid-high level word finding tasks with 80% accuracy, to be completed within the next 4-6 weeks.  Did not target.    Goal 2. Yolis will complete executive functioning tasks with 80% accuracy, to be completed within the next 4-6 week.  Basic deduction puzzle (Deduction Puzzle #1) was attempted; Yolis was unable to complete independently. She required max breakdown of task and visual support. She also benefited from repetition. She demonstrated significant difficulty completing deductions.    To target alternating attention, executive function and working memory skills the patient completed time executive function tasks with her schedule. The patient was provided with one of his appointments with specific instructions (i.e. how early) and had to determine what time he should arrive and what time he should leave the house. She was able to complete these tasks with an overall accuracy of 50%. She  required visual support and breakdown of task.     Goal 3. Yolis with follow verbally spoken and written 2-4 step commands with 80% accuracy, to be completed within the next 4-6 weeks.  Did not target.    Goal 4. Yolis will complete attention dependent structured tasks with 80% accuracy, to be completed within the next 4-6 weeks.  Did not target.    Goal 5. Yolis will complete immediate, delayed, and working memory structured tasks, to be completed within the next 4-6 weeks.  Did not target.     -Continue with current plan of care.

## 2024-10-24 ENCOUNTER — OFFICE VISIT (OUTPATIENT)
Dept: OBGYN CLINIC | Facility: CLINIC | Age: 74
End: 2024-10-24
Payer: COMMERCIAL

## 2024-10-24 ENCOUNTER — APPOINTMENT (OUTPATIENT)
Dept: RADIOLOGY | Facility: CLINIC | Age: 74
End: 2024-10-24
Payer: COMMERCIAL

## 2024-10-24 VITALS
HEIGHT: 64 IN | SYSTOLIC BLOOD PRESSURE: 149 MMHG | WEIGHT: 165 LBS | DIASTOLIC BLOOD PRESSURE: 76 MMHG | HEART RATE: 87 BPM | BODY MASS INDEX: 28.17 KG/M2

## 2024-10-24 DIAGNOSIS — S82.142D CLOSED FRACTURE OF LEFT TIBIAL PLATEAU WITH ROUTINE HEALING, SUBSEQUENT ENCOUNTER: Primary | ICD-10-CM

## 2024-10-24 DIAGNOSIS — S82.142D CLOSED FRACTURE OF LEFT TIBIAL PLATEAU WITH ROUTINE HEALING, SUBSEQUENT ENCOUNTER: ICD-10-CM

## 2024-10-24 DIAGNOSIS — S32.009A CLOSED FRACTURE OF LUMBAR SPINE WITHOUT SPINAL CORD LESION, INITIAL ENCOUNTER (HCC): ICD-10-CM

## 2024-10-24 PROCEDURE — 73562 X-RAY EXAM OF KNEE 3: CPT

## 2024-10-24 PROCEDURE — 99213 OFFICE O/P EST LOW 20 MIN: CPT | Performed by: ORTHOPAEDIC SURGERY

## 2024-10-24 NOTE — PROGRESS NOTES
Ortho Sports Medicine New Patient Visit     Assesment:   73 y.o. female with left tibial plateau fracture sustained 9/11/24    Plan:    Repeat x-rays obtained today demonstrate increased displacement of fracture of fracture. Patient was instructed to be NWB at her last visit to prevent this, however has had difficulty with compliance.   Discussed that due to the displacement of the tibial plateau fracture she has an increased risk for developing more severe osteoarthritis.  I recommend continued nonoperative treatment at this time however.  If she has significant pain after fully recovering from the fracture I may consider referral to Dr. Rousseau to consider a total knee arthroplasty.  At this stage in the healing process it is unlikely to displace further, she may discontinue use of T-ROM brace, activities as tolerated.   She will follow up in 6 weeks with repeat left knee x-ray on arrival         Follow up:    Return in about 6 weeks (around 12/5/2024) for Recheck.        Chief Complaint   Patient presents with    Left Leg - Follow-up, Fracture       History of Present Illness:    The patient is a 73 y.o. female who presents for follow up evaluation of left tibial plateau fracture sustained 9/11/24. Patient has been instructed to be non weight bearing in a T-ROM brace with crutches. She presents today with out assistive device, weight bearing thru LLE. She states she has not  been compliant with NWB but has been compliant with T-ROM. She notes continued pain to her knee aggravated with activity and alleviated with rest and OTC analgesics.     Knee Surgical History:  None    Past Medical, Social and Family History:  Past Medical History:   Diagnosis Date    Anxiety     CVA (cerebral vascular accident) (HCC)     Depression     Diabetes (HCC)     Diabetes mellitus (HCC)     GERD (gastroesophageal reflux disease)     Hypertension     Stroke (HCC)     Stroke (HCC)      Past Surgical History:   Procedure Laterality Date  "   APPENDECTOMY      CARDIAC ELECTROPHYSIOLOGY PROCEDURE N/A 8/2/2023    Procedure: Cardiac loop recorder implant;  Surgeon: Charla Anthony DO;  Location: [facility];  Service: Cardiology    TONSILLECTOMY       Allergies   Allergen Reactions    Amoxil [Amoxicillin] Other (See Comments)     \"mouth felt on fire\"     Current Outpatient Medications on File Prior to Visit   Medication Sig Dispense Refill    Alcohol Swabs 70 % PADS May substitute brand based on insurance coverage. Check glucose BID. 100 each 0    ALPRAZolam (XANAX) 0.5 mg tablet Take 0.5 mg by mouth 4 (four) times a day as needed for anxiety Pt takes one 0.5 mg tablet as needed for anxiety up to four times daily      atorvastatin (LIPITOR) 80 mg tablet Take 1 tablet (80 mg total) by mouth daily with dinner 30 tablet 0    cariprazine (Vraylar) 1.5 MG capsule Take 1.5 mg by mouth daily      clopidogrel (PLAVIX) 75 mg tablet Take 1 tablet (75 mg total) by mouth daily Do not start before March 16, 2024. 30 tablet 0    famotidine (PEPCID) 20 mg tablet Take 0.5 tablets (10 mg total) by mouth 2 (two) times a day  0    lisinopril (ZESTRIL) 10 mg tablet Take 1 tablet (10 mg total) by mouth daily Do not start before July 30, 2023.  0    metFORMIN (GLUCOPHAGE) 500 mg tablet take 1 tablet by mouth EVERY MORNING WITH BREAKFAST 90 tablet 2    thiamine (VITAMIN B1) 100 mg tablet Take 100 mg by mouth daily      Blood Glucose Monitoring Suppl (OneTouch Verio Reflect) w/Device KIT May substitute brand based on insurance coverage. Check glucose BID. (Patient not taking: Reported on 8/21/2023) 1 kit 0    doxycycline hyclate (VIBRA-TABS) 100 mg tablet Take 100 mg by mouth 2 (two) times a day (Patient not taking: Reported on 9/16/2024)      ferrous sulfate 325 (65 Fe) mg tablet Take 1 tablet (325 mg total) by mouth daily with breakfast Do not start before March 15, 2024. 30 tablet 0    glucose blood (OneTouch Verio) test strip May substitute brand based on insurance " "coverage. Check glucose BID. (Patient not taking: Reported on 2023) 100 each 0    OneTouch Delica Lancets 33G MISC May substitute brand based on insurance coverage. Check glucose BID. (Patient not taking: Reported on 2023) 100 each 0    pantoprazole (PROTONIX) 40 mg tablet Take 1 tablet (40 mg total) by mouth daily in the early morning 30 tablet 0    polyethylene glycol (GOLYTELY) 4000 mL solution Take 4,000 mL by mouth once for 1 dose 4000 mL 0    sertraline (ZOLOFT) 100 mg tablet Take 100 mg by mouth daily with dinner 2 tablets daily       No current facility-administered medications on file prior to visit.     Social History     Socioeconomic History    Marital status: /Civil Union     Spouse name: Not on file    Number of children: Not on file    Years of education: Not on file    Highest education level: Not on file   Occupational History    Not on file   Tobacco Use    Smoking status: Former     Current packs/day: 0.00     Types: Cigarettes     Quit date: 2023     Years since quittin.2    Smokeless tobacco: Former   Vaping Use    Vaping status: Former    Quit date: 2023   Substance and Sexual Activity    Alcohol use: Not Currently     Comment: every night has \"a few\" beers    Drug use: No    Sexual activity: Not Currently   Other Topics Concern    Not on file   Social History Narrative    Not on file     Social Determinants of Health     Financial Resource Strain: Not on file   Food Insecurity: No Food Insecurity (3/11/2024)    Nursing - Inadequate Food Risk Classification     Worried About Running Out of Food in the Last Year: Never true     Ran Out of Food in the Last Year: Never true     Ran Out of Food in the Last Year: Not on file   Transportation Needs: No Transportation Needs (3/11/2024)    PRAPARE - Transportation     Lack of Transportation (Medical): No     Lack of Transportation (Non-Medical): No   Physical Activity: Not on file   Stress: Not on file   Social " "Connections: Not on file   Intimate Partner Violence: Not on file   Housing Stability: Low Risk  (3/11/2024)    Housing Stability Vital Sign     Unable to Pay for Housing in the Last Year: No     Number of Times Moved in the Last Year: 1     Homeless in the Last Year: No         I have reviewed the past medical, surgical, social and family history, medications and allergies as documented in the EMR.    Review of systems: ROS is negative other than that noted in the HPI.  Constitutional: Negative for fatigue and fever.   HENT: Negative for sore throat.    Respiratory: Negative for shortness of breath.    Cardiovascular: Negative for chest pain.   Gastrointestinal: Negative for abdominal pain.   Endocrine: Negative for cold intolerance and heat intolerance.   Genitourinary: Negative for flank pain.   Musculoskeletal: Negative for back pain.   Skin: Negative for rash.   Allergic/Immunologic: Negative for immunocompromised state.   Neurological: Negative for dizziness.   Psychiatric/Behavioral: Negative for agitation.      Physical Exam:    Blood pressure 149/76, pulse 87, height 5' 4\" (1.626 m), weight 74.8 kg (165 lb).    General/Constitutional: NAD, well developed, well nourished  HENT: Normocephalic, atraumatic  CV: Intact distal pulses, regular rate  Resp: No respiratory distress or labored breathing  Abdomen: soft, nondistended   Lymphatic: No lymphadenopathy palpated  Neuro: Alert and Oriented x 3, no focal deficits  Psych: Normal mood, normal affect  Skin: Warm, dry, no rashes, no erythema      Knee Exam:   No significant skin lesions or deformity  Range of motion from 2 degrees shy of full extension - 90 degrees of flexion    Medial and lateral joint line tenderness   Knee is stable to varus stress, valgus stress, Lachman, and posterior drawer.    Neurovascularly intact distally    Knee Imaging    X-rays of the left knee were reviewed and interpreted today. These show continued minimal displacement of the " fracture site compared to previous radiographs.      Scribe Attestation      I,:  Promise Olsen am acting as a scribe while in the presence of the attending physician.:       I,:  Kush Eli MD personally performed the services described in this documentation    as scribed in my presence.:

## 2024-10-28 ENCOUNTER — OFFICE VISIT (OUTPATIENT)
Facility: CLINIC | Age: 74
End: 2024-10-28
Payer: COMMERCIAL

## 2024-10-28 DIAGNOSIS — I66.02 OCCLUSION AND STENOSIS OF LEFT MIDDLE CEREBRAL ARTERY: ICD-10-CM

## 2024-10-28 DIAGNOSIS — I63.9 CEREBROVASCULAR ACCIDENT (CVA), UNSPECIFIED MECHANISM (HCC): Primary | ICD-10-CM

## 2024-10-28 DIAGNOSIS — R48.8 OTHER SYMBOLIC DYSFUNCTIONS: ICD-10-CM

## 2024-10-28 PROCEDURE — 92507 TX SP LANG VOICE COMM INDIV: CPT

## 2024-10-28 NOTE — PROGRESS NOTES
"  Daily Speech Treatment Note     Today's date: 10/28/2024  Patient’s name: Yolis Flores  : 1950  MRN: [id number]    Referring provider: Shady Metcalf MD    Encounter Diagnosis     ICD-10-CM    1. Cerebrovascular accident (CVA), unspecified mechanism (HCC)  I63.9       2. Occlusion and stenosis of left middle cerebral artery  I66.02       3. Other symbolic dysfunctions  R48.8                     POC expires Auth Status Total   Visits  Start date  Expiration date PT/OT + Visit Limit? Co-Insurance   24 Authorized bomn 1/3/24 12/31/24   No                                                                       Visit/Unit Tracking  AUTH Status: Authorized Date 10/21 10/23 10/28          Visits  Authed: bomn Used 1 2 3 4 5 6 7 8 9 10    Remaining                   Visit tracking:  Visit #66    Subjective: Yolis arrived on time; she remained motivated throughout today's treatment session. Had follow up with orthopedic doctor; no longer required to wear leg brace. Reports improvements in pain. Discussed plan of care and potential discharge. Encouraged her to speak with spouse about it. She verbalized agreement.     Goal 1. Patient will completed mid-high level word finding tasks with 80% accuracy, to be completed within the next 4-6 weeks.  Mid level word finding activity-guessing a word based on a description and partial visual support (\"-man\" in various word positions) was completed with 50% independent accuracy. She benefited from min-mod cues for word finding.    Goal 2. Yolis will complete executive functioning tasks with 80% accuracy, to be completed within the next 4-6 week.    Goal 3. Yolis with follow verbally spoken and written 2-4 step commands with 80% accuracy, to be completed within the next 4-6 weeks.  Did not target.    Goal 4. Yolis will complete attention dependent structured tasks with 80% accuracy, to be completed within the next 4-6 weeks.  Did not target.    Goal 5. " Yolis will complete immediate, delayed, and working memory structured tasks, to be completed within the next 4-6 weeks.  Did not target.     -Continue with current plan of care.

## 2024-10-30 ENCOUNTER — OFFICE VISIT (OUTPATIENT)
Facility: CLINIC | Age: 74
End: 2024-10-30
Payer: COMMERCIAL

## 2024-10-30 DIAGNOSIS — I63.9 CEREBROVASCULAR ACCIDENT (CVA), UNSPECIFIED MECHANISM (HCC): Primary | ICD-10-CM

## 2024-10-30 DIAGNOSIS — R48.8 OTHER SYMBOLIC DYSFUNCTIONS: ICD-10-CM

## 2024-10-30 DIAGNOSIS — I66.02 OCCLUSION AND STENOSIS OF LEFT MIDDLE CEREBRAL ARTERY: ICD-10-CM

## 2024-10-30 PROCEDURE — 92507 TX SP LANG VOICE COMM INDIV: CPT

## 2024-10-30 NOTE — PROGRESS NOTES
Daily Speech Treatment Note     Today's date: 10/30/2024  Patient’s name: Yolis Flores  : 1950  MRN: [id number]    Referring provider: Shady Metcalf MD    Encounter Diagnosis     ICD-10-CM    1. Cerebrovascular accident (CVA), unspecified mechanism (HCC)  I63.9       2. Occlusion and stenosis of left middle cerebral artery  I66.02       3. Other symbolic dysfunctions  R48.8                 POC expires Auth Status Total   Visits  Start date  Expiration date PT/OT + Visit Limit? Co-Insurance   24 Authorized bomn 1/3/24 12/31/24   No                                                                       Visit/Unit Tracking  AUTH Status: Authorized Date 10/21 10/23 10/28 10/30         Visits  Authed: bomn Used 1 2 3 4 5 6 7 8 9 10    Remaining                   Visit tracking:  Visit #67    Subjective: Yolis arrived on time; she remained motivated throughout today's treatment session. Reports no new changes from the previous session.     Goal 1. Patient will completed mid-high level word finding tasks with 80% accuracy, to be completed within the next 4-6 weeks.  Yolis completed mid level executive function based word finding activity ([employer]) with 75% accuracy independently. Long and overt pauses present for word finding. She benefited from min-mod semantic cues for specificity.     Goal 2. Yolis will complete executive functioning tasks with 80% accuracy, to be completed within the next 4-6 week.    Goal 3. Yolis with follow verbally spoken and written 2-4 step commands with 80% accuracy, to be completed within the next 4-6 weeks.  Did not target.    Goal 4. Yolis will complete attention dependent structured tasks with 80% accuracy, to be completed within the next 4-6 weeks.  Did not target.    Goal 5. Yolis will complete immediate, delayed, and working memory structured tasks, to be completed within the next 4-6 weeks.  Did not target.     -Continue with current plan of  care.

## 2024-11-04 ENCOUNTER — OFFICE VISIT (OUTPATIENT)
Facility: CLINIC | Age: 74
End: 2024-11-04
Payer: COMMERCIAL

## 2024-11-04 DIAGNOSIS — I63.9 CEREBROVASCULAR ACCIDENT (CVA), UNSPECIFIED MECHANISM (HCC): Primary | ICD-10-CM

## 2024-11-04 DIAGNOSIS — I66.02 OCCLUSION AND STENOSIS OF LEFT MIDDLE CEREBRAL ARTERY: ICD-10-CM

## 2024-11-04 PROCEDURE — 92507 TX SP LANG VOICE COMM INDIV: CPT

## 2024-11-04 NOTE — PROGRESS NOTES
Daily Speech Treatment Note     Today's date: 2024  Patient’s name: Yolis Flores  : 1950  MRN: [id number]    Referring provider: Shady Metcalf MD    Encounter Diagnosis     ICD-10-CM    1. Cerebrovascular accident (CVA), unspecified mechanism (HCC)  I63.9       2. Occlusion and stenosis of left middle cerebral artery  I66.02                   POC expires Auth Status Total   Visits  Start date  Expiration date PT/OT + Visit Limit? Co-Insurance   24 Authorized bomn 1/3/24 12/31/24   No                                                                       Visit/Unit Tracking  AUTH Status: Authorized Date 10/21 10/23 10/28 10/30 11/4        Visits  Authed: bomn Used 1 2 3 4 5 6 7 8 9 10    Remaining                   Visit tracking:  Visit #67    Subjective: Yolis arrived on time; she remained motivated throughout today's treatment session. Reports no new changes from the previous session.     Goal 1. Patient will completed mid-high level word finding tasks with 80% accuracy, to be completed within the next 4-6 weeks.  Yolis generated opposites for (20) mid level word with 60% accuracy independently. Pauses for word finding were present; she benefited from hearing words presented in a sentence.     Goal 2. Yolis will complete executive functioning tasks with 80% accuracy, to be completed within the next 4-6 week.    Goal 3. Yolis with follow verbally spoken and written 2-4 step commands with 80% accuracy, to be completed within the next 4-6 weeks.  Did not target.    Goal 4. Yolis will complete attention dependent structured tasks with 80% accuracy, to be completed within the next 4-6 weeks.  Did not target.     Goal 5. Yolis will complete immediate, delayed, and working memory structured tasks, to be completed within the next 4-6 weeks.  Did not target.     -Continue with current plan of care.

## 2024-11-06 ENCOUNTER — APPOINTMENT (OUTPATIENT)
Facility: CLINIC | Age: 74
End: 2024-11-06
Payer: COMMERCIAL

## 2024-11-13 ENCOUNTER — APPOINTMENT (OUTPATIENT)
Facility: CLINIC | Age: 74
End: 2024-11-13
Payer: COMMERCIAL

## 2024-11-18 ENCOUNTER — APPOINTMENT (OUTPATIENT)
Facility: CLINIC | Age: 74
End: 2024-11-18
Payer: COMMERCIAL

## 2024-11-20 ENCOUNTER — APPOINTMENT (OUTPATIENT)
Facility: CLINIC | Age: 74
End: 2024-11-20
Payer: COMMERCIAL

## 2024-11-25 ENCOUNTER — APPOINTMENT (OUTPATIENT)
Facility: CLINIC | Age: 74
End: 2024-11-25
Payer: COMMERCIAL

## 2024-12-05 ENCOUNTER — REMOTE DEVICE CLINIC VISIT (OUTPATIENT)
Dept: CARDIOLOGY CLINIC | Facility: CLINIC | Age: 74
End: 2024-12-05
Payer: COMMERCIAL

## 2024-12-05 DIAGNOSIS — I63.9 CRYPTOGENIC STROKE (HCC): Primary | ICD-10-CM

## 2024-12-05 PROCEDURE — 93298 REM INTERROG DEV EVAL SCRMS: CPT | Performed by: INTERNAL MEDICINE

## 2024-12-05 NOTE — PROGRESS NOTES
"Results for orders placed or performed in visit on 12/05/24   Cardiac EP device report    Narrative    MDT [id number]/ ACTIVE SYSTEM IS MRI CONDITIONAL  CARELINK TRANSMISSION: BATTERY STATUS \"OK.\" NO PATIENT OR DEVICE ACTIVATED EPISODES.â€”REYES        "

## 2024-12-12 ENCOUNTER — RESULTS FOLLOW-UP (OUTPATIENT)
Dept: CARDIOLOGY CLINIC | Facility: CLINIC | Age: 74
End: 2024-12-12

## 2025-01-02 ENCOUNTER — OFFICE VISIT (OUTPATIENT)
Dept: OBGYN CLINIC | Facility: CLINIC | Age: 75
End: 2025-01-02
Payer: COMMERCIAL

## 2025-01-02 ENCOUNTER — APPOINTMENT (OUTPATIENT)
Dept: RADIOLOGY | Facility: CLINIC | Age: 75
End: 2025-01-02
Payer: COMMERCIAL

## 2025-01-02 VITALS — HEIGHT: 64 IN | WEIGHT: 165 LBS | BODY MASS INDEX: 28.17 KG/M2

## 2025-01-02 DIAGNOSIS — S82.142A CLOSED FRACTURE OF LEFT TIBIAL PLATEAU, INITIAL ENCOUNTER: Primary | ICD-10-CM

## 2025-01-02 DIAGNOSIS — S82.142A CLOSED FRACTURE OF LEFT TIBIAL PLATEAU, INITIAL ENCOUNTER: ICD-10-CM

## 2025-01-02 DIAGNOSIS — R26.2 AMBULATORY DYSFUNCTION: ICD-10-CM

## 2025-01-02 PROCEDURE — 73560 X-RAY EXAM OF KNEE 1 OR 2: CPT

## 2025-01-02 PROCEDURE — 99213 OFFICE O/P EST LOW 20 MIN: CPT | Performed by: ORTHOPAEDIC SURGERY

## 2025-01-02 NOTE — PROGRESS NOTES
"Ortho Sports Medicine Follow Up Patient Visit     Assesment:   74 y.o. female with left tibial plateau fracture sustained 9/11/24    Plan:    We reviewed Yolis's left knee x-rays today, which do show healing of her minimally displaced/depressed tibial plateau fracture. Today, the patient notes some knee discomfort with kneeling and getting in/out of her truck, however, overall, she denies significant knee symptoms. We reviewed that she is at risk of evolving arthritic changes that may require treatment in the future, such as injection options and ultimately a knee replacement, depending on the severity of her symptoms and response to non-operative interventions. Regardless, I would recommend a referral to formal PT to work on ambulatory dysfunction and overall lower extremity strengthening. We discussed continuing to use OTC medications as needed and activities as tolerated. We will plan to follow up with her as needed.      Chief Complaint   Patient presents with    Left Knee - Follow-up       History of Present Illness:    The patient is a 74 y.o. female who presents for follow up evaluation of left tibial plateau fracture sustained 9/11/24. Today, the patient notes some persistent knee pain with directly kneeling on the knee and getting in/out of her 's truck. Her  states she \"waddles\" when she walks, and generally walks slower, but this was a result of her stroke. The patient states she is fearful of falling and the patient's  reports any attempt of NWB status was not feasible. She denies new injury/trauma.     Knee Surgical History:  None    Past Medical, Social and Family History:  Past Medical History:   Diagnosis Date    Anxiety     CVA (cerebral vascular accident) (HCC)     Depression     Diabetes (HCC)     Diabetes mellitus (HCC)     GERD (gastroesophageal reflux disease)     Hypertension     Stroke (HCC)     Stroke (HCC)      Past Surgical History:   Procedure Laterality Date    " "APPENDECTOMY      CARDIAC ELECTROPHYSIOLOGY PROCEDURE N/A 8/2/2023    Procedure: Cardiac loop recorder implant;  Surgeon: Charla Anthony DO;  Location: [facility];  Service: Cardiology    TONSILLECTOMY       Allergies   Allergen Reactions    Amoxil [Amoxicillin] Other (See Comments)     \"mouth felt on fire\"     Current Outpatient Medications on File Prior to Visit   Medication Sig Dispense Refill    ALPRAZolam (XANAX) 0.5 mg tablet Take 0.5 mg by mouth 4 (four) times a day as needed for anxiety Pt takes one 0.5 mg tablet as needed for anxiety up to four times daily      atorvastatin (LIPITOR) 80 mg tablet Take 1 tablet (80 mg total) by mouth daily with dinner 30 tablet 0    clopidogrel (PLAVIX) 75 mg tablet Take 1 tablet (75 mg total) by mouth daily Do not start before March 16, 2024. 30 tablet 0    famotidine (PEPCID) 20 mg tablet Take 0.5 tablets (10 mg total) by mouth 2 (two) times a day  0    ferrous sulfate 325 (65 Fe) mg tablet Take 1 tablet (325 mg total) by mouth daily with breakfast Do not start before March 15, 2024. 30 tablet 0    lisinopril (ZESTRIL) 10 mg tablet Take 1 tablet (10 mg total) by mouth daily Do not start before July 30, 2023.  0    metFORMIN (GLUCOPHAGE) 500 mg tablet take 1 tablet by mouth EVERY MORNING WITH BREAKFAST 90 tablet 2    pantoprazole (PROTONIX) 40 mg tablet Take 1 tablet (40 mg total) by mouth daily in the early morning 30 tablet 0    sertraline (ZOLOFT) 100 mg tablet Take 100 mg by mouth daily with dinner 2 tablets daily      thiamine (VITAMIN B1) 100 mg tablet Take 100 mg by mouth daily      Alcohol Swabs 70 % PADS May substitute brand based on insurance coverage. Check glucose BID. (Patient not taking: Reported on 1/2/2025) 100 each 0    Blood Glucose Monitoring Suppl (OneTouch Verio Reflect) w/Device KIT May substitute brand based on insurance coverage. Check glucose BID. (Patient not taking: Reported on 8/21/2023) 1 kit 0    cariprazine (Vraylar) 1.5 MG capsule Take " "1.5 mg by mouth daily (Patient not taking: Reported on 2025)      doxycycline hyclate (VIBRA-TABS) 100 mg tablet Take 100 mg by mouth 2 (two) times a day (Patient not taking: Reported on 2025)      glucose blood (OneTouch Verio) test strip May substitute brand based on insurance coverage. Check glucose BID. (Patient not taking: Reported on 2023) 100 each 0    OneTouch Delica Lancets 33G MISC May substitute brand based on insurance coverage. Check glucose BID. (Patient not taking: Reported on 2023) 100 each 0    polyethylene glycol (GOLYTELY) 4000 mL solution Take 4,000 mL by mouth once for 1 dose 4000 mL 0     No current facility-administered medications on file prior to visit.     Social History     Socioeconomic History    Marital status: /Civil Union     Spouse name: Not on file    Number of children: Not on file    Years of education: Not on file    Highest education level: Not on file   Occupational History    Not on file   Tobacco Use    Smoking status: Former     Current packs/day: 0.00     Types: Cigarettes     Quit date: 2023     Years since quittin.4    Smokeless tobacco: Former   Vaping Use    Vaping status: Former    Quit date: 2023   Substance and Sexual Activity    Alcohol use: Not Currently     Comment: every night has \"a few\" beers    Drug use: No    Sexual activity: Not Currently   Other Topics Concern    Not on file   Social History Narrative    Not on file     Social Drivers of Health     Financial Resource Strain: Not on file   Food Insecurity: No Food Insecurity (3/11/2024)    Nursing - Inadequate Food Risk Classification     Worried About Running Out of Food in the Last Year: Never true     Ran Out of Food in the Last Year: Never true     Ran Out of Food in the Last Year: Not on file   Transportation Needs: No Transportation Needs (3/11/2024)    PRAPARE - Transportation     Lack of Transportation (Medical): No     Lack of Transportation (Non-Medical): No " "  Physical Activity: Not on file   Stress: Not on file   Social Connections: Not on file   Intimate Partner Violence: Not on file   Housing Stability: Low Risk  (3/11/2024)    Housing Stability Vital Sign     Unable to Pay for Housing in the Last Year: No     Number of Times Moved in the Last Year: 1     Homeless in the Last Year: No         I have reviewed the past medical, surgical, social and family history, medications and allergies as documented in the EMR.    Review of systems: ROS is negative other than that noted in the HPI.  Constitutional: Negative for fatigue and fever.   HENT: Negative for sore throat.    Respiratory: Negative for shortness of breath.    Cardiovascular: Negative for chest pain.   Gastrointestinal: Negative for abdominal pain.   Endocrine: Negative for cold intolerance and heat intolerance.   Genitourinary: Negative for flank pain.   Musculoskeletal: Negative for back pain.   Skin: Negative for rash.   Allergic/Immunologic: Negative for immunocompromised state.   Neurological: Negative for dizziness.   Psychiatric/Behavioral: Negative for agitation.      Physical Exam:    Height 5' 4\" (1.626 m), weight 74.8 kg (165 lb).    General/Constitutional: NAD, well developed, well nourished  HENT: Normocephalic, atraumatic  CV: Intact distal pulses, regular rate  Resp: No respiratory distress or labored breathing  Abdomen: soft, nondistended   Lymphatic: No lymphadenopathy palpated  Neuro: Alert and Oriented x 3, no focal deficits  Psych: Normal mood, normal affect  Skin: Warm, dry, no rashes, no erythema      Knee Exam:   No significant skin lesions or deformity  Valgus alignment  Range of motion from a few degrees shy of full extension to 100 degrees of flexion    No significant joint line tenderness. No extensor mechanism tenderness.  Knee is stable to varus stress, valgus stress, Lachman, and posterior drawer.    Neurovascularly intact distally    Knee Imaging    X-rays of the left knee were " reviewed and interpreted today. These show continued minimal displacement of the fracture site in similar appearance to images from 10/24/24 with interval healing.      Scribe Attestation      I,:  Taisha Garcia PA-C am acting as a scribe while in the presence of the attending physician.:       I,:  Kush Eli MD personally performed the services described in this documentation    as scribed in my presence.:

## 2025-01-13 ENCOUNTER — EVALUATION (OUTPATIENT)
Dept: PHYSICAL THERAPY | Facility: CLINIC | Age: 75
End: 2025-01-13
Payer: COMMERCIAL

## 2025-01-13 DIAGNOSIS — S82.142A CLOSED FRACTURE OF LEFT TIBIAL PLATEAU, INITIAL ENCOUNTER: ICD-10-CM

## 2025-01-13 DIAGNOSIS — R26.2 AMBULATORY DYSFUNCTION: ICD-10-CM

## 2025-01-13 PROCEDURE — 97162 PT EVAL MOD COMPLEX 30 MIN: CPT

## 2025-01-13 NOTE — PROGRESS NOTES
PT Evaluation     Today's date: 2025  Patient name: Yolis Flores  : 1950  MRN: [id number]  Referring provider: Taisha Garcia PA-C  Dx:   Encounter Diagnosis     ICD-10-CM    1. Closed fracture of left tibial plateau, initial encounter  S82.142A Ambulatory Referral to Physical Therapy      2. Ambulatory dysfunction  R26.2 Ambulatory Referral to Physical Therapy                     Assessment  Impairments: abnormal coordination, abnormal gait, abnormal or restricted ROM, activity intolerance, impaired balance, impaired physical strength, lacks appropriate home exercise program, pain with function, weight-bearing intolerance and poor body mechanics  Symptom irritability: moderate    Assessment details: Yolis Flores is a 74 y.o. female who presents with pain, decreased strength, decreased ROM, decreased joint mobility, ambulatory dysfunction, and balance dysfunction. Due to these impairments, patient has difficulty performing ADL's, recreational activities, ambulation, stair negotiation, transfers. Patient's clinical presentation is consistent with their referring diagnosis of Closed fracture of left tibial plateau, initial encounter and Ambulatory dysfunction. Patient has been educated in home exercise program and plan of care. Patient would benefit from skilled physical therapy services to address their aforementioned functional limitations and progress towards prior level of function and independence with home exercise program.       Goals  Short Term Goals:  to be accomplished in 4 weeks   STG1. Initiate and advance HEP to maximize progress between therapy sessions  STG2. Reduce pain w/ WB activity to 0-1/10.  STG3. Improve B/L LE strength to 4/5 to allow pt to raise from sit to stand w/o UE assist.      Long Term Goals:  Target Date 12 weeks   LTG1. Pt to be Indep with HEP  to maximize progress between therapy sessions and improve functional mobility  LTG2. Pt will demo L knee ROM of 0-135 or  greater needed for reciprocal stairs w/ handrail w/o pain   LTG3.Pt to demo B/L LE strength of 4+/5 or better such that pt can perform reciprocal stair negotiation  LTG4. Pt will return to household ADLs and work duties pain free as per PLOF          Plan  Patient would benefit from: PT eval and skilled physical therapy  Planned modality interventions: cryotherapy and thermotherapy: hydrocollator packs    Planned therapy interventions: manual therapy, neuromuscular re-education, self care, therapeutic activities, therapeutic exercise, home exercise program, patient education, balance/weight bearing training, IASTM, joint mobilization, Valladares taping, kinesiology taping, postural training, strengthening, stretching and flexibility    Frequency: 2x week  Plan of Care beginning date: 2025  Plan of Care expiration date: 2025  Treatment plan discussed with: patient  Plan details: HEP development, stretching, strengthening, A/AA/PROM, joint mobilizations, posture education, STM/MI as needed to reduce muscle tension, muscle reeducation, PLOC discussed and agreed upon with patient.            Subjective Evaluation    History of Present Illness  Mechanism of injury: Patient reports to PT with L tibial plateau fracture. Patient was unable to maintain NWB status, however most recent xray appears to show healing of fracture and MD still feel conservative measures are appropriate at this time. Patient is unable to kneel because she cannot get up due to pain. Patient has some pain with stairs and she has to be careful on them. Patient can get up her 16 stairs at home but has to compensate and is unable to do reciprocal stair negotiation. Walking can cause some achiness and she has to move slower than she was previously.   Patient Goals  Patient goals for therapy: decreased pain, improved balance, increased motion, increased strength and independence with ADLs/IADLs    Pain  Current pain ratin  At best pain rating:  0  At worst pain rating: 3  Location: L knee  Quality: dull ache  Relieving factors: relaxation  Aggravating factors: stair climbing, walking and standing (kneeling)    Social Support  Steps to enter house: yes  Stairs in house: yes   16  Lives in: multiple-level home  Lives with: spouse      Diagnostic Tests  Abnormal x-ray: healing fracture.  Treatments  Current treatment comments: on hold from speech therapy.         Objective    (*=pain)      Knee AROM: deg      R  L  Extension: (sup)   0  -3  Flexion: (sup/prone)  140  120      Strength: MMT  Hip    R  L    IR(seated)   4/5  3/5*  ER(seated)   4/5  3/5*   Flexion(sup)   4+/5  4-/5      Knee    R  L    Extension(Seated)  4+/5  4/5  Flexion(Seated)  4/5  4-/5    Ankle    R  L   Dorsiflexion(seated)  5/5  5/5  Plantarflexion(seated)  5/5  5/5      Joint mobility:   Mild hypomobility with inferior glide of L patella.     Observation: mild edema noted compared to opp LE.     Tenderness/Palpation: no TTP at this time but patient reports sometimes it can be     Special Tests:  Valgus Stress: neg  Varus Stres: neg   McMurrary's IR of the tibia + Varus stress = lateral meniscus: neg  McMurrays ER of the tibia + Valgus stress = medial meniscus:neg  Lachman: neg   Post drawer: neg     Balance:   Tandem R posterior: 9 sec   Tandem L posterior: 6 sec      Function:   Stairs: difficulty with ascending and descending on RLE with difficulty coordinating foot position with descent. Unable to perform on LLE  Ambulation: antalgic gait pattern with decreased stance time on LLE.                 Precautions: history of CVA in July 2023  Past Medical History:   Diagnosis Date    Anxiety     CVA (cerebral vascular accident) (HCC)     Depression     Diabetes (HCC)     Diabetes mellitus (HCC)     GERD (gastroesophageal reflux disease)     Hypertension     Stroke (HCC)     Stroke (HCC)      Past Surgical History:   Procedure Laterality Date    APPENDECTOMY      CARDIAC ELECTROPHYSIOLOGY  PROCEDURE N/A 8/2/2023    Procedure: Cardiac loop recorder implant;  Surgeon: Charla Anthony DO;  Location: [facility];  Service: Cardiology    TONSILLECTOMY       SOC: 1/13/2025  FOTO:1/13/2025  POC Expiration: 4/7/2025  Daily Treatment Log:  Date 1/13/2025       Visit # 1       Auth         Auth exp        Manual                        There Exer        Recumb Bike for ROM         LAQ        Heel slides         Std hip abd         Std hip ext                                         Pt edu        HEP Created and discussed        There Activ                                                        NMReed        Weight shifts         Xiao Morocho         HR/TR        Tandem standing                                         Modalities                                HEP:     Access Code: [email or web address]  URL: [email or web address]  Date: 01/13/2025  Prepared by: Lilly Yan    Exercises  - Seated Long Arc Quad  - 1 x daily - 7 x weekly - 1 sets - 10 reps  - Standing Knee Flexion AROM with Chair Support  - 1 x daily - 7 x weekly - 1 sets - 10 reps  - Supine Heel Slide with Strap  - 1 x daily - 7 x weekly - 1 sets - 10 reps - 3sec hold

## 2025-01-16 ENCOUNTER — OFFICE VISIT (OUTPATIENT)
Dept: PHYSICAL THERAPY | Facility: CLINIC | Age: 75
End: 2025-01-16
Payer: COMMERCIAL

## 2025-01-16 DIAGNOSIS — S82.142A CLOSED FRACTURE OF LEFT TIBIAL PLATEAU, INITIAL ENCOUNTER: Primary | ICD-10-CM

## 2025-01-16 DIAGNOSIS — R26.2 AMBULATORY DYSFUNCTION: ICD-10-CM

## 2025-01-16 PROCEDURE — 97110 THERAPEUTIC EXERCISES: CPT

## 2025-01-16 PROCEDURE — 97112 NEUROMUSCULAR REEDUCATION: CPT

## 2025-01-16 NOTE — PROGRESS NOTES
Daily Note     Today's date: 2025  Patient name: Yolis Flores  : 1950  MRN: [id number]  Referring provider: Taisha Garcia PA-C  Dx:   Encounter Diagnosis     ICD-10-CM    1. Closed fracture of left tibial plateau, initial encounter  S82.142A       2. Ambulatory dysfunction  R26.2                      Subjective: Patient reports no concerns with current HEP       Objective: See treatment diary below      Assessment: Tolerated treatment well with good tolerance to WB on LLE. Patient denied pain during session and was instructed to report back next visit on any post session soreness. Patient would benefit from continued PT      Plan: Continue per plan of care.      Precautions: history of CVA in 2023- required increased VC for form   Past Medical History:   Diagnosis Date    Anxiety     CVA (cerebral vascular accident) (HCC)     Depression     Diabetes (HCC)     Diabetes mellitus (HCC)     GERD (gastroesophageal reflux disease)     Hypertension     Stroke (HCC)     Stroke (HCC)      Past Surgical History:   Procedure Laterality Date    APPENDECTOMY      CARDIAC ELECTROPHYSIOLOGY PROCEDURE N/A 2023    Procedure: Cardiac loop recorder implant;  Surgeon: Charla Anthony DO;  Location: [facility];  Service: Cardiology    TONSILLECTOMY       SOC: 2025  FOTO:2025  POC Expiration: 2025  Daily Treatment Log:  Date 2025      Visit # 1 2      Auth         Auth exp        Manual                        There Exer  15'      Recumb Bike for ROM   3'       LAQ  1x15 R/L       Heel slides   1x10 L       Std hip abd         Std hip ext         HS curl vs TB  YTB 1x10 R/L                               Pt edu        HEP Created and discussed  Updated and discussed       There Activ        STS        FSU                                         NMReed  23'      Weight shifts   1x10 ML         Clamshells   Hooklying YTB 1x10       Bridges   1x5      HR/TR  1x10 HR       Tandem  standing         Side stepping   2 laps at rail                               Modalities                                HEP:   Access Code: [email or web address]  URL: [email or web address]  Date: 01/16/2025  Prepared by: Lilly Yan    Exercises  - Seated Long Arc Quad  - 1 x daily - 7 x weekly - 1 sets - 10 reps  - Standing Knee Flexion AROM with Chair Support  - 1 x daily - 7 x weekly - 1 sets - 10 reps  - Side Stepping with Counter Support  - 1 x daily - 7 x weekly  - Heel Raises with Counter Support  - 1 x daily - 7 x weekly - 1 sets - 10 reps  - Supine Bridge  - 1 x daily - 7 x weekly - 1 sets - 5 reps  - Supine Heel Slide with Strap  - 1 x daily - 7 x weekly - 1 sets - 10 reps - 3sec hold

## 2025-01-20 ENCOUNTER — OFFICE VISIT (OUTPATIENT)
Dept: PHYSICAL THERAPY | Facility: CLINIC | Age: 75
End: 2025-01-20
Payer: COMMERCIAL

## 2025-01-20 DIAGNOSIS — S82.142A CLOSED FRACTURE OF LEFT TIBIAL PLATEAU, INITIAL ENCOUNTER: Primary | ICD-10-CM

## 2025-01-20 DIAGNOSIS — R26.2 AMBULATORY DYSFUNCTION: ICD-10-CM

## 2025-01-20 PROCEDURE — 97110 THERAPEUTIC EXERCISES: CPT

## 2025-01-20 PROCEDURE — 97112 NEUROMUSCULAR REEDUCATION: CPT

## 2025-01-20 PROCEDURE — 97530 THERAPEUTIC ACTIVITIES: CPT

## 2025-01-20 NOTE — PROGRESS NOTES
Daily Note     Today's date: 2025  Patient name: Yolis Flores  : 1950  MRN: [id number]  Referring provider: Taisha Garcia PA-C  Dx:   Encounter Diagnosis     ICD-10-CM    1. Closed fracture of left tibial plateau, initial encounter  S82.142A       2. Ambulatory dysfunction  R26.2                      Subjective: Patient reports her leg is feeling ok today with no soreness over the weekend. Patient does still get some pain with kneeling and getting up from the ground. Discussed using a cushion or pillow under the knee with kneeling or half kneeling onto R knee to avoid hard pressure from floor which can increase knee pain. Patient verbalized understanding.       Objective: See treatment diary below      Assessment: Tolerated treatment well with good tolerance to additional hip strengthening with no increase in L knee pain. Continue to progress balance and LE strengthening as tolerated to improve function and gait.  Patient would benefit from continued PT      Plan: Continue per plan of care.      Precautions: history of CVA in 2023- required increased VC for form   Past Medical History:   Diagnosis Date    Anxiety     CVA (cerebral vascular accident) (HCC)     Depression     Diabetes (HCC)     Diabetes mellitus (HCC)     GERD (gastroesophageal reflux disease)     Hypertension     Stroke (HCC)     Stroke (HCC)      Past Surgical History:   Procedure Laterality Date    APPENDECTOMY      CARDIAC ELECTROPHYSIOLOGY PROCEDURE N/A 2023    Procedure: Cardiac loop recorder implant;  Surgeon: Charla Anthony DO;  Location: [facility];  Service: Cardiology    TONSILLECTOMY       SOC: 2025  FOTO:2025  POC Expiration: 2025  Daily Treatment Log:  Date 2025     Visit # 1 2 3     Auth         Auth exp        Manual        L Knee ROM    Flexion: 135  Ext: 0             There Exer  15' 10'     Recumb Bike for ROM   3'  5'     LAQ  1x15 R/L  1# 1x15 R/L      Heel  "slides   1x10 L  1x10 L      Std hip abd    1x10 R/L alt      Std hip ext         HS curl vs TB  YTB 1x10 R/L  YTB 1x10 R/L      Seated knee flexion with opp LE OP   1x10 L                      Pt edu        HEP Created and discussed  Updated and discussed       There Activ   10'     STS   1x10      FSU    4\" step 1x10      LSU    4\" step 1x5 R/L                              NMReed  23' 20'     Weight shifts   1x10 ML         Clamshells   Hooklying YTB 1x10  Hooklying YTB 1x10      Bridges   1x5 1x5      HR/TR  1x10 HR  1x10 ea      Tandem standing    2 laps near rail uni UE support CS      Side stepping   2 laps at rail                               Modalities                                HEP:   Access Code: [email or web address]  URL: [email or web address]  Date: 01/16/2025  Prepared by: Lilly Yan    Exercises  - Seated Long Arc Quad  - 1 x daily - 7 x weekly - 1 sets - 10 reps  - Standing Knee Flexion AROM with Chair Support  - 1 x daily - 7 x weekly - 1 sets - 10 reps  - Side Stepping with Counter Support  - 1 x daily - 7 x weekly  - Heel Raises with Counter Support  - 1 x daily - 7 x weekly - 1 sets - 10 reps  - Supine Bridge  - 1 x daily - 7 x weekly - 1 sets - 5 reps  - Supine Heel Slide with Strap  - 1 x daily - 7 x weekly - 1 sets - 10 reps - 3sec hold       "

## 2025-01-21 ENCOUNTER — OFFICE VISIT (OUTPATIENT)
Dept: PHYSICAL THERAPY | Facility: CLINIC | Age: 75
End: 2025-01-21
Payer: COMMERCIAL

## 2025-01-21 DIAGNOSIS — S82.142A CLOSED FRACTURE OF LEFT TIBIAL PLATEAU, INITIAL ENCOUNTER: Primary | ICD-10-CM

## 2025-01-21 DIAGNOSIS — R26.2 AMBULATORY DYSFUNCTION: ICD-10-CM

## 2025-01-21 PROCEDURE — 97110 THERAPEUTIC EXERCISES: CPT

## 2025-01-21 PROCEDURE — 97530 THERAPEUTIC ACTIVITIES: CPT

## 2025-01-21 PROCEDURE — 97112 NEUROMUSCULAR REEDUCATION: CPT

## 2025-01-21 NOTE — PROGRESS NOTES
"Daily Note     Today's date: 2025  Patient name: Yolsi Flores  : 1950  MRN: [id number]  Referring provider: Taisha Garcia PA-C  Dx:   Encounter Diagnosis     ICD-10-CM    1. Closed fracture of left tibial plateau, initial encounter  S82.142A       2. Ambulatory dysfunction  R26.2                      Subjective: pt reports her usual soreness today. She noted last week there was a day she felt there was improvement but cont with good days and bad days.       Objective: See treatment diary below      Assessment: Tolerated treatment well. Pt requires VC for proper form and reps during TE. No noted increased pain during or post session. Patient would benefit from continued PT to progress global LE strengthening and improvement in form and indep of TE for HEP compliance.       Plan: Continue per plan of care.      Precautions: history of CVA in 2023- required increased VC for form   Past Medical History:   Diagnosis Date    Anxiety     CVA (cerebral vascular accident) (HCC)     Depression     Diabetes (HCC)     Diabetes mellitus (HCC)     GERD (gastroesophageal reflux disease)     Hypertension     Stroke (HCC)     Stroke (HCC)      Past Surgical History:   Procedure Laterality Date    APPENDECTOMY      CARDIAC ELECTROPHYSIOLOGY PROCEDURE N/A 2023    Procedure: Cardiac loop recorder implant;  Surgeon: Charla Anthony DO;  Location: [facility];  Service: Cardiology    TONSILLECTOMY       SOC: 2025  FOTO:2025  POC Expiration: 2025  Daily Treatment Log:  Date 2025    Visit # 1 2 3 4     Auth         Auth exp        Manual        L Knee ROM    Flexion: 135  Ext: 0             There Exer  15' 10' 15'     Recumb Bike for ROM   3'  5' 5'     LAQ  1x15 R/L  1# 1x15 R/L  W/ add 1# 3\" 2x10 L     Heel slides   1x10 L  1x10 L      Std hip abd    1x10 R/L alt  1x10 R/L alt     Std hip ext     1x10 R/L alt     Std knee flex     1x10 R/L alt     HS curl vs " "TB  YTB 1x10 R/L  YTB 1x10 R/L  YTB 2x10 L     Seated knee flexion with opp LE OP   1x10 L  1x10 L                     Pt edu        HEP Created and discussed  Updated and discussed       There Activ   10' 10'    STS   1x10  1x10; 2x     FSU    4\" step 1x10  4\" 1x10 L     LSU    4\" step 1x5 R/L  4\" 1x10 L                             NMReed  23' 20' 15'     Weight shifts   1x10 ML         Clamshells   Hooklying YTB 1x10  Hooklying YTB 1x10  Hooklying YTB 1x10      Bridges   1x5 1x5  1x10     HR/TR  1x10 HR  1x10 ea  1x10 ea     Tandem standing    2 laps near rail uni UE support CS      Side stepping   2 laps at rail                               Modalities                                HEP:   Access Code: [email or web address]  URL: [email or web address]  Date: 01/16/2025  Prepared by: Lilly Yan    Exercises  - Seated Long Arc Quad  - 1 x daily - 7 x weekly - 1 sets - 10 reps  - Standing Knee Flexion AROM with Chair Support  - 1 x daily - 7 x weekly - 1 sets - 10 reps  - Side Stepping with Counter Support  - 1 x daily - 7 x weekly  - Heel Raises with Counter Support  - 1 x daily - 7 x weekly - 1 sets - 10 reps  - Supine Bridge  - 1 x daily - 7 x weekly - 1 sets - 5 reps  - Supine Heel Slide with Strap  - 1 x daily - 7 x weekly - 1 sets - 10 reps - 3sec hold         "

## 2025-01-23 ENCOUNTER — OFFICE VISIT (OUTPATIENT)
Dept: PHYSICAL THERAPY | Facility: CLINIC | Age: 75
End: 2025-01-23
Payer: COMMERCIAL

## 2025-01-23 DIAGNOSIS — R26.2 AMBULATORY DYSFUNCTION: ICD-10-CM

## 2025-01-23 DIAGNOSIS — S82.142A CLOSED FRACTURE OF LEFT TIBIAL PLATEAU, INITIAL ENCOUNTER: Primary | ICD-10-CM

## 2025-01-23 PROCEDURE — 97530 THERAPEUTIC ACTIVITIES: CPT

## 2025-01-23 PROCEDURE — 97112 NEUROMUSCULAR REEDUCATION: CPT

## 2025-01-23 PROCEDURE — 97110 THERAPEUTIC EXERCISES: CPT

## 2025-01-23 NOTE — PROGRESS NOTES
"Daily Note     Today's date: 2025  Patient name: Yolis Flores  : 1950  MRN: [id number]  Referring provider: Taisha Garcia PA-C  Dx:   Encounter Diagnosis     ICD-10-CM    1. Closed fracture of left tibial plateau, initial encounter  S82.142A       2. Ambulatory dysfunction  R26.2                      Subjective: patient reports ADLs are getting better but kneeling and getting up from kneeling is still hard.       Objective: See treatment diary below      Assessment: Tolerated treatment well. Pt requires VC for proper form and reps during TE. No noted increased pain during or post session. Patient would benefit from continued PT to progress global LE strengthening and improvement in form and indep of TE for HEP compliance.       Plan: Continue per plan of care.      Precautions: history of CVA in 2023- required increased VC for form   Past Medical History:   Diagnosis Date    Anxiety     CVA (cerebral vascular accident) (HCC)     Depression     Diabetes (HCC)     Diabetes mellitus (HCC)     GERD (gastroesophageal reflux disease)     Hypertension     Stroke (HCC)     Stroke (HCC)      Past Surgical History:   Procedure Laterality Date    APPENDECTOMY      CARDIAC ELECTROPHYSIOLOGY PROCEDURE N/A 2023    Procedure: Cardiac loop recorder implant;  Surgeon: Charla Anthony DO;  Location: [facility];  Service: Cardiology    TONSILLECTOMY       SOC: 2025  FOTO:2025  POC Expiration: 2025  Daily Treatment Log:  Date 2025   Visit # 1 2 3 4  5(FOTO)   Auth         Auth exp        Manual        L Knee ROM    Flexion: 135  Ext: 0             There Exer  15' 10' 15'  15'   Recumb Bike for ROM   3'  5' 5'  5'   LAQ  1x15 R/L  1# 1x15 R/L  W/ add 1# 3\" 2x10 L  W/ add 1# 3\" 2x10 R/L    Heel slides   1x10 L  1x10 L      Std hip abd    1x10 R/L alt  1x10 R/L alt  1x10 R/L alt    Std hip ext     1x10 R/L alt  1x10 R/L alt    Std knee flex     " "1x10 R/L alt  1x10 R/L alt    HS curl vs TB  YTB 1x10 R/L  YTB 1x10 R/L  YTB 2x10 L  YTB 2x10 L    Seated knee flexion with opp LE OP   1x10 L  1x10 L  1x10 L    SLR flex      1x5 R/L            Pt edu        HEP Created and discussed  Updated and discussed       There Activ   10' 10' 10'   STS   1x10  1x10; 2x  1x10    FSU    4\" step 1x10  4\" 1x10 L  4\" 1x10 R/L    LSU    4\" step 1x5 R/L  4\" 1x10 L  4\" 1x10 RL                            NMReed  23' 20' 15'  13'   Weight shifts   1x10 ML         Clamshells   Hooklying YTB 1x10  Hooklying YTB 1x10  Hooklying YTB 1x10   Hooklying YTB 1x15     Bridges   1x5 1x5  1x10  1x10    HR/TR  1x10 HR  1x10 ea  1x10 ea  1x10 ea   Tandem standing    2 laps near rail uni UE support CS      Side stepping   2 laps at rail       Fwd Lunges with Uni UE support                        Modalities                                HEP:   Access Code: [email or web address]  URL: [email or web address]  Date: 01/16/2025  Prepared by: Lilly Yan    Exercises  - Seated Long Arc Quad  - 1 x daily - 7 x weekly - 1 sets - 10 reps  - Standing Knee Flexion AROM with Chair Support  - 1 x daily - 7 x weekly - 1 sets - 10 reps  - Side Stepping with Counter Support  - 1 x daily - 7 x weekly  - Heel Raises with Counter Support  - 1 x daily - 7 x weekly - 1 sets - 10 reps  - Supine Bridge  - 1 x daily - 7 x weekly - 1 sets - 5 reps  - Supine Heel Slide with Strap  - 1 x daily - 7 x weekly - 1 sets - 10 reps - 3sec hold         "

## 2025-01-27 ENCOUNTER — OFFICE VISIT (OUTPATIENT)
Dept: PHYSICAL THERAPY | Facility: CLINIC | Age: 75
End: 2025-01-27
Payer: COMMERCIAL

## 2025-01-27 DIAGNOSIS — R26.2 AMBULATORY DYSFUNCTION: ICD-10-CM

## 2025-01-27 DIAGNOSIS — S82.142A CLOSED FRACTURE OF LEFT TIBIAL PLATEAU, INITIAL ENCOUNTER: Primary | ICD-10-CM

## 2025-01-27 PROCEDURE — 97530 THERAPEUTIC ACTIVITIES: CPT

## 2025-01-27 PROCEDURE — 97110 THERAPEUTIC EXERCISES: CPT

## 2025-01-27 PROCEDURE — 97112 NEUROMUSCULAR REEDUCATION: CPT

## 2025-01-27 NOTE — PROGRESS NOTES
"Daily Note     Today's date: 2025  Patient name: Yolis Flores  : 1950  MRN: [id number]  Referring provider: Taisha Garcia PA-C  Dx:   Encounter Diagnosis     ICD-10-CM    1. Closed fracture of left tibial plateau, initial encounter  S82.142A       2. Ambulatory dysfunction  R26.2                        Subjective: patient reports no pain entering clinic, only with kneeling and getting up from the ground.       Objective: See treatment diary below      Assessment: Tolerated treatment well with increased band resistance for multiple exercises today. Patient edu on proper half kneel position with RLE on ground to limit pain on LLE. Edu added as clinical note to HEP.  Patient would benefit from continued PT to progress global LE strengthening and improvement in form and indep of TE for HEP compliance.       Plan: Continue per plan of care.      Precautions: history of CVA in 2023- required increased VC for form   Past Medical History:   Diagnosis Date    Anxiety     CVA (cerebral vascular accident) (HCC)     Depression     Diabetes (HCC)     Diabetes mellitus (HCC)     GERD (gastroesophageal reflux disease)     Hypertension     Stroke (HCC)     Stroke (HCC)      Past Surgical History:   Procedure Laterality Date    APPENDECTOMY      CARDIAC ELECTROPHYSIOLOGY PROCEDURE N/A 2023    Procedure: Cardiac loop recorder implant;  Surgeon: Charla Anthony DO;  Location: [facility];  Service: Cardiology    TONSILLECTOMY       SOC: 2025  FOTO:2025  POC Expiration: 2025  Daily Treatment Log:  Date 2025   Visit # 6  3 4  5(FOTO)   Auth         Auth exp        Manual        L Knee ROM    Flexion: 135  Ext: 0             There Exer 15'  10' 15'  15'   Recumb Bike for ROM  5'  5' 5'  5'   LAQ W/ add 1.5# 3\" 2x10 R/L   1# 1x15 R/L  W/ add 1# 3\" 2x10 L  W/ add 1# 3\" 2x10 R/L    Heel slides    1x10 L      Std hip abd  YTB @ [id number] R/L   1x10 R/L alt  1x10 " "R/L alt  1x10 R/L alt    Std hip ext  YTB @ knee 110 R/L    1x10 R/L alt  1x10 R/L alt    Std knee flex     1x10 R/L alt  1x10 R/L alt    HS curl vs TB RTB 1x10 R/L   YTB 1x10 R/L  YTB 2x10 L  YTB 2x10 L    Seated knee flexion with opp LE OP   1x10 L  1x10 L  1x10 L    SLR flex  1x10 R/L     1x5 R/L    Seated leg press  B/L 25# 1x10                        Pt edu        HEP        There Activ 10'  10' 10' 10'   STS   1x10  1x10; 2x  1x10    FSU  4\" 1x10 R/L   4\" step 1x10  4\" 1x10 L  4\" 1x10 R/L    LSU  4\" 1x10 R/L   4\" step 1x5 R/L  4\" 1x10 L  4\" 1x10 RL                            NMReed 15'  20' 15'  13'   Weight shifts         Clamshells  Hooklying YTB 1x15    Hooklying YTB 1x10  Hooklying YTB 1x10   Hooklying YTB 1x15     Bridges  1x10  1x5  1x10  1x10    HR/TR 1x15 ea   1x10 ea  1x10 ea  1x10 ea   Tandem standing    2 laps near rail uni UE support CS      Side stepping         Fwd Lunges with Uni UE support 1x5 R/L VC for form                        Modalities                                HEP:   Access Code: [email or web address]  URL: [email or web address]  Date: 01/16/2025  Prepared by: Lilly Yan    Exercises  - Seated Long Arc Quad  - 1 x daily - 7 x weekly - 1 sets - 10 reps  - Standing Knee Flexion AROM with Chair Support  - 1 x daily - 7 x weekly - 1 sets - 10 reps  - Side Stepping with Counter Support  - 1 x daily - 7 x weekly  - Heel Raises with Counter Support  - 1 x daily - 7 x weekly - 1 sets - 10 reps  - Supine Bridge  - 1 x daily - 7 x weekly - 1 sets - 5 reps  - Supine Heel Slide with Strap  - 1 x daily - 7 x weekly - 1 sets - 10 reps - 3sec hold         "

## 2025-01-28 ENCOUNTER — OFFICE VISIT (OUTPATIENT)
Dept: PHYSICAL THERAPY | Facility: CLINIC | Age: 75
End: 2025-01-28
Payer: COMMERCIAL

## 2025-01-28 DIAGNOSIS — R26.2 AMBULATORY DYSFUNCTION: ICD-10-CM

## 2025-01-28 DIAGNOSIS — S82.142A CLOSED FRACTURE OF LEFT TIBIAL PLATEAU, INITIAL ENCOUNTER: Primary | ICD-10-CM

## 2025-01-28 PROCEDURE — 97110 THERAPEUTIC EXERCISES: CPT

## 2025-01-28 PROCEDURE — 97112 NEUROMUSCULAR REEDUCATION: CPT

## 2025-01-28 NOTE — PROGRESS NOTES
"Daily Note     Today's date: 2025  Patient name: Yolis Flores  : 1950  MRN: [id number]  Referring provider: Taisha Garcia PA-C  Dx:   Encounter Diagnosis     ICD-10-CM    1. Closed fracture of left tibial plateau, initial encounter  S82.142A       2. Ambulatory dysfunction  R26.2                        Subjective: patient reports no new complaints at this time. Patient did kneel this morning with improved tolerance.       Objective: See treatment diary below      Assessment: Tolerated treatment well with progression to uni leg press with no increased pain in LLE reported. Noted mild QL with SLR flex on LLE. Patient would benefit from continued PT to progress global LE strengthening and improvement in form and indep of TE for HEP compliance.       Plan: Continue per plan of care.      Precautions: history of CVA in 2023- required increased VC for form   Past Medical History:   Diagnosis Date    Anxiety     CVA (cerebral vascular accident) (HCC)     Depression     Diabetes (HCC)     Diabetes mellitus (HCC)     GERD (gastroesophageal reflux disease)     Hypertension     Stroke (HCC)     Stroke (HCC)      Past Surgical History:   Procedure Laterality Date    APPENDECTOMY      CARDIAC ELECTROPHYSIOLOGY PROCEDURE N/A 2023    Procedure: Cardiac loop recorder implant;  Surgeon: Charla Anthony DO;  Location: [facility];  Service: Cardiology    TONSILLECTOMY       SOC: 2025  FOTO:2025  POC Expiration: 2025  Daily Treatment Log:  Date 2025   Visit # 6 7  4  5(FOTO)   Auth         Auth exp        Manual        L Knee ROM                 There Exer 15' 20'  15'  15'   Recumb Bike for ROM  5' 5'  5'  5'   LAQ W/ add 1.5# 3\" 2x10 R/L  W/ add 1.5# 3\" 2x10 R/L  W/ add 1# 3\" 2x10 L  W/ add 1# 3\" 2x10 R/L    Heel slides         Std hip abd  YTB @ [id number] R/L  YTB @ knee 110 R/L   1x10 R/L alt  1x10 R/L alt    Std hip ext  YTB @ knee 110 R/L  YTB @ " "knee 110 R/L   1x10 R/L alt  1x10 R/L alt    Std knee flex     1x10 R/L alt  1x10 R/L alt    HS curl vs TB RTB 1x10 R/L  RTB 1x10 R/L   YTB 2x10 L  YTB 2x10 L    Seated knee flexion with opp LE OP    1x10 L  1x10 L    SLR flex  1x10 R/L  1x10 R/L VC for quad set    1x5 R/L    Seated leg press  B/L 25# 1x10  Uni 15# 1x10 R/L                      Pt edu        HEP        There Activ 10' 5'  10' 10'   STS    1x10; 2x  1x10    FSU  4\" 1x10 R/L  4\" 1x10 R/L  6\"  4\" 1x10 L  4\" 1x10 R/L    LSU  4\" 1x10 R/L     4\" 1x10 L  4\" 1x10 RL                            NMReed 15' 15'  15'  13'   Weight shifts         Clamshells  Hooklying YTB 1x15   Hooklying RTB 1x15    Hooklying YTB 1x10   Hooklying YTB 1x15     Bridges  1x10 1x12  1x10  1x10    HR/TR 1x15 ea  1x15 ea.   1x10 ea  1x10 ea   Tandem standing         Side stepping         Fwd Lunges with Uni UE support 1x5 R/L VC for form                        Modalities                                HEP:   Access Code: [email or web address]  URL: [email or web address]  Date: 01/16/2025  Prepared by: Lilly Yan    Exercises  - Seated Long Arc Quad  - 1 x daily - 7 x weekly - 1 sets - 10 reps  - Standing Knee Flexion AROM with Chair Support  - 1 x daily - 7 x weekly - 1 sets - 10 reps  - Side Stepping with Counter Support  - 1 x daily - 7 x weekly  - Heel Raises with Counter Support  - 1 x daily - 7 x weekly - 1 sets - 10 reps  - Supine Bridge  - 1 x daily - 7 x weekly - 1 sets - 5 reps  - Supine Heel Slide with Strap  - 1 x daily - 7 x weekly - 1 sets - 10 reps - 3sec hold         "

## 2025-02-03 ENCOUNTER — OFFICE VISIT (OUTPATIENT)
Dept: PHYSICAL THERAPY | Facility: CLINIC | Age: 75
End: 2025-02-03
Payer: COMMERCIAL

## 2025-02-03 DIAGNOSIS — S82.142A CLOSED FRACTURE OF LEFT TIBIAL PLATEAU, INITIAL ENCOUNTER: Primary | ICD-10-CM

## 2025-02-03 DIAGNOSIS — R26.2 AMBULATORY DYSFUNCTION: ICD-10-CM

## 2025-02-03 PROCEDURE — 97112 NEUROMUSCULAR REEDUCATION: CPT

## 2025-02-03 PROCEDURE — 97110 THERAPEUTIC EXERCISES: CPT

## 2025-02-03 NOTE — PROGRESS NOTES
"Daily Note     Today's date: 2/3/2025  Patient name: Yolis Flores  : 1950  MRN: [id number]  Referring provider: Taisha Garcia PA-C  Dx:   Encounter Diagnosis     ICD-10-CM    1. Closed fracture of left tibial plateau, initial encounter  S82.142A       2. Ambulatory dysfunction  R26.2                        Subjective: patient reports the leg is ok today.      Objective: See treatment diary below      Assessment: Tolerated treatment well with no increased pain in session. Patient continues to note pain with kneeling at home.  Noted mild QL with SLR flex on LLE. Patient would benefit from continued PT to progress global LE strengthening and improvement in form and indep of TE for HEP compliance.       Plan: Continue per plan of care.      Precautions: history of CVA in 2023- required increased VC for form   Past Medical History:   Diagnosis Date    Anxiety     CVA (cerebral vascular accident) (HCC)     Depression     Diabetes (HCC)     Diabetes mellitus (HCC)     GERD (gastroesophageal reflux disease)     Hypertension     Stroke (HCC)     Stroke (HCC)      Past Surgical History:   Procedure Laterality Date    APPENDECTOMY      CARDIAC ELECTROPHYSIOLOGY PROCEDURE N/A 2023    Procedure: Cardiac loop recorder implant;  Surgeon: Charla Anthony DO;  Location: [facility];  Service: Cardiology    TONSILLECTOMY       SOC: 2025  FOTO:2025  POC Expiration: 2025  Daily Treatment Log:  Date 2025 2025 2/3/2025     Visit # 6 7 8     Auth         Auth exp        Manual        L Knee ROM                 There Exer 15' 20' 20'     Recumb Bike for ROM  5' 5' 5'     LAQ W/ add 1.5# 3\" 2x10 R/L  W/ add 1.5# 3\" 2x10 R/L W/ add 1.5# 3\" 2x10 R/L     Heel slides         Std hip abd  YTB @ [id number] R/L  YTB @ knee 110 R/L  RTB @ knee 110 R/L      Std hip ext  YTB @ knee 110 R/L  YTB @ knee 110 R/L  RTB @ knee 110 R/L VC for form     Std knee flex         HS curl vs TB RTB 1x10 R/L  RTB 1x10 " "R/L  RTB 1x10 R/L      SLR flex  1x10 R/L  1x10 R/L VC for quad set  1x10 R/L VC for quad set      Seated leg press  B/L 25# 1x10  Uni 15# 1x10 R/L Uni 15# 1x10 R/L                     Pt edu        HEP        There Activ 10' 5' 5'     STS        FSU  4\" 1x10 R/L  4\" 1x10 R/L  6\" 1x10 R/L      LSU  4\" 1x10 R/L                                 NMReed 15' 15' 15'     Weight shifts         Clamshells  Hooklying YTB 1x15   Hooklying RTB 1x15   Hooklying RTB 1x15       Bridges  1x10 1x12 1x12     HR/TR 1x15 ea  1x15 ea.  1x15 ea.      Tandem standing         Side stepping         Fwd Lunges with Uni UE support 1x5 R/L VC for form                        Modalities                                HEP:   Access Code: [email or web address]  URL: [email or web address]  Date: 01/16/2025  Prepared by: Lilly Yan    Exercises  - Seated Long Arc Quad  - 1 x daily - 7 x weekly - 1 sets - 10 reps  - Standing Knee Flexion AROM with Chair Support  - 1 x daily - 7 x weekly - 1 sets - 10 reps  - Side Stepping with Counter Support  - 1 x daily - 7 x weekly  - Heel Raises with Counter Support  - 1 x daily - 7 x weekly - 1 sets - 10 reps  - Supine Bridge  - 1 x daily - 7 x weekly - 1 sets - 5 reps  - Supine Heel Slide with Strap  - 1 x daily - 7 x weekly - 1 sets - 10 reps - 3sec hold         "

## 2025-02-06 ENCOUNTER — OFFICE VISIT (OUTPATIENT)
Dept: PHYSICAL THERAPY | Facility: CLINIC | Age: 75
End: 2025-02-06
Payer: COMMERCIAL

## 2025-02-06 DIAGNOSIS — S82.142A CLOSED FRACTURE OF LEFT TIBIAL PLATEAU, INITIAL ENCOUNTER: Primary | ICD-10-CM

## 2025-02-06 DIAGNOSIS — R26.2 AMBULATORY DYSFUNCTION: ICD-10-CM

## 2025-02-06 PROCEDURE — 97112 NEUROMUSCULAR REEDUCATION: CPT

## 2025-02-06 PROCEDURE — 97110 THERAPEUTIC EXERCISES: CPT

## 2025-02-06 NOTE — PROGRESS NOTES
"Daily Note     Today's date: 2025  Patient name: Yolis Flores  : 1950  MRN: [id number]  Referring provider: Taisha Garcia PA-C  Dx:   Encounter Diagnosis     ICD-10-CM    1. Closed fracture of left tibial plateau, initial encounter  S82.142A       2. Ambulatory dysfunction  R26.2                      Subjective: pt denies pain in L LE on arrival to session. She still has trouble getting up from the floor when in a kneeling position. She does feel her amb speed is improving       Objective: See treatment diary below      Assessment: Tolerated treatment well. Pt requiring VC for additional exercises today for form. Mild knee pain w/ HS curl on L LE but otherwise tolerating TE well at this time. Cont to progress SL strengthening as tolerated per symptom irritability to maximize functional mobility and ADLs. Patient would benefit from continued PT      Plan: Continue per plan of care.  RE next visit      Precautions: history of CVA in 2023- required increased VC for form   Past Medical History:   Diagnosis Date    Anxiety     CVA (cerebral vascular accident) (HCC)     Depression     Diabetes (HCC)     Diabetes mellitus (HCC)     GERD (gastroesophageal reflux disease)     Hypertension     Stroke (HCC)     Stroke (HCC)      Past Surgical History:   Procedure Laterality Date    APPENDECTOMY      CARDIAC ELECTROPHYSIOLOGY PROCEDURE N/A 2023    Procedure: Cardiac loop recorder implant;  Surgeon: Charla Anthony DO;  Location: [facility];  Service: Cardiology    TONSILLECTOMY       SOC: 2025  FOTO:2025  POC Expiration: 2025  Daily Treatment Log:  Date 2025 2025 2/3/2025 2025    Visit # 6 7 8 9     Auth         Auth exp        Manual        L Knee ROM                 There Exer 15' 20' 20' 25'    Recumb Bike for ROM  5' 5' 5' 5'     LAQ W/ add 1.5# 3\" 2x10 R/L  W/ add 1.5# 3\" 2x10 R/L W/ add 1.5# 3\" 2x10 R/L W/ add 1.5# 3\" 2x10 R/L     Heel slides         Std hip abd  " "YTB @ [id number] R/L  YTB @ knee 110 R/L  RTB @ knee 110 R/L  RTB @ knees 2x10 R/L     Std hip ext  YTB @ knee 110 R/L  YTB @ knee 110 R/L  RTB @ knee 110 R/L VC for form RTB @ knees 2x10 R/L     Std knee flex         HS curl vs TB RTB 1x10 R/L  RTB 1x10 R/L  RTB 1x10 R/L  RTB 1x15 R/L     SLR flex  1x10 R/L  1x10 R/L VC for quad set  1x10 R/L VC for quad set  1x10 R/L VC for quad set      Seated leg press  B/L 25# 1x10  Uni 15# 1x10 R/L Uni 15# 1x10 R/L Uni 15# 2x10 R/L                     Pt edu        HEP        There Activ 10' 5' 5'     STS        FSU  4\" 1x10 R/L  4\" 1x10 R/L  6\" 1x10 R/L      LSU  4\" 1x10 R/L                                 NMReed 15' 15' 15' 15'    Weight shifts     WB AP/ML 20x ea UE support     Clamshells  Hooklying YTB 1x15   Hooklying RTB 1x15   Hooklying RTB 1x15   Hooklying GTB 1x15     Bridges  1x10 1x12 1x12 1x15     HR/TR 1x15 ea  1x15 ea.  1x15 ea.  15x ea     Tandem standing         Side stepping         Lateral lunge B/L UE support     1x5 R/L VC     Fwd Lunges with Uni UE support 1x5 R/L VC for form                        Modalities                                HEP:   Access Code: [email or web address]  URL: [email or web address]  Date: 01/16/2025  Prepared by: Lilly Yan    Exercises  - Seated Long Arc Quad  - 1 x daily - 7 x weekly - 1 sets - 10 reps  - Standing Knee Flexion AROM with Chair Support  - 1 x daily - 7 x weekly - 1 sets - 10 reps  - Side Stepping with Counter Support  - 1 x daily - 7 x weekly  - Heel Raises with Counter Support  - 1 x daily - 7 x weekly - 1 sets - 10 reps  - Supine Bridge  - 1 x daily - 7 x weekly - 1 sets - 5 reps  - Supine Heel Slide with Strap  - 1 x daily - 7 x weekly - 1 sets - 10 reps - 3sec hold           "

## 2025-02-10 ENCOUNTER — EVALUATION (OUTPATIENT)
Dept: PHYSICAL THERAPY | Facility: CLINIC | Age: 75
End: 2025-02-10
Payer: COMMERCIAL

## 2025-02-10 DIAGNOSIS — S82.142A CLOSED FRACTURE OF LEFT TIBIAL PLATEAU, INITIAL ENCOUNTER: Primary | ICD-10-CM

## 2025-02-10 DIAGNOSIS — R26.2 AMBULATORY DYSFUNCTION: ICD-10-CM

## 2025-02-10 PROCEDURE — 97110 THERAPEUTIC EXERCISES: CPT

## 2025-02-10 PROCEDURE — 97112 NEUROMUSCULAR REEDUCATION: CPT

## 2025-02-10 NOTE — PROGRESS NOTES
PT Re-Evaluation     Today's date: 2/10/2025  Patient name: Yolis Flores  : 1950  MRN: [id number]  Referring provider: Taisha Garcia PA-C  Dx:   Encounter Diagnosis     ICD-10-CM    1. Closed fracture of left tibial plateau, initial encounter  S82.142A       2. Ambulatory dysfunction  R26.2                        Assessment  Impairments: abnormal coordination, abnormal gait, abnormal or restricted ROM, activity intolerance, impaired balance, impaired physical strength, pain with function, weight-bearing intolerance and poor body mechanics    Assessment details: Yolis Flores is a 74 y.o. female who presents with improvements in pain at rest and in WB, LE strength,  L knee ROM, ambulation, and balance, however deficits are still present. Patient continue to demo pain with kneeling, getting up and down from the floor, with some gait impairments noted. Due to these impairments, patient has difficulty performing ADL's, recreational activities, ambulation, stair negotiation, transfers. Patient's clinical presentation is consistent with their referring diagnosis of Closed fracture of left tibial plateau, initial encounter and Ambulatory dysfunction. Patient has been educated in home exercise program and plan of care. Patient would benefit from skilled physical therapy services to address their aforementioned functional limitations and progress towards prior level of function and independence with home exercise program.       Goals  Short Term Goals:  to be accomplished in 4 weeks   STG1. Initiate and advance HEP to maximize progress between therapy sessions---met  STG2. Reduce pain w/ WB activity to 0-1/10.---met   STG3. Improve B/L LE strength to 4/5 to allow pt to raise from sit to stand w/o UE assist.---partially met      Long Term Goals:  Target Date 12 weeks ---progressing towards all   LTG1. Pt to be Indep with HEP  to maximize progress between therapy sessions and improve functional mobility  LTG2.  Pt will demo L knee ROM of 0-135 or greater needed for reciprocal stairs w/ handrail w/o pain   LTG3.Pt to demo B/L LE strength of 4+/5 or better such that pt can perform reciprocal stair negotiation  LTG4. Pt will return to household ADLs and work duties pain free as per PLOF  LTG5: Pt to be able to kneel to the ground and return to standing with no more than 1/10 pain per PLOF ---added 2/10/25          Plan  Patient would benefit from: skilled physical therapy  Planned modality interventions: cryotherapy and thermotherapy: hydrocollator packs    Planned therapy interventions: manual therapy, neuromuscular re-education, self care, therapeutic activities, therapeutic exercise, home exercise program, patient education, balance/weight bearing training, IASTM, joint mobilization, Valladares taping, kinesiology taping, postural training, strengthening, stretching and flexibility    Frequency: 2x week  Plan of Care beginning date: 2025  Plan of Care expiration date: 2025  Treatment plan discussed with: patient  Plan details: HEP development, stretching, strengthening, A/AA/PROM, joint mobilizations, posture education, STM/MI as needed to reduce muscle tension, muscle reeducation, PLOC discussed and agreed upon with patient.            Subjective Evaluation    History of Present Illness  Mechanism of injury: Patient reports to PT with L tibial plateau fracture. Patient has noted some improvements in pain levels and function since starting PT. Patient continues to have pain with getting up from kneeling.   Patient Goals  Patient goals for therapy: decreased pain, improved balance, increased motion, increased strength and independence with ADLs/IADLs    Pain  Current pain ratin  At best pain ratin  At worst pain rating: 3  Location: L knee  Quality: dull ache  Relieving factors: relaxation  Aggravating factors: stair climbing (kneeling)    Social Support  Steps to enter house: yes  Stairs in house: yes  "  16  Lives in: multiple-level home  Lives with: spouse      Diagnostic Tests  Abnormal x-ray: healing fracture.        Objective    (*=pain)      Knee AROM: deg      R  L  Extension: (sup)   0  -2 at rest/ 0 w/ QS  Flexion: (sup/prone)  140  130      Strength: MMT  Hip    R  L    IR(seated)   4+/5  4-/5  ER(seated)   4+/5  4-/5   Flexion(sup)   4+/5  4-/5      Knee    R  L    Extension(Seated)  4+/5  4/5  Flexion(Seated)  4+/5  4/5    Ankle    R  L   Dorsiflexion(seated)  5/5  5/5  Plantarflexion(seated)  5/5  5/5      Joint mobility:   Mild hypomobility with inferior glide of L patella.     Observation: no edema noted today     Tenderness/Palpation: no TTP at this time but patient reports sometimes it can be     Special Tests:  Valgus Stress: neg  Varus Stres: neg   McMurrary's IR of the tibia + Varus stress = lateral meniscus: neg  McMurrays ER of the tibia + Valgus stress = medial meniscus:neg  Lachman: neg   Post drawer: neg     Balance:   Tandem R posterior: 9 sec   Tandem L posterior: 6 sec      Function:   Stairs: improved control with ascending and descending w/ RLE on 6\" step. Improved tolerance with LLE however increased support from rail compared to RLE  Ambulation: improved gait pattern however still noting some decreased stance time on LLE.    Kneeling: able to get down to full kneel on foam pad with no pain. Required VC to move into half kneel to help with getting back up. Noted some pain getting back up from kneeling.              Precautions: history of CVA in July 2023  Past Medical History:   Diagnosis Date    Anxiety     CVA (cerebral vascular accident) (HCC)     Depression     Diabetes (HCC)     Diabetes mellitus (HCC)     GERD (gastroesophageal reflux disease)     Hypertension     Stroke (HCC)     Stroke (HCC)      Past Surgical History:   Procedure Laterality Date    APPENDECTOMY      CARDIAC ELECTROPHYSIOLOGY PROCEDURE N/A 8/2/2023    Procedure: Cardiac loop recorder implant;  Surgeon: Charla" "DO Raj;  Location: [facility];  Service: Cardiology    TONSILLECTOMY       SOC: 1/13/2025  FOTO:1/23/2025  POC Expiration: 4/7/2025  Daily Treatment Log:  Date 1/27/2025 1/28/2025 2/3/2025 2/6/2025 2/10/2025   Visit # 6 7 8 9  10 (RE/FOTO)    Auth         Auth exp        Manual        L Knee ROM      See obj section            There Exer 15' 20' 20' 25' 25'   Recumb Bike for ROM  5' 5' 5' 5'  5'   LAQ W/ add 1.5# 3\" 2x10 R/L  W/ add 1.5# 3\" 2x10 R/L W/ add 1.5# 3\" 2x10 R/L W/ add 1.5# 3\" 2x10 R/L  W/ add 2# 3\" 2x10 R/L    Std hip abd  YTB @ [id number] R/L  YTB @ knee 110 R/L  RTB @ knee 110 R/L  RTB @ knees 2x10 R/L     Std hip ext  YTB @ knee 110 R/L  YTB @ knee 110 R/L  RTB @ knee 110 R/L VC for form RTB @ knees 2x10 R/L     Std knee flex         HS curl vs TB RTB 1x10 R/L  RTB 1x10 R/L  RTB 1x10 R/L  RTB 1x15 R/L     SLR flex  1x10 R/L  1x10 R/L VC for quad set  1x10 R/L VC for quad set  1x10 R/L VC for quad set   1x10 R/L (VC for QS on LLE)    Seated leg press  B/L 25# 1x10  Uni 15# 1x10 R/L Uni 15# 1x10 R/L Uni 15# 2x10 R/L  Uni 20# 2x10 R/L            Objective measures      EG   Pt edu        HEP        There Activ 10' 5' 5'     STS        FSU  4\" 1x10 R/L  4\" 1x10 R/L  6\" 1x10 R/L      LSU  4\" 1x10 R/L                                 NMReed 15' 15' 15' 15' 15'   Weight shifts     WB AP/ML 20x ea UE support     Clamshells  Hooklying YTB 1x15   Hooklying RTB 1x15   Hooklying RTB 1x15   Hooklying GTB 1x15  Hooklying GTB 1x15    Bridges  1x10 1x12 1x12 1x15  1x15   HR/TR 1x15 ea  1x15 ea.  1x15 ea.  15x ea  15x ea   Tandem standing         Side stepping         Lateral lunge B/L UE support     1x5 R/L VC  1x5    Fwd Lunges with Uni UE support 1x5 R/L VC for form                        Modalities                                HEP:   Access Code: [email or web address]  URL: [email or web address]  Date: 01/16/2025  Prepared by: Lilly Yan    Exercises  - Seated Long Arc Quad  - 1 x daily - 7 x weekly " - 1 sets - 10 reps  - Standing Knee Flexion AROM with Chair Support  - 1 x daily - 7 x weekly - 1 sets - 10 reps  - Side Stepping with Counter Support  - 1 x daily - 7 x weekly  - Heel Raises with Counter Support  - 1 x daily - 7 x weekly - 1 sets - 10 reps  - Supine Bridge  - 1 x daily - 7 x weekly - 1 sets - 5 reps  - Supine Heel Slide with Strap  - 1 x daily - 7 x weekly - 1 sets - 10 reps - 3sec hold

## 2025-02-13 ENCOUNTER — OFFICE VISIT (OUTPATIENT)
Dept: PHYSICAL THERAPY | Facility: CLINIC | Age: 75
End: 2025-02-13
Payer: COMMERCIAL

## 2025-02-13 DIAGNOSIS — R26.2 AMBULATORY DYSFUNCTION: ICD-10-CM

## 2025-02-13 DIAGNOSIS — S82.142A CLOSED FRACTURE OF LEFT TIBIAL PLATEAU, INITIAL ENCOUNTER: Primary | ICD-10-CM

## 2025-02-13 PROCEDURE — 97112 NEUROMUSCULAR REEDUCATION: CPT

## 2025-02-13 PROCEDURE — 97110 THERAPEUTIC EXERCISES: CPT

## 2025-02-13 NOTE — PROGRESS NOTES
"Daily Note     Today's date: 2025  Patient name: Yolis Flores  : 1950  MRN: [id number]  Referring provider: Taisha Garcia PA-C  Dx:   Encounter Diagnosis     ICD-10-CM    1. Closed fracture of left tibial plateau, initial encounter  S82.142A       2. Ambulatory dysfunction  R26.2                      Subjective: pt has no new complaints on arrival to session, overall tolerating TE well.       Objective: See treatment diary below      Assessment: Tolerated treatment well. Increased reps today w/o complaints. Cont to progress global strengthening as able. Pt has no noted increased pain during or post session. Patient would benefit from continued PT      Plan: Continue per plan of care.      Precautions: history of CVA in 2023; patient does not count reps  Past Medical History:   Diagnosis Date    Anxiety     CVA (cerebral vascular accident) (HCC)     Depression     Diabetes (HCC)     Diabetes mellitus (HCC)     GERD (gastroesophageal reflux disease)     Hypertension     Stroke (HCC)     Stroke (HCC)      Past Surgical History:   Procedure Laterality Date    APPENDECTOMY      CARDIAC ELECTROPHYSIOLOGY PROCEDURE N/A 2023    Procedure: Cardiac loop recorder implant;  Surgeon: Charla Anthony DO;  Location: [facility];  Service: Cardiology    TONSILLECTOMY       SOC: 2025  FOTO:2025  POC Expiration: 2025  Daily Treatment Log:  Date 2025  2/3/2025 2025 2/10/2025   Visit # 11  8 9  10 (RE/FOTO)    Auth         Auth exp        Manual        L Knee ROM      See obj section            There Exer 25'  20' 25' 25'   Recumb Bike for ROM  5'   5' 5'  5'   LAQ W/ add 2# 3\" 2x15 R/L   W/ add 1.5# 3\" 2x10 R/L W/ add 1.5# 3\" 2x10 R/L  W/ add 2# 3\" 2x10 R/L    Std hip abd    RTB @ knee 110 R/L  RTB @ knees 2x10 R/L     Std hip ext    RTB @ knee 110 R/L VC for form RTB @ knees 2x10 R/L     Std knee flex         HS curl vs TB GTB 1x15 R/L; 2x   RTB 1x10 R/L  RTB 1x15 R/L     SLR " "flex  1x10 R/L   1x10 R/L VC for quad set  1x10 R/L VC for quad set   1x10 R/L (VC for QS on LLE)    Seated leg press  Uni 20# 2x15 R/L   Uni 15# 1x10 R/L Uni 15# 2x10 R/L  Uni 20# 2x10 R/L            Objective measures      EG   Pt edu        HEP        There Activ   5'     STS        FSU    6\" 1x10 R/L      LSU                                  NMReed 15'  15' 15' 15'   Weight shifts     WB AP/ML 20x ea UE support     Clamshells  Hooklying GTB alt iso 2x10 R/L   Hooklying RTB 1x15   Hooklying GTB 1x15  Hooklying GTB 1x15    Bridges  GTB abd 2x10   1x12 1x15  1x15   HR/TR 1x15 ea   1x15 ea.  15x ea  15x ea   Tandem standing         Side stepping         Lateral lunge B/L UE support     1x5 R/L VC  1x5    Fwd Lunges with Uni UE support                        Modalities                                HEP:   Access Code: [email or web address]  URL: [email or web address]  Date: 01/16/2025  Prepared by: Lilly Yan    Exercises  - Seated Long Arc Quad  - 1 x daily - 7 x weekly - 1 sets - 10 reps  - Standing Knee Flexion AROM with Chair Support  - 1 x daily - 7 x weekly - 1 sets - 10 reps  - Side Stepping with Counter Support  - 1 x daily - 7 x weekly  - Heel Raises with Counter Support  - 1 x daily - 7 x weekly - 1 sets - 10 reps  - Supine Bridge  - 1 x daily - 7 x weekly - 1 sets - 5 reps  - Supine Heel Slide with Strap  - 1 x daily - 7 x weekly - 1 sets - 10 reps - 3sec hold       "

## 2025-02-17 ENCOUNTER — OFFICE VISIT (OUTPATIENT)
Dept: PHYSICAL THERAPY | Facility: CLINIC | Age: 75
End: 2025-02-17
Payer: COMMERCIAL

## 2025-02-17 DIAGNOSIS — R26.2 AMBULATORY DYSFUNCTION: ICD-10-CM

## 2025-02-17 DIAGNOSIS — S82.142A CLOSED FRACTURE OF LEFT TIBIAL PLATEAU, INITIAL ENCOUNTER: Primary | ICD-10-CM

## 2025-02-17 PROCEDURE — 97112 NEUROMUSCULAR REEDUCATION: CPT

## 2025-02-17 PROCEDURE — 97110 THERAPEUTIC EXERCISES: CPT

## 2025-02-17 NOTE — PROGRESS NOTES
"Daily Note     Today's date: 2025  Patient name: Yolis Flores  : 1950  MRN: [id number]  Referring provider: Taisha Garcia PA-C  Dx:   Encounter Diagnosis     ICD-10-CM    1. Closed fracture of left tibial plateau, initial encounter  S82.142A       2. Ambulatory dysfunction  R26.2                      Subjective: pt has no new complaints on arrival to session, no complaints after last session w/ the increased reps performed.       Objective: See treatment diary below      Assessment: Tolerated treatment well. Pt dem mild post knee pain w/ resisted HS curl that did not linger beyond exercise. Cont to progress TE as tolerated per symptom irritability. Patient would benefit from continued PT      Plan: Continue per plan of care.      Precautions: history of CVA in 2023; patient does not count reps  Past Medical History:   Diagnosis Date    Anxiety     CVA (cerebral vascular accident) (HCC)     Depression     Diabetes (HCC)     Diabetes mellitus (HCC)     GERD (gastroesophageal reflux disease)     Hypertension     Stroke (HCC)     Stroke (HCC)      Past Surgical History:   Procedure Laterality Date    APPENDECTOMY      CARDIAC ELECTROPHYSIOLOGY PROCEDURE N/A 2023    Procedure: Cardiac loop recorder implant;  Surgeon: Charla Anthony DO;  Location: [facility];  Service: Cardiology    TONSILLECTOMY       SOC: 2025  FOTO:2025  POC Expiration: 2025  Daily Treatment Log:  Date 2025 2025  2025 2/10/2025   Visit # 11 12  9  10 (RE/FOTO)    Auth         Auth exp        Manual        L Knee ROM      See obj section            There Exer 25' 25'   25' 25'   Recumb Bike for ROM  5'  5'   5'  5'   LAQ W/ add 2# 3\" 2x15 R/L  W/ add 2# 3\" 2x15 R/L   W/ add 1.5# 3\" 2x10 R/L  W/ add 2# 3\" 2x10 R/L    Std hip abd   GTB @ knee 1x10 R/L; 2x   RTB @ knees 2x10 R/L     Std hip ext   GTB @ knee 1x10 R/L VC for form; 2x   RTB @ knees 2x10 R/L     Std knee flex         HS curl vs TB " "GTB 1x15 R/L; 2x  GTB 1x15 R/L   RTB 1x15 R/L     SLR flex  1x10 R/L  1x15 R/L   1x10 R/L VC for quad set   1x10 R/L (VC for QS on LLE)    Seated leg press  Uni 20# 2x15 R/L  Uni 25# 2x10 R/L   Uni 15# 2x10 R/L  Uni 20# 2x10 R/L            Objective measures      EG   Pt edu        HEP        There Activ        STS        FSU  6\"        LSU                                  NMReed 15' 15'   15' 15'   Weight shifts     WB AP/ML 20x ea UE support     Clamshells  Hooklying GTB alt iso 2x10 R/L  Hooklying GTB alt iso 2x10 R/L   Hooklying GTB 1x15  Hooklying GTB 1x15    Bridges  GTB abd 2x10  GTB abd 2x10   1x15  1x15   HR/TR 1x15 ea  1x15 ea   15x ea  15x ea   Tandem standing         Side stepping         Lateral lunge B/L UE support     1x5 R/L VC  1x5    Fwd Lunges with Uni UE support                        Modalities                                HEP:   Access Code: [email or web address]  URL: [email or web address]  Date: 01/16/2025  Prepared by: Lilly Yan    Exercises  - Seated Long Arc Quad  - 1 x daily - 7 x weekly - 1 sets - 10 reps  - Standing Knee Flexion AROM with Chair Support  - 1 x daily - 7 x weekly - 1 sets - 10 reps  - Side Stepping with Counter Support  - 1 x daily - 7 x weekly  - Heel Raises with Counter Support  - 1 x daily - 7 x weekly - 1 sets - 10 reps  - Supine Bridge  - 1 x daily - 7 x weekly - 1 sets - 5 reps  - Supine Heel Slide with Strap  - 1 x daily - 7 x weekly - 1 sets - 10 reps - 3sec hold         "

## 2025-02-20 ENCOUNTER — APPOINTMENT (OUTPATIENT)
Dept: PHYSICAL THERAPY | Facility: CLINIC | Age: 75
End: 2025-02-20
Payer: COMMERCIAL

## 2025-02-24 ENCOUNTER — OFFICE VISIT (OUTPATIENT)
Dept: PHYSICAL THERAPY | Facility: CLINIC | Age: 75
End: 2025-02-24
Payer: COMMERCIAL

## 2025-02-24 DIAGNOSIS — S82.142A CLOSED FRACTURE OF LEFT TIBIAL PLATEAU, INITIAL ENCOUNTER: Primary | ICD-10-CM

## 2025-02-24 DIAGNOSIS — R26.2 AMBULATORY DYSFUNCTION: ICD-10-CM

## 2025-02-24 PROCEDURE — 97110 THERAPEUTIC EXERCISES: CPT

## 2025-02-24 PROCEDURE — 97112 NEUROMUSCULAR REEDUCATION: CPT

## 2025-02-24 NOTE — PROGRESS NOTES
"Daily Note     Today's date: 2025  Patient name: Yolis Flores  : 1950  MRN: [id number]  Referring provider: Taisha Garcia PA-C  Dx:   Encounter Diagnosis     ICD-10-CM    1. Closed fracture of left tibial plateau, initial encounter  S82.142A       2. Ambulatory dysfunction  R26.2                      Subjective: patient reports she is feeling ok today. Stairs and kneel still remain difficult but are improving. Limited to step to pattern when LE is bothering her.       Objective: See treatment diary below      Assessment: Tolerated treatment well. Challenged with form with lunges today, however is improving compared to previous sessions.  Cont to progress TE as tolerated per symptom irritability. Patient would benefit from continued PT      Plan: Continue per plan of care.      Precautions: history of CVA in 2023; patient does not count reps  Past Medical History:   Diagnosis Date    Anxiety     CVA (cerebral vascular accident) (HCC)     Depression     Diabetes (HCC)     Diabetes mellitus (HCC)     GERD (gastroesophageal reflux disease)     Hypertension     Stroke (HCC)     Stroke (HCC)      Past Surgical History:   Procedure Laterality Date    APPENDECTOMY      CARDIAC ELECTROPHYSIOLOGY PROCEDURE N/A 2023    Procedure: Cardiac loop recorder implant;  Surgeon: Charla Anthony DO;  Location: [facility];  Service: Cardiology    TONSILLECTOMY       SOC: 2025  FOTO:2025  POC Expiration: 2025  Daily Treatment Log:  Date 2025     Visit # 11 12 13     Auth         Auth exp        Manual        L Knee ROM                 There Exer 25' 25'  25'     Recumb Bike for ROM  5'  5'  5'      LAQ W/ add 2# 3\" 2x15 R/L  W/ add 2# 3\" 2x15 R/L  W/ add 2# 3\" 2x15 R/L      Std hip abd   GTB @ knee 1x10 R/L; 2x  GTB @ knee 1x10 R/L; 2x      Std hip ext   GTB @ knee 1x10 R/L VC for form; 2x  GTB @ knee 1x10 R/L; 2x      HS curl vs TB GTB 1x15 R/L; 2x  GTB 1x15 R/L  GTB " "1x15 R/L      SLR flex  1x10 R/L  1x15 R/L  1x15 R/L      Seated leg press  Uni 20# 2x15 R/L  Uni 25# 2x10 R/L  Uni 30# 2x10 R/L      Slant board    10\" x5 B/L                      Objective measures         Pt edu        HEP   Updated and discussed      There Activ        STS        FSU         LSU     6\" step 1x10 R/L                              NMReed 15' 15'  15'     Clamshells  Hooklying GTB alt iso 2x10 R/L  Hooklying GTB alt iso 2x10 R/L  Hooklying GTB alt iso 2x10 R/L      Bridges  GTB abd 2x10  GTB abd 2x10  GTB abd 2x10      HR/TR 1x15 ea  1x15 ea  1x15 ea      Tandem standing         Side stepping         Fwd Lunges with Uni UE support   On 6\" step focus on form 1x5                      Modalities                                HEP:   Access Code: [email or web address]  URL: [email or web address]  Date: 02/24/2025  Prepared by: Lilly Yan    Program Notes  when kneeling: the better option is to half kneel with the left leg up and the Right leg on the ground.     Exercises  - Seated Long Arc Quad  - 1 x daily - 7 x weekly - 2 sets - 10 reps  - Supine Bridge  - 1 x daily - 7 x weekly - 1 sets - 10 reps  - Standing Hip Abduction with Resistance at Ankles and Counter Support  - 1 x daily - 7 x weekly - 2 sets - 10 reps  - Standing Hip Extension with Resistance at Ankles and Counter Support  - 1 x daily - 7 x weekly - 2 sets - 10 reps  - Heel Toe Raises with Counter Support  - 1 x daily - 7 x weekly - 1 sets - 10 reps  - Partial Lunge with Chair  - 1 x daily - 7 x weekly - 1 sets - 5 reps       "

## 2025-02-27 ENCOUNTER — OFFICE VISIT (OUTPATIENT)
Dept: PHYSICAL THERAPY | Facility: CLINIC | Age: 75
End: 2025-02-27
Payer: COMMERCIAL

## 2025-02-27 DIAGNOSIS — R26.2 AMBULATORY DYSFUNCTION: ICD-10-CM

## 2025-02-27 DIAGNOSIS — S82.142A CLOSED FRACTURE OF LEFT TIBIAL PLATEAU, INITIAL ENCOUNTER: Primary | ICD-10-CM

## 2025-02-27 PROCEDURE — 97110 THERAPEUTIC EXERCISES: CPT

## 2025-02-27 PROCEDURE — 97112 NEUROMUSCULAR REEDUCATION: CPT

## 2025-02-27 NOTE — PROGRESS NOTES
"Daily Note     Today's date: 2025  Patient name: Yolis Flores  : 1950  MRN: [id number]  Referring provider: Taisha Garcia PA-C  Dx:   Encounter Diagnosis     ICD-10-CM    1. Closed fracture of left tibial plateau, initial encounter  S82.142A       2. Ambulatory dysfunction  R26.2                        Subjective: patient reports her LLE is sore after a fall earlier in the week slipping on ice.       Objective: See treatment diary below      Assessment: Tolerated treatment well. Continues to be challenged with lunges, but is improving. Patient denied any inc in soreness t/o session. Cont to progress TE as tolerated per symptom irritability. Patient would benefit from continued PT      Plan: Continue per plan of care.      Precautions: history of CVA in 2023; patient does not count reps  Past Medical History:   Diagnosis Date    Anxiety     CVA (cerebral vascular accident) (HCC)     Depression     Diabetes (HCC)     Diabetes mellitus (HCC)     GERD (gastroesophageal reflux disease)     Hypertension     Stroke (HCC)     Stroke (HCC)      Past Surgical History:   Procedure Laterality Date    APPENDECTOMY      CARDIAC ELECTROPHYSIOLOGY PROCEDURE N/A 2023    Procedure: Cardiac loop recorder implant;  Surgeon: Charla Anthony DO;  Location: [facility];  Service: Cardiology    TONSILLECTOMY       SOC: 2025  FOTO:2025  POC Expiration: 2025  Daily Treatment Log:  Date 2025    Visit # 11 12 13 14    Auth         Auth exp        Manual        L Knee ROM                 There Exer 25' 25'  25' 25'    Recumb Bike for ROM  5'  5'  5'  5'    LAQ W/ add 2# 3\" 2x15 R/L  W/ add 2# 3\" 2x15 R/L  W/ add 2# 3\" 2x15 R/L  W/ add 2.5# 3\" 2x15 R/L     Std hip abd   GTB @ knee 1x10 R/L; 2x  GTB @ knee 1x10 R/L; 2x  GTB @ knee 1x10 R/L; 2x     Std hip ext   GTB @ knee 1x10 R/L VC for form; 2x  GTB @ knee 1x10 R/L; 2x  GTB @ knee 1x10 R/L; 2x     HS curl vs TB GTB " "1x15 R/L; 2x  GTB 1x15 R/L  GTB 1x15 R/L  GTB 1x15 R/L     SLR flex  1x10 R/L  1x15 R/L  1x15 R/L  1x15 R/L     Seated leg press  Uni 20# 2x15 R/L  Uni 25# 2x10 R/L  Uni 30# 2x10 R/L  Uni 30# 2x10 R/L     Slant board    10\" x5 B/L  10\" x5 R/L                     Objective measures         Pt edu        HEP   Updated and discussed      There Activ        STS        FSU         LSU     6\" step 1x10 R/L                              NMReed 15' 15'  15' 15'    Clamshells  Hooklying GTB alt iso 2x10 R/L  Hooklying GTB alt iso 2x10 R/L  Hooklying GTB alt iso 2x10 R/L  Hooklying GTB alt iso 2x10 R/L    Bridges  GTB abd 2x10  GTB abd 2x10  GTB abd 2x10  GTB abd 2x10     HR/TR 1x15 ea  1x15 ea  1x15 ea  1x15 ea    Tandem standing         Side stepping         Fwd Lunges with Uni UE support   On 6\" step focus on form 1x5  On 6\" step focus on form 1x5                     Modalities                                HEP:   Access Code: [email or web address]  URL: [email or web address]  Date: 02/24/2025  Prepared by: Lilly Yan    Program Notes  when kneeling: the better option is to half kneel with the left leg up and the Right leg on the ground.     Exercises  - Seated Long Arc Quad  - 1 x daily - 7 x weekly - 2 sets - 10 reps  - Supine Bridge  - 1 x daily - 7 x weekly - 1 sets - 10 reps  - Standing Hip Abduction with Resistance at Ankles and Counter Support  - 1 x daily - 7 x weekly - 2 sets - 10 reps  - Standing Hip Extension with Resistance at Ankles and Counter Support  - 1 x daily - 7 x weekly - 2 sets - 10 reps  - Heel Toe Raises with Counter Support  - 1 x daily - 7 x weekly - 1 sets - 10 reps  - Partial Lunge with Chair  - 1 x daily - 7 x weekly - 1 sets - 5 reps       "

## 2025-03-03 ENCOUNTER — OFFICE VISIT (OUTPATIENT)
Dept: PHYSICAL THERAPY | Facility: CLINIC | Age: 75
End: 2025-03-03
Payer: COMMERCIAL

## 2025-03-03 DIAGNOSIS — S82.142A CLOSED FRACTURE OF LEFT TIBIAL PLATEAU, INITIAL ENCOUNTER: Primary | ICD-10-CM

## 2025-03-03 DIAGNOSIS — R26.2 AMBULATORY DYSFUNCTION: ICD-10-CM

## 2025-03-03 PROCEDURE — 97110 THERAPEUTIC EXERCISES: CPT

## 2025-03-03 PROCEDURE — 97112 NEUROMUSCULAR REEDUCATION: CPT

## 2025-03-03 NOTE — PROGRESS NOTES
"Daily Note     Today's date: 3/3/2025  Patient name: Yolis Flores  : 1950  MRN: [id number]  Referring provider: Taisha Garcia PA-C  Dx:   Encounter Diagnosis     ICD-10-CM    1. Closed fracture of left tibial plateau, initial encounter  S82.142A       2. Ambulatory dysfunction  R26.2                      Subjective: pt reports that she cont to have some trouble w/ getting up from kneeling but this has improved recently. She denies pain on arrival to session.       Objective: See treatment diary below      Assessment: Tolerated treatment well. Patient would benefit from continued PT to progress strengthening as tolerated. Pt challenged w/ STS, no pain noted.       Plan: Continue per plan of care.      Precautions: history of CVA in 2023; patient does not count reps  Past Medical History:   Diagnosis Date    Anxiety     CVA (cerebral vascular accident) (HCC)     Depression     Diabetes (HCC)     Diabetes mellitus (HCC)     GERD (gastroesophageal reflux disease)     Hypertension     Stroke (HCC)     Stroke (HCC)      Past Surgical History:   Procedure Laterality Date    APPENDECTOMY      CARDIAC ELECTROPHYSIOLOGY PROCEDURE N/A 2023    Procedure: Cardiac loop recorder implant;  Surgeon: Charla Anthony DO;  Location: [facility];  Service: Cardiology    TONSILLECTOMY       SOC: 2025  FOTO:2025  POC Expiration: 2025  Daily Treatment Log:  Date 2025 2025 2025 2025 3/3/2025   Visit # 11 12 13 14 15    Auth         Auth exp        Manual        L Knee ROM                 There Exer 25' 25'  25' 25' 25'    Recumb Bike for ROM  5'  5'  5'  5' 5'    LAQ W/ add 2# 3\" 2x15 R/L  W/ add 2# 3\" 2x15 R/L  W/ add 2# 3\" 2x15 R/L  W/ add 2.5# 3\" 2x15 R/L  W/ add 3# 3\" 2x10 R/L   Std hip abd   GTB @ knee 1x10 R/L; 2x  GTB @ knee 1x10 R/L; 2x  GTB @ knee 1x10 R/L; 2x  GTB @ knee 1x10 R/L; 2x     Std hip ext   GTB @ knee 1x10 R/L VC for form; 2x  GTB @ knee 1x10 R/L; 2x  GTB @ " "knee 1x10 R/L; 2x  GTB @ knee 1x10 R/L; 2x    HS curl vs TB GTB 1x15 R/L; 2x  GTB 1x15 R/L  GTB 1x15 R/L  GTB 1x15 R/L  GTB 2x10 R/L   SLR flex  1x10 R/L  1x15 R/L  1x15 R/L  1x15 R/L     Seated leg press  Uni 20# 2x15 R/L  Uni 25# 2x10 R/L  Uni 30# 2x10 R/L  Uni 30# 2x10 R/L  Uni 35# 2x10 R/L   Slant board    10\" x5 B/L  10\" x5 R/L  10\"x5 R/L                    Objective measures         Pt edu        HEP   Updated and discussed      There Activ        STS     1x10 elevated mat    FSU      W/ knee hike 1 UE support 1x10 R/L; 2x    LSU     6\" step 1x10 R/L   6\" 1x10 R/L                            NMReed 15' 15'  15' 15' 15'    Clamshells  Hooklying GTB alt iso 2x10 R/L  Hooklying GTB alt iso 2x10 R/L  Hooklying GTB alt iso 2x10 R/L  Hooklying GTB alt iso 2x10 R/L    Bridges  GTB abd 2x10  GTB abd 2x10  GTB abd 2x10  GTB abd 2x10     HR/TR 1x15 ea  1x15 ea  1x15 ea  1x15 ea 15x ea    Tandem standing         Side stepping      GTB @ knees 3 laps at rail    Fwd Lunges with Uni UE support   On 6\" step focus on form 1x5  On 6\" step focus on form 1x5                     Modalities                                HEP:   Access Code: [email or web address]  URL: [email or web address]  Date: 02/24/2025  Prepared by: Lilly Yan    Program Notes  when kneeling: the better option is to half kneel with the left leg up and the Right leg on the ground.     Exercises  - Seated Long Arc Quad  - 1 x daily - 7 x weekly - 2 sets - 10 reps  - Supine Bridge  - 1 x daily - 7 x weekly - 1 sets - 10 reps  - Standing Hip Abduction with Resistance at Ankles and Counter Support  - 1 x daily - 7 x weekly - 2 sets - 10 reps  - Standing Hip Extension with Resistance at Ankles and Counter Support  - 1 x daily - 7 x weekly - 2 sets - 10 reps  - Heel Toe Raises with Counter Support  - 1 x daily - 7 x weekly - 1 sets - 10 reps  - Partial Lunge with Chair  - 1 x daily - 7 x weekly - 1 sets - 5 reps         "

## 2025-03-06 ENCOUNTER — RESULTS FOLLOW-UP (OUTPATIENT)
Dept: CARDIOLOGY CLINIC | Facility: CLINIC | Age: 75
End: 2025-03-06

## 2025-03-06 ENCOUNTER — APPOINTMENT (OUTPATIENT)
Dept: PHYSICAL THERAPY | Facility: CLINIC | Age: 75
End: 2025-03-06
Payer: COMMERCIAL

## 2025-03-06 ENCOUNTER — REMOTE DEVICE CLINIC VISIT (OUTPATIENT)
Dept: CARDIOLOGY CLINIC | Facility: CLINIC | Age: 75
End: 2025-03-06
Payer: COMMERCIAL

## 2025-03-06 ENCOUNTER — TELEPHONE (OUTPATIENT)
Dept: PHYSICAL THERAPY | Facility: CLINIC | Age: 75
End: 2025-03-06

## 2025-03-06 DIAGNOSIS — I63.9 CRYPTOGENIC STROKE (HCC): Primary | ICD-10-CM

## 2025-03-06 PROCEDURE — 93298 REM INTERROG DEV EVAL SCRMS: CPT | Performed by: INTERNAL MEDICINE

## 2025-03-06 NOTE — TELEPHONE ENCOUNTER
Patient called to cancel 1230 appt. No reason given confirmed next appt.    General Sunscreen Counseling: I recommended a broad spectrum sunscreen with a SPF of 30 or higher.  I explained that SPF 30 sunscreens block approximately 97 percent of the sun's harmful rays.  Sunscreens should be applied at least 15 minutes prior to expected sun exposure and then every 2 hours after that as long as sun exposure continues. If swimming or exercising sunscreen should be reapplied every 45 minutes to an hour after getting wet or sweating.  One ounce, or the equivalent of a shot glass full of sunscreen, is adequate to protect the skin not covered by a bathing suit. I also recommended a lip balm with a sunscreen as well. Sun protective clothing can be used in lieu of sunscreen but must be worn the entire time you are exposed to the sun's rays. Detail Level: Generalized

## 2025-03-06 NOTE — PROGRESS NOTES
"Results for orders placed or performed in visit on 03/06/25   Cardiac EP device report    Narrative    MDT LNQ22/ ACTIVE SYSTEM IS MRI CONDITIONAL  CARELINK TRANSMISSION: BATTERY STATUS \"OK.\" NO PATIENT OR DEVICE ACTIVATED EPISODES. PVC BURDEN 0%â€”REYES        "

## 2025-03-10 ENCOUNTER — EVALUATION (OUTPATIENT)
Dept: PHYSICAL THERAPY | Facility: CLINIC | Age: 75
End: 2025-03-10
Payer: COMMERCIAL

## 2025-03-10 DIAGNOSIS — R26.2 AMBULATORY DYSFUNCTION: ICD-10-CM

## 2025-03-10 DIAGNOSIS — S82.142A CLOSED FRACTURE OF LEFT TIBIAL PLATEAU, INITIAL ENCOUNTER: Primary | ICD-10-CM

## 2025-03-10 PROCEDURE — 97110 THERAPEUTIC EXERCISES: CPT

## 2025-03-10 NOTE — PROGRESS NOTES
PT Re-Evaluation     Today's date: 3/10/2025  Patient name: Yolis Flores  : 1950  MRN: [id number]  Referring provider: Taisha Garcia PA-C  Dx:   Encounter Diagnosis     ICD-10-CM    1. Closed fracture of left tibial plateau, initial encounter  S82.142A       2. Ambulatory dysfunction  R26.2                          Assessment  Impairments: abnormal gait, abnormal or restricted ROM, activity intolerance, impaired balance, impaired physical strength, pain with function and poor body mechanics    Assessment details: Yolis Flores is a 74 y.o. female who presents with improvements in pain at worst, LE strength,  L knee ROM, ambulation tolerance, and balance, however deficits are still present. Patient continue to demo pain with getting up and down from the floor, some gait impairments, and limitations in stair negotiation with pain with reciprocal stairs. Due to these impairments, patient has difficulty performing some ADL's, recreational activities, ambulation, stair negotiation, transfers. Patient's clinical presentation is consistent with their referring diagnosis of Closed fracture of left tibial plateau, initial encounter and Ambulatory dysfunction. Patient has been educated in home exercise program and plan of care. Patient would benefit from skilled physical therapy services to address their aforementioned functional limitations and progress towards prior level of function and independence with home exercise program.       Goals  Short Term Goals:  to be accomplished in 4 weeks   STG1. Initiate and advance HEP to maximize progress between therapy sessions---met  STG2. Reduce pain w/ WB activity to 0-1/10.---met   STG3. Improve B/L LE strength to 4/5 to allow pt to raise from sit to stand w/o UE assist.---partially met      Long Term Goals:  Target Date 12 weeks   LTG1. Pt to be Indep with HEP  to maximize progress between therapy sessions and improve functional mobility---progressing towards    LTG2. Pt will demo L knee ROM of 0-130 or greater needed for reciprocal stairs w/ handrail w/o pain --updated and met   LTG3.Pt to demo B/L LE strength of 4+/5 or better such that pt can perform reciprocal stair negotiation---progressing towards   LTG4. Pt will return to household ADLs and work duties pain free as per PLOF---progressing towards   LTG5: Pt to be able to kneel to the ground and return to standing with no more than 1/10 pain per PLOF ---progressing towards           Plan  Patient would benefit from: skilled physical therapy  Planned modality interventions: cryotherapy and thermotherapy: hydrocollator packs    Planned therapy interventions: manual therapy, neuromuscular re-education, self care, therapeutic activities, therapeutic exercise, home exercise program, patient education, balance/weight bearing training, IASTM, joint mobilization, Valladares taping, kinesiology taping, postural training, strengthening, stretching and flexibility    Frequency: 2x week  Plan of Care beginning date: 2025  Plan of Care expiration date: 5/10/2025  Treatment plan discussed with: patient  Plan details: HEP development, stretching, strengthening, A/AA/PROM, joint mobilizations, posture education, STM/MI as needed to reduce muscle tension, muscle reeducation, PLOC discussed and agreed upon with patient.            Subjective Evaluation    History of Present Illness  Mechanism of injury: Patient reports to PT with L tibial plateau fracture. Patient has noted some improvements in pain levels with kneeling with adjustments on form. Patient finds sitting on the floor and getting up is hard.  Patient reports stairs have been better but she is still doing step too pattern.  Patient has some difficulty if it is a larger amount of stairs.   Patient Goals  Patient goals for therapy: decreased pain, improved balance, increased motion, increased strength and independence with ADLs/IADLs    Pain  Current pain ratin  At  "best pain ratin  At worst pain ratin  Location: L knee  Quality: dull ache  Relieving factors: relaxation  Aggravating factors: stair climbing (kneeling, getting up from the floor)    Social Support  Steps to enter house: yes  Stairs in house: yes   16  Lives in: multiple-level home  Lives with: spouse      Diagnostic Tests  Abnormal x-ray: healing fracture.        Objective    (*=pain)      Knee AROM: deg      R  L  Extension: (QS/ SLR)  0/0  0/-2   Flexion: (sup/prone)  130  130      Strength: MMT  Hip    R  L    IR(seated)   4+/5  4/5  ER(seated)   4+/5  4/5   Flexion(sup)   4+/5  4/5(QL)      Knee    R  L    Extension(Seated)  4+/5  4+/5  Flexion(Seated)  4+/5  4+/5    Ankle    R  L   Dorsiflexion(seated)  5/5  5/5  Plantarflexion(seated)  5/5  5/5      Joint mobility:   WNL for L patella     Observation: no edema noted today     Tenderness/Palpation: no TTP at this time     Special Tests:  Valgus Stress: neg  Varus Stres: neg   McMurrary's IR of the tibia + Varus stress = lateral meniscus: neg  McMurrays ER of the tibia + Valgus stress = medial meniscus:neg  Lachman: neg   Post drawer: neg     Balance:   Tandem R posterior: 14 sec   Tandem L posterior: 9 sec      Function:   Stairs: improving control with ascending and descending w/ RLE on 6\" step with uni rail   Ambulation: improving gait pattern however still noting some decreased stance time on LLE.    Kneeling: noted improved form with half kneel and less pain, still requires some min VC.   Standing: noted increased WB into RLE after 10 min standing, able to adjust to equal WB with VC and denied pain             Precautions: history of CVA in 2023  Past Medical History:   Diagnosis Date    Anxiety     CVA (cerebral vascular accident) (HCC)     Depression     Diabetes (HCC)     Diabetes mellitus (HCC)     GERD (gastroesophageal reflux disease)     Hypertension     Stroke (HCC)     Stroke (HCC)      Past Surgical History:   Procedure Laterality " "Date    APPENDECTOMY      CARDIAC ELECTROPHYSIOLOGY PROCEDURE N/A 8/2/2023    Procedure: Cardiac loop recorder implant;  Surgeon: Charla Anthony DO;  Location: [facility];  Service: Cardiology    TONSILLECTOMY       SOC: 1/13/2025  FOTO:1/23/2025  POC Expiration: 5/10/2025  Daily Treatment Log:  Date 3/10/2025   2/27/2025 3/3/2025   Visit # 16 (RE)    14 15    Auth         Auth exp        Manual        L Knee ROM                 There Exer 40'   25' 25'    Recumb Bike for ROM     5' 5'    LAQ W/ add 3# 3\" 2x10 R/L   W/ add 2.5# 3\" 2x15 R/L  W/ add 3# 3\" 2x10 R/L   Std hip abd  GTB @ shin 1x10 R/L; 2x     GTB @ knee 1x10 R/L; 2x  GTB @ knee 1x10 R/L; 2x     Std hip ext  GTB @ knee 1x10 R/L; 2x    GTB @ knee 1x10 R/L; 2x  GTB @ knee 1x10 R/L; 2x    HS curl vs TB    GTB 1x15 R/L  GTB 2x10 R/L   SLR flex  1x10 R/L    1x15 R/L     Seated leg press  Uni 35# 2x10 R/L   Uni 30# 2x10 R/L  Uni 35# 2x10 R/L   Slant board     10\" x5 R/L  10\"x5 R/L                    Objective measures  EG       Pt edu        HEP        There Activ        STS     1x10 elevated mat    FSU      W/ knee hike 1 UE support 1x10 R/L; 2x    LSU      6\" 1x10 R/L                            NMReed    15' 15'    Clamshells  Hooklying GTB alt iso 2x10 R/L   Hooklying GTB alt iso 2x10 R/L    Bridges     GTB abd 2x10     HR/TR    1x15 ea 15x ea    Tandem standing         Side stepping      GTB @ knees 3 laps at rail    Fwd Lunges with Uni UE support    On 6\" step focus on form 1x5                     Modalities                                HEP:   Access Code: [email or web address]  URL: [email or web address]  Date: 02/24/2025  Prepared by: Lilly Yan    Program Notes  when kneeling: the better option is to half kneel with the left leg up and the Right leg on the ground.     Exercises  - Seated Long Arc Quad  - 1 x daily - 7 x weekly - 2 sets - 10 reps  - Supine Bridge  - 1 x daily - 7 x weekly - 1 sets - 10 reps  - Standing Hip Abduction with " Resistance at Ankles and Counter Support  - 1 x daily - 7 x weekly - 2 sets - 10 reps  - Standing Hip Extension with Resistance at Ankles and Counter Support  - 1 x daily - 7 x weekly - 2 sets - 10 reps  - Heel Toe Raises with Counter Support  - 1 x daily - 7 x weekly - 1 sets - 10 reps  - Partial Lunge with Chair  - 1 x daily - 7 x weekly - 1 sets - 5 reps

## 2025-03-13 ENCOUNTER — OFFICE VISIT (OUTPATIENT)
Dept: PHYSICAL THERAPY | Facility: CLINIC | Age: 75
End: 2025-03-13
Payer: COMMERCIAL

## 2025-03-13 DIAGNOSIS — S82.142A CLOSED FRACTURE OF LEFT TIBIAL PLATEAU, INITIAL ENCOUNTER: Primary | ICD-10-CM

## 2025-03-13 DIAGNOSIS — R26.2 AMBULATORY DYSFUNCTION: ICD-10-CM

## 2025-03-13 PROCEDURE — 97112 NEUROMUSCULAR REEDUCATION: CPT

## 2025-03-13 PROCEDURE — 97110 THERAPEUTIC EXERCISES: CPT

## 2025-03-13 NOTE — PROGRESS NOTES
"Daily Note     Today's date: 3/13/2025  Patient name: Yolis Flores  : 1950  MRN: [id number]  Referring provider: Taisha Garcia PA-C  Dx:   Encounter Diagnosis     ICD-10-CM    1. Closed fracture of left tibial plateau, initial encounter  S82.142A       2. Ambulatory dysfunction  R26.2                      Subjective: pt has no new complaints, overall things are going okay. Pt does report that getting up from the floor is slightly easier.       Objective: See treatment diary below      Assessment: Tolerated treatment well with some difficulty with coordinating FSU with high knee today. Required mod VC for sequence. Dem improving form with STS on low mat. Patient would benefit from continued PT      Plan: Continue per plan of care.      Precautions: history of CVA in 2023  Past Medical History:   Diagnosis Date    Anxiety     CVA (cerebral vascular accident) (HCC)     Depression     Diabetes (HCC)     Diabetes mellitus (HCC)     GERD (gastroesophageal reflux disease)     Hypertension     Stroke (HCC)     Stroke (HCC)      Past Surgical History:   Procedure Laterality Date    APPENDECTOMY      CARDIAC ELECTROPHYSIOLOGY PROCEDURE N/A 2023    Procedure: Cardiac loop recorder implant;  Surgeon: Charla Anthony DO;  Location: [facility];  Service: Cardiology    TONSILLECTOMY       SOC: 2025  FOTO:2025  POC Expiration: 5/10/2025  Daily Treatment Log:  Date 3/10/2025 3/13/2025   3/3/2025   Visit # 16 (RE)  17    15    Auth         Auth exp        Manual        L Knee ROM                 There Exer 40' 25'   25'    Recumb Bike for ROM   5'    5'    LAQ W/ add 3# 3\" 2x10 R/L W/ add 3# 3\" 2x10 R/L   W/ add 3# 3\" 2x10 R/L   Std hip abd  GTB @ shin 1x10 R/L; 2x   GTB @ shin 1x10 R/L; 2x     GTB @ knee 1x10 R/L; 2x     Std hip ext  GTB @ knee 1x10 R/L; 2x  GTB @ shin 1x10 R/L; 2x     GTB @ knee 1x10 R/L; 2x    HS curl vs TB  GTB 2x10 R/L    GTB 2x10 R/L   SLR flex  1x10 R/L  1x10 R/L     " "  Seated leg press  Uni 35# 2x10 R/L Uni 35# 2x15 R/L    Uni 35# 2x10 R/L   Slant board      10\"x5 R/L                    Objective measures  EG       Pt edu        HEP        There Activ  10'      STS  1x10 low mat   1x10 elevated mat    FSU   W/ knee hike 1 UE support 1x10 R/L   W/ knee hike 1 UE support 1x10 R/L; 2x    LSU      6\" 1x10 R/L                            NMReed  10'   15'    Clamshells  Hooklying GTB alt iso 2x10 R/L Hooklying GTB alt iso 2x10 R/L      Bridges         HR/TR  15x ea   15x ea    Tandem standing         Side stepping      GTB @ knees 3 laps at rail    Fwd Lunges with Uni UE support                        Modalities                                HEP:   Access Code: [email or web address]  URL: [email or web address]  Date: 02/24/2025  Prepared by: Lilly Yan    Program Notes  when kneeling: the better option is to half kneel with the left leg up and the Right leg on the ground.     Exercises  - Seated Long Arc Quad  - 1 x daily - 7 x weekly - 2 sets - 10 reps  - Supine Bridge  - 1 x daily - 7 x weekly - 1 sets - 10 reps  - Standing Hip Abduction with Resistance at Ankles and Counter Support  - 1 x daily - 7 x weekly - 2 sets - 10 reps  - Standing Hip Extension with Resistance at Ankles and Counter Support  - 1 x daily - 7 x weekly - 2 sets - 10 reps  - Heel Toe Raises with Counter Support  - 1 x daily - 7 x weekly - 1 sets - 10 reps  - Partial Lunge with Chair  - 1 x daily - 7 x weekly - 1 sets - 5 reps       "

## 2025-03-17 ENCOUNTER — TELEPHONE (OUTPATIENT)
Dept: PHYSICAL THERAPY | Facility: CLINIC | Age: 75
End: 2025-03-17

## 2025-03-17 ENCOUNTER — APPOINTMENT (OUTPATIENT)
Dept: PHYSICAL THERAPY | Facility: CLINIC | Age: 75
End: 2025-03-17
Payer: COMMERCIAL

## 2025-03-20 ENCOUNTER — APPOINTMENT (OUTPATIENT)
Dept: PHYSICAL THERAPY | Facility: CLINIC | Age: 75
End: 2025-03-20
Payer: COMMERCIAL

## 2025-03-24 ENCOUNTER — TELEPHONE (OUTPATIENT)
Dept: PHYSICAL THERAPY | Facility: CLINIC | Age: 75
End: 2025-03-24

## 2025-03-24 NOTE — TELEPHONE ENCOUNTER
Spoke to patients . Stated patient told him she wasn't coming to PT and thinks she may have forgot to call and cancel. PT inquired if patient wanted to cancel all appts or just today.  is unsure but will ask patient and have her call us back to let the clinic know.     Lilly Yan PT, DPT   License #  [id number]

## 2025-03-26 ENCOUNTER — TELEPHONE (OUTPATIENT)
Dept: PHYSICAL THERAPY | Facility: CLINIC | Age: 75
End: 2025-03-26

## 2025-03-26 NOTE — TELEPHONE ENCOUNTER
PT left message to inform patient her appt for 3/27 would be removed as we did not receive a call back to confirm after NS appt. Did give option to call back to reschedule appt if wanted. Requested call back by 1230p on 3/28 or future appt would also be removed.     Lilly Yan PT, DPT   License #  [id number]

## 2025-03-27 ENCOUNTER — TELEPHONE (OUTPATIENT)
Dept: PHYSICAL THERAPY | Facility: CLINIC | Age: 75
End: 2025-03-27

## 2025-03-27 ENCOUNTER — APPOINTMENT (OUTPATIENT)
Dept: PHYSICAL THERAPY | Facility: CLINIC | Age: 75
End: 2025-03-27
Payer: COMMERCIAL

## 2025-03-27 NOTE — TELEPHONE ENCOUNTER
Patients  called and stated  patient won't be coming back to PT at this time and  w/c/b to schedule if needed.     Lilly Yan PT, DPT   License #  [id number]

## 2025-03-31 ENCOUNTER — APPOINTMENT (OUTPATIENT)
Dept: PHYSICAL THERAPY | Facility: CLINIC | Age: 75
End: 2025-03-31
Payer: COMMERCIAL

## 2025-06-05 ENCOUNTER — REMOTE DEVICE CLINIC VISIT (OUTPATIENT)
Dept: CARDIOLOGY CLINIC | Facility: CLINIC | Age: 75
End: 2025-06-05
Payer: COMMERCIAL

## 2025-06-05 DIAGNOSIS — I63.9 CRYPTOGENIC STROKE (HCC): Primary | ICD-10-CM

## 2025-06-05 PROCEDURE — 93298 REM INTERROG DEV EVAL SCRMS: CPT | Performed by: INTERNAL MEDICINE

## 2025-06-05 NOTE — PROGRESS NOTES
"Results for orders placed or performed in visit on 06/05/25   Cardiac EP device report    Narrative    MDT LNQ22/ ACTIVE SYSTEM IS MRI CONDITIONAL  CARELINK TRANSMISSION: BATTERY STATUS \"OK.\" NO PATIENT OR DEVICE ACTIVATED EPISODES. â€”REYES        "

## 2025-06-06 ENCOUNTER — RESULTS FOLLOW-UP (OUTPATIENT)
Dept: CARDIOLOGY CLINIC | Facility: CLINIC | Age: 75
End: 2025-06-06

## 2025-06-12 DIAGNOSIS — E11.9 TYPE 2 DIABETES MELLITUS (HCC): ICD-10-CM
